# Patient Record
Sex: MALE | Race: WHITE
[De-identification: names, ages, dates, MRNs, and addresses within clinical notes are randomized per-mention and may not be internally consistent; named-entity substitution may affect disease eponyms.]

---

## 2017-12-13 ENCOUNTER — RX RENEWAL (OUTPATIENT)
Age: 57
End: 2017-12-13

## 2017-12-13 PROBLEM — Z00.00 ENCOUNTER FOR PREVENTIVE HEALTH EXAMINATION: Status: ACTIVE | Noted: 2017-12-13

## 2017-12-14 ENCOUNTER — RECORD ABSTRACTING (OUTPATIENT)
Age: 57
End: 2017-12-14

## 2017-12-14 DIAGNOSIS — Z83.3 FAMILY HISTORY OF DIABETES MELLITUS: ICD-10-CM

## 2017-12-14 DIAGNOSIS — Z87.442 PERSONAL HISTORY OF URINARY CALCULI: ICD-10-CM

## 2017-12-14 DIAGNOSIS — Z80.2 FAMILY HISTORY OF MALIGNANT NEOPLASM OF OTHER RESPIRATORY AND INTRATHORACIC ORGANS: ICD-10-CM

## 2017-12-14 DIAGNOSIS — Z82.49 FAMILY HISTORY OF ISCHEMIC HEART DISEASE AND OTHER DISEASES OF THE CIRCULATORY SYSTEM: ICD-10-CM

## 2017-12-14 DIAGNOSIS — Z81.8 FAMILY HISTORY OF OTHER MENTAL AND BEHAVIORAL DISORDERS: ICD-10-CM

## 2017-12-14 DIAGNOSIS — Z80.42 FAMILY HISTORY OF MALIGNANT NEOPLASM OF PROSTATE: ICD-10-CM

## 2017-12-14 DIAGNOSIS — Z83.49 FAMILY HISTORY OF OTHER ENDOCRINE, NUTRITIONAL AND METABOLIC DISEASES: ICD-10-CM

## 2017-12-18 ENCOUNTER — RX RENEWAL (OUTPATIENT)
Age: 57
End: 2017-12-18

## 2018-01-19 ENCOUNTER — APPOINTMENT (OUTPATIENT)
Dept: FAMILY MEDICINE | Facility: CLINIC | Age: 58
End: 2018-01-19
Payer: COMMERCIAL

## 2018-01-19 VITALS
WEIGHT: 190 LBS | HEIGHT: 67 IN | DIASTOLIC BLOOD PRESSURE: 94 MMHG | BODY MASS INDEX: 29.82 KG/M2 | SYSTOLIC BLOOD PRESSURE: 150 MMHG

## 2018-01-19 PROCEDURE — 99213 OFFICE O/P EST LOW 20 MIN: CPT

## 2018-01-19 RX ORDER — TRIAMTERENE AND HYDROCHLOROTHIAZIDE 37.5; 25 MG/1; MG/1
37.5-25 CAPSULE ORAL
Qty: 90 | Refills: 0 | Status: DISCONTINUED | COMMUNITY
Start: 2017-12-13 | End: 2018-01-19

## 2018-02-28 ENCOUNTER — APPOINTMENT (OUTPATIENT)
Dept: FAMILY MEDICINE | Facility: CLINIC | Age: 58
End: 2018-02-28

## 2018-03-19 ENCOUNTER — RX RENEWAL (OUTPATIENT)
Age: 58
End: 2018-03-19

## 2018-03-28 ENCOUNTER — APPOINTMENT (OUTPATIENT)
Dept: FAMILY MEDICINE | Facility: CLINIC | Age: 58
End: 2018-03-28

## 2018-05-29 ENCOUNTER — APPOINTMENT (OUTPATIENT)
Dept: FAMILY MEDICINE | Facility: CLINIC | Age: 58
End: 2018-05-29
Payer: COMMERCIAL

## 2018-05-29 VITALS
SYSTOLIC BLOOD PRESSURE: 120 MMHG | BODY MASS INDEX: 29.82 KG/M2 | DIASTOLIC BLOOD PRESSURE: 76 MMHG | HEIGHT: 67 IN | WEIGHT: 190 LBS

## 2018-05-29 PROCEDURE — 99213 OFFICE O/P EST LOW 20 MIN: CPT

## 2018-05-29 NOTE — HISTORY OF PRESENT ILLNESS
[Hypertension] : Hypertension [Checks BP Sporadically] : The patient checks ~his/her~ blood pressure sporadically [FreeTextEntry6] : BP check

## 2018-05-29 NOTE — HEALTH RISK ASSESSMENT
[No falls in past year] : Patient reported no falls in the past year [0] : 2) Feeling down, depressed, or hopeless: Not at all (0) [[id number]] : 0

## 2018-06-25 ENCOUNTER — APPOINTMENT (OUTPATIENT)
Dept: FAMILY MEDICINE | Facility: CLINIC | Age: 58
End: 2018-06-25

## 2018-09-13 ENCOUNTER — RX RENEWAL (OUTPATIENT)
Age: 58
End: 2018-09-13

## 2018-09-14 ENCOUNTER — RX RENEWAL (OUTPATIENT)
Age: 58
End: 2018-09-14

## 2018-10-05 ENCOUNTER — APPOINTMENT (OUTPATIENT)
Dept: FAMILY MEDICINE | Facility: CLINIC | Age: 58
End: 2018-10-05
Payer: COMMERCIAL

## 2018-10-05 VITALS
SYSTOLIC BLOOD PRESSURE: 130 MMHG | HEIGHT: 67 IN | BODY MASS INDEX: 29.82 KG/M2 | DIASTOLIC BLOOD PRESSURE: 90 MMHG | WEIGHT: 190 LBS

## 2018-10-05 PROCEDURE — 90686 IIV4 VACC NO PRSV 0.5 ML IM: CPT

## 2018-10-05 PROCEDURE — G0008: CPT

## 2018-10-05 PROCEDURE — 99213 OFFICE O/P EST LOW 20 MIN: CPT | Mod: 25

## 2018-10-05 NOTE — HISTORY OF PRESENT ILLNESS
[Hypertension] : Hypertension [Checks BP Regularly] : The patient checks ~his/her~ blood pressure regularly [<140/90] : Target blood pressure is <140/90 [FreeTextEntry6] : BP check and flu shot.

## 2018-12-10 ENCOUNTER — RX RENEWAL (OUTPATIENT)
Age: 58
End: 2018-12-10

## 2019-01-06 ENCOUNTER — RX RENEWAL (OUTPATIENT)
Age: 59
End: 2019-01-06

## 2019-02-08 ENCOUNTER — APPOINTMENT (OUTPATIENT)
Dept: FAMILY MEDICINE | Facility: CLINIC | Age: 59
End: 2019-02-08
Payer: COMMERCIAL

## 2019-02-08 VITALS — HEIGHT: 67 IN | WEIGHT: 190 LBS | BODY MASS INDEX: 29.82 KG/M2

## 2019-02-08 PROCEDURE — 90471 IMMUNIZATION ADMIN: CPT

## 2019-02-08 PROCEDURE — 90715 TDAP VACCINE 7 YRS/> IM: CPT

## 2019-02-08 PROCEDURE — 99213 OFFICE O/P EST LOW 20 MIN: CPT | Mod: 25

## 2019-02-08 NOTE — HISTORY OF PRESENT ILLNESS
[Hypertension] : Hypertension [Does not check BP] : The patient is not checking blood pressure [<140/90] : Target blood pressure is <140/90 [Target goal met] : BP target goal met [FreeTextEntry6] : BP check.

## 2019-05-13 ENCOUNTER — RX RENEWAL (OUTPATIENT)
Age: 59
End: 2019-05-13

## 2019-05-30 ENCOUNTER — APPOINTMENT (OUTPATIENT)
Dept: FAMILY MEDICINE | Facility: CLINIC | Age: 59
End: 2019-05-30
Payer: COMMERCIAL

## 2019-05-30 VITALS
BODY MASS INDEX: 29.35 KG/M2 | WEIGHT: 187 LBS | DIASTOLIC BLOOD PRESSURE: 84 MMHG | SYSTOLIC BLOOD PRESSURE: 128 MMHG | HEIGHT: 67 IN

## 2019-05-30 PROCEDURE — 99212 OFFICE O/P EST SF 10 MIN: CPT

## 2019-09-09 ENCOUNTER — RX RENEWAL (OUTPATIENT)
Age: 59
End: 2019-09-09

## 2019-09-21 ENCOUNTER — RX RENEWAL (OUTPATIENT)
Age: 59
End: 2019-09-21

## 2019-09-26 ENCOUNTER — APPOINTMENT (OUTPATIENT)
Dept: FAMILY MEDICINE | Facility: CLINIC | Age: 59
End: 2019-09-26
Payer: COMMERCIAL

## 2019-09-26 VITALS
BODY MASS INDEX: 29.35 KG/M2 | HEIGHT: 67 IN | SYSTOLIC BLOOD PRESSURE: 124 MMHG | DIASTOLIC BLOOD PRESSURE: 84 MMHG | WEIGHT: 187 LBS

## 2019-09-26 PROCEDURE — 99213 OFFICE O/P EST LOW 20 MIN: CPT

## 2019-09-26 NOTE — HISTORY OF PRESENT ILLNESS
[Hypertension] : Hypertension [Does not check BP] : The patient is not checking blood pressure [<130/90] : Target blood pressure is  <130/90 [Target goal met] : BP target goal met [FreeTextEntry6] : BP check.\par

## 2019-09-26 NOTE — HEALTH RISK ASSESSMENT
[No] : In the past 12 months have you used drugs other than those required for medical reasons? No [No falls in past year] : Patient reported no falls in the past year [0] : 2) Feeling down, depressed, or hopeless: Not at all (0) [] : No [Audit-CScore] : 0 [[id number]] : 0

## 2019-10-22 ENCOUNTER — APPOINTMENT (OUTPATIENT)
Dept: FAMILY MEDICINE | Facility: CLINIC | Age: 59
End: 2019-10-22
Payer: COMMERCIAL

## 2019-10-22 VITALS
DIASTOLIC BLOOD PRESSURE: 84 MMHG | WEIGHT: 187 LBS | SYSTOLIC BLOOD PRESSURE: 130 MMHG | BODY MASS INDEX: 29.35 KG/M2 | HEIGHT: 67 IN

## 2019-10-22 PROCEDURE — 99213 OFFICE O/P EST LOW 20 MIN: CPT | Mod: 25

## 2019-10-22 PROCEDURE — 90686 IIV4 VACC NO PRSV 0.5 ML IM: CPT

## 2019-10-22 PROCEDURE — G0008: CPT

## 2020-01-20 ENCOUNTER — RX RENEWAL (OUTPATIENT)
Age: 60
End: 2020-01-20

## 2020-01-24 ENCOUNTER — APPOINTMENT (OUTPATIENT)
Dept: FAMILY MEDICINE | Facility: CLINIC | Age: 60
End: 2020-01-24
Payer: COMMERCIAL

## 2020-01-24 VITALS
WEIGHT: 189 LBS | BODY MASS INDEX: 29.66 KG/M2 | SYSTOLIC BLOOD PRESSURE: 134 MMHG | DIASTOLIC BLOOD PRESSURE: 90 MMHG | OXYGEN SATURATION: 97 % | HEART RATE: 87 BPM | HEIGHT: 67 IN | TEMPERATURE: 98 F

## 2020-01-24 PROCEDURE — 99213 OFFICE O/P EST LOW 20 MIN: CPT

## 2020-01-24 NOTE — HEALTH RISK ASSESSMENT
[No] : In the past 12 months have you used drugs other than those required for medical reasons? No [No falls in past year] : Patient reported no falls in the past year [0] : 2) Feeling down, depressed, or hopeless: Not at all (0) [] : No [EOH6Lvfxw] : 0

## 2020-01-24 NOTE — HISTORY OF PRESENT ILLNESS
[Hypertension] : Hypertension [Checks BP Sporadically] : The patient checks ~his/her~ blood pressure sporadically [<140/90] : Target blood pressure is <140/90 [Near target goal] : BP near target goal

## 2020-05-04 ENCOUNTER — RX RENEWAL (OUTPATIENT)
Age: 60
End: 2020-05-04

## 2020-05-07 RX ORDER — METOPROLOL TARTRATE 100 MG/1
100 TABLET, FILM COATED ORAL
Qty: 180 | Refills: 0 | Status: DISCONTINUED | COMMUNITY
Start: 2020-05-04 | End: 2020-05-07

## 2020-05-29 ENCOUNTER — APPOINTMENT (OUTPATIENT)
Dept: FAMILY MEDICINE | Facility: CLINIC | Age: 60
End: 2020-05-29
Payer: COMMERCIAL

## 2020-05-29 PROCEDURE — 99213 OFFICE O/P EST LOW 20 MIN: CPT | Mod: 25

## 2020-05-29 PROCEDURE — 36415 COLL VENOUS BLD VENIPUNCTURE: CPT

## 2020-05-29 NOTE — HISTORY OF PRESENT ILLNESS
[Hypertension] : Hypertension [Does not check BP] : The patient is not checking blood pressure [<140/90] : Target blood pressure is <140/90 [Target goal met] : BP target goal met

## 2020-05-29 NOTE — HEALTH RISK ASSESSMENT
[] : No [No] : No [Never (0 pts)] : Never (0 points) [No falls in past year] : Patient reported no falls in the past year [0] : 2) Feeling down, depressed, or hopeless: Not at all (0) [IRM4Ebhqc] : 0

## 2020-05-30 LAB
SARS-COV-2 IGG SERPL IA-ACNC: 0.1 INDEX
SARS-COV-2 IGG SERPL QL IA: NEGATIVE

## 2020-07-31 ENCOUNTER — RX RENEWAL (OUTPATIENT)
Age: 60
End: 2020-07-31

## 2020-09-17 ENCOUNTER — APPOINTMENT (OUTPATIENT)
Dept: FAMILY MEDICINE | Facility: CLINIC | Age: 60
End: 2020-09-17
Payer: COMMERCIAL

## 2020-09-17 VITALS — DIASTOLIC BLOOD PRESSURE: 88 MMHG | SYSTOLIC BLOOD PRESSURE: 136 MMHG

## 2020-09-17 VITALS — DIASTOLIC BLOOD PRESSURE: 92 MMHG | SYSTOLIC BLOOD PRESSURE: 142 MMHG

## 2020-09-17 PROCEDURE — 90686 IIV4 VACC NO PRSV 0.5 ML IM: CPT

## 2020-09-17 PROCEDURE — 99213 OFFICE O/P EST LOW 20 MIN: CPT | Mod: 25

## 2020-09-17 PROCEDURE — G0008: CPT

## 2020-09-17 NOTE — HEALTH RISK ASSESSMENT
[] : No [No] : In the past 12 months have you used drugs other than those required for medical reasons? No [No falls in past year] : Patient reported no falls in the past year [0] : 2) Feeling down, depressed, or hopeless: Not at all (0) [JCZ2Rdehf] : 0

## 2020-09-17 NOTE — HISTORY OF PRESENT ILLNESS
[Hypertension] : Hypertension [Patient was last seen on ___] : Patient was last seen on [unfilled] [Checks BP Sporadically] : The patient checks ~his/her~ blood pressure sporadically [Near target goal] : BP near target goal [FreeTextEntry6] : No new or worsening symptoms since last visit. [<140/90] : Target blood pressure is <140/90 [de-identified] : 134/82

## 2020-09-17 NOTE — PHYSICAL EXAM
[No Abdominal Bruit] : a ~M bruit was not heard ~T in the abdomen [No Edema] : there was no peripheral edema [No Palpable Aorta] : no palpable aorta [Normal] : soft, non-tender, non-distended, no masses palpated, no HSM and normal bowel sounds

## 2020-11-20 ENCOUNTER — APPOINTMENT (OUTPATIENT)
Dept: FAMILY MEDICINE | Facility: CLINIC | Age: 60
End: 2020-11-20
Payer: COMMERCIAL

## 2020-11-20 VITALS
OXYGEN SATURATION: 98 % | DIASTOLIC BLOOD PRESSURE: 90 MMHG | TEMPERATURE: 94.9 F | SYSTOLIC BLOOD PRESSURE: 137 MMHG | HEART RATE: 78 BPM | BODY MASS INDEX: 29.66 KG/M2 | WEIGHT: 189 LBS | HEIGHT: 67 IN

## 2020-11-20 DIAGNOSIS — Z23 ENCOUNTER FOR IMMUNIZATION: ICD-10-CM

## 2020-11-20 DIAGNOSIS — H60.501 UNSPECIFIED ACUTE NONINFECTIVE OTITIS EXTERNA, RIGHT EAR: ICD-10-CM

## 2020-11-20 DIAGNOSIS — Z11.59 ENCOUNTER FOR SCREENING FOR OTHER VIRAL DISEASES: ICD-10-CM

## 2020-11-20 PROCEDURE — 99213 OFFICE O/P EST LOW 20 MIN: CPT

## 2020-11-20 NOTE — PLAN
[FreeTextEntry1] : \par Counseling provided on symptoms to monitor for. Call/return if symptoms worsen. \par Use prescribed ear drops for 7 days.

## 2020-11-20 NOTE — HISTORY OF PRESENT ILLNESS
[FreeTextEntry8] : Kala Garcia is a 60 year old male who presents with right ear pain for about 2 days. States it has improved significantly. He felt as if he had water in his ear. Then he heard a popping sensation and has been better. Denies fever/chills.

## 2020-12-11 ENCOUNTER — OUTPATIENT (OUTPATIENT)
Dept: OUTPATIENT SERVICES | Facility: HOSPITAL | Age: 60
LOS: 1 days | End: 2020-12-11
Payer: COMMERCIAL

## 2020-12-11 DIAGNOSIS — Z11.59 ENCOUNTER FOR SCREENING FOR OTHER VIRAL DISEASES: ICD-10-CM

## 2020-12-11 PROCEDURE — U0003: CPT

## 2020-12-12 ENCOUNTER — TRANSCRIPTION ENCOUNTER (OUTPATIENT)
Age: 60
End: 2020-12-12

## 2020-12-12 DIAGNOSIS — Z11.59 ENCOUNTER FOR SCREENING FOR OTHER VIRAL DISEASES: ICD-10-CM

## 2020-12-13 LAB — SARS-COV-2 RNA SPEC QL NAA+PROBE: SIGNIFICANT CHANGE UP

## 2020-12-18 ENCOUNTER — RX RENEWAL (OUTPATIENT)
Age: 60
End: 2020-12-18

## 2021-03-16 ENCOUNTER — NON-APPOINTMENT (OUTPATIENT)
Age: 61
End: 2021-03-16

## 2021-06-20 ENCOUNTER — RX RENEWAL (OUTPATIENT)
Age: 61
End: 2021-06-20

## 2021-06-23 ENCOUNTER — RX RENEWAL (OUTPATIENT)
Age: 61
End: 2021-06-23

## 2021-07-07 ENCOUNTER — APPOINTMENT (OUTPATIENT)
Dept: FAMILY MEDICINE | Facility: CLINIC | Age: 61
End: 2021-07-07
Payer: COMMERCIAL

## 2021-07-07 VITALS
SYSTOLIC BLOOD PRESSURE: 119 MMHG | TEMPERATURE: 98 F | HEART RATE: 87 BPM | WEIGHT: 189 LBS | BODY MASS INDEX: 29.66 KG/M2 | OXYGEN SATURATION: 97 % | HEIGHT: 67 IN | DIASTOLIC BLOOD PRESSURE: 79 MMHG

## 2021-07-07 PROCEDURE — 99213 OFFICE O/P EST LOW 20 MIN: CPT

## 2021-07-07 NOTE — COUNSELING
[Fall prevention counseling provided] : Fall prevention counseling provided [Behavioral health counseling provided] : Behavioral health counseling provided [Potential consequences of obesity discussed] : Potential consequences of obesity discussed [Good understanding] : Patient has a good understanding of lifestyle changes and steps needed to achieve self management goal

## 2021-07-07 NOTE — HEALTH RISK ASSESSMENT
[0-4] : 0-4 [1 or 2 (0 pts)] : 1 or 2 (0 points) [Never (0 pts)] : Never (0 points) [No] : In the past 12 months have you used drugs other than those required for medical reasons? No [No falls in past year] : Patient reported no falls in the past year [0] : 2) Feeling down, depressed, or hopeless: Not at all (0) [] : No [Audit-CScore] : 0 [IKH3Mbsoh] : 0

## 2021-10-07 ENCOUNTER — APPOINTMENT (OUTPATIENT)
Dept: FAMILY MEDICINE | Facility: CLINIC | Age: 61
End: 2021-10-07
Payer: COMMERCIAL

## 2021-10-07 VITALS
WEIGHT: 189 LBS | BODY MASS INDEX: 29.66 KG/M2 | OXYGEN SATURATION: 96 % | DIASTOLIC BLOOD PRESSURE: 89 MMHG | SYSTOLIC BLOOD PRESSURE: 148 MMHG | HEART RATE: 97 BPM | TEMPERATURE: 98.4 F | HEIGHT: 67 IN

## 2021-10-07 DIAGNOSIS — J04.0 ACUTE LARYNGITIS: ICD-10-CM

## 2021-10-07 PROCEDURE — 99213 OFFICE O/P EST LOW 20 MIN: CPT

## 2021-10-07 RX ORDER — BENZONATATE 100 MG/1
100 CAPSULE ORAL EVERY 8 HOURS
Qty: 40 | Refills: 0 | Status: COMPLETED | COMMUNITY
Start: 2021-10-07 | End: 2021-10-14

## 2021-10-15 NOTE — PHYSICAL EXAM
[Normal] : affect was normal and insight and judgment were intact [de-identified] : errythematous oropharynx

## 2021-10-15 NOTE — PLAN
[FreeTextEntry1] : Symptomatic management for now. May try lozenges, warm teas, salt/water gargles.\par Return sooner if needed.

## 2021-10-15 NOTE — HISTORY OF PRESENT ILLNESS
[FreeTextEntry8] : Patient presenting for symptoms of sore throat.\par Denies fevers/chills, shortness of breath. Denies taking any OTC pain med. Has taken cough drops.

## 2021-11-16 ENCOUNTER — APPOINTMENT (OUTPATIENT)
Dept: FAMILY MEDICINE | Facility: CLINIC | Age: 61
End: 2021-11-16
Payer: COMMERCIAL

## 2021-11-16 VITALS
OXYGEN SATURATION: 97 % | DIASTOLIC BLOOD PRESSURE: 80 MMHG | WEIGHT: 189 LBS | SYSTOLIC BLOOD PRESSURE: 144 MMHG | HEIGHT: 67 IN | BODY MASS INDEX: 29.66 KG/M2 | HEART RATE: 95 BPM | TEMPERATURE: 97.9 F

## 2021-11-16 PROCEDURE — 99213 OFFICE O/P EST LOW 20 MIN: CPT | Mod: 25

## 2021-11-16 PROCEDURE — 90686 IIV4 VACC NO PRSV 0.5 ML IM: CPT

## 2021-11-16 PROCEDURE — G0008: CPT

## 2021-11-16 NOTE — HISTORY OF PRESENT ILLNESS
[Hypertension] : Hypertension [FreeTextEntry6] : Influenza vaccine\par Still with some laryngitis but much better.  No fever or chills. No cough. ONly tickle to back of throat.

## 2022-01-21 ENCOUNTER — RX RENEWAL (OUTPATIENT)
Age: 62
End: 2022-01-21

## 2022-02-17 ENCOUNTER — APPOINTMENT (OUTPATIENT)
Dept: FAMILY MEDICINE | Facility: CLINIC | Age: 62
End: 2022-02-17

## 2022-03-05 ENCOUNTER — RX RENEWAL (OUTPATIENT)
Age: 62
End: 2022-03-05

## 2022-03-06 ENCOUNTER — NON-APPOINTMENT (OUTPATIENT)
Age: 62
End: 2022-03-06

## 2022-04-11 PROBLEM — Z11.59 SCREENING FOR VIRAL DISEASE: Status: RESOLVED | Noted: 2020-05-29 | Resolved: 2020-11-20

## 2022-06-12 ENCOUNTER — RX RENEWAL (OUTPATIENT)
Age: 62
End: 2022-06-12

## 2022-06-24 ENCOUNTER — APPOINTMENT (OUTPATIENT)
Dept: FAMILY MEDICINE | Facility: CLINIC | Age: 62
End: 2022-06-24
Payer: COMMERCIAL

## 2022-06-24 ENCOUNTER — RX RENEWAL (OUTPATIENT)
Age: 62
End: 2022-06-24

## 2022-06-24 VITALS — DIASTOLIC BLOOD PRESSURE: 32 MMHG | SYSTOLIC BLOOD PRESSURE: 118 MMHG

## 2022-06-24 PROCEDURE — 99213 OFFICE O/P EST LOW 20 MIN: CPT

## 2022-06-24 NOTE — HISTORY OF PRESENT ILLNESS
[Hypertension] : Hypertension [Checks BP Sporadically] : The patient checks ~his/her~ blood pressure sporadically [<140/90] : Target blood pressure is <140/90 [Target goal met] : BP target goal met

## 2022-09-24 ENCOUNTER — RX RENEWAL (OUTPATIENT)
Age: 62
End: 2022-09-24

## 2022-11-22 ENCOUNTER — APPOINTMENT (OUTPATIENT)
Dept: FAMILY MEDICINE | Facility: CLINIC | Age: 62
End: 2022-11-22

## 2022-11-22 VITALS
WEIGHT: 189 LBS | BODY MASS INDEX: 29.66 KG/M2 | HEIGHT: 67 IN | TEMPERATURE: 98.1 F | SYSTOLIC BLOOD PRESSURE: 118 MMHG | OXYGEN SATURATION: 97 % | HEART RATE: 81 BPM | DIASTOLIC BLOOD PRESSURE: 70 MMHG

## 2022-11-22 PROCEDURE — 99214 OFFICE O/P EST MOD 30 MIN: CPT

## 2022-11-22 NOTE — PHYSICAL EXAM
[Normal] : no joint swelling and grossly normal strength and tone [de-identified] : ulceration to right nasal septum

## 2022-11-22 NOTE — HEALTH RISK ASSESSMENT
[Never] : Never [0-4] : 0-4 [1 or 2 (0 pts)] : 1 or 2 (0 points) [Never (0 pts)] : Never (0 points) [No] : In the past 12 months have you used drugs other than those required for medical reasons? No [No falls in past year] : Patient reported no falls in the past year [0] : 2) Feeling down, depressed, or hopeless: Not at all (0) [PHQ-2 Negative - No further assessment needed] : PHQ-2 Negative - No further assessment needed [Audit-CScore] : 0 [[id number]] : 0

## 2022-12-05 ENCOUNTER — RX RENEWAL (OUTPATIENT)
Age: 62
End: 2022-12-05

## 2022-12-25 ENCOUNTER — RX RENEWAL (OUTPATIENT)
Age: 62
End: 2022-12-25

## 2023-01-24 ENCOUNTER — APPOINTMENT (OUTPATIENT)
Dept: OTOLARYNGOLOGY | Facility: CLINIC | Age: 63
End: 2023-01-24
Payer: COMMERCIAL

## 2023-01-24 VITALS — BODY MASS INDEX: 26.68 KG/M2 | TEMPERATURE: 97.3 F | HEIGHT: 67 IN | WEIGHT: 170 LBS

## 2023-01-24 DIAGNOSIS — J34.0 ABSCESS, FURUNCLE AND CARBUNCLE OF NOSE: ICD-10-CM

## 2023-01-24 PROCEDURE — 31231 NASAL ENDOSCOPY DX: CPT

## 2023-01-24 PROCEDURE — 99204 OFFICE O/P NEW MOD 45 MIN: CPT | Mod: 25

## 2023-01-24 NOTE — HISTORY OF PRESENT ILLNESS
[de-identified] : co scabbing inside nose\par question of perf symptoms started 12 y ago\par no excessive drip\par no obstruction\par co lump sensation throat past 16 mo\par can regurgitate food, pressure in thoat\par hx gerd in past but not now

## 2023-01-24 NOTE — ASSESSMENT
[FreeTextEntry1] : rt anterior septal ulceration, no septal perf\par rec bacitracin ointment as directed tid\par exam larynx hypopharynx unremarkable\par ?reflux\par ro Zenkers-ba swallow
Statement Selected

## 2023-01-24 NOTE — CONSULT LETTER
[Consult Letter:] : I had the pleasure of evaluating your patient, [unfilled]. [Please see my note below.] : Please see my note below. [Consult Closing:] : Thank you very much for allowing me to participate in the care of this patient.  If you have any questions, please do not hesitate to contact me. [Sincerely,] : Sincerely, [FreeTextEntry1] : Dear Dr. ANTONELLA LACEY,\par \par Thank you for your kind referral. Please refer to my enclosed office notes for TJ HAYWOOD . If there are any questions free to contact me.\par  [FreeTextEntry3] : Bunny Balderas MD, FACS\par

## 2023-01-24 NOTE — PROCEDURE
[FreeTextEntry6] : Indication:  Unable to adequately examine nasal passages and sinus drainage with anterior rhinoscopy.\par The patient has chronic rt nasal crusting\par \par Scope # 44\par Mild septal deviation is present on direct visualization on either side.\par superficial ulceration along rt anterior septum no exposed cartilage\par Both inferior nasal turbinates are moderate in size with normal  appearing mucosa. \par The sinus endoscope was introduced into the right nares\par exam right middle meatus reveals no mucopus, polyps or inflammation.  The middle turbinate is unremarkable.\par The scope was advanced and the sphenoethmoid region was inspected.\par The superior meatus and nasal vault are unremarkable.  The nasopharynx is unremarkable without inflammation or mass\par The sinus endoscope was introduced into the left nares\par exam of the left middle meatus reveals no mucopus, polyps or inflammation and the left middle turbinate is unremarkable.\par The scope was advanced and the sphenoethmoid region was inspected.\par The left superior meatus and nasal vault are unremarkable.\par The fiberoptic scope was introduced to the posterior pharynx and exam of the endolarynx is wnl w good vocal cord mobility.\par No lesion noted in hypopharynx.  There is no erythema or edema of the posterior larynx to suggest reflux.\par

## 2023-01-24 NOTE — REASON FOR VISIT
[Initial Consultation] : an initial consultation for [FreeTextEntry2] : septal  perforation     esophagus

## 2023-01-30 ENCOUNTER — NON-APPOINTMENT (OUTPATIENT)
Age: 63
End: 2023-01-30

## 2023-01-31 ENCOUNTER — NON-APPOINTMENT (OUTPATIENT)
Age: 63
End: 2023-01-31

## 2023-02-06 ENCOUNTER — RESULT REVIEW (OUTPATIENT)
Age: 63
End: 2023-02-06

## 2023-02-06 ENCOUNTER — OUTPATIENT (OUTPATIENT)
Dept: OUTPATIENT SERVICES | Facility: HOSPITAL | Age: 63
LOS: 1 days | End: 2023-02-06
Payer: COMMERCIAL

## 2023-02-06 DIAGNOSIS — R13.12 DYSPHAGIA, OROPHARYNGEAL PHASE: ICD-10-CM

## 2023-02-06 DIAGNOSIS — J31.0 CHRONIC RHINITIS: ICD-10-CM

## 2023-02-06 PROCEDURE — 74220 X-RAY XM ESOPHAGUS 1CNTRST: CPT

## 2023-02-06 PROCEDURE — 74220 X-RAY XM ESOPHAGUS 1CNTRST: CPT | Mod: 26

## 2023-02-07 DIAGNOSIS — R13.12 DYSPHAGIA, OROPHARYNGEAL PHASE: ICD-10-CM

## 2023-02-07 DIAGNOSIS — J31.0 CHRONIC RHINITIS: ICD-10-CM

## 2023-02-08 ENCOUNTER — NON-APPOINTMENT (OUTPATIENT)
Age: 63
End: 2023-02-08

## 2023-02-09 ENCOUNTER — APPOINTMENT (OUTPATIENT)
Dept: GASTROENTEROLOGY | Facility: CLINIC | Age: 63
End: 2023-02-09
Payer: COMMERCIAL

## 2023-02-09 VITALS
HEART RATE: 87 BPM | OXYGEN SATURATION: 100 % | BODY MASS INDEX: 26.68 KG/M2 | WEIGHT: 170 LBS | DIASTOLIC BLOOD PRESSURE: 87 MMHG | SYSTOLIC BLOOD PRESSURE: 138 MMHG | HEIGHT: 67 IN | RESPIRATION RATE: 87 BRPM

## 2023-02-09 PROCEDURE — 99204 OFFICE O/P NEW MOD 45 MIN: CPT

## 2023-02-09 NOTE — HISTORY OF PRESENT ILLNESS
[FreeTextEntry1] : Dave Garcia is a 62 year old male no PMH presents today as initial consult for dysphagia. Pt notes has been going on intermittently since 2021, however notes over the last month has become severe to the point where he can no longer eat solid foods. pt was undergoing workup with Dr. Balderas, noted esophageal obstructions on XR esophagram. Pt admits that over the last 2 months has lost about 30 pounds, has had to regurgitate food because he can't get it to go down. Notes he had reflux most of his adult life until about 2 years ago when it suddenly improved. Has never had previous EGD. Denies any bowel complaints.

## 2023-02-09 NOTE — REVIEW OF SYSTEMS
[As Noted in HPI] : as noted in HPI [Abdominal Pain] : no abdominal pain [Vomiting] : vomiting [Constipation] : no constipation [Diarrhea] : no diarrhea [Heartburn] : heartburn [Melena (black stool)] : no melena [Bleeding] : no bleeding [Fecal Incontinence (soiling)] : no fecal incontinence [Bloating (gassiness)] : no bloating [Negative] : Heme/Lymph

## 2023-02-09 NOTE — PHYSICAL EXAM
[Alert] : alert [Underweight (BMI <= 18.5)] : underweight (BMI <= 18.5) [Sclera] : the sclera and conjunctiva were normal [Bowel Sounds] : normal bowel sounds [Abdomen Tenderness] : non-tender [Abdomen Soft] : soft [Abnormal Walk] : normal gait [Normal Color / Pigmentation] : normal skin color and pigmentation [Oriented To Time, Place, And Person] : oriented to person, place, and time

## 2023-02-09 NOTE — ASSESSMENT
[FreeTextEntry1] : Plan:\par Discussed at length findings of XR esophagram indicating obstruction, highly suspicious for malignancy. recommend EGD ASAP with possible stent placement to relieve symptoms. Risks versus benefits as well as instructions reviewed, pt agrees to planned procedure. Discussed at length importance of liquid diet until then to prevent acute obstruction. Pt and wife at bedside express understanding, all questions answered. Discussed with Dr. Briceño. I have spent 45 minutes on this encounter.

## 2023-02-14 ENCOUNTER — OUTPATIENT (OUTPATIENT)
Dept: OUTPATIENT SERVICES | Facility: HOSPITAL | Age: 63
LOS: 1 days | End: 2023-02-14
Payer: COMMERCIAL

## 2023-02-14 VITALS
HEART RATE: 83 BPM | RESPIRATION RATE: 16 BRPM | SYSTOLIC BLOOD PRESSURE: 131 MMHG | HEIGHT: 67 IN | TEMPERATURE: 98 F | DIASTOLIC BLOOD PRESSURE: 68 MMHG | OXYGEN SATURATION: 100 % | WEIGHT: 154.1 LBS

## 2023-02-14 DIAGNOSIS — Z90.49 ACQUIRED ABSENCE OF OTHER SPECIFIED PARTS OF DIGESTIVE TRACT: Chronic | ICD-10-CM

## 2023-02-14 DIAGNOSIS — K22.89 OTHER SPECIFIED DISEASE OF ESOPHAGUS: ICD-10-CM

## 2023-02-14 DIAGNOSIS — Z98.890 OTHER SPECIFIED POSTPROCEDURAL STATES: Chronic | ICD-10-CM

## 2023-02-14 LAB
APTT BLD: 30.7 SEC — SIGNIFICANT CHANGE UP (ref 27.5–35.5)
BASOPHILS # BLD AUTO: 0.04 K/UL — SIGNIFICANT CHANGE UP (ref 0–0.2)
BASOPHILS NFR BLD AUTO: 0.7 % — SIGNIFICANT CHANGE UP (ref 0–2)
EOSINOPHIL # BLD AUTO: 0.07 K/UL — SIGNIFICANT CHANGE UP (ref 0–0.5)
EOSINOPHIL NFR BLD AUTO: 1.2 % — SIGNIFICANT CHANGE UP (ref 0–6)
HCT VFR BLD CALC: 35.7 % — LOW (ref 39–50)
HGB BLD-MCNC: 11.3 G/DL — LOW (ref 13–17)
IMM GRANULOCYTES NFR BLD AUTO: 0.3 % — SIGNIFICANT CHANGE UP (ref 0–0.9)
INR BLD: 1.1 RATIO — SIGNIFICANT CHANGE UP (ref 0.88–1.16)
LYMPHOCYTES # BLD AUTO: 1.51 K/UL — SIGNIFICANT CHANGE UP (ref 1–3.3)
LYMPHOCYTES # BLD AUTO: 24.8 % — SIGNIFICANT CHANGE UP (ref 13–44)
MCHC RBC-ENTMCNC: 26.5 PG — LOW (ref 27–34)
MCHC RBC-ENTMCNC: 31.7 GM/DL — LOW (ref 32–36)
MCV RBC AUTO: 83.6 FL — SIGNIFICANT CHANGE UP (ref 80–100)
MONOCYTES # BLD AUTO: 0.47 K/UL — SIGNIFICANT CHANGE UP (ref 0–0.9)
MONOCYTES NFR BLD AUTO: 7.7 % — SIGNIFICANT CHANGE UP (ref 2–14)
NEUTROPHILS # BLD AUTO: 3.97 K/UL — SIGNIFICANT CHANGE UP (ref 1.8–7.4)
NEUTROPHILS NFR BLD AUTO: 65.3 % — SIGNIFICANT CHANGE UP (ref 43–77)
PLATELET # BLD AUTO: 170 K/UL — SIGNIFICANT CHANGE UP (ref 150–400)
PROTHROM AB SERPL-ACNC: 12.8 SEC — SIGNIFICANT CHANGE UP (ref 10.5–13.4)
RBC # BLD: 4.27 M/UL — SIGNIFICANT CHANGE UP (ref 4.2–5.8)
RBC # FLD: 13.2 % — SIGNIFICANT CHANGE UP (ref 10.3–14.5)
SARS-COV-2 RNA SPEC QL NAA+PROBE: SIGNIFICANT CHANGE UP
WBC # BLD: 6.08 K/UL — SIGNIFICANT CHANGE UP (ref 3.8–10.5)
WBC # FLD AUTO: 6.08 K/UL — SIGNIFICANT CHANGE UP (ref 3.8–10.5)

## 2023-02-14 PROCEDURE — U0003: CPT

## 2023-02-14 PROCEDURE — 93010 ELECTROCARDIOGRAM REPORT: CPT

## 2023-02-14 PROCEDURE — 99214 OFFICE O/P EST MOD 30 MIN: CPT | Mod: 25

## 2023-02-14 PROCEDURE — 85730 THROMBOPLASTIN TIME PARTIAL: CPT

## 2023-02-14 PROCEDURE — 85610 PROTHROMBIN TIME: CPT

## 2023-02-14 PROCEDURE — U0005: CPT

## 2023-02-14 PROCEDURE — 85025 COMPLETE CBC W/AUTO DIFF WBC: CPT

## 2023-02-14 PROCEDURE — 36415 COLL VENOUS BLD VENIPUNCTURE: CPT

## 2023-02-14 PROCEDURE — 93005 ELECTROCARDIOGRAM TRACING: CPT

## 2023-02-14 NOTE — H&P PST ADULT - PROBLEM SELECTOR PLAN 1
1. Expect a call from Endoscopy the day before your procedure between 11am and 3pm.  2. Follow the GI doctor's instructions for preparation  and day before procedure activities.  3. Follow the GI doctor's  instructions for medications.   4. Covid swab done today in PST

## 2023-02-14 NOTE — H&P PST ADULT - ASSESSMENT
61 y/o male with hx of HTn presents to [facility] for scheduled endoscopy with stent placement on 2/15/23.

## 2023-02-14 NOTE — H&P PST ADULT - HISTORY OF PRESENT ILLNESS
61 y/o male with hx of HTn presents to [facility] for scheduled endoscopy with stent placement on 2/15/23. Patient with dysphagia that has worsen over the last 8 months seen by ENT and GI diagnostic testing done and mass noted.

## 2023-02-15 ENCOUNTER — RESULT REVIEW (OUTPATIENT)
Age: 63
End: 2023-02-15

## 2023-02-15 ENCOUNTER — OUTPATIENT (OUTPATIENT)
Dept: INPATIENT UNIT | Facility: HOSPITAL | Age: 63
LOS: 1 days | Discharge: HOME | End: 2023-02-15
Payer: COMMERCIAL

## 2023-02-15 ENCOUNTER — APPOINTMENT (OUTPATIENT)
Dept: GASTROENTEROLOGY | Facility: HOSPITAL | Age: 63
End: 2023-02-15

## 2023-02-15 ENCOUNTER — TRANSCRIPTION ENCOUNTER (OUTPATIENT)
Age: 63
End: 2023-02-15

## 2023-02-15 VITALS
SYSTOLIC BLOOD PRESSURE: 130 MMHG | TEMPERATURE: 98 F | RESPIRATION RATE: 16 BRPM | OXYGEN SATURATION: 99 % | HEART RATE: 74 BPM | DIASTOLIC BLOOD PRESSURE: 78 MMHG

## 2023-02-15 VITALS
TEMPERATURE: 98 F | RESPIRATION RATE: 16 BRPM | WEIGHT: 154.1 LBS | HEART RATE: 77 BPM | SYSTOLIC BLOOD PRESSURE: 123 MMHG | DIASTOLIC BLOOD PRESSURE: 66 MMHG | HEIGHT: 67 IN | OXYGEN SATURATION: 100 %

## 2023-02-15 DIAGNOSIS — K22.89 OTHER SPECIFIED DISEASE OF ESOPHAGUS: ICD-10-CM

## 2023-02-15 DIAGNOSIS — Z98.890 OTHER SPECIFIED POSTPROCEDURAL STATES: Chronic | ICD-10-CM

## 2023-02-15 DIAGNOSIS — Z90.49 ACQUIRED ABSENCE OF OTHER SPECIFIED PARTS OF DIGESTIVE TRACT: Chronic | ICD-10-CM

## 2023-02-15 LAB
ANION GAP SERPL CALC-SCNC: 6 MMOL/L — SIGNIFICANT CHANGE UP (ref 5–17)
BUN SERPL-MCNC: 14 MG/DL — SIGNIFICANT CHANGE UP (ref 7–23)
CALCIUM SERPL-MCNC: 9 MG/DL — SIGNIFICANT CHANGE UP (ref 8.5–10.1)
CHLORIDE SERPL-SCNC: 103 MMOL/L — SIGNIFICANT CHANGE UP (ref 96–108)
CO2 SERPL-SCNC: 29 MMOL/L — SIGNIFICANT CHANGE UP (ref 22–31)
CREAT SERPL-MCNC: 0.84 MG/DL — SIGNIFICANT CHANGE UP (ref 0.5–1.3)
EGFR: 98 ML/MIN/1.73M2 — SIGNIFICANT CHANGE UP
GLUCOSE SERPL-MCNC: 168 MG/DL — HIGH (ref 70–99)
POTASSIUM SERPL-MCNC: 3.5 MMOL/L — SIGNIFICANT CHANGE UP (ref 3.5–5.3)
POTASSIUM SERPL-SCNC: 3.5 MMOL/L — SIGNIFICANT CHANGE UP (ref 3.5–5.3)
SODIUM SERPL-SCNC: 138 MMOL/L — SIGNIFICANT CHANGE UP (ref 135–145)

## 2023-02-15 PROCEDURE — 88342 IMHCHEM/IMCYTCHM 1ST ANTB: CPT | Mod: 26,59

## 2023-02-15 PROCEDURE — C1769: CPT

## 2023-02-15 PROCEDURE — 88341 IMHCHEM/IMCYTCHM EA ADD ANTB: CPT

## 2023-02-15 PROCEDURE — 76000 FLUOROSCOPY <1 HR PHYS/QHP: CPT

## 2023-02-15 PROCEDURE — 43239 EGD BIOPSY SINGLE/MULTIPLE: CPT | Mod: 59

## 2023-02-15 PROCEDURE — 43259 EGD US EXAM DUODENUM/JEJUNUM: CPT

## 2023-02-15 PROCEDURE — 88360 TUMOR IMMUNOHISTOCHEM/MANUAL: CPT

## 2023-02-15 PROCEDURE — 88313 SPECIAL STAINS GROUP 2: CPT | Mod: 26

## 2023-02-15 PROCEDURE — 36415 COLL VENOUS BLD VENIPUNCTURE: CPT

## 2023-02-15 PROCEDURE — 88313 SPECIAL STAINS GROUP 2: CPT

## 2023-02-15 PROCEDURE — 88360 TUMOR IMMUNOHISTOCHEM/MANUAL: CPT | Mod: 26

## 2023-02-15 PROCEDURE — 43266 EGD ENDOSCOPIC STENT PLACE: CPT

## 2023-02-15 PROCEDURE — 88305 TISSUE EXAM BY PATHOLOGIST: CPT | Mod: 26

## 2023-02-15 PROCEDURE — 88305 TISSUE EXAM BY PATHOLOGIST: CPT

## 2023-02-15 PROCEDURE — C1874: CPT

## 2023-02-15 PROCEDURE — 80048 BASIC METABOLIC PNL TOTAL CA: CPT

## 2023-02-15 PROCEDURE — 88342 IMHCHEM/IMCYTCHM 1ST ANTB: CPT

## 2023-02-15 PROCEDURE — 88341 IMHCHEM/IMCYTCHM EA ADD ANTB: CPT | Mod: 26,59

## 2023-02-15 RX ORDER — SODIUM CHLORIDE 9 MG/ML
1000 INJECTION, SOLUTION INTRAVENOUS
Refills: 0 | Status: DISCONTINUED | OUTPATIENT
Start: 2023-02-15 | End: 2023-02-15

## 2023-02-15 RX ORDER — ONDANSETRON 8 MG/1
4 TABLET, FILM COATED ORAL ONCE
Refills: 0 | Status: DISCONTINUED | OUTPATIENT
Start: 2023-02-15 | End: 2023-02-15

## 2023-02-15 RX ORDER — HYDROMORPHONE HYDROCHLORIDE 2 MG/ML
0.25 INJECTION INTRAMUSCULAR; INTRAVENOUS; SUBCUTANEOUS
Refills: 0 | Status: DISCONTINUED | OUTPATIENT
Start: 2023-02-15 | End: 2023-02-15

## 2023-02-15 RX ORDER — ACETAMINOPHEN 500 MG
1000 TABLET ORAL ONCE
Refills: 0 | Status: DISCONTINUED | OUTPATIENT
Start: 2023-02-15 | End: 2023-02-15

## 2023-02-15 RX ORDER — FENTANYL CITRATE 50 UG/ML
25 INJECTION INTRAVENOUS
Refills: 0 | Status: DISCONTINUED | OUTPATIENT
Start: 2023-02-15 | End: 2023-02-15

## 2023-02-15 NOTE — ASU PATIENT PROFILE, ADULT - FALL HARM RISK - UNIVERSAL INTERVENTIONS
Bed in lowest position, wheels locked, appropriate side rails in place/Call bell, personal items and telephone in reach/Instruct patient to call for assistance before getting out of bed or chair/Non-slip footwear when patient is out of bed/[place] to call system/Physically safe environment - no spills, clutter or unnecessary equipment/Purposeful Proactive Rounding/Room/bathroom lighting operational, light cord in reach

## 2023-02-17 ENCOUNTER — APPOINTMENT (OUTPATIENT)
Dept: FAMILY MEDICINE | Facility: CLINIC | Age: 63
End: 2023-02-17

## 2023-02-21 LAB — SURGICAL PATHOLOGY STUDY: SIGNIFICANT CHANGE UP

## 2023-02-22 DIAGNOSIS — I10 ESSENTIAL (PRIMARY) HYPERTENSION: ICD-10-CM

## 2023-02-22 DIAGNOSIS — R13.10 DYSPHAGIA, UNSPECIFIED: ICD-10-CM

## 2023-02-22 DIAGNOSIS — Z90.49 ACQUIRED ABSENCE OF OTHER SPECIFIED PARTS OF DIGESTIVE TRACT: ICD-10-CM

## 2023-02-22 DIAGNOSIS — C15.9 MALIGNANT NEOPLASM OF ESOPHAGUS, UNSPECIFIED: ICD-10-CM

## 2023-02-22 PROBLEM — K22.89 OTHER SPECIFIED DISEASE OF ESOPHAGUS: Chronic | Status: ACTIVE | Noted: 2023-02-14

## 2023-02-24 ENCOUNTER — OUTPATIENT (OUTPATIENT)
Dept: OUTPATIENT SERVICES | Facility: HOSPITAL | Age: 63
LOS: 1 days | Discharge: HOME | End: 2023-02-24

## 2023-02-24 DIAGNOSIS — C15.9 MALIGNANT NEOPLASM OF ESOPHAGUS, UNSPECIFIED: ICD-10-CM

## 2023-02-24 DIAGNOSIS — Z90.49 ACQUIRED ABSENCE OF OTHER SPECIFIED PARTS OF DIGESTIVE TRACT: Chronic | ICD-10-CM

## 2023-02-24 DIAGNOSIS — Z98.890 OTHER SPECIFIED POSTPROCEDURAL STATES: Chronic | ICD-10-CM

## 2023-02-27 ENCOUNTER — APPOINTMENT (OUTPATIENT)
Dept: CT IMAGING | Facility: CLINIC | Age: 63
End: 2023-02-27

## 2023-02-27 ENCOUNTER — NON-APPOINTMENT (OUTPATIENT)
Age: 63
End: 2023-02-27

## 2023-02-27 ENCOUNTER — OUTPATIENT (OUTPATIENT)
Dept: OUTPATIENT SERVICES | Facility: HOSPITAL | Age: 63
LOS: 1 days | End: 2023-02-27
Payer: COMMERCIAL

## 2023-02-27 DIAGNOSIS — Z98.890 OTHER SPECIFIED POSTPROCEDURAL STATES: Chronic | ICD-10-CM

## 2023-02-27 DIAGNOSIS — R13.12 DYSPHAGIA, OROPHARYNGEAL PHASE: ICD-10-CM

## 2023-02-27 DIAGNOSIS — Z90.49 ACQUIRED ABSENCE OF OTHER SPECIFIED PARTS OF DIGESTIVE TRACT: Chronic | ICD-10-CM

## 2023-02-27 PROCEDURE — 71260 CT THORAX DX C+: CPT

## 2023-02-27 PROCEDURE — 74177 CT ABD & PELVIS W/CONTRAST: CPT

## 2023-02-28 ENCOUNTER — NON-APPOINTMENT (OUTPATIENT)
Age: 63
End: 2023-02-28

## 2023-03-02 ENCOUNTER — RESULT REVIEW (OUTPATIENT)
Age: 63
End: 2023-03-02

## 2023-03-02 ENCOUNTER — NON-APPOINTMENT (OUTPATIENT)
Age: 63
End: 2023-03-02

## 2023-03-02 ENCOUNTER — APPOINTMENT (OUTPATIENT)
Dept: HEMATOLOGY ONCOLOGY | Facility: CLINIC | Age: 63
End: 2023-03-02
Payer: COMMERCIAL

## 2023-03-02 ENCOUNTER — APPOINTMENT (OUTPATIENT)
Dept: SURGICAL ONCOLOGY | Facility: CLINIC | Age: 63
End: 2023-03-02
Payer: COMMERCIAL

## 2023-03-02 VITALS
TEMPERATURE: 98 F | RESPIRATION RATE: 18 BRPM | OXYGEN SATURATION: 98 % | BODY MASS INDEX: 24.37 KG/M2 | DIASTOLIC BLOOD PRESSURE: 84 MMHG | HEIGHT: 67 IN | WEIGHT: 155.25 LBS | SYSTOLIC BLOOD PRESSURE: 130 MMHG | HEART RATE: 82 BPM

## 2023-03-02 VITALS
HEART RATE: 87 BPM | BODY MASS INDEX: 24.17 KG/M2 | HEIGHT: 67 IN | WEIGHT: 154 LBS | RESPIRATION RATE: 18 BRPM | OXYGEN SATURATION: 100 % | SYSTOLIC BLOOD PRESSURE: 122 MMHG | DIASTOLIC BLOOD PRESSURE: 74 MMHG

## 2023-03-02 LAB
ALBUMIN SERPL ELPH-MCNC: 4 G/DL — SIGNIFICANT CHANGE UP (ref 3.3–5)
ALP SERPL-CCNC: 138 U/L — HIGH (ref 40–120)
ALT FLD-CCNC: 15 U/L — SIGNIFICANT CHANGE UP (ref 10–45)
ANION GAP SERPL CALC-SCNC: 11 MMOL/L — SIGNIFICANT CHANGE UP (ref 5–17)
AST SERPL-CCNC: 13 U/L — SIGNIFICANT CHANGE UP (ref 10–40)
BASOPHILS # BLD AUTO: 0.03 K/UL — SIGNIFICANT CHANGE UP (ref 0–0.2)
BASOPHILS NFR BLD AUTO: 0.4 % — SIGNIFICANT CHANGE UP (ref 0–2)
BILIRUB SERPL-MCNC: 0.2 MG/DL — SIGNIFICANT CHANGE UP (ref 0.2–1.2)
BUN SERPL-MCNC: 10 MG/DL — SIGNIFICANT CHANGE UP (ref 7–23)
CALCIUM SERPL-MCNC: 9.6 MG/DL — SIGNIFICANT CHANGE UP (ref 8.4–10.5)
CHLORIDE SERPL-SCNC: 97 MMOL/L — SIGNIFICANT CHANGE UP (ref 96–108)
CO2 SERPL-SCNC: 30 MMOL/L — SIGNIFICANT CHANGE UP (ref 22–31)
CREAT SERPL-MCNC: 0.69 MG/DL — SIGNIFICANT CHANGE UP (ref 0.5–1.3)
EGFR: 104 ML/MIN/1.73M2 — SIGNIFICANT CHANGE UP
EOSINOPHIL # BLD AUTO: 0.13 K/UL — SIGNIFICANT CHANGE UP (ref 0–0.5)
EOSINOPHIL NFR BLD AUTO: 1.9 % — SIGNIFICANT CHANGE UP (ref 0–6)
GLUCOSE SERPL-MCNC: 326 MG/DL — HIGH (ref 70–99)
HAV IGM SER-ACNC: SIGNIFICANT CHANGE UP
HBV CORE IGM SER-ACNC: SIGNIFICANT CHANGE UP
HBV SURFACE AG SER-ACNC: SIGNIFICANT CHANGE UP
HCT VFR BLD CALC: 33.9 % — LOW (ref 39–50)
HCV AB S/CO SERPL IA: 0.17 S/CO — SIGNIFICANT CHANGE UP (ref 0–0.99)
HCV AB SERPL-IMP: SIGNIFICANT CHANGE UP
HGB BLD-MCNC: 10.9 G/DL — LOW (ref 13–17)
IMM GRANULOCYTES NFR BLD AUTO: 0.4 % — SIGNIFICANT CHANGE UP (ref 0–0.9)
INR BLD: 1.05 RATIO — SIGNIFICANT CHANGE UP (ref 0.88–1.16)
LYMPHOCYTES # BLD AUTO: 1.28 K/UL — SIGNIFICANT CHANGE UP (ref 1–3.3)
LYMPHOCYTES # BLD AUTO: 18.8 % — SIGNIFICANT CHANGE UP (ref 13–44)
MAGNESIUM SERPL-MCNC: 1.6 MG/DL — SIGNIFICANT CHANGE UP (ref 1.6–2.6)
MCHC RBC-ENTMCNC: 26.1 PG — LOW (ref 27–34)
MCHC RBC-ENTMCNC: 32.2 GM/DL — SIGNIFICANT CHANGE UP (ref 32–36)
MCV RBC AUTO: 81.3 FL — SIGNIFICANT CHANGE UP (ref 80–100)
MONOCYTES # BLD AUTO: 0.5 K/UL — SIGNIFICANT CHANGE UP (ref 0–0.9)
MONOCYTES NFR BLD AUTO: 7.3 % — SIGNIFICANT CHANGE UP (ref 2–14)
NEUTROPHILS # BLD AUTO: 4.85 K/UL — SIGNIFICANT CHANGE UP (ref 1.8–7.4)
NEUTROPHILS NFR BLD AUTO: 71.2 % — SIGNIFICANT CHANGE UP (ref 43–77)
NRBC # BLD: 0 /100 WBCS — SIGNIFICANT CHANGE UP (ref 0–0)
PLATELET # BLD AUTO: 215 K/UL — SIGNIFICANT CHANGE UP (ref 150–400)
POTASSIUM SERPL-MCNC: 4.1 MMOL/L — SIGNIFICANT CHANGE UP (ref 3.5–5.3)
POTASSIUM SERPL-SCNC: 4.1 MMOL/L — SIGNIFICANT CHANGE UP (ref 3.5–5.3)
PROT SERPL-MCNC: 7 G/DL — SIGNIFICANT CHANGE UP (ref 6–8.3)
PROTHROM AB SERPL-ACNC: 12.2 SEC — SIGNIFICANT CHANGE UP (ref 10.5–13.4)
RBC # BLD: 4.17 M/UL — LOW (ref 4.2–5.8)
RBC # FLD: 13.1 % — SIGNIFICANT CHANGE UP (ref 10.3–14.5)
SODIUM SERPL-SCNC: 138 MMOL/L — SIGNIFICANT CHANGE UP (ref 135–145)
T3FREE SERPL-MCNC: 2.98 PG/ML — SIGNIFICANT CHANGE UP (ref 2–4.4)
T4 FREE SERPL-MCNC: 1.3 NG/DL — SIGNIFICANT CHANGE UP (ref 0.9–1.8)
TSH SERPL-MCNC: 0.84 UIU/ML — SIGNIFICANT CHANGE UP (ref 0.27–4.2)
WBC # BLD: 6.82 K/UL — SIGNIFICANT CHANGE UP (ref 3.8–10.5)
WBC # FLD AUTO: 6.82 K/UL — SIGNIFICANT CHANGE UP (ref 3.8–10.5)

## 2023-03-02 PROCEDURE — 99204 OFFICE O/P NEW MOD 45 MIN: CPT

## 2023-03-02 PROCEDURE — 99205 OFFICE O/P NEW HI 60 MIN: CPT

## 2023-03-02 RX ORDER — BETAMETHASONE DIPROPIONATE 0.5 MG/G
0.05 OINTMENT, AUGMENTED TOPICAL DAILY
Qty: 1 | Refills: 0 | Status: DISCONTINUED | COMMUNITY
Start: 2019-02-08 | End: 2023-03-02

## 2023-03-02 RX ORDER — ONDANSETRON 4 MG/1
4 TABLET ORAL
Qty: 60 | Refills: 0 | Status: DISCONTINUED | COMMUNITY
Start: 2023-02-15 | End: 2023-03-02

## 2023-03-02 RX ORDER — COLISTIN SULFATE, NEOMYCIN SULFATE, THONZONIUM BROMIDE AND HYDROCORTISONE ACETATE 3; 3.3; .5; 1 MG/ML; MG/ML; MG/ML; MG/ML
3.3-3-10-0.5 SUSPENSION AURICULAR (OTIC) 4 TIMES DAILY
Qty: 1 | Refills: 0 | Status: DISCONTINUED | COMMUNITY
Start: 2020-11-20 | End: 2023-03-02

## 2023-03-02 NOTE — PHYSICAL EXAM
[Restricted in physically strenuous activity but ambulatory and able to carry out work of a light or sedentary nature] : Status 1- Restricted in physically strenuous activity but ambulatory and able to carry out work of a light or sedentary nature, e.g., light house work, office work [Normal] : affect appropriate [de-identified] : wt loss 30 lbs over 1 year

## 2023-03-02 NOTE — REVIEW OF SYSTEMS
[Recent Change In Weight] : ~T recent weight change [Negative] : Allergic/Immunologic [Chest Pain] : no chest pain [Shortness Of Breath] : no shortness of breath [Abdominal Pain] : no abdominal pain [FreeTextEntry2] : wt loss 30 lbs over 1 year

## 2023-03-02 NOTE — RESULTS/DATA
[FreeTextEntry1] : 2/27/23 CT C/A/P: Nonspecific bilateral noncalcified pulmonary nodules measuring up to 4 mm Lower third esophageal masslike wall thickening extends to the GE junction and gastric cardia compatible with tumor. Masslike wall thickening extends upstream of the proximal end of the indwelling esophageal Wallstent. Lower esophageal, gastrohepatic and periceliac lymphadenopathy, measures 4.5 x 3.2 cm . Minimal nonspecific nodularity involving the omentum suspicious however inconclusive for carcinomatosis. Probable left hepatic lobe cysts however consider liver MRI if additional imaging is warranted. 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 Alcian blue stain is noncontributory.\par No loss of nuclear expression of MMR proteins (MLH1, MSH2, MSH6, and PMS2). HER-2: Negative (0/1+ membrane staining).\par \par 2/15/23 Endoscopy: At least a T3N1Mx lesion on this exam. Biopsied. Almost completely obstructing esophageal/ GE junction tumor, Siewert Type II.  Stented with an 18 x 103 mm Walflex stent. \par \par 2/6/23 Xray Esophagram: 5 cm distal esophageal stricture suspect for neoplasm.

## 2023-03-02 NOTE — CONSULT LETTER
[Dear  ___] : Dear  [unfilled], [Consult Letter:] : I had the pleasure of evaluating your patient, [unfilled]. [Please see my note below.] : Please see my note below. [Consult Closing:] : Thank you very much for allowing me to participate in the care of this patient.  If you have any questions, please do not hesitate to contact me. [Sincerely,] : Sincerely, [DrYogi  ___] : Dr. CASTRO [DrYogi ___] : Dr. CASTRO [FreeTextEntry3] : Dr. Yue Valero

## 2023-03-02 NOTE — HISTORY OF PRESENT ILLNESS
[T: ___] : T[unfilled] [N: ___] : N[unfilled] [M: ___] : M[unfilled] [AJCC Stage: ____] : AJCC Stage: [unfilled] [de-identified] : The patient was diagnosed with esophageal adenocarcinoma in February 2023 at the age of 62. He presented to his PCP with complaints of dysphagia, intermittently since 2021, however, since January 2023 it has become severe to the point where he can no longer eat solid foods. On 2/6/23, he had an xray esophagram which showed a 5 cm distal esophageal stricture suspect for neoplasm. On 2/15/23, he underwent an endoscopy which showed at least a T3N1Mx lesion.  Almost completely obstructing esophageal/ GE junction tumor, Siewert Type II.  Stented with an 18 x 103 mm Walflex stent. 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  No loss of nuclear expression of MMR proteins (MLH1, MSH2, MSH6, and PMS2). HER-2: Negative (0/1+ membrane staining). A CT C/A/P on 2/27/23 showed nonspecific bilateral noncalcified pulmonary nodules measuring up to 4 mm Lower third esophageal masslike wall thickening extends to the GE junction and gastric cardia compatible with tumor. Masslike wall thickening extends upstream of the proximal end of the indwelling esophageal Wallstent. Lower esophageal, gastrohepatic and periceliac lymphadenopathy, measures 4.5 x 3.2 cm . Minimal nonspecific nodularity involving the omentum suspicious however inconclusive for carcinomatosis. Probable left hepatic lobe cysts however consider liver MRI if additional imaging is warranted.  [de-identified] : Medical Hx: HTN; GERD; kidney stones \par Surgical Hx: cholecystectomy; lithotripsy Left kidney  \par Family Hx: father prostrate ca; paternal uncle lymphoma \par Social Hx: never smoker; ETOH never; , live together; retired [place] ; family close by [de-identified] : Today he has a noted wt loss of 30 lbs over 1 year. SInce stenting he is reporting wt gain. Denies pain. See full review of systems below.

## 2023-03-03 ENCOUNTER — APPOINTMENT (OUTPATIENT)
Dept: RADIATION ONCOLOGY | Facility: CLINIC | Age: 63
End: 2023-03-03
Payer: COMMERCIAL

## 2023-03-03 VITALS
HEART RATE: 85 BPM | OXYGEN SATURATION: 98 % | BODY MASS INDEX: 24.33 KG/M2 | RESPIRATION RATE: 18 BRPM | SYSTOLIC BLOOD PRESSURE: 102 MMHG | WEIGHT: 155 LBS | DIASTOLIC BLOOD PRESSURE: 68 MMHG | HEIGHT: 67 IN

## 2023-03-03 PROCEDURE — 99204 OFFICE O/P NEW MOD 45 MIN: CPT | Mod: 25

## 2023-03-03 NOTE — HISTORY OF PRESENT ILLNESS
[FreeTextEntry1] : The patient is a pleasant 62 year old male diagnosed with esophageal carcinoma referred by Dr. Aviles.\par \par He reports a long standing history of GERD and new intermittent dysphagia increasing in severity for past 4 months. It has become more severe and he was unable to eat solid foods. He has had a 30 lb unintentional weight loss. Denies pain or changes in bowel habits. He was referred to ENT followed by GI (Dr. Jorge Alberto Briceño) and has continued on a liquid diet to prevent obstruction.\par \par Xray Esophagram on 2/6/23 revealed a 5 cm distal esophageal stricture suspect for neoplasm. \par Upper EUS on 2/15/23 with Dr. Velazco showed at least a T3N1Mx tumor, almost completely obstructing esophageal/GE junction. \par A walflex stent was placed. Pathology revealed invasive adenocarcinoma, poorly differentiated with areas of signet ring cell morphology. \par Since the placement of the stent patient is able to eat regular diet and started to gain weight.\par \par CT chest 2/27/23 showed nonspecific bilateral noncalcified pulmonary nodules measuring up to 4 mm\par Lower third esophageal masslike wall thickening extends to the GE junction and gastric cardia compatible with tumor. Masslike wall thickening extends upstream of the proximal end of the indwelling esophageal Wallstent\par Lower esophageal, gastrohepatic and periceliac lymphadenopathy up to 4.5 cm. \par Minimal nonspecific nodularity involving the omentum suspicious however inconclusive for carcinomatosis\par Probable left hepatic lobe cysts however consider liver MRI if additional imaging is warranted.\par \par He has been able to eat a regular diet since the stent placement but is avoiding meat. \par He is a retired [place]  and was present at 9/11 site for 42 days and is enrolled in the [employer].He met his wife at the [address] [place] [address] where she was a volunteer giving out sandwiches. 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 He has a history of psoriasis which resolved in 2001 after his cholecystectomy.\par \par He is scheduled for a PET/CT on 2/7/23 and MRI liver on 2/8/23 to complete staging. \par \par He presents with his wife to discuss the role of radiation therapy in his care.\par

## 2023-03-03 NOTE — PHYSICAL EXAM
[Supraclavicular Lymph Nodes Enlarged Bilaterally] : supraclavicular [Axillary Lymph Nodes Enlarged Bilaterally] : axillary [Normal] : oriented to person, place and time, the affect was normal, the mood was normal and not anxious

## 2023-03-03 NOTE — VITALS
[Maximal Pain Intensity: 0/10] : 0/10 [NoTreatment Scheduled] : no treatment scheduled [Date: ____________] : Patient's last distress assessment performed on [unfilled]. [1 - Distress Level] : Distress Level: 1 [80: Normal activity with effort; some signs or symptoms of disease.] : 80: Normal activity with effort; some signs or symptoms of disease.

## 2023-03-05 ENCOUNTER — RESULT CHARGE (OUTPATIENT)
Age: 63
End: 2023-03-05

## 2023-03-06 ENCOUNTER — APPOINTMENT (OUTPATIENT)
Dept: FAMILY MEDICINE | Facility: CLINIC | Age: 63
End: 2023-03-06
Payer: COMMERCIAL

## 2023-03-06 VITALS
WEIGHT: 155 LBS | OXYGEN SATURATION: 97 % | HEART RATE: 97 BPM | HEIGHT: 67 IN | DIASTOLIC BLOOD PRESSURE: 60 MMHG | TEMPERATURE: 98.6 F | BODY MASS INDEX: 24.33 KG/M2 | SYSTOLIC BLOOD PRESSURE: 122 MMHG

## 2023-03-06 PROCEDURE — 99214 OFFICE O/P EST MOD 30 MIN: CPT | Mod: 25

## 2023-03-06 PROCEDURE — 81003 URINALYSIS AUTO W/O SCOPE: CPT | Mod: QW

## 2023-03-06 NOTE — HISTORY OF PRESENT ILLNESS
[de-identified] : Ms. Dave Garcia is a 62  year old male who presents for an initial consultation for esophageal cancer. He is referred by Dr. Mason Velazco.\par \par He c/o persistent dysphagia since 2021 but notes since last month it has become more severe and unable to eat solid foods. He has had a 30 lb unintentional weight loss. Denies pain or changes in bowel habits. He had met with Dr. Jorge Alberto Briceño ([place]) and has continued on a liquid diet to prevent obstruction\par \par Xray Esophagram on 2/6/23 reveals a 5 cm distal esophageal stricture suspect for neoplasm. He was sent to Dr. Velazco for an upper EUS on 2/15/23. There is at least a T3N1Mx lesion on this exam, almost completely obstructing esophageal/GE junction tumor. A walflex stent was placed. Pathology revealed invasive adenocarcinoma, poorly differentiated with areas of signet ring cell morphology. \par CT chest 2/27/23 showed Nonspecific bilateral noncalcified pulmonary nodules measuring up to 4 mm\par Lower third esophageal masslike wall thickening extends to the GE junction and gastric cardia compatible with tumor. Masslike wall thickening extends upstream of the proximal end of the indwelling esophageal Wallstent\par Lower esophageal, gastrohepatic and periceliac lymphadenopathy\par Minimal nonspecific nodularity involving the omentum suspicious however inconclusive for carcinomatosis\par Probable left hepatic lobe cysts however consider liver MRI if additional imaging is warranted.\par Patient has been eating regular diet since the stent placement, denies any symptoms. States he feels great, exercises.\par

## 2023-03-06 NOTE — CONSULT LETTER
[Dear  ___] : Dear  [unfilled], [Consult Letter:] : I had the pleasure of evaluating your patient, [unfilled]. [Please see my note below.] : Please see my note below. [Consult Closing:] : Thank you very much for allowing me to participate in the care of this patient.  If you have any questions, please do not hesitate to contact me. [Sincerely,] : Sincerely, [FreeTextEntry2] : Dr. Mason Velazco [FreeTextEntry3] : Natan Aviles MD, MPH, FACS, FSSO\par , [facility]\par [facility] [place]\par Associate Professor of Surgery\par [employer] [facility]  [DrYogi  ___] : Dr. CASTRO

## 2023-03-06 NOTE — ASSESSMENT
[FreeTextEntry1] : IMP:\par Dave is a 63 y/o with worsening dysphagia and presented for an Upper EUS which found a lesion almost completely obstructing esophageal/GE junction tumor. Stent was placed.\par \par PLAN:\par ChemoXRT neoadjuvant + esophagectomy\par Continue to improve PO intake / activity\par Med onc\par Rad onc

## 2023-03-06 NOTE — PLAN
[FreeTextEntry1] : start with 1 metformin daily, then after 5-7 days go up to 2 tabs daily\par F/u in3-4 weeks for recheck\par low carbs\par Await  A1C\par \par

## 2023-03-06 NOTE — PHYSICAL EXAM
[Normal] : supple, no neck mass and thyroid not enlarged [Normal Neck Lymph Nodes] : normal neck lymph nodes  [Normal Supraclavicular Lymph Nodes] : normal supraclavicular lymph nodes [Normal Groin Lymph Nodes] : normal groin lymph nodes [Normal Axillary Lymph Nodes] : normal axillary lymph nodes [Normal] : oriented to person, place and time, with appropriate affect [de-identified] : soft NT ND

## 2023-03-06 NOTE — HISTORY OF PRESENT ILLNESS
[FreeTextEntry8] : Pt is here for high sugar levels. \par [place] [employer] physical said elevated glucose 285\par Repeat BW with Dr Alejandra showed glucose of 325\par Diagnosed with esophageal care.\par \par

## 2023-03-07 ENCOUNTER — APPOINTMENT (OUTPATIENT)
Dept: NUCLEAR MEDICINE | Facility: CLINIC | Age: 63
End: 2023-03-07

## 2023-03-07 ENCOUNTER — OUTPATIENT (OUTPATIENT)
Dept: OUTPATIENT SERVICES | Facility: HOSPITAL | Age: 63
LOS: 1 days | End: 2023-03-07
Payer: COMMERCIAL

## 2023-03-07 DIAGNOSIS — Z98.890 OTHER SPECIFIED POSTPROCEDURAL STATES: Chronic | ICD-10-CM

## 2023-03-07 DIAGNOSIS — C15.9 MALIGNANT NEOPLASM OF ESOPHAGUS, UNSPECIFIED: ICD-10-CM

## 2023-03-07 DIAGNOSIS — Z90.49 ACQUIRED ABSENCE OF OTHER SPECIFIED PARTS OF DIGESTIVE TRACT: Chronic | ICD-10-CM

## 2023-03-08 ENCOUNTER — RESULT REVIEW (OUTPATIENT)
Age: 63
End: 2023-03-08

## 2023-03-08 PROCEDURE — 78815 PET IMAGE W/CT SKULL-THIGH: CPT | Mod: 26,PI

## 2023-03-09 ENCOUNTER — APPOINTMENT (OUTPATIENT)
Dept: RADIOLOGY | Facility: CLINIC | Age: 63
End: 2023-03-09
Payer: COMMERCIAL

## 2023-03-09 ENCOUNTER — OUTPATIENT (OUTPATIENT)
Dept: OUTPATIENT SERVICES | Facility: HOSPITAL | Age: 63
LOS: 1 days | End: 2023-03-09
Payer: COMMERCIAL

## 2023-03-09 ENCOUNTER — APPOINTMENT (OUTPATIENT)
Dept: MRI IMAGING | Facility: CLINIC | Age: 63
End: 2023-03-09

## 2023-03-09 ENCOUNTER — NON-APPOINTMENT (OUTPATIENT)
Age: 63
End: 2023-03-09

## 2023-03-09 DIAGNOSIS — Z90.49 ACQUIRED ABSENCE OF OTHER SPECIFIED PARTS OF DIGESTIVE TRACT: Chronic | ICD-10-CM

## 2023-03-09 DIAGNOSIS — Z98.890 OTHER SPECIFIED POSTPROCEDURAL STATES: Chronic | ICD-10-CM

## 2023-03-09 DIAGNOSIS — C15.9 MALIGNANT NEOPLASM OF ESOPHAGUS, UNSPECIFIED: ICD-10-CM

## 2023-03-09 PROCEDURE — 74018 RADEX ABDOMEN 1 VIEW: CPT | Mod: 26

## 2023-03-09 PROCEDURE — 71045 X-RAY EXAM CHEST 1 VIEW: CPT | Mod: 26

## 2023-03-09 PROCEDURE — 74183 MRI ABD W/O CNTR FLWD CNTR: CPT | Mod: 26

## 2023-03-09 PROCEDURE — 74018 RADEX ABDOMEN 1 VIEW: CPT

## 2023-03-09 PROCEDURE — 71045 X-RAY EXAM CHEST 1 VIEW: CPT

## 2023-03-09 PROCEDURE — 74183 MRI ABD W/O CNTR FLWD CNTR: CPT

## 2023-03-10 LAB — HBA1C MFR BLD HPLC: 8.9

## 2023-03-14 ENCOUNTER — OUTPATIENT (OUTPATIENT)
Dept: OUTPATIENT SERVICES | Facility: HOSPITAL | Age: 63
LOS: 1 days | Discharge: HOME | End: 2023-03-14

## 2023-03-14 DIAGNOSIS — C15.9 MALIGNANT NEOPLASM OF ESOPHAGUS, UNSPECIFIED: ICD-10-CM

## 2023-03-14 DIAGNOSIS — Z98.890 OTHER SPECIFIED POSTPROCEDURAL STATES: Chronic | ICD-10-CM

## 2023-03-16 ENCOUNTER — RESULT REVIEW (OUTPATIENT)
Age: 63
End: 2023-03-16

## 2023-03-16 ENCOUNTER — APPOINTMENT (OUTPATIENT)
Dept: INFUSION THERAPY | Facility: CLINIC | Age: 63
End: 2023-03-16

## 2023-03-16 VITALS
WEIGHT: 154.25 LBS | OXYGEN SATURATION: 97 % | RESPIRATION RATE: 18 BRPM | HEIGHT: 67 IN | SYSTOLIC BLOOD PRESSURE: 119 MMHG | DIASTOLIC BLOOD PRESSURE: 74 MMHG | HEART RATE: 91 BPM | TEMPERATURE: 98.1 F | BODY MASS INDEX: 24.21 KG/M2

## 2023-03-16 LAB
BASOPHILS # BLD AUTO: 0.05 K/UL — SIGNIFICANT CHANGE UP (ref 0–0.2)
BASOPHILS NFR BLD AUTO: 0.7 % — SIGNIFICANT CHANGE UP (ref 0–2)
EOSINOPHIL # BLD AUTO: 0.27 K/UL — SIGNIFICANT CHANGE UP (ref 0–0.5)
EOSINOPHIL NFR BLD AUTO: 4 % — SIGNIFICANT CHANGE UP (ref 0–6)
HCT VFR BLD CALC: 31.9 % — LOW (ref 39–50)
HGB BLD-MCNC: 10 G/DL — LOW (ref 13–17)
IMM GRANULOCYTES NFR BLD AUTO: 0.1 % — SIGNIFICANT CHANGE UP (ref 0–0.9)
LYMPHOCYTES # BLD AUTO: 1.75 K/UL — SIGNIFICANT CHANGE UP (ref 1–3.3)
LYMPHOCYTES # BLD AUTO: 26.2 % — SIGNIFICANT CHANGE UP (ref 13–44)
MCHC RBC-ENTMCNC: 24.3 PG — LOW (ref 27–34)
MCHC RBC-ENTMCNC: 31.3 GM/DL — LOW (ref 32–36)
MCV RBC AUTO: 77.6 FL — LOW (ref 80–100)
MONOCYTES # BLD AUTO: 0.63 K/UL — SIGNIFICANT CHANGE UP (ref 0–0.9)
MONOCYTES NFR BLD AUTO: 9.4 % — SIGNIFICANT CHANGE UP (ref 2–14)
NEUTROPHILS # BLD AUTO: 3.97 K/UL — SIGNIFICANT CHANGE UP (ref 1.8–7.4)
NEUTROPHILS NFR BLD AUTO: 59.6 % — SIGNIFICANT CHANGE UP (ref 43–77)
NRBC # BLD: 0 /100 WBCS — SIGNIFICANT CHANGE UP (ref 0–0)
PLATELET # BLD AUTO: 195 K/UL — SIGNIFICANT CHANGE UP (ref 150–400)
RBC # BLD: 4.11 M/UL — LOW (ref 4.2–5.8)
RBC # FLD: 13.3 % — SIGNIFICANT CHANGE UP (ref 10.3–14.5)
WBC # BLD: 6.68 K/UL — SIGNIFICANT CHANGE UP (ref 3.8–10.5)
WBC # FLD AUTO: 6.68 K/UL — SIGNIFICANT CHANGE UP (ref 3.8–10.5)

## 2023-03-16 RX ORDER — MULTIVIT-MIN/FERROUS GLUCONATE 9 MG/15 ML
1 LIQUID (ML) ORAL
Qty: 0 | Refills: 0 | DISCHARGE

## 2023-03-17 ENCOUNTER — APPOINTMENT (OUTPATIENT)
Dept: HEMATOLOGY ONCOLOGY | Facility: CLINIC | Age: 63
End: 2023-03-17
Payer: COMMERCIAL

## 2023-03-17 DIAGNOSIS — Z51.11 ENCOUNTER FOR ANTINEOPLASTIC CHEMOTHERAPY: ICD-10-CM

## 2023-03-17 DIAGNOSIS — R11.2 NAUSEA WITH VOMITING, UNSPECIFIED: ICD-10-CM

## 2023-03-17 LAB
ALBUMIN SERPL ELPH-MCNC: 4.1 G/DL — SIGNIFICANT CHANGE UP (ref 3.3–5)
ALP SERPL-CCNC: 134 U/L — HIGH (ref 40–120)
ALT FLD-CCNC: 15 U/L — SIGNIFICANT CHANGE UP (ref 10–45)
ANION GAP SERPL CALC-SCNC: 11 MMOL/L — SIGNIFICANT CHANGE UP (ref 5–17)
AST SERPL-CCNC: 13 U/L — SIGNIFICANT CHANGE UP (ref 10–40)
BILIRUB SERPL-MCNC: 0.3 MG/DL — SIGNIFICANT CHANGE UP (ref 0.2–1.2)
BUN SERPL-MCNC: 10 MG/DL — SIGNIFICANT CHANGE UP (ref 7–23)
CALCIUM SERPL-MCNC: 9.2 MG/DL — SIGNIFICANT CHANGE UP (ref 8.4–10.5)
CHLORIDE SERPL-SCNC: 100 MMOL/L — SIGNIFICANT CHANGE UP (ref 96–108)
CO2 SERPL-SCNC: 29 MMOL/L — SIGNIFICANT CHANGE UP (ref 22–31)
CREAT SERPL-MCNC: 0.78 MG/DL — SIGNIFICANT CHANGE UP (ref 0.5–1.3)
EGFR: 100 ML/MIN/1.73M2 — SIGNIFICANT CHANGE UP
GLUCOSE SERPL-MCNC: 214 MG/DL — HIGH (ref 70–99)
MAGNESIUM SERPL-MCNC: 1.8 MG/DL — SIGNIFICANT CHANGE UP (ref 1.6–2.6)
POTASSIUM SERPL-MCNC: 3.6 MMOL/L — SIGNIFICANT CHANGE UP (ref 3.5–5.3)
POTASSIUM SERPL-SCNC: 3.6 MMOL/L — SIGNIFICANT CHANGE UP (ref 3.5–5.3)
PROT SERPL-MCNC: 7.2 G/DL — SIGNIFICANT CHANGE UP (ref 6–8.3)
SODIUM SERPL-SCNC: 140 MMOL/L — SIGNIFICANT CHANGE UP (ref 135–145)

## 2023-03-17 PROCEDURE — 99443: CPT

## 2023-03-17 NOTE — PHYSICAL EXAM
[Restricted in physically strenuous activity but ambulatory and able to carry out work of a light or sedentary nature] : Status 1- Restricted in physically strenuous activity but ambulatory and able to carry out work of a light or sedentary nature, e.g., light house work, office work [Normal] : affect appropriate [de-identified] : wt loss 30 lbs over 1 year

## 2023-03-17 NOTE — RESULTS/DATA
[FreeTextEntry1] : 3/10/23 MRI: No liver metastases. Small scattered hepatic cysts. Metastatic lymphadenopathy upper abdomen with central necrosis.\par \par 3/8/23 PET: Foci of increased activity at the gastroesophageal junction and within the gastric cardia related to known site of malignancy. Hypermetabolic upper mesenteric nodes including portacaval/periceliac sandra conglomerate with necrosis compatible with metastases. Sites of omental nodularity not visualized on low-dose CT and without abnormal FDG activity at the sites. Nonavid subcentimeter lung nodules below PET detection; these will be reassessed on follow-up.\par \par 2/27/23 CT C/A/P: Nonspecific bilateral noncalcified pulmonary nodules measuring up to 4 mm Lower third esophageal masslike wall thickening extends to the GE junction and gastric cardia compatible with tumor. Masslike wall thickening extends upstream of the proximal end of the indwelling esophageal Wallstent. Lower esophageal, gastrohepatic and periceliac lymphadenopathy, measures 4.5 x 3.2 cm . Minimal nonspecific nodularity involving the omentum suspicious however inconclusive for carcinomatosis. Probable left hepatic lobe cysts however consider liver MRI if additional imaging is warranted. 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 Alcian blue stain is noncontributory.\par No loss of nuclear expression of MMR proteins (MLH1, MSH2, MSH6, and PMS2). HER-2: Negative (0/1+ membrane staining).\par \par 2/15/23 Endoscopy: At least a T3N1Mx lesion on this exam. Biopsied. Almost completely obstructing esophageal/ GE junction tumor, Siewert Type II.  Stented with an 18 x 103 mm Walflex stent. \par \par 2/6/23 Xray Esophagram: 5 cm distal esophageal stricture suspect for neoplasm.

## 2023-03-17 NOTE — HISTORY OF PRESENT ILLNESS
[T: ___] : T[unfilled] [N: ___] : N[unfilled] [M: ___] : M[unfilled] [AJCC Stage: ____] : AJCC Stage: [unfilled] [Home] : at home, [unfilled] , at the time of the visit. [Medical Office: ([facility])___] : at the medical office located in  [Verbal consent obtained from patient] : the patient, [unfilled] [de-identified] : The patient was diagnosed with esophageal adenocarcinoma in February 2023 at the age of 62. He presented to his PCP with complaints of dysphagia, intermittently since 2021, however, since January 2023 it has become severe to the point where he can no longer eat solid foods. On 2/6/23, he had an xray esophagram which showed a 5 cm distal esophageal stricture suspect for neoplasm. On 2/15/23, he underwent an endoscopy which showed at least a T3N1Mx lesion.  Almost completely obstructing esophageal/ GE junction tumor, Siewert Type II.  Stented with an 18 x 103 mm Walflex stent. 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  No loss of nuclear expression of MMR proteins (MLH1, MSH2, MSH6, and PMS2). HER-2: Negative (0/1+ membrane staining). A CT C/A/P on 2/27/23 showed nonspecific bilateral noncalcified pulmonary nodules measuring up to 4 mm Lower third esophageal masslike wall thickening extends to the GE junction and gastric cardia compatible with tumor. Masslike wall thickening extends upstream of the proximal end of the indwelling esophageal Wallstent. Lower esophageal, gastrohepatic and periceliac lymphadenopathy, measures 4.5 x 3.2 cm . Minimal nonspecific nodularity involving the omentum suspicious however inconclusive for carcinomatosis. Probable left hepatic lobe cysts however consider liver MRI if additional imaging is warranted.  [de-identified] : Medical Hx: HTN; GERD; kidney stones \par Surgical Hx: cholecystectomy; lithotripsy Left kidney  \par Family Hx: father prostrate ca; paternal uncle lymphoma \par Social Hx: never smoker; ETOH never; , live together; retired [place] ; family close by [de-identified] : He is C1D2 of induction for carbo / taxol. He has a noted wt loss of 30 lbs over 1 year. SInce stenting he is reporting wt gain. Denies pain. See full review of systems below.

## 2023-03-21 PROCEDURE — 77263 THER RADIOLOGY TX PLNG CPLX: CPT

## 2023-03-21 PROCEDURE — 77333 RADIATION TREATMENT AID(S): CPT

## 2023-03-23 ENCOUNTER — RESULT REVIEW (OUTPATIENT)
Age: 63
End: 2023-03-23

## 2023-03-23 ENCOUNTER — APPOINTMENT (OUTPATIENT)
Dept: INFUSION THERAPY | Facility: CLINIC | Age: 63
End: 2023-03-23

## 2023-03-23 VITALS
HEIGHT: 67 IN | TEMPERATURE: 98.1 F | SYSTOLIC BLOOD PRESSURE: 106 MMHG | RESPIRATION RATE: 18 BRPM | WEIGHT: 150 LBS | OXYGEN SATURATION: 97 % | BODY MASS INDEX: 23.54 KG/M2 | DIASTOLIC BLOOD PRESSURE: 61 MMHG | HEART RATE: 82 BPM

## 2023-03-23 LAB
ALBUMIN SERPL ELPH-MCNC: 4.1 G/DL — SIGNIFICANT CHANGE UP (ref 3.3–5)
ALP SERPL-CCNC: 121 U/L — HIGH (ref 40–120)
ALT FLD-CCNC: 11 U/L — SIGNIFICANT CHANGE UP (ref 10–45)
ANION GAP SERPL CALC-SCNC: 14 MMOL/L — SIGNIFICANT CHANGE UP (ref 5–17)
AST SERPL-CCNC: 13 U/L — SIGNIFICANT CHANGE UP (ref 10–40)
BASOPHILS # BLD AUTO: 0.03 K/UL — SIGNIFICANT CHANGE UP (ref 0–0.2)
BASOPHILS NFR BLD AUTO: 0.5 % — SIGNIFICANT CHANGE UP (ref 0–2)
BILIRUB SERPL-MCNC: 0.3 MG/DL — SIGNIFICANT CHANGE UP (ref 0.2–1.2)
BUN SERPL-MCNC: 14 MG/DL — SIGNIFICANT CHANGE UP (ref 7–23)
CALCIUM SERPL-MCNC: 9.8 MG/DL — SIGNIFICANT CHANGE UP (ref 8.4–10.5)
CHLORIDE SERPL-SCNC: 98 MMOL/L — SIGNIFICANT CHANGE UP (ref 96–108)
CO2 SERPL-SCNC: 26 MMOL/L — SIGNIFICANT CHANGE UP (ref 22–31)
CREAT SERPL-MCNC: 0.64 MG/DL — SIGNIFICANT CHANGE UP (ref 0.5–1.3)
EGFR: 106 ML/MIN/1.73M2 — SIGNIFICANT CHANGE UP
EOSINOPHIL # BLD AUTO: 0.15 K/UL — SIGNIFICANT CHANGE UP (ref 0–0.5)
EOSINOPHIL NFR BLD AUTO: 2.3 % — SIGNIFICANT CHANGE UP (ref 0–6)
GLUCOSE SERPL-MCNC: 192 MG/DL — HIGH (ref 70–99)
HCT VFR BLD CALC: 30.9 % — LOW (ref 39–50)
HGB BLD-MCNC: 9.8 G/DL — LOW (ref 13–17)
IMM GRANULOCYTES NFR BLD AUTO: 0.5 % — SIGNIFICANT CHANGE UP (ref 0–0.9)
LYMPHOCYTES # BLD AUTO: 1.67 K/UL — SIGNIFICANT CHANGE UP (ref 1–3.3)
LYMPHOCYTES # BLD AUTO: 25.5 % — SIGNIFICANT CHANGE UP (ref 13–44)
MAGNESIUM SERPL-MCNC: 1.4 MG/DL — LOW (ref 1.6–2.6)
MCHC RBC-ENTMCNC: 24.4 PG — LOW (ref 27–34)
MCHC RBC-ENTMCNC: 31.7 GM/DL — LOW (ref 32–36)
MCV RBC AUTO: 76.9 FL — LOW (ref 80–100)
MONOCYTES # BLD AUTO: 0.44 K/UL — SIGNIFICANT CHANGE UP (ref 0–0.9)
MONOCYTES NFR BLD AUTO: 6.7 % — SIGNIFICANT CHANGE UP (ref 2–14)
NEUTROPHILS # BLD AUTO: 4.24 K/UL — SIGNIFICANT CHANGE UP (ref 1.8–7.4)
NEUTROPHILS NFR BLD AUTO: 64.5 % — SIGNIFICANT CHANGE UP (ref 43–77)
NRBC # BLD: 0 /100 WBCS — SIGNIFICANT CHANGE UP (ref 0–0)
PLATELET # BLD AUTO: 169 K/UL — SIGNIFICANT CHANGE UP (ref 150–400)
POTASSIUM SERPL-MCNC: 3.8 MMOL/L — SIGNIFICANT CHANGE UP (ref 3.5–5.3)
POTASSIUM SERPL-SCNC: 3.8 MMOL/L — SIGNIFICANT CHANGE UP (ref 3.5–5.3)
PROT SERPL-MCNC: 6.6 G/DL — SIGNIFICANT CHANGE UP (ref 6–8.3)
RBC # BLD: 4.02 M/UL — LOW (ref 4.2–5.8)
RBC # FLD: 13.6 % — SIGNIFICANT CHANGE UP (ref 10.3–14.5)
SODIUM SERPL-SCNC: 138 MMOL/L — SIGNIFICANT CHANGE UP (ref 135–145)
WBC # BLD: 6.56 K/UL — SIGNIFICANT CHANGE UP (ref 3.8–10.5)
WBC # FLD AUTO: 6.56 K/UL — SIGNIFICANT CHANGE UP (ref 3.8–10.5)

## 2023-03-24 PROCEDURE — 77301 RADIOTHERAPY DOSE PLAN IMRT: CPT

## 2023-03-24 PROCEDURE — 77300 RADIATION THERAPY DOSE PLAN: CPT

## 2023-03-24 PROCEDURE — 77338 DESIGN MLC DEVICE FOR IMRT: CPT

## 2023-03-28 ENCOUNTER — APPOINTMENT (OUTPATIENT)
Dept: FAMILY MEDICINE | Facility: CLINIC | Age: 63
End: 2023-03-28
Payer: COMMERCIAL

## 2023-03-28 VITALS
SYSTOLIC BLOOD PRESSURE: 100 MMHG | DIASTOLIC BLOOD PRESSURE: 60 MMHG | HEART RATE: 89 BPM | HEIGHT: 67 IN | TEMPERATURE: 98.1 F | BODY MASS INDEX: 23.54 KG/M2 | OXYGEN SATURATION: 98 % | WEIGHT: 150 LBS

## 2023-03-28 DIAGNOSIS — Z87.2 PERSONAL HISTORY OF DISEASES OF THE SKIN AND SUBCUTANEOUS TISSUE: ICD-10-CM

## 2023-03-28 PROCEDURE — 99213 OFFICE O/P EST LOW 20 MIN: CPT

## 2023-03-28 RX ORDER — LISINOPRIL AND HYDROCHLOROTHIAZIDE TABLETS 10; 12.5 MG/1; MG/1
10-12.5 TABLET ORAL
Qty: 90 | Refills: 0 | Status: DISCONTINUED | COMMUNITY
Start: 2022-06-12 | End: 2023-03-28

## 2023-03-28 NOTE — HISTORY OF PRESENT ILLNESS
[FreeTextEntry8] : Pt is here for low BP.\par Mr. Garcia is a 61yo male with PMH significant for HTN, HLD, T2DM, and esophageal cancer. He is currently undergoing treatment for esophageal cancer with weekly chemotherapy and upcoming radiation. Pet scan was clear for metastasis. He presents today for blood pressure check. He feels his blood pressure has been low over the last few months due to 40 lb weight loss 2/2 poor intake.

## 2023-03-28 NOTE — PLAN
[FreeTextEntry1] : Discussed meds at length\par Suggest stopping the HCTZ first. If BP still low, willl d/c additional agents

## 2023-03-30 ENCOUNTER — RESULT REVIEW (OUTPATIENT)
Age: 63
End: 2023-03-30

## 2023-03-30 ENCOUNTER — APPOINTMENT (OUTPATIENT)
Dept: INFUSION THERAPY | Facility: CLINIC | Age: 63
End: 2023-03-30
Payer: COMMERCIAL

## 2023-03-30 ENCOUNTER — APPOINTMENT (OUTPATIENT)
Dept: HEMATOLOGY ONCOLOGY | Facility: CLINIC | Age: 63
End: 2023-03-30
Payer: COMMERCIAL

## 2023-03-30 LAB
ALBUMIN SERPL ELPH-MCNC: 4.3 G/DL — SIGNIFICANT CHANGE UP (ref 3.3–5)
ALP SERPL-CCNC: 120 U/L — SIGNIFICANT CHANGE UP (ref 40–120)
ALT FLD-CCNC: 12 U/L — SIGNIFICANT CHANGE UP (ref 10–45)
ANION GAP SERPL CALC-SCNC: 11 MMOL/L — SIGNIFICANT CHANGE UP (ref 5–17)
AST SERPL-CCNC: 10 U/L — SIGNIFICANT CHANGE UP (ref 10–40)
BASOPHILS # BLD AUTO: 0.04 K/UL — SIGNIFICANT CHANGE UP (ref 0–0.2)
BASOPHILS NFR BLD AUTO: 0.9 % — SIGNIFICANT CHANGE UP (ref 0–2)
BILIRUB SERPL-MCNC: 0.2 MG/DL — SIGNIFICANT CHANGE UP (ref 0.2–1.2)
BUN SERPL-MCNC: 11 MG/DL — SIGNIFICANT CHANGE UP (ref 7–23)
CALCIUM SERPL-MCNC: 9.6 MG/DL — SIGNIFICANT CHANGE UP (ref 8.4–10.5)
CHLORIDE SERPL-SCNC: 97 MMOL/L — SIGNIFICANT CHANGE UP (ref 96–108)
CO2 SERPL-SCNC: 30 MMOL/L — SIGNIFICANT CHANGE UP (ref 22–31)
CREAT SERPL-MCNC: 0.61 MG/DL — SIGNIFICANT CHANGE UP (ref 0.5–1.3)
EGFR: 108 ML/MIN/1.73M2 — SIGNIFICANT CHANGE UP
EOSINOPHIL # BLD AUTO: 0.05 K/UL — SIGNIFICANT CHANGE UP (ref 0–0.5)
EOSINOPHIL NFR BLD AUTO: 1.1 % — SIGNIFICANT CHANGE UP (ref 0–6)
GLUCOSE SERPL-MCNC: 188 MG/DL — HIGH (ref 70–99)
HCT VFR BLD CALC: 31.3 % — LOW (ref 39–50)
HGB BLD-MCNC: 9.9 G/DL — LOW (ref 13–17)
IMM GRANULOCYTES NFR BLD AUTO: 0.2 % — SIGNIFICANT CHANGE UP (ref 0–0.9)
LYMPHOCYTES # BLD AUTO: 1.57 K/UL — SIGNIFICANT CHANGE UP (ref 1–3.3)
LYMPHOCYTES # BLD AUTO: 33.8 % — SIGNIFICANT CHANGE UP (ref 13–44)
MAGNESIUM SERPL-MCNC: 1.7 MG/DL — SIGNIFICANT CHANGE UP (ref 1.6–2.6)
MCHC RBC-ENTMCNC: 24.5 PG — LOW (ref 27–34)
MCHC RBC-ENTMCNC: 31.6 GM/DL — LOW (ref 32–36)
MCV RBC AUTO: 77.5 FL — LOW (ref 80–100)
MONOCYTES # BLD AUTO: 0.33 K/UL — SIGNIFICANT CHANGE UP (ref 0–0.9)
MONOCYTES NFR BLD AUTO: 7.1 % — SIGNIFICANT CHANGE UP (ref 2–14)
NEUTROPHILS # BLD AUTO: 2.65 K/UL — SIGNIFICANT CHANGE UP (ref 1.8–7.4)
NEUTROPHILS NFR BLD AUTO: 56.9 % — SIGNIFICANT CHANGE UP (ref 43–77)
NRBC # BLD: 0 /100 WBCS — SIGNIFICANT CHANGE UP (ref 0–0)
PLATELET # BLD AUTO: 193 K/UL — SIGNIFICANT CHANGE UP (ref 150–400)
POTASSIUM SERPL-MCNC: 3.5 MMOL/L — SIGNIFICANT CHANGE UP (ref 3.5–5.3)
POTASSIUM SERPL-SCNC: 3.5 MMOL/L — SIGNIFICANT CHANGE UP (ref 3.5–5.3)
PROT SERPL-MCNC: 7 G/DL — SIGNIFICANT CHANGE UP (ref 6–8.3)
RBC # BLD: 4.04 M/UL — LOW (ref 4.2–5.8)
RBC # FLD: 14.7 % — HIGH (ref 10.3–14.5)
SODIUM SERPL-SCNC: 137 MMOL/L — SIGNIFICANT CHANGE UP (ref 135–145)
WBC # BLD: 4.65 K/UL — SIGNIFICANT CHANGE UP (ref 3.8–10.5)
WBC # FLD AUTO: 4.65 K/UL — SIGNIFICANT CHANGE UP (ref 3.8–10.5)

## 2023-03-30 PROCEDURE — 99213 OFFICE O/P EST LOW 20 MIN: CPT

## 2023-03-30 RX ORDER — METOPROLOL TARTRATE 50 MG
1 TABLET ORAL
Qty: 0 | Refills: 0 | DISCHARGE

## 2023-03-30 NOTE — HISTORY OF PRESENT ILLNESS
[T: ___] : T[unfilled] [N: ___] : N[unfilled] [M: ___] : M[unfilled] [AJCC Stage: ____] : AJCC Stage: [unfilled] [de-identified] : The patient was diagnosed with esophageal adenocarcinoma in February 2023 at the age of 62. He presented to his PCP with complaints of dysphagia, intermittently since 2021, however, since January 2023 it has become severe to the point where he can no longer eat solid foods. On 2/6/23, he had an xray esophagram which showed a 5 cm distal esophageal stricture suspect for neoplasm. On 2/15/23, he underwent an endoscopy which showed at least a T3N1Mx lesion.  Almost completely obstructing esophageal/ GE junction tumor, Siewert Type II.  Stented with an 18 x 103 mm Walflex stent. 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  No loss of nuclear expression of MMR proteins (MLH1, MSH2, MSH6, and PMS2). HER-2: Negative (0/1+ membrane staining). A CT C/A/P on 2/27/23 showed nonspecific bilateral noncalcified pulmonary nodules measuring up to 4 mm Lower third esophageal masslike wall thickening extends to the GE junction and gastric cardia compatible with tumor. Masslike wall thickening extends upstream of the proximal end of the indwelling esophageal Wallstent. Lower esophageal, gastrohepatic and periceliac lymphadenopathy, measures 4.5 x 3.2 cm . Minimal nonspecific nodularity involving the omentum suspicious however inconclusive for carcinomatosis. Probable left hepatic lobe cysts however consider liver MRI if additional imaging is warranted.  [de-identified] : Medical Hx: HTN; GERD; kidney stones \par Surgical Hx: cholecystectomy; lithotripsy Left kidney  \par Family Hx: father prostrate ca; paternal uncle lymphoma \par Social Hx: never smoker; ETOH never; , live together; retired [place] ; family close by [de-identified] : He is C3D1 of induction for carbo / taxol. She c/o fatigue the first few days after treatment and then resolves. He c/o nausea starts about 24 hours later and is mild, takes compazine and resolves; no vomiting. He denies hair loss; nail / skin changes; neuropathy; pain; mouth sores, performs good mouth care; heartburn, mild due to stent and denies \par C/D. \par

## 2023-03-30 NOTE — PHYSICAL EXAM
[Restricted in physically strenuous activity but ambulatory and able to carry out work of a light or sedentary nature] : Status 1- Restricted in physically strenuous activity but ambulatory and able to carry out work of a light or sedentary nature, e.g., light house work, office work [Normal] : affect appropriate [de-identified] : wt stable today

## 2023-03-30 NOTE — REVIEW OF SYSTEMS
[Negative] : Allergic/Immunologic [Chest Pain] : no chest pain [Shortness Of Breath] : no shortness of breath [Abdominal Pain] : no abdominal pain [FreeTextEntry2] : wt stable today  [FreeTextEntry7] : nausea

## 2023-04-06 ENCOUNTER — RESULT REVIEW (OUTPATIENT)
Age: 63
End: 2023-04-06

## 2023-04-06 ENCOUNTER — APPOINTMENT (OUTPATIENT)
Dept: INFUSION THERAPY | Facility: CLINIC | Age: 63
End: 2023-04-06

## 2023-04-06 VITALS
OXYGEN SATURATION: 99 % | WEIGHT: 149.38 LBS | HEART RATE: 100 BPM | TEMPERATURE: 98.2 F | SYSTOLIC BLOOD PRESSURE: 124 MMHG | HEIGHT: 67 IN | DIASTOLIC BLOOD PRESSURE: 79 MMHG | BODY MASS INDEX: 23.45 KG/M2 | RESPIRATION RATE: 18 BRPM

## 2023-04-06 LAB
BASOPHILS # BLD AUTO: 0.03 K/UL — SIGNIFICANT CHANGE UP (ref 0–0.2)
BASOPHILS NFR BLD AUTO: 0.9 % — SIGNIFICANT CHANGE UP (ref 0–2)
EOSINOPHIL # BLD AUTO: 0.01 K/UL — SIGNIFICANT CHANGE UP (ref 0–0.5)
EOSINOPHIL NFR BLD AUTO: 0.3 % — SIGNIFICANT CHANGE UP (ref 0–6)
HCT VFR BLD CALC: 31.4 % — LOW (ref 39–50)
HGB BLD-MCNC: 9.9 G/DL — LOW (ref 13–17)
IMM GRANULOCYTES NFR BLD AUTO: 0.3 % — SIGNIFICANT CHANGE UP (ref 0–0.9)
LYMPHOCYTES # BLD AUTO: 1.03 K/UL — SIGNIFICANT CHANGE UP (ref 1–3.3)
LYMPHOCYTES # BLD AUTO: 29.3 % — SIGNIFICANT CHANGE UP (ref 13–44)
MCHC RBC-ENTMCNC: 24.6 PG — LOW (ref 27–34)
MCHC RBC-ENTMCNC: 31.5 GM/DL — LOW (ref 32–36)
MCV RBC AUTO: 78.1 FL — LOW (ref 80–100)
MONOCYTES # BLD AUTO: 0.32 K/UL — SIGNIFICANT CHANGE UP (ref 0–0.9)
MONOCYTES NFR BLD AUTO: 9.1 % — SIGNIFICANT CHANGE UP (ref 2–14)
NEUTROPHILS # BLD AUTO: 2.11 K/UL — SIGNIFICANT CHANGE UP (ref 1.8–7.4)
NEUTROPHILS NFR BLD AUTO: 60.1 % — SIGNIFICANT CHANGE UP (ref 43–77)
NRBC # BLD: 0 /100 WBCS — SIGNIFICANT CHANGE UP (ref 0–0)
PLATELET # BLD AUTO: 191 K/UL — SIGNIFICANT CHANGE UP (ref 150–400)
RBC # BLD: 4.02 M/UL — LOW (ref 4.2–5.8)
RBC # FLD: 16.3 % — HIGH (ref 10.3–14.5)
WBC # BLD: 3.51 K/UL — LOW (ref 3.8–10.5)
WBC # FLD AUTO: 3.51 K/UL — LOW (ref 3.8–10.5)

## 2023-04-06 PROCEDURE — 77427 RADIATION TX MANAGEMENT X5: CPT

## 2023-04-06 PROCEDURE — G6002: CPT

## 2023-04-06 PROCEDURE — G6015: CPT

## 2023-04-07 LAB
ALBUMIN SERPL ELPH-MCNC: 4.2 G/DL — SIGNIFICANT CHANGE UP (ref 3.3–5)
ALP SERPL-CCNC: 117 U/L — SIGNIFICANT CHANGE UP (ref 40–120)
ALT FLD-CCNC: 11 U/L — SIGNIFICANT CHANGE UP (ref 10–45)
ANION GAP SERPL CALC-SCNC: 14 MMOL/L — SIGNIFICANT CHANGE UP (ref 5–17)
AST SERPL-CCNC: 12 U/L — SIGNIFICANT CHANGE UP (ref 10–40)
BILIRUB SERPL-MCNC: 0.3 MG/DL — SIGNIFICANT CHANGE UP (ref 0.2–1.2)
BUN SERPL-MCNC: 12 MG/DL — SIGNIFICANT CHANGE UP (ref 7–23)
CALCIUM SERPL-MCNC: 9.1 MG/DL — SIGNIFICANT CHANGE UP (ref 8.4–10.5)
CHLORIDE SERPL-SCNC: 98 MMOL/L — SIGNIFICANT CHANGE UP (ref 96–108)
CO2 SERPL-SCNC: 27 MMOL/L — SIGNIFICANT CHANGE UP (ref 22–31)
CREAT SERPL-MCNC: 0.6 MG/DL — SIGNIFICANT CHANGE UP (ref 0.5–1.3)
EGFR: 108 ML/MIN/1.73M2 — SIGNIFICANT CHANGE UP
GLUCOSE SERPL-MCNC: 255 MG/DL — HIGH (ref 70–99)
MAGNESIUM SERPL-MCNC: 1.5 MG/DL — LOW (ref 1.6–2.6)
POTASSIUM SERPL-MCNC: 3.4 MMOL/L — LOW (ref 3.5–5.3)
POTASSIUM SERPL-SCNC: 3.4 MMOL/L — LOW (ref 3.5–5.3)
PROT SERPL-MCNC: 6.8 G/DL — SIGNIFICANT CHANGE UP (ref 6–8.3)
SODIUM SERPL-SCNC: 138 MMOL/L — SIGNIFICANT CHANGE UP (ref 135–145)

## 2023-04-07 PROCEDURE — G6015: CPT

## 2023-04-07 PROCEDURE — G6002: CPT

## 2023-04-10 PROCEDURE — G6015: CPT

## 2023-04-10 PROCEDURE — G6002: CPT

## 2023-04-11 ENCOUNTER — NON-APPOINTMENT (OUTPATIENT)
Age: 63
End: 2023-04-11

## 2023-04-11 VITALS
RESPIRATION RATE: 20 BRPM | DIASTOLIC BLOOD PRESSURE: 68 MMHG | HEIGHT: 67 IN | HEART RATE: 100 BPM | SYSTOLIC BLOOD PRESSURE: 126 MMHG | OXYGEN SATURATION: 99 % | BODY MASS INDEX: 23.7 KG/M2 | WEIGHT: 151 LBS

## 2023-04-11 PROCEDURE — 77014: CPT

## 2023-04-11 PROCEDURE — G6015: CPT

## 2023-04-11 NOTE — HISTORY OF PRESENT ILLNESS
[FreeTextEntry1] : The patient is a pleasant 62 year old male diagnosed with esophageal carcinoma referred by Dr. Aviles.\par \par He reports a long standing history of GERD and new intermittent dysphagia increasing in severity for past 4 months. It has become more severe and he was unable to eat solid foods. He has had a 30 lb unintentional weight loss. Denies pain or changes in bowel habits. He was referred to ENT followed by GI (Dr. Jorge Alberto Briceño) and has continued on a liquid diet to prevent obstruction.\par \par Xray Esophagram on 2/6/23 revealed a 5 cm distal esophageal stricture suspect for neoplasm. \par Upper EUS on 2/15/23 with Dr. Velazco showed at least a T3N1Mx tumor, almost completely obstructing esophageal/GE junction. \par A walflex stent was placed. Pathology revealed invasive adenocarcinoma, poorly differentiated with areas of signet ring cell morphology. \par Since the placement of the stent patient is able to eat regular diet and started to gain weight.\par \par CT chest 2/27/23 showed nonspecific bilateral noncalcified pulmonary nodules measuring up to 4 mm\par Lower third esophageal masslike wall thickening extends to the GE junction and gastric cardia compatible with tumor. Masslike wall thickening extends upstream of the proximal end of the indwelling esophageal Wallstent\par Lower esophageal, gastrohepatic and periceliac lymphadenopathy up to 4.5 cm. \par Minimal nonspecific nodularity involving the omentum suspicious however inconclusive for carcinomatosis\par Probable left hepatic lobe cysts however consider liver MRI if additional imaging is warranted.\par \par He has been able to eat a regular diet since the stent placement but is avoiding meat. \par He is a retired [place]  and was present at 9/11 site for 42 days and is enrolled in the [employer].He met his wife at the [address] [place] [address] where she was a volunteer giving out sandwiches. 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 He has a history of psoriasis which resolved in 2001 after his cholecystectomy.\par \par He had a  PET/CT on 2/7/23 and MRI liver on 2/8/23 which showed no new findings. He began induction carbo/taxol chemotherapy weekly and radiation began week 4.\par \par Started RTX on 4/6/23.\par Presents for OTV # 4/28. Feels well and using compazine pretx and denies nausea. Good swallow function without limitations. Magnesium a bit low and WBC decreasing. Labs all reviewed with pt.\par

## 2023-04-11 NOTE — DISEASE MANAGEMENT
[Pathological] : TNM Stage: p [III] : III [TTNM] : 3 [NTNM] : 1 [MTNM] : x [de-identified] : 588 [de-identified] : 7612 [de-identified] : esophagus/ LN

## 2023-04-12 PROCEDURE — G6015: CPT

## 2023-04-12 PROCEDURE — G6002: CPT

## 2023-04-13 ENCOUNTER — RESULT REVIEW (OUTPATIENT)
Age: 63
End: 2023-04-13

## 2023-04-13 ENCOUNTER — APPOINTMENT (OUTPATIENT)
Dept: INFUSION THERAPY | Facility: CLINIC | Age: 63
End: 2023-04-13

## 2023-04-13 ENCOUNTER — APPOINTMENT (OUTPATIENT)
Dept: HEMATOLOGY ONCOLOGY | Facility: CLINIC | Age: 63
End: 2023-04-13
Payer: COMMERCIAL

## 2023-04-13 VITALS
BODY MASS INDEX: 23.68 KG/M2 | OXYGEN SATURATION: 99 % | RESPIRATION RATE: 81 BRPM | DIASTOLIC BLOOD PRESSURE: 64 MMHG | SYSTOLIC BLOOD PRESSURE: 115 MMHG | HEART RATE: 77 BPM | TEMPERATURE: 98.7 F | WEIGHT: 151.19 LBS

## 2023-04-13 LAB
ACANTHOCYTES BLD QL SMEAR: SLIGHT — SIGNIFICANT CHANGE UP
ALBUMIN SERPL ELPH-MCNC: 4.1 G/DL — SIGNIFICANT CHANGE UP (ref 3.3–5)
ALP SERPL-CCNC: 109 U/L — SIGNIFICANT CHANGE UP (ref 40–120)
ALT FLD-CCNC: 8 U/L — LOW (ref 10–45)
ANION GAP SERPL CALC-SCNC: 12 MMOL/L — SIGNIFICANT CHANGE UP (ref 5–17)
ANISOCYTOSIS BLD QL: SLIGHT — SIGNIFICANT CHANGE UP
AST SERPL-CCNC: 13 U/L — SIGNIFICANT CHANGE UP (ref 10–40)
BASOPHILS # BLD AUTO: 0.04 K/UL — SIGNIFICANT CHANGE UP (ref 0–0.2)
BASOPHILS NFR BLD AUTO: 1.2 % — SIGNIFICANT CHANGE UP (ref 0–2)
BILIRUB SERPL-MCNC: 0.4 MG/DL — SIGNIFICANT CHANGE UP (ref 0.2–1.2)
BUN SERPL-MCNC: 7 MG/DL — SIGNIFICANT CHANGE UP (ref 7–23)
CALCIUM SERPL-MCNC: 9.4 MG/DL — SIGNIFICANT CHANGE UP (ref 8.4–10.5)
CHLORIDE SERPL-SCNC: 102 MMOL/L — SIGNIFICANT CHANGE UP (ref 96–108)
CO2 SERPL-SCNC: 25 MMOL/L — SIGNIFICANT CHANGE UP (ref 22–31)
CREAT SERPL-MCNC: 0.55 MG/DL — SIGNIFICANT CHANGE UP (ref 0.5–1.3)
DACRYOCYTES BLD QL SMEAR: SLIGHT — SIGNIFICANT CHANGE UP
EGFR: 111 ML/MIN/1.73M2 — SIGNIFICANT CHANGE UP
ELLIPTOCYTES BLD QL SMEAR: SLIGHT — SIGNIFICANT CHANGE UP
EOSINOPHIL # BLD AUTO: 0.03 K/UL — SIGNIFICANT CHANGE UP (ref 0–0.5)
EOSINOPHIL NFR BLD AUTO: 0.9 % — SIGNIFICANT CHANGE UP (ref 0–6)
GLUCOSE SERPL-MCNC: 155 MG/DL — HIGH (ref 70–99)
HCT VFR BLD CALC: 29.1 % — LOW (ref 39–50)
HGB BLD-MCNC: 9.4 G/DL — LOW (ref 13–17)
HYPOCHROMIA BLD QL: SLIGHT — SIGNIFICANT CHANGE UP
IMM GRANULOCYTES NFR BLD AUTO: 1.2 % — HIGH (ref 0–0.9)
LG PLATELETS BLD QL AUTO: SLIGHT — SIGNIFICANT CHANGE UP
LYMPHOCYTES # BLD AUTO: 0.55 K/UL — LOW (ref 1–3.3)
LYMPHOCYTES # BLD AUTO: 16.7 % — SIGNIFICANT CHANGE UP (ref 13–44)
MACROCYTES BLD QL: SLIGHT — SIGNIFICANT CHANGE UP
MAGNESIUM SERPL-MCNC: 1.7 MG/DL — SIGNIFICANT CHANGE UP (ref 1.6–2.6)
MCHC RBC-ENTMCNC: 25.4 PG — LOW (ref 27–34)
MCHC RBC-ENTMCNC: 32.3 GM/DL — SIGNIFICANT CHANGE UP (ref 32–36)
MCV RBC AUTO: 78.6 FL — LOW (ref 80–100)
MICROCYTES BLD QL: SLIGHT — SIGNIFICANT CHANGE UP
MONOCYTES # BLD AUTO: 0.28 K/UL — SIGNIFICANT CHANGE UP (ref 0–0.9)
MONOCYTES NFR BLD AUTO: 8.5 % — SIGNIFICANT CHANGE UP (ref 2–14)
NEUTROPHILS # BLD AUTO: 2.36 K/UL — SIGNIFICANT CHANGE UP (ref 1.8–7.4)
NEUTROPHILS NFR BLD AUTO: 71.5 % — SIGNIFICANT CHANGE UP (ref 43–77)
NRBC # BLD: 0 /100 WBCS — SIGNIFICANT CHANGE UP (ref 0–0)
OVALOCYTES BLD QL SMEAR: SLIGHT — SIGNIFICANT CHANGE UP
PLAT MORPH BLD: NORMAL — SIGNIFICANT CHANGE UP
PLATELET # BLD AUTO: 103 K/UL — LOW (ref 150–400)
POIKILOCYTOSIS BLD QL AUTO: SLIGHT — SIGNIFICANT CHANGE UP
POLYCHROMASIA BLD QL SMEAR: SLIGHT — SIGNIFICANT CHANGE UP
POTASSIUM SERPL-MCNC: 3.8 MMOL/L — SIGNIFICANT CHANGE UP (ref 3.5–5.3)
POTASSIUM SERPL-SCNC: 3.8 MMOL/L — SIGNIFICANT CHANGE UP (ref 3.5–5.3)
PROT SERPL-MCNC: 6.6 G/DL — SIGNIFICANT CHANGE UP (ref 6–8.3)
RBC # BLD: 3.7 M/UL — LOW (ref 4.2–5.8)
RBC # FLD: 17 % — HIGH (ref 10.3–14.5)
RBC BLD AUTO: ABNORMAL
SCHISTOCYTES BLD QL AUTO: SLIGHT — SIGNIFICANT CHANGE UP
SODIUM SERPL-SCNC: 139 MMOL/L — SIGNIFICANT CHANGE UP (ref 135–145)
WBC # BLD: 3.3 K/UL — LOW (ref 3.8–10.5)
WBC # FLD AUTO: 3.3 K/UL — LOW (ref 3.8–10.5)

## 2023-04-13 PROCEDURE — G6002: CPT

## 2023-04-13 PROCEDURE — G6015: CPT

## 2023-04-13 PROCEDURE — 99213 OFFICE O/P EST LOW 20 MIN: CPT

## 2023-04-13 PROCEDURE — 77427 RADIATION TX MANAGEMENT X5: CPT

## 2023-04-14 PROCEDURE — G6002: CPT

## 2023-04-14 PROCEDURE — G6015: CPT

## 2023-04-14 NOTE — REVIEW OF SYSTEMS
[Negative] : Allergic/Immunologic [Fatigue] : fatigue [Chest Pain] : no chest pain [Shortness Of Breath] : no shortness of breath [Abdominal Pain] : no abdominal pain [Constipation] : no constipation [Diarrhea] : no diarrhea [FreeTextEntry2] : wt stable today  [FreeTextEntry7] : nausea

## 2023-04-14 NOTE — HISTORY OF PRESENT ILLNESS
[T: ___] : T[unfilled] [N: ___] : N[unfilled] [M: ___] : M[unfilled] [AJCC Stage: ____] : AJCC Stage: [unfilled] [de-identified] : The patient was diagnosed with esophageal adenocarcinoma in February 2023 at the age of 62. He presented to his PCP with complaints of dysphagia, intermittently since 2021, however, since January 2023 it has become severe to the point where he can no longer eat solid foods. On 2/6/23, he had an xray esophagram which showed a 5 cm distal esophageal stricture suspect for neoplasm. On 2/15/23, he underwent an endoscopy which showed at least a T3N1Mx lesion.  Almost completely obstructing esophageal/ GE junction tumor, Siewert Type II.  Stented with an 18 x 103 mm Walflex stent. 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  No loss of nuclear expression of MMR proteins (MLH1, MSH2, MSH6, and PMS2). HER-2: Negative (0/1+ membrane staining). A CT C/A/P on 2/27/23 showed nonspecific bilateral noncalcified pulmonary nodules measuring up to 4 mm Lower third esophageal masslike wall thickening extends to the GE junction and gastric cardia compatible with tumor. Masslike wall thickening extends upstream of the proximal end of the indwelling esophageal Wallstent. Lower esophageal, gastrohepatic and periceliac lymphadenopathy, measures 4.5 x 3.2 cm . Minimal nonspecific nodularity involving the omentum suspicious however inconclusive for carcinomatosis. Probable left hepatic lobe cysts however consider liver MRI if additional imaging is warranted.  [de-identified] : Medical Hx: HTN; GERD; kidney stones \par Surgical Hx: cholecystectomy; lithotripsy Left kidney  \par Family Hx: father prostrate ca; paternal uncle lymphoma \par Social Hx: never smoker; ETOH never; , live together; retired [place] ; family close by [de-identified] : He is C3 of induction for carbo / taxol completed 3/30/23. Today he is C2 of 6 carbo / Taxol with RT today. He had a filling fixed yesterday for a tooth ache, feels much better now. He c/o fatigue the first few days after treatment and then resolves. He c/o nausea starts about 24 hours after treatment and is mild, takes compazine and resolves; no vomiting. Wt is stable. He denies hair loss; nail / skin changes; neuropathy; pain; mouth sores, performs good mouth care; heartburn, mild due to stent and denies C/D. He feels well overall. \par

## 2023-04-14 NOTE — PHYSICAL EXAM
[Restricted in physically strenuous activity but ambulatory and able to carry out work of a light or sedentary nature] : Status 1- Restricted in physically strenuous activity but ambulatory and able to carry out work of a light or sedentary nature, e.g., light house work, office work [Normal] : affect appropriate [de-identified] : wt stable today [de-identified] : right forearm IV accessed, dressing C/D/I

## 2023-04-17 PROCEDURE — G6015: CPT

## 2023-04-17 PROCEDURE — G6002: CPT

## 2023-04-18 ENCOUNTER — NON-APPOINTMENT (OUTPATIENT)
Age: 63
End: 2023-04-18

## 2023-04-18 VITALS
BODY MASS INDEX: 22.91 KG/M2 | HEART RATE: 76 BPM | DIASTOLIC BLOOD PRESSURE: 76 MMHG | RESPIRATION RATE: 16 BRPM | SYSTOLIC BLOOD PRESSURE: 120 MMHG | WEIGHT: 146 LBS | HEIGHT: 67 IN

## 2023-04-18 PROCEDURE — 77014: CPT

## 2023-04-18 PROCEDURE — G6015: CPT

## 2023-04-18 NOTE — HISTORY OF PRESENT ILLNESS
[FreeTextEntry1] : The patient is a pleasant 63 year old male diagnosed with esophageal carcinoma referred by Dr. Aviles.\par \par He reports a long standing history of GERD and new intermittent dysphagia increasing in severity for past 4 months. It has become more severe and he was unable to eat solid foods. He has had a 30 lb unintentional weight loss. Denies pain or changes in bowel habits. He was referred to ENT followed by GI (Dr. Jorge Alberto Briceño) and has continued on a liquid diet to prevent obstruction.\par \par Xray Esophagram on 2/6/23 revealed a 5 cm distal esophageal stricture suspect for neoplasm. \par Upper EUS on 2/15/23 with Dr. Velazco showed at least a T3N1Mx tumor, almost completely obstructing esophageal/GE junction. \par A walflex stent was placed. Pathology revealed invasive adenocarcinoma, poorly differentiated with areas of signet ring cell morphology. \par Since the placement of the stent patient is able to eat regular diet and started to gain weight.\par \par CT chest 2/27/23 showed nonspecific bilateral noncalcified pulmonary nodules measuring up to 4 mm\par Lower third esophageal masslike wall thickening extends to the GE junction and gastric cardia compatible with tumor. Masslike wall thickening extends upstream of the proximal end of the indwelling esophageal Wallstent\par Lower esophageal, gastrohepatic and periceliac lymphadenopathy up to 4.5 cm. \par Minimal nonspecific nodularity involving the omentum suspicious however inconclusive for carcinomatosis\par Probable left hepatic lobe cysts however consider liver MRI if additional imaging is warranted.\par \par He has been able to eat a regular diet since the stent placement but is avoiding meat. \par He is a retired [place]  and was present at 9/11 site for 42 days and is enrolled in the [employer].He met his wife at the [address] [place] [address] where she was a volunteer giving out sandwiches. 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 He has a history of psoriasis which resolved in 2001 after his cholecystectomy.\par \par He had a  PET/CT on 2/7/23 and MRI liver on 2/8/23 which showed no new findings. He began induction carbo/taxol chemotherapy weekly and radiation began week 4.\par \par Started RTX on 4/6/23.\par Presents for OTV # 9/28. Feels well and using compazine pretx and denies nausea. Good swallow function without limitations. Magnesium a bit low and WBC decreasing. Labs all reviewed with pt.Allergies bothering him.\par

## 2023-04-18 NOTE — DISEASE MANAGEMENT
[Pathological] : TNM Stage: p [III] : III [TTNM] : 3 [NTNM] : 1 [MTNM] : x [de-identified] : 2440 [de-identified] : 6920 [de-identified] : esophagus/ LN

## 2023-04-19 ENCOUNTER — APPOINTMENT (OUTPATIENT)
Dept: FAMILY MEDICINE | Facility: CLINIC | Age: 63
End: 2023-04-19
Payer: COMMERCIAL

## 2023-04-19 VITALS
SYSTOLIC BLOOD PRESSURE: 130 MMHG | DIASTOLIC BLOOD PRESSURE: 64 MMHG | HEART RATE: 115 BPM | BODY MASS INDEX: 23.54 KG/M2 | HEIGHT: 67 IN | TEMPERATURE: 98.3 F | OXYGEN SATURATION: 97 % | WEIGHT: 150 LBS

## 2023-04-19 PROCEDURE — 77336 RADIATION PHYSICS CONSULT: CPT

## 2023-04-19 PROCEDURE — G6015: CPT

## 2023-04-19 PROCEDURE — G6002: CPT

## 2023-04-19 PROCEDURE — 99214 OFFICE O/P EST MOD 30 MIN: CPT

## 2023-04-19 RX ORDER — BLOOD-GLUCOSE METER
W/DEVICE EACH MISCELLANEOUS
Qty: 1 | Refills: 0 | Status: DISCONTINUED | COMMUNITY
Start: 2023-03-06 | End: 2023-04-19

## 2023-04-19 RX ORDER — OMEPRAZOLE 40 MG/1
40 CAPSULE, DELAYED RELEASE ORAL
Qty: 180 | Refills: 2 | Status: DISCONTINUED | COMMUNITY
Start: 2023-02-15 | End: 2023-04-19

## 2023-04-20 ENCOUNTER — RESULT REVIEW (OUTPATIENT)
Age: 63
End: 2023-04-20

## 2023-04-20 ENCOUNTER — APPOINTMENT (OUTPATIENT)
Dept: INFUSION THERAPY | Facility: CLINIC | Age: 63
End: 2023-04-20

## 2023-04-20 VITALS
TEMPERATURE: 98.4 F | RESPIRATION RATE: 18 BRPM | HEART RATE: 88 BPM | WEIGHT: 146.38 LBS | BODY MASS INDEX: 22.93 KG/M2 | OXYGEN SATURATION: 100 % | SYSTOLIC BLOOD PRESSURE: 116 MMHG | DIASTOLIC BLOOD PRESSURE: 75 MMHG

## 2023-04-20 LAB
ALBUMIN SERPL ELPH-MCNC: 4 G/DL — SIGNIFICANT CHANGE UP (ref 3.3–5)
ALP SERPL-CCNC: 112 U/L — SIGNIFICANT CHANGE UP (ref 40–120)
ALT FLD-CCNC: 8 U/L — LOW (ref 10–45)
ANION GAP SERPL CALC-SCNC: 9 MMOL/L — SIGNIFICANT CHANGE UP (ref 5–17)
ANISOCYTOSIS BLD QL: SIGNIFICANT CHANGE UP
AST SERPL-CCNC: 9 U/L — LOW (ref 10–40)
BASOPHILS # BLD AUTO: 0.03 K/UL — SIGNIFICANT CHANGE UP (ref 0–0.2)
BASOPHILS NFR BLD AUTO: 1 % — SIGNIFICANT CHANGE UP (ref 0–2)
BILIRUB SERPL-MCNC: 0.3 MG/DL — SIGNIFICANT CHANGE UP (ref 0.2–1.2)
BUN SERPL-MCNC: 15 MG/DL — SIGNIFICANT CHANGE UP (ref 7–23)
CALCIUM SERPL-MCNC: 9.2 MG/DL — SIGNIFICANT CHANGE UP (ref 8.4–10.5)
CHLORIDE SERPL-SCNC: 101 MMOL/L — SIGNIFICANT CHANGE UP (ref 96–108)
CO2 SERPL-SCNC: 27 MMOL/L — SIGNIFICANT CHANGE UP (ref 22–31)
CREAT SERPL-MCNC: 0.58 MG/DL — SIGNIFICANT CHANGE UP (ref 0.5–1.3)
DACRYOCYTES BLD QL SMEAR: SLIGHT — SIGNIFICANT CHANGE UP
EGFR: 109 ML/MIN/1.73M2 — SIGNIFICANT CHANGE UP
ELLIPTOCYTES BLD QL SMEAR: SLIGHT — SIGNIFICANT CHANGE UP
EOSINOPHIL # BLD AUTO: 0.08 K/UL — SIGNIFICANT CHANGE UP (ref 0–0.5)
EOSINOPHIL NFR BLD AUTO: 3 % — SIGNIFICANT CHANGE UP (ref 0–6)
GLUCOSE SERPL-MCNC: 187 MG/DL — HIGH (ref 70–99)
HCT VFR BLD CALC: 29.1 % — LOW (ref 39–50)
HGB BLD-MCNC: 9.5 G/DL — LOW (ref 13–17)
HYPOCHROMIA BLD QL: SLIGHT — SIGNIFICANT CHANGE UP
LYMPHOCYTES # BLD AUTO: 1.02 K/UL — SIGNIFICANT CHANGE UP (ref 1–3.3)
LYMPHOCYTES # BLD AUTO: 40 % — SIGNIFICANT CHANGE UP (ref 13–44)
MACROCYTES BLD QL: SLIGHT — SIGNIFICANT CHANGE UP
MAGNESIUM SERPL-MCNC: 1.8 MG/DL — SIGNIFICANT CHANGE UP (ref 1.6–2.6)
MCHC RBC-ENTMCNC: 25.5 PG — LOW (ref 27–34)
MCHC RBC-ENTMCNC: 32.6 GM/DL — SIGNIFICANT CHANGE UP (ref 32–36)
MCV RBC AUTO: 78.2 FL — LOW (ref 80–100)
MICROCYTES BLD QL: SLIGHT — SIGNIFICANT CHANGE UP
MONOCYTES # BLD AUTO: 0.26 K/UL — SIGNIFICANT CHANGE UP (ref 0–0.9)
MONOCYTES NFR BLD AUTO: 10 % — SIGNIFICANT CHANGE UP (ref 2–14)
NEUTROPHILS # BLD AUTO: 1.18 K/UL — LOW (ref 1.8–7.4)
NEUTROPHILS NFR BLD AUTO: 46 % — SIGNIFICANT CHANGE UP (ref 43–77)
NRBC # BLD: 0 /100 — SIGNIFICANT CHANGE UP (ref 0–0)
NRBC # BLD: SIGNIFICANT CHANGE UP /100 WBCS (ref 0–0)
PLAT MORPH BLD: NORMAL — SIGNIFICANT CHANGE UP
PLATELET # BLD AUTO: 89 K/UL — LOW (ref 150–400)
POLYCHROMASIA BLD QL SMEAR: SLIGHT — SIGNIFICANT CHANGE UP
POTASSIUM SERPL-MCNC: 4.3 MMOL/L — SIGNIFICANT CHANGE UP (ref 3.5–5.3)
POTASSIUM SERPL-SCNC: 4.3 MMOL/L — SIGNIFICANT CHANGE UP (ref 3.5–5.3)
PROT SERPL-MCNC: 6.6 G/DL — SIGNIFICANT CHANGE UP (ref 6–8.3)
RBC # BLD: 3.72 M/UL — LOW (ref 4.2–5.8)
RBC # FLD: 18.8 % — HIGH (ref 10.3–14.5)
RBC BLD AUTO: ABNORMAL
SCHISTOCYTES BLD QL AUTO: SLIGHT — SIGNIFICANT CHANGE UP
SODIUM SERPL-SCNC: 138 MMOL/L — SIGNIFICANT CHANGE UP (ref 135–145)
WBC # BLD: 2.56 K/UL — LOW (ref 3.8–10.5)
WBC # FLD AUTO: 2.56 K/UL — LOW (ref 3.8–10.5)

## 2023-04-20 PROCEDURE — G6015: CPT

## 2023-04-20 PROCEDURE — G6002: CPT

## 2023-04-20 PROCEDURE — 77427 RADIATION TX MANAGEMENT X5: CPT

## 2023-04-21 PROCEDURE — G6002: CPT

## 2023-04-21 PROCEDURE — G6015: CPT

## 2023-04-24 PROCEDURE — G6002: CPT

## 2023-04-24 PROCEDURE — G6015: CPT

## 2023-04-25 ENCOUNTER — NON-APPOINTMENT (OUTPATIENT)
Age: 63
End: 2023-04-25

## 2023-04-25 VITALS
HEART RATE: 100 BPM | HEIGHT: 67 IN | OXYGEN SATURATION: 98 % | BODY MASS INDEX: 23.23 KG/M2 | RESPIRATION RATE: 18 BRPM | WEIGHT: 148 LBS

## 2023-04-25 PROCEDURE — G6015: CPT

## 2023-04-25 PROCEDURE — 77014: CPT

## 2023-04-25 NOTE — HISTORY OF PRESENT ILLNESS
[FreeTextEntry1] : The patient is a pleasant 63 year old male diagnosed with esophageal carcinoma referred by Dr. Aviles.\par \par He reports a long standing history of GERD and new intermittent dysphagia increasing in severity for past 4 months. It has become more severe and he was unable to eat solid foods. He has had a 30 lb unintentional weight loss. Denies pain or changes in bowel habits. He was referred to ENT followed by GI (Dr. Jorge Alberto Briceño) and has continued on a liquid diet to prevent obstruction.\par \par Xray Esophagram on 2/6/23 revealed a 5 cm distal esophageal stricture suspect for neoplasm. \par Upper EUS on 2/15/23 with Dr. Velazco showed at least a T3N1Mx tumor, almost completely obstructing esophageal/GE junction. \par A walflex stent was placed. Pathology revealed invasive adenocarcinoma, poorly differentiated with areas of signet ring cell morphology. \par Since the placement of the stent patient is able to eat regular diet and started to gain weight.\par \par CT chest 2/27/23 showed nonspecific bilateral noncalcified pulmonary nodules measuring up to 4 mm\par Lower third esophageal masslike wall thickening extends to the GE junction and gastric cardia compatible with tumor. Masslike wall thickening extends upstream of the proximal end of the indwelling esophageal Wallstent\par Lower esophageal, gastrohepatic and periceliac lymphadenopathy up to 4.5 cm. \par Minimal nonspecific nodularity involving the omentum suspicious however inconclusive for carcinomatosis\par Probable left hepatic lobe cysts however consider liver MRI if additional imaging is warranted.\par \par He has been able to eat a regular diet since the stent placement but is avoiding meat. \par He is a retired [place]  and was present at 9/11 site for 42 days and is enrolled in the [employer].He met his wife at the [address] [place] [address] where she was a volunteer giving out sandwiches. 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 He has a history of psoriasis which resolved in 2001 after his cholecystectomy.\par \par He had a  PET/CT on 2/7/23 and MRI liver on 2/8/23 which showed no new findings. He began induction carbo/taxol chemotherapy weekly and radiation began week 4.\par \par Started RTX on 4/6/23.\par Presents for OTV # 14/28. Feeling nauseous all the time - using compazine without relief. Good swallow function without limitations. Thick mucus in oral cavity. Magnesium a bit low and WBC decreasing. CHemotherapy held last two weeks due to cytopenias. Rash on b/ forearms likely due to allergic reaction to plants. \par Advised to switch to ondanestron and sent in script for tabs (ODT $900). Coristone on forearms. Constipation- add scoop of benefiber or miralax daily.\par

## 2023-04-25 NOTE — PHYSICAL EXAM
[Normal] : normal skin color and pigmentation and no rash [Skin Color & Pigmentation] : normal skin color and pigmentation [de-identified] : thick mucus in oral cavity. NO thrush

## 2023-04-25 NOTE — DISEASE MANAGEMENT
[Pathological] : TNM Stage: p [III] : III [TTNM] : 3 [NTNM] : 1 [MTNM] : x [de-identified] : 9517 [de-identified] : 8450 [de-identified] : esophagus/ LN

## 2023-04-26 ENCOUNTER — RESULT REVIEW (OUTPATIENT)
Age: 63
End: 2023-04-26

## 2023-04-26 ENCOUNTER — APPOINTMENT (OUTPATIENT)
Dept: HEMATOLOGY ONCOLOGY | Facility: CLINIC | Age: 63
End: 2023-04-26

## 2023-04-26 LAB
ANISOCYTOSIS BLD QL: SLIGHT — SIGNIFICANT CHANGE UP
BASOPHILS # BLD AUTO: 0.02 K/UL — SIGNIFICANT CHANGE UP (ref 0–0.2)
BASOPHILS NFR BLD AUTO: 0.9 % — SIGNIFICANT CHANGE UP (ref 0–2)
DACRYOCYTES BLD QL SMEAR: SLIGHT — SIGNIFICANT CHANGE UP
ELLIPTOCYTES BLD QL SMEAR: SLIGHT — SIGNIFICANT CHANGE UP
EOSINOPHIL # BLD AUTO: 0.07 K/UL — SIGNIFICANT CHANGE UP (ref 0–0.5)
EOSINOPHIL NFR BLD AUTO: 3.1 % — SIGNIFICANT CHANGE UP (ref 0–6)
HCT VFR BLD CALC: 31 % — LOW (ref 39–50)
HGB BLD-MCNC: 10.2 G/DL — LOW (ref 13–17)
HYPOCHROMIA BLD QL: SLIGHT — SIGNIFICANT CHANGE UP
IMM GRANULOCYTES NFR BLD AUTO: 0 % — SIGNIFICANT CHANGE UP (ref 0–0.9)
LG PLATELETS BLD QL AUTO: SLIGHT — SIGNIFICANT CHANGE UP
LYMPHOCYTES # BLD AUTO: 0.29 K/UL — LOW (ref 1–3.3)
LYMPHOCYTES # BLD AUTO: 12.8 % — LOW (ref 13–44)
MACROCYTES BLD QL: SLIGHT — SIGNIFICANT CHANGE UP
MCHC RBC-ENTMCNC: 26.4 PG — LOW (ref 27–34)
MCHC RBC-ENTMCNC: 32.9 GM/DL — SIGNIFICANT CHANGE UP (ref 32–36)
MCV RBC AUTO: 80.3 FL — SIGNIFICANT CHANGE UP (ref 80–100)
MICROCYTES BLD QL: SLIGHT — SIGNIFICANT CHANGE UP
MONOCYTES # BLD AUTO: 0.34 K/UL — SIGNIFICANT CHANGE UP (ref 0–0.9)
MONOCYTES NFR BLD AUTO: 15 % — HIGH (ref 2–14)
NEUTROPHILS # BLD AUTO: 1.54 K/UL — LOW (ref 1.8–7.4)
NEUTROPHILS NFR BLD AUTO: 68.2 % — SIGNIFICANT CHANGE UP (ref 43–77)
NRBC # BLD: 0 /100 WBCS — SIGNIFICANT CHANGE UP (ref 0–0)
OVALOCYTES BLD QL SMEAR: SLIGHT — SIGNIFICANT CHANGE UP
PLAT MORPH BLD: NORMAL — SIGNIFICANT CHANGE UP
PLATELET # BLD AUTO: 191 K/UL — SIGNIFICANT CHANGE UP (ref 150–400)
POLYCHROMASIA BLD QL SMEAR: SLIGHT — SIGNIFICANT CHANGE UP
RBC # BLD: 3.86 M/UL — LOW (ref 4.2–5.8)
RBC # FLD: 21 % — HIGH (ref 10.3–14.5)
RBC BLD AUTO: ABNORMAL
WBC # BLD: 2.26 K/UL — LOW (ref 3.8–10.5)
WBC # FLD AUTO: 2.26 K/UL — LOW (ref 3.8–10.5)

## 2023-04-26 PROCEDURE — 77336 RADIATION PHYSICS CONSULT: CPT

## 2023-04-26 PROCEDURE — G6002: CPT

## 2023-04-26 PROCEDURE — G6015: CPT

## 2023-04-27 ENCOUNTER — APPOINTMENT (OUTPATIENT)
Dept: INFUSION THERAPY | Facility: CLINIC | Age: 63
End: 2023-04-27

## 2023-04-27 ENCOUNTER — RESULT REVIEW (OUTPATIENT)
Age: 63
End: 2023-04-27

## 2023-04-27 LAB
ALBUMIN SERPL ELPH-MCNC: 3.9 G/DL — SIGNIFICANT CHANGE UP (ref 3.3–5)
ALP SERPL-CCNC: 119 U/L — SIGNIFICANT CHANGE UP (ref 40–120)
ALT FLD-CCNC: 7 U/L — LOW (ref 10–45)
ANION GAP SERPL CALC-SCNC: 11 MMOL/L — SIGNIFICANT CHANGE UP (ref 5–17)
AST SERPL-CCNC: 22 U/L — SIGNIFICANT CHANGE UP (ref 10–40)
BILIRUB SERPL-MCNC: 0.5 MG/DL — SIGNIFICANT CHANGE UP (ref 0.2–1.2)
BUN SERPL-MCNC: 13 MG/DL — SIGNIFICANT CHANGE UP (ref 7–23)
CALCIUM SERPL-MCNC: 9.4 MG/DL — SIGNIFICANT CHANGE UP (ref 8.4–10.5)
CHLORIDE SERPL-SCNC: 100 MMOL/L — SIGNIFICANT CHANGE UP (ref 96–108)
CO2 SERPL-SCNC: 27 MMOL/L — SIGNIFICANT CHANGE UP (ref 22–31)
CREAT SERPL-MCNC: 0.66 MG/DL — SIGNIFICANT CHANGE UP (ref 0.5–1.3)
EGFR: 105 ML/MIN/1.73M2 — SIGNIFICANT CHANGE UP
GLUCOSE SERPL-MCNC: 166 MG/DL — HIGH (ref 70–99)
MAGNESIUM SERPL-MCNC: 1.8 MG/DL — SIGNIFICANT CHANGE UP (ref 1.6–2.6)
POTASSIUM SERPL-MCNC: 5.2 MMOL/L — SIGNIFICANT CHANGE UP (ref 3.5–5.3)
POTASSIUM SERPL-SCNC: 5.2 MMOL/L — SIGNIFICANT CHANGE UP (ref 3.5–5.3)
PROT SERPL-MCNC: 6.7 G/DL — SIGNIFICANT CHANGE UP (ref 6–8.3)
SODIUM SERPL-SCNC: 137 MMOL/L — SIGNIFICANT CHANGE UP (ref 135–145)

## 2023-04-27 PROCEDURE — G6015: CPT

## 2023-04-27 PROCEDURE — G6002: CPT

## 2023-04-28 PROCEDURE — G6015: CPT

## 2023-04-28 PROCEDURE — G6002: CPT

## 2023-05-01 ENCOUNTER — NON-APPOINTMENT (OUTPATIENT)
Age: 63
End: 2023-05-01

## 2023-05-01 PROCEDURE — 77427 RADIATION TX MANAGEMENT X5: CPT

## 2023-05-01 PROCEDURE — G6015: CPT

## 2023-05-01 PROCEDURE — G6002: CPT

## 2023-05-02 ENCOUNTER — NON-APPOINTMENT (OUTPATIENT)
Age: 63
End: 2023-05-02

## 2023-05-02 PROCEDURE — 77014: CPT

## 2023-05-02 PROCEDURE — G6015: CPT

## 2023-05-02 NOTE — DISEASE MANAGEMENT
[TTNM] : 3 [NTNM] : 1 [MTNM] : x [de-identified] : 5017 [de-identified] : 6017 [de-identified] : esophagus/ LN

## 2023-05-02 NOTE — HISTORY OF PRESENT ILLNESS
[FreeTextEntry1] : The patient is a pleasant 63 year old male diagnosed with esophageal carcinoma referred by Dr. Aviles.\par \par He reports a long standing history of GERD and new intermittent dysphagia increasing in severity for past 4 months. It has become more severe and he was unable to eat solid foods. He has had a 30 lb unintentional weight loss. Denies pain or changes in bowel habits. He was referred to ENT followed by GI (Dr. Jorge Alberto Briceño) and has continued on a liquid diet to prevent obstruction.\par \par Xray Esophagram on 2/6/23 revealed a 5 cm distal esophageal stricture suspect for neoplasm. \par Upper EUS on 2/15/23 with Dr. Velazco showed at least a T3N1Mx tumor, almost completely obstructing esophageal/GE junction. \par A walflex stent was placed. Pathology revealed invasive adenocarcinoma, poorly differentiated with areas of signet ring cell morphology. \par Since the placement of the stent patient is able to eat regular diet and started to gain weight.\par \par CT chest 2/27/23 showed nonspecific bilateral noncalcified pulmonary nodules measuring up to 4 mm\par Lower third esophageal masslike wall thickening extends to the GE junction and gastric cardia compatible with tumor. Masslike wall thickening extends upstream of the proximal end of the indwelling esophageal Wallstent\par Lower esophageal, gastrohepatic and periceliac lymphadenopathy up to 4.5 cm. \par Minimal nonspecific nodularity involving the omentum suspicious however inconclusive for carcinomatosis\par Probable left hepatic lobe cysts however consider liver MRI if additional imaging is warranted.\par \par He has been able to eat a regular diet since the stent placement but is avoiding meat. \par He is a retired [place]  and was present at 9/11 site for 42 days and is enrolled in the [employer].He met his wife at the [address] [place] [address] where she was a volunteer giving out sandwiches. 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 He has a history of psoriasis which resolved in 2001 after his cholecystectomy.\par \par He had a  PET/CT on 2/7/23 and MRI liver on 2/8/23 which showed no new findings. He began induction carbo/taxol chemotherapy weekly and radiation began week 4.\par \par Started RTX on 4/6/23.\par Presents for OTV # 19/28. Feeling nauseous all the time - using compazine without relief. Good swallow function without limitations. Thick mucus in oral cavity. Magnesium a bit low and WBC decreasing. CHemotherapy held last two weeks due to cytopenias. Rash on b/ forearms likely due to allergic reaction to plants. \par Advised to switch to ondanestron and sent in script for tabs (ODT $900). Coristone on forearms. Constipation- add scoop of benefiber or miralax daily. daily nausea despite zofran and compazine. Better when eating frequent small meals Reports pain in chest from stent.\par

## 2023-05-03 ENCOUNTER — NON-APPOINTMENT (OUTPATIENT)
Age: 63
End: 2023-05-03

## 2023-05-03 PROCEDURE — G6015: CPT

## 2023-05-03 PROCEDURE — 77336 RADIATION PHYSICS CONSULT: CPT

## 2023-05-03 PROCEDURE — G6002: CPT

## 2023-05-04 ENCOUNTER — RESULT REVIEW (OUTPATIENT)
Age: 63
End: 2023-05-04

## 2023-05-04 ENCOUNTER — APPOINTMENT (OUTPATIENT)
Dept: HEMATOLOGY ONCOLOGY | Facility: CLINIC | Age: 63
End: 2023-05-04
Payer: COMMERCIAL

## 2023-05-04 ENCOUNTER — APPOINTMENT (OUTPATIENT)
Dept: INFUSION THERAPY | Facility: CLINIC | Age: 63
End: 2023-05-04
Payer: COMMERCIAL

## 2023-05-04 ENCOUNTER — NON-APPOINTMENT (OUTPATIENT)
Age: 63
End: 2023-05-04

## 2023-05-04 LAB
ALBUMIN SERPL ELPH-MCNC: 3.9 G/DL — SIGNIFICANT CHANGE UP (ref 3.3–5)
ALP SERPL-CCNC: 111 U/L — SIGNIFICANT CHANGE UP (ref 40–120)
ALT FLD-CCNC: 8 U/L — LOW (ref 10–45)
ANION GAP SERPL CALC-SCNC: 15 MMOL/L — SIGNIFICANT CHANGE UP (ref 5–17)
ANISOCYTOSIS BLD QL: SIGNIFICANT CHANGE UP
AST SERPL-CCNC: 14 U/L — SIGNIFICANT CHANGE UP (ref 10–40)
BASOPHILS # BLD AUTO: 0.03 K/UL — SIGNIFICANT CHANGE UP (ref 0–0.2)
BASOPHILS NFR BLD AUTO: 1 % — SIGNIFICANT CHANGE UP (ref 0–2)
BILIRUB SERPL-MCNC: 0.4 MG/DL — SIGNIFICANT CHANGE UP (ref 0.2–1.2)
BUN SERPL-MCNC: 16 MG/DL — SIGNIFICANT CHANGE UP (ref 7–23)
CALCIUM SERPL-MCNC: 9.5 MG/DL — SIGNIFICANT CHANGE UP (ref 8.4–10.5)
CHLORIDE SERPL-SCNC: 94 MMOL/L — LOW (ref 96–108)
CO2 SERPL-SCNC: 28 MMOL/L — SIGNIFICANT CHANGE UP (ref 22–31)
CREAT SERPL-MCNC: 0.59 MG/DL — SIGNIFICANT CHANGE UP (ref 0.5–1.3)
EGFR: 108 ML/MIN/1.73M2 — SIGNIFICANT CHANGE UP
EOSINOPHIL # BLD AUTO: 0 K/UL — SIGNIFICANT CHANGE UP (ref 0–0.5)
EOSINOPHIL NFR BLD AUTO: 0 % — SIGNIFICANT CHANGE UP (ref 0–6)
GLUCOSE SERPL-MCNC: 177 MG/DL — HIGH (ref 70–99)
HCT VFR BLD CALC: 33.9 % — LOW (ref 39–50)
HGB BLD-MCNC: 11.2 G/DL — LOW (ref 13–17)
HYPOCHROMIA BLD QL: SLIGHT — SIGNIFICANT CHANGE UP
LG PLATELETS BLD QL AUTO: SLIGHT — SIGNIFICANT CHANGE UP
LYMPHOCYTES # BLD AUTO: 0.19 K/UL — LOW (ref 1–3.3)
LYMPHOCYTES # BLD AUTO: 7 % — LOW (ref 13–44)
MACROCYTES BLD QL: SIGNIFICANT CHANGE UP
MAGNESIUM SERPL-MCNC: 1.5 MG/DL — LOW (ref 1.6–2.6)
MCHC RBC-ENTMCNC: 26.5 PG — LOW (ref 27–34)
MCHC RBC-ENTMCNC: 33 GM/DL — SIGNIFICANT CHANGE UP (ref 32–36)
MCV RBC AUTO: 80.1 FL — SIGNIFICANT CHANGE UP (ref 80–100)
MICROCYTES BLD QL: SLIGHT — SIGNIFICANT CHANGE UP
MONOCYTES # BLD AUTO: 0.48 K/UL — SIGNIFICANT CHANGE UP (ref 0–0.9)
MONOCYTES NFR BLD AUTO: 18 % — HIGH (ref 2–14)
NEUTROPHILS # BLD AUTO: 1.97 K/UL — SIGNIFICANT CHANGE UP (ref 1.8–7.4)
NEUTROPHILS NFR BLD AUTO: 73 % — SIGNIFICANT CHANGE UP (ref 43–77)
NEUTS BAND # BLD: 1 % — SIGNIFICANT CHANGE UP (ref 0–8)
NRBC # BLD: 0 /100 — SIGNIFICANT CHANGE UP (ref 0–0)
NRBC # BLD: SIGNIFICANT CHANGE UP /100 WBCS (ref 0–0)
PLAT MORPH BLD: ABNORMAL
PLATELET # BLD AUTO: 295 K/UL — SIGNIFICANT CHANGE UP (ref 150–400)
POLYCHROMASIA BLD QL SMEAR: SLIGHT — SIGNIFICANT CHANGE UP
POTASSIUM SERPL-MCNC: 4.2 MMOL/L — SIGNIFICANT CHANGE UP (ref 3.5–5.3)
POTASSIUM SERPL-SCNC: 4.2 MMOL/L — SIGNIFICANT CHANGE UP (ref 3.5–5.3)
PROT SERPL-MCNC: 7 G/DL — SIGNIFICANT CHANGE UP (ref 6–8.3)
RBC # BLD: 4.23 M/UL — SIGNIFICANT CHANGE UP (ref 4.2–5.8)
RBC # FLD: 22.8 % — HIGH (ref 10.3–14.5)
RBC BLD AUTO: ABNORMAL
SODIUM SERPL-SCNC: 137 MMOL/L — SIGNIFICANT CHANGE UP (ref 135–145)
WBC # BLD: 2.66 K/UL — LOW (ref 3.8–10.5)
WBC # FLD AUTO: 2.66 K/UL — LOW (ref 3.8–10.5)

## 2023-05-04 PROCEDURE — 99215 OFFICE O/P EST HI 40 MIN: CPT

## 2023-05-04 PROCEDURE — G6002: CPT

## 2023-05-04 PROCEDURE — G6015: CPT

## 2023-05-04 PROCEDURE — 77427 RADIATION TX MANAGEMENT X5: CPT

## 2023-05-04 NOTE — PHYSICAL EXAM
[Restricted in physically strenuous activity but ambulatory and able to carry out work of a light or sedentary nature] : Status 1- Restricted in physically strenuous activity but ambulatory and able to carry out work of a light or sedentary nature, e.g., light house work, office work [Normal] : affect appropriate [de-identified] : wt stable today [de-identified] : right forearm IV accessed, dressing C/D/I

## 2023-05-04 NOTE — HISTORY OF PRESENT ILLNESS
[T: ___] : T[unfilled] [N: ___] : N[unfilled] [M: ___] : M[unfilled] [AJCC Stage: ____] : AJCC Stage: [unfilled] [de-identified] : The patient was diagnosed with esophageal adenocarcinoma in February 2023 at the age of 62. He presented to his PCP with complaints of dysphagia, intermittently since 2021, however, since January 2023 it has become severe to the point where he can no longer eat solid foods. On 2/6/23, he had an xray esophagram which showed a 5 cm distal esophageal stricture suspect for neoplasm. On 2/15/23, he underwent an endoscopy which showed at least a T3N1Mx lesion.  Almost completely obstructing esophageal/ GE junction tumor, Siewert Type II.  Stented with an 18 x 103 mm Walflex stent. 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  No loss of nuclear expression of MMR proteins (MLH1, MSH2, MSH6, and PMS2). HER-2: Negative (0/1+ membrane staining). A CT C/A/P on 2/27/23 showed nonspecific bilateral noncalcified pulmonary nodules measuring up to 4 mm Lower third esophageal masslike wall thickening extends to the GE junction and gastric cardia compatible with tumor. Masslike wall thickening extends upstream of the proximal end of the indwelling esophageal Wallstent. Lower esophageal, gastrohepatic and periceliac lymphadenopathy, measures 4.5 x 3.2 cm . Minimal nonspecific nodularity involving the omentum suspicious however inconclusive for carcinomatosis. Probable left hepatic lobe cysts however consider liver MRI if additional imaging is warranted.  [de-identified] : Medical Hx: HTN; GERD; kidney stones \par Surgical Hx: cholecystectomy; lithotripsy Left kidney  \par Family Hx: father prostrate ca; paternal uncle lymphoma \par Social Hx: never smoker; ETOH never; , live together; retired [place] ; family close by [de-identified] : He completed 3 cycles of induction for carbo / taxol on 3/30/23. Today he is C5 of 6 carbo / Taxol with RT today. He had a filling fixed yesterday for a tooth ache, feels much better now. He c/o fatigue the first few days after treatment and then resolves. He c/o nausea starts about 24 hours after treatment and is mild, takes compazine and resolves; no vomiting. Wt is stable. He denies hair loss; nail / skin changes; neuropathy; pain; mouth sores, performs good mouth care; heartburn, mild due to stent and denies C/D. He feels well overall. \par

## 2023-05-04 NOTE — REVIEW OF SYSTEMS
[Fatigue] : fatigue [Negative] : Allergic/Immunologic [Chest Pain] : no chest pain [Shortness Of Breath] : no shortness of breath [Abdominal Pain] : no abdominal pain [Constipation] : no constipation [Diarrhea] : no diarrhea [FreeTextEntry2] : wt stable today  [FreeTextEntry7] : nausea

## 2023-05-05 ENCOUNTER — APPOINTMENT (OUTPATIENT)
Dept: INFUSION THERAPY | Facility: CLINIC | Age: 63
End: 2023-05-05

## 2023-05-05 VITALS
DIASTOLIC BLOOD PRESSURE: 70 MMHG | OXYGEN SATURATION: 97 % | SYSTOLIC BLOOD PRESSURE: 106 MMHG | HEART RATE: 131 BPM | TEMPERATURE: 98.5 F | RESPIRATION RATE: 18 BRPM

## 2023-05-05 PROCEDURE — G6015: CPT

## 2023-05-05 PROCEDURE — G6002: CPT

## 2023-05-08 DIAGNOSIS — Z51.89 ENCOUNTER FOR OTHER SPECIFIED AFTERCARE: ICD-10-CM

## 2023-05-08 PROCEDURE — G6015: CPT

## 2023-05-08 PROCEDURE — G6002: CPT

## 2023-05-09 ENCOUNTER — NON-APPOINTMENT (OUTPATIENT)
Age: 63
End: 2023-05-09

## 2023-05-09 VITALS — WEIGHT: 134 LBS | BODY MASS INDEX: 20.99 KG/M2

## 2023-05-09 PROCEDURE — 77014: CPT

## 2023-05-09 PROCEDURE — G6015: CPT

## 2023-05-09 NOTE — DISEASE MANAGEMENT
[Pathological] : TNM Stage: p [III] : III [TTNM] : 3 [NTNM] : 1 [MTNM] : x [de-identified] : 9925 [de-identified] : 7639 [de-identified] : esophagus/ LN

## 2023-05-09 NOTE — HISTORY OF PRESENT ILLNESS
[FreeTextEntry1] : The patient is a pleasant 63 year old male diagnosed with esophageal carcinoma referred by Dr. Aviles.\par \par He reports a long standing history of GERD and new intermittent dysphagia increasing in severity for past 4 months. It has become more severe and he was unable to eat solid foods. He has had a 30 lb unintentional weight loss. Denies pain or changes in bowel habits. He was referred to ENT followed by GI (Dr. Jorge Alberto Briceño) and has continued on a liquid diet to prevent obstruction.\par \par Xray Esophagram on 2/6/23 revealed a 5 cm distal esophageal stricture suspect for neoplasm. \par Upper EUS on 2/15/23 with Dr. Velazco showed at least a T3N1Mx tumor, almost completely obstructing esophageal/GE junction. \par A walflex stent was placed. Pathology revealed invasive adenocarcinoma, poorly differentiated with areas of signet ring cell morphology. \par Since the placement of the stent patient is able to eat regular diet and started to gain weight.\par \par CT chest 2/27/23 showed nonspecific bilateral noncalcified pulmonary nodules measuring up to 4 mm\par Lower third esophageal masslike wall thickening extends to the GE junction and gastric cardia compatible with tumor. Masslike wall thickening extends upstream of the proximal end of the indwelling esophageal Wallstent\par Lower esophageal, gastrohepatic and periceliac lymphadenopathy up to 4.5 cm. \par Minimal nonspecific nodularity involving the omentum suspicious however inconclusive for carcinomatosis\par Probable left hepatic lobe cysts however consider liver MRI if additional imaging is warranted.\par \par He has been able to eat a regular diet since the stent placement but is avoiding meat. \par He is a retired [place]  and was present at 9/11 site for 42 days and is enrolled in the [employer].He met his wife at the [address] [place] [address] where she was a volunteer giving out sandwiches. 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 He has a history of psoriasis which resolved in 2001 after his cholecystectomy.\par \par He had a  PET/CT on 2/7/23 and MRI liver on 2/8/23 which showed no new findings. He began induction carbo/taxol chemotherapy weekly and radiation began week 4.\par \par Started RTX on 4/6/23.\par Presents for OTV # 24/28. Feeling nauseous all the time - using compazine without relief. Good swallow function without limitations. Thick mucus in oral cavity. Magnesium a bit low and WBC decreasing. CHemotherapy held last two weeks due to cytopenias. Rash on b/ forearms likely due to allergic reaction to plants. \par Advised to switch to ondanestron and sent in script for tabs (ODT $900). Coristone on forearms. Constipation- add scoop of benefiber or miralax daily. daily nausea despite zofran and compazine. Better when eating frequent small meals Reports pain in chest from stent.Continuing to lose wegiht. I had recommended medical marijuana and he had declined but agreed today to certification.\par

## 2023-05-10 PROCEDURE — 77336 RADIATION PHYSICS CONSULT: CPT

## 2023-05-10 PROCEDURE — G6002: CPT

## 2023-05-10 PROCEDURE — G6015: CPT

## 2023-05-10 RX ORDER — LISINOPRIL 10 MG/1
10 TABLET ORAL
Qty: 30 | Refills: 1 | Status: DISCONTINUED | COMMUNITY
Start: 2023-03-28 | End: 2023-05-10

## 2023-05-11 ENCOUNTER — RESULT REVIEW (OUTPATIENT)
Age: 63
End: 2023-05-11

## 2023-05-11 ENCOUNTER — APPOINTMENT (OUTPATIENT)
Dept: INFUSION THERAPY | Facility: CLINIC | Age: 63
End: 2023-05-11

## 2023-05-11 LAB
ALBUMIN SERPL ELPH-MCNC: 3.9 G/DL — SIGNIFICANT CHANGE UP (ref 3.3–5)
ALP SERPL-CCNC: 104 U/L — SIGNIFICANT CHANGE UP (ref 40–120)
ALT FLD-CCNC: 7 U/L — LOW (ref 10–45)
ANION GAP SERPL CALC-SCNC: 15 MMOL/L — SIGNIFICANT CHANGE UP (ref 5–17)
AST SERPL-CCNC: 11 U/L — SIGNIFICANT CHANGE UP (ref 10–40)
BASOPHILS # BLD AUTO: 0.03 K/UL — SIGNIFICANT CHANGE UP (ref 0–0.2)
BASOPHILS NFR BLD AUTO: 0.8 % — SIGNIFICANT CHANGE UP (ref 0–2)
BILIRUB SERPL-MCNC: 0.4 MG/DL — SIGNIFICANT CHANGE UP (ref 0.2–1.2)
BUN SERPL-MCNC: 13 MG/DL — SIGNIFICANT CHANGE UP (ref 7–23)
CALCIUM SERPL-MCNC: 9.2 MG/DL — SIGNIFICANT CHANGE UP (ref 8.4–10.5)
CHLORIDE SERPL-SCNC: 94 MMOL/L — LOW (ref 96–108)
CO2 SERPL-SCNC: 28 MMOL/L — SIGNIFICANT CHANGE UP (ref 22–31)
CREAT SERPL-MCNC: 0.63 MG/DL — SIGNIFICANT CHANGE UP (ref 0.5–1.3)
EGFR: 106 ML/MIN/1.73M2 — SIGNIFICANT CHANGE UP
EOSINOPHIL # BLD AUTO: 0 K/UL — SIGNIFICANT CHANGE UP (ref 0–0.5)
EOSINOPHIL NFR BLD AUTO: 0 % — SIGNIFICANT CHANGE UP (ref 0–6)
GLUCOSE SERPL-MCNC: 224 MG/DL — HIGH (ref 70–99)
HCT VFR BLD CALC: 35.3 % — LOW (ref 39–50)
HGB BLD-MCNC: 11.7 G/DL — LOW (ref 13–17)
IMM GRANULOCYTES NFR BLD AUTO: 1.1 % — HIGH (ref 0–0.9)
LYMPHOCYTES # BLD AUTO: 0.21 K/UL — LOW (ref 1–3.3)
LYMPHOCYTES # BLD AUTO: 5.6 % — LOW (ref 13–44)
MAGNESIUM SERPL-MCNC: 1.3 MG/DL — LOW (ref 1.6–2.6)
MCHC RBC-ENTMCNC: 27.4 PG — SIGNIFICANT CHANGE UP (ref 27–34)
MCHC RBC-ENTMCNC: 33.1 GM/DL — SIGNIFICANT CHANGE UP (ref 32–36)
MCV RBC AUTO: 82.7 FL — SIGNIFICANT CHANGE UP (ref 80–100)
MONOCYTES # BLD AUTO: 0.7 K/UL — SIGNIFICANT CHANGE UP (ref 0–0.9)
MONOCYTES NFR BLD AUTO: 18.8 % — HIGH (ref 2–14)
NEUTROPHILS # BLD AUTO: 2.75 K/UL — SIGNIFICANT CHANGE UP (ref 1.8–7.4)
NEUTROPHILS NFR BLD AUTO: 73.7 % — SIGNIFICANT CHANGE UP (ref 43–77)
NRBC # BLD: 0 /100 WBCS — SIGNIFICANT CHANGE UP (ref 0–0)
PLATELET # BLD AUTO: 218 K/UL — SIGNIFICANT CHANGE UP (ref 150–400)
POTASSIUM SERPL-MCNC: 3.9 MMOL/L — SIGNIFICANT CHANGE UP (ref 3.5–5.3)
POTASSIUM SERPL-SCNC: 3.9 MMOL/L — SIGNIFICANT CHANGE UP (ref 3.5–5.3)
PROT SERPL-MCNC: 6.8 G/DL — SIGNIFICANT CHANGE UP (ref 6–8.3)
RBC # BLD: 4.27 M/UL — SIGNIFICANT CHANGE UP (ref 4.2–5.8)
RBC # FLD: 23.7 % — HIGH (ref 10.3–14.5)
SODIUM SERPL-SCNC: 137 MMOL/L — SIGNIFICANT CHANGE UP (ref 135–145)
WBC # BLD: 3.73 K/UL — LOW (ref 3.8–10.5)
WBC # FLD AUTO: 3.73 K/UL — LOW (ref 3.8–10.5)

## 2023-05-11 PROCEDURE — G6002: CPT

## 2023-05-11 PROCEDURE — G6015: CPT

## 2023-05-12 ENCOUNTER — NON-APPOINTMENT (OUTPATIENT)
Age: 63
End: 2023-05-12

## 2023-05-12 ENCOUNTER — OUTPATIENT (OUTPATIENT)
Dept: OUTPATIENT SERVICES | Facility: HOSPITAL | Age: 63
LOS: 1 days | Discharge: HOME | End: 2023-05-12

## 2023-05-12 DIAGNOSIS — Z98.890 OTHER SPECIFIED POSTPROCEDURAL STATES: Chronic | ICD-10-CM

## 2023-05-12 DIAGNOSIS — Z90.49 ACQUIRED ABSENCE OF OTHER SPECIFIED PARTS OF DIGESTIVE TRACT: Chronic | ICD-10-CM

## 2023-05-12 DIAGNOSIS — C15.9 MALIGNANT NEOPLASM OF ESOPHAGUS, UNSPECIFIED: ICD-10-CM

## 2023-05-12 PROCEDURE — G6002: CPT

## 2023-05-12 PROCEDURE — G6015: CPT

## 2023-05-15 PROCEDURE — G6002: CPT

## 2023-05-15 PROCEDURE — G6015: CPT

## 2023-05-15 PROCEDURE — 77336 RADIATION PHYSICS CONSULT: CPT

## 2023-05-16 ENCOUNTER — APPOINTMENT (OUTPATIENT)
Dept: INFUSION THERAPY | Facility: CLINIC | Age: 63
End: 2023-05-16

## 2023-05-16 ENCOUNTER — NON-APPOINTMENT (OUTPATIENT)
Age: 63
End: 2023-05-16

## 2023-05-16 VITALS
TEMPERATURE: 98.5 F | DIASTOLIC BLOOD PRESSURE: 59 MMHG | HEART RATE: 117 BPM | RESPIRATION RATE: 18 BRPM | OXYGEN SATURATION: 97 % | SYSTOLIC BLOOD PRESSURE: 79 MMHG

## 2023-05-16 DIAGNOSIS — E86.0 DEHYDRATION: ICD-10-CM

## 2023-05-17 ENCOUNTER — RESULT REVIEW (OUTPATIENT)
Age: 63
End: 2023-05-17

## 2023-05-17 ENCOUNTER — APPOINTMENT (OUTPATIENT)
Dept: INFUSION THERAPY | Facility: CLINIC | Age: 63
End: 2023-05-17

## 2023-05-17 VITALS
TEMPERATURE: 98.4 F | RESPIRATION RATE: 18 BRPM | OXYGEN SATURATION: 98 % | SYSTOLIC BLOOD PRESSURE: 77 MMHG | HEART RATE: 132 BPM | DIASTOLIC BLOOD PRESSURE: 58 MMHG

## 2023-05-17 LAB
BASOPHILS # BLD AUTO: 0.04 K/UL — SIGNIFICANT CHANGE UP (ref 0–0.2)
BASOPHILS NFR BLD AUTO: 0.9 % — SIGNIFICANT CHANGE UP (ref 0–2)
EOSINOPHIL # BLD AUTO: 0.03 K/UL — SIGNIFICANT CHANGE UP (ref 0–0.5)
EOSINOPHIL NFR BLD AUTO: 0.7 % — SIGNIFICANT CHANGE UP (ref 0–6)
HCT VFR BLD CALC: 32.4 % — LOW (ref 39–50)
HGB BLD-MCNC: 10.8 G/DL — LOW (ref 13–17)
IMM GRANULOCYTES NFR BLD AUTO: 0.9 % — SIGNIFICANT CHANGE UP (ref 0–0.9)
LYMPHOCYTES # BLD AUTO: 0.16 K/UL — LOW (ref 1–3.3)
LYMPHOCYTES # BLD AUTO: 3.5 % — LOW (ref 13–44)
MCHC RBC-ENTMCNC: 27.8 PG — SIGNIFICANT CHANGE UP (ref 27–34)
MCHC RBC-ENTMCNC: 33.3 GM/DL — SIGNIFICANT CHANGE UP (ref 32–36)
MCV RBC AUTO: 83.5 FL — SIGNIFICANT CHANGE UP (ref 80–100)
MONOCYTES # BLD AUTO: 0.86 K/UL — SIGNIFICANT CHANGE UP (ref 0–0.9)
MONOCYTES NFR BLD AUTO: 19 % — HIGH (ref 2–14)
NEUTROPHILS # BLD AUTO: 3.39 K/UL — SIGNIFICANT CHANGE UP (ref 1.8–7.4)
NEUTROPHILS NFR BLD AUTO: 75 % — SIGNIFICANT CHANGE UP (ref 43–77)
NRBC # BLD: 0 /100 WBCS — SIGNIFICANT CHANGE UP (ref 0–0)
PLATELET # BLD AUTO: 157 K/UL — SIGNIFICANT CHANGE UP (ref 150–400)
RBC # BLD: 3.88 M/UL — LOW (ref 4.2–5.8)
RBC # FLD: 24.1 % — HIGH (ref 10.3–14.5)
WBC # BLD: 4.52 K/UL — SIGNIFICANT CHANGE UP (ref 3.8–10.5)
WBC # FLD AUTO: 4.52 K/UL — SIGNIFICANT CHANGE UP (ref 3.8–10.5)

## 2023-05-18 ENCOUNTER — APPOINTMENT (OUTPATIENT)
Dept: INFUSION THERAPY | Facility: CLINIC | Age: 63
End: 2023-05-18

## 2023-05-18 VITALS — BODY MASS INDEX: 20.45 KG/M2 | WEIGHT: 130.56 LBS

## 2023-05-18 LAB
ALBUMIN SERPL ELPH-MCNC: 3.5 G/DL — SIGNIFICANT CHANGE UP (ref 3.3–5)
ALP SERPL-CCNC: 95 U/L — SIGNIFICANT CHANGE UP (ref 40–120)
ALT FLD-CCNC: 10 U/L — SIGNIFICANT CHANGE UP (ref 10–45)
ANION GAP SERPL CALC-SCNC: 17 MMOL/L — SIGNIFICANT CHANGE UP (ref 5–17)
AST SERPL-CCNC: 14 U/L — SIGNIFICANT CHANGE UP (ref 10–40)
BILIRUB SERPL-MCNC: 0.4 MG/DL — SIGNIFICANT CHANGE UP (ref 0.2–1.2)
BUN SERPL-MCNC: 13 MG/DL — SIGNIFICANT CHANGE UP (ref 7–23)
CALCIUM SERPL-MCNC: 9 MG/DL — SIGNIFICANT CHANGE UP (ref 8.4–10.5)
CHLORIDE SERPL-SCNC: 93 MMOL/L — LOW (ref 96–108)
CO2 SERPL-SCNC: 27 MMOL/L — SIGNIFICANT CHANGE UP (ref 22–31)
CREAT SERPL-MCNC: 0.53 MG/DL — SIGNIFICANT CHANGE UP (ref 0.5–1.3)
EGFR: 112 ML/MIN/1.73M2 — SIGNIFICANT CHANGE UP
GLUCOSE SERPL-MCNC: 202 MG/DL — HIGH (ref 70–99)
MAGNESIUM SERPL-MCNC: 1 MG/DL — CRITICAL LOW (ref 1.6–2.6)
POTASSIUM SERPL-MCNC: 3.2 MMOL/L — LOW (ref 3.5–5.3)
POTASSIUM SERPL-SCNC: 3.2 MMOL/L — LOW (ref 3.5–5.3)
PROT SERPL-MCNC: 6.6 G/DL — SIGNIFICANT CHANGE UP (ref 6–8.3)
SODIUM SERPL-SCNC: 138 MMOL/L — SIGNIFICANT CHANGE UP (ref 135–145)

## 2023-05-24 ENCOUNTER — RX RENEWAL (OUTPATIENT)
Age: 63
End: 2023-05-24

## 2023-05-26 ENCOUNTER — RESULT REVIEW (OUTPATIENT)
Age: 63
End: 2023-05-26

## 2023-05-26 ENCOUNTER — APPOINTMENT (OUTPATIENT)
Dept: HEMATOLOGY ONCOLOGY | Facility: CLINIC | Age: 63
End: 2023-05-26
Payer: COMMERCIAL

## 2023-05-26 LAB
ALBUMIN SERPL ELPH-MCNC: 3.5 G/DL — SIGNIFICANT CHANGE UP (ref 3.3–5)
ALP SERPL-CCNC: 94 U/L — SIGNIFICANT CHANGE UP (ref 40–120)
ALT FLD-CCNC: 6 U/L — LOW (ref 10–45)
ANION GAP SERPL CALC-SCNC: 15 MMOL/L — SIGNIFICANT CHANGE UP (ref 5–17)
ANISOCYTOSIS BLD QL: SLIGHT — SIGNIFICANT CHANGE UP
AST SERPL-CCNC: 11 U/L — SIGNIFICANT CHANGE UP (ref 10–40)
BASOPHILS # BLD AUTO: 0.03 K/UL — SIGNIFICANT CHANGE UP (ref 0–0.2)
BASOPHILS NFR BLD AUTO: 0.6 % — SIGNIFICANT CHANGE UP (ref 0–2)
BILIRUB SERPL-MCNC: 0.4 MG/DL — SIGNIFICANT CHANGE UP (ref 0.2–1.2)
BUN SERPL-MCNC: 14 MG/DL — SIGNIFICANT CHANGE UP (ref 7–23)
CALCIUM SERPL-MCNC: 9.2 MG/DL — SIGNIFICANT CHANGE UP (ref 8.4–10.5)
CHLORIDE SERPL-SCNC: 96 MMOL/L — SIGNIFICANT CHANGE UP (ref 96–108)
CO2 SERPL-SCNC: 30 MMOL/L — SIGNIFICANT CHANGE UP (ref 22–31)
CREAT SERPL-MCNC: 0.7 MG/DL — SIGNIFICANT CHANGE UP (ref 0.5–1.3)
EGFR: 103 ML/MIN/1.73M2 — SIGNIFICANT CHANGE UP
ELLIPTOCYTES BLD QL SMEAR: SLIGHT — SIGNIFICANT CHANGE UP
EOSINOPHIL # BLD AUTO: 0.02 K/UL — SIGNIFICANT CHANGE UP (ref 0–0.5)
EOSINOPHIL NFR BLD AUTO: 0.4 % — SIGNIFICANT CHANGE UP (ref 0–6)
GIANT PLATELETS BLD QL SMEAR: PRESENT — SIGNIFICANT CHANGE UP
GLUCOSE SERPL-MCNC: 224 MG/DL — HIGH (ref 70–99)
HCT VFR BLD CALC: 33.5 % — LOW (ref 39–50)
HGB BLD-MCNC: 11 G/DL — LOW (ref 13–17)
IMM GRANULOCYTES NFR BLD AUTO: 0.2 % — SIGNIFICANT CHANGE UP (ref 0–0.9)
LG PLATELETS BLD QL AUTO: SLIGHT — SIGNIFICANT CHANGE UP
LYMPHOCYTES # BLD AUTO: 0.52 K/UL — LOW (ref 1–3.3)
LYMPHOCYTES # BLD AUTO: 10.6 % — LOW (ref 13–44)
MACROCYTES BLD QL: SLIGHT — SIGNIFICANT CHANGE UP
MAGNESIUM SERPL-MCNC: 1.1 MG/DL — LOW (ref 1.6–2.6)
MCHC RBC-ENTMCNC: 28.1 PG — SIGNIFICANT CHANGE UP (ref 27–34)
MCHC RBC-ENTMCNC: 32.8 GM/DL — SIGNIFICANT CHANGE UP (ref 32–36)
MCV RBC AUTO: 85.7 FL — SIGNIFICANT CHANGE UP (ref 80–100)
MICROCYTES BLD QL: SLIGHT — SIGNIFICANT CHANGE UP
MONOCYTES # BLD AUTO: 0.57 K/UL — SIGNIFICANT CHANGE UP (ref 0–0.9)
MONOCYTES NFR BLD AUTO: 11.7 % — SIGNIFICANT CHANGE UP (ref 2–14)
NEUTROPHILS # BLD AUTO: 3.74 K/UL — SIGNIFICANT CHANGE UP (ref 1.8–7.4)
NEUTROPHILS NFR BLD AUTO: 76.5 % — SIGNIFICANT CHANGE UP (ref 43–77)
NRBC # BLD: 0 /100 WBCS — SIGNIFICANT CHANGE UP (ref 0–0)
OVALOCYTES BLD QL SMEAR: SLIGHT — SIGNIFICANT CHANGE UP
PLAT MORPH BLD: NORMAL — SIGNIFICANT CHANGE UP
PLATELET # BLD AUTO: 117 K/UL — LOW (ref 150–400)
POIKILOCYTOSIS BLD QL AUTO: SLIGHT — SIGNIFICANT CHANGE UP
POLYCHROMASIA BLD QL SMEAR: SLIGHT — SIGNIFICANT CHANGE UP
POTASSIUM SERPL-MCNC: 3.4 MMOL/L — LOW (ref 3.5–5.3)
POTASSIUM SERPL-SCNC: 3.4 MMOL/L — LOW (ref 3.5–5.3)
PROT SERPL-MCNC: 6.4 G/DL — SIGNIFICANT CHANGE UP (ref 6–8.3)
RBC # BLD: 3.91 M/UL — LOW (ref 4.2–5.8)
RBC # FLD: 23.9 % — HIGH (ref 10.3–14.5)
RBC BLD AUTO: SIGNIFICANT CHANGE UP
SODIUM SERPL-SCNC: 141 MMOL/L — SIGNIFICANT CHANGE UP (ref 135–145)
WBC # BLD: 4.89 K/UL — SIGNIFICANT CHANGE UP (ref 3.8–10.5)
WBC # FLD AUTO: 4.89 K/UL — SIGNIFICANT CHANGE UP (ref 3.8–10.5)

## 2023-05-26 PROCEDURE — 99215 OFFICE O/P EST HI 40 MIN: CPT

## 2023-05-26 NOTE — HISTORY OF PRESENT ILLNESS
[T: ___] : T[unfilled] [N: ___] : N[unfilled] [M: ___] : M[unfilled] [AJCC Stage: ____] : AJCC Stage: [unfilled] [de-identified] : The patient was diagnosed with esophageal adenocarcinoma in February 2023 at the age of 62. He presented to his PCP with complaints of dysphagia, intermittently since 2021, however, since January 2023 it has become severe to the point where he can no longer eat solid foods. On 2/6/23, he had an xray esophagram which showed a 5 cm distal esophageal stricture suspect for neoplasm. On 2/15/23, he underwent an endoscopy which showed at least a T3N1Mx lesion.  Almost completely obstructing esophageal/ GE junction tumor, Siewert Type II.  Stented with an 18 x 103 mm Walflex stent. 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  No loss of nuclear expression of MMR proteins (MLH1, MSH2, MSH6, and PMS2). HER-2: Negative (0/1+ membrane staining). A CT C/A/P on 2/27/23 showed nonspecific bilateral noncalcified pulmonary nodules measuring up to 4 mm Lower third esophageal masslike wall thickening extends to the GE junction and gastric cardia compatible with tumor. Masslike wall thickening extends upstream of the proximal end of the indwelling esophageal Wallstent. Lower esophageal, gastrohepatic and periceliac lymphadenopathy, measures 4.5 x 3.2 cm . Minimal nonspecific nodularity involving the omentum suspicious however inconclusive for carcinomatosis. Probable left hepatic lobe cysts however consider liver MRI if additional imaging is warranted.  [de-identified] : Medical Hx: HTN; GERD; kidney stones \par Surgical Hx: cholecystectomy; lithotripsy Left kidney  \par Family Hx: father prostrate ca; paternal uncle lymphoma \par Social Hx: never smoker; ETOH never; , live together; retired [place] ; family close by [de-identified] : He completed 3 cycles of induction for carbo / taxol on 3/30/23. Today he is C5 of 6 carbo / Taxol with RT today. He had a filling fixed yesterday for a tooth ache, feels much better now. He c/o fatigue the first few days after treatment and then resolves. He c/o nausea starts about 24 hours after treatment and is mild, takes compazine and resolves; no vomiting. Wt is stable. He denies hair loss; nail / skin changes; neuropathy; pain; mouth sores, performs good mouth care; heartburn, mild due to stent and denies C/D. He feels well overall. \par

## 2023-05-26 NOTE — DISCUSSION/SUMMARY
[FreeTextEntry1] : pt presented for hydration today\par reported fall from knees and scraped nose last Saturday, now with well healed scabs \par his BP was low today at 89/62 and HR of 112\par he has lost an additional 3 lbs since last visit\par chemo completed 5/11/23 and RT completed 5/15/23\par reviewed above with Dr Valero \par will give zyprea here (pt reported increased appetite) and hydration\par pt to return tomorrow for additional hydration as well as Friday \par rx sent to local pharmacy for zyprexa for 10 days for appetite \par will monitor this week

## 2023-05-26 NOTE — REVIEW OF SYSTEMS
[Fatigue] : fatigue [Negative] : Allergic/Immunologic [Chest Pain] : no chest pain [Shortness Of Breath] : no shortness of breath [Abdominal Pain] : no abdominal pain [Constipation] : no constipation [FreeTextEntry2] : wt stable today  [FreeTextEntry7] : nausea

## 2023-05-26 NOTE — PHYSICAL EXAM
[Restricted in physically strenuous activity but ambulatory and able to carry out work of a light or sedentary nature] : Status 1- Restricted in physically strenuous activity but ambulatory and able to carry out work of a light or sedentary nature, e.g., light house work, office work [Normal] : affect appropriate [de-identified] : wt stable today [de-identified] : right forearm IV accessed, dressing C/D/I

## 2023-05-30 ENCOUNTER — APPOINTMENT (OUTPATIENT)
Dept: CARDIOLOGY | Facility: CLINIC | Age: 63
End: 2023-05-30
Payer: COMMERCIAL

## 2023-05-30 ENCOUNTER — NON-APPOINTMENT (OUTPATIENT)
Age: 63
End: 2023-05-30

## 2023-05-30 VITALS
DIASTOLIC BLOOD PRESSURE: 70 MMHG | SYSTOLIC BLOOD PRESSURE: 94 MMHG | WEIGHT: 123 LBS | HEART RATE: 143 BPM | BODY MASS INDEX: 19.3 KG/M2 | HEIGHT: 67 IN | OXYGEN SATURATION: 100 %

## 2023-05-30 VITALS — DIASTOLIC BLOOD PRESSURE: 68 MMHG | SYSTOLIC BLOOD PRESSURE: 96 MMHG

## 2023-05-30 PROCEDURE — 99205 OFFICE O/P NEW HI 60 MIN: CPT

## 2023-05-30 PROCEDURE — 93000 ELECTROCARDIOGRAM COMPLETE: CPT

## 2023-05-30 RX ORDER — ONDANSETRON 8 MG/1
8 TABLET, ORALLY DISINTEGRATING ORAL
Qty: 90 | Refills: 3 | Status: DISCONTINUED | COMMUNITY
Start: 2023-03-02 | End: 2023-05-30

## 2023-05-30 RX ORDER — METOPROLOL SUCCINATE 200 MG/1
200 TABLET, EXTENDED RELEASE ORAL
Qty: 90 | Refills: 0 | Status: DISCONTINUED | COMMUNITY
Start: 2018-03-19 | End: 2023-05-30

## 2023-05-30 RX ORDER — PROCHLORPERAZINE MALEATE 10 MG/1
10 TABLET ORAL EVERY 6 HOURS
Qty: 120 | Refills: 2 | Status: DISCONTINUED | COMMUNITY
Start: 2023-03-02 | End: 2023-05-30

## 2023-05-30 RX ORDER — LANCETS
EACH MISCELLANEOUS
Qty: 1 | Refills: 1 | Status: DISCONTINUED | COMMUNITY
Start: 2023-03-06 | End: 2023-05-30

## 2023-05-30 RX ORDER — ONDANSETRON 8 MG/1
8 TABLET ORAL
Qty: 60 | Refills: 2 | Status: DISCONTINUED | COMMUNITY
Start: 2023-04-25 | End: 2023-05-30

## 2023-05-30 RX ORDER — METFORMIN ER 500 MG 500 MG/1
500 TABLET ORAL
Qty: 180 | Refills: 0 | Status: DISCONTINUED | COMMUNITY
Start: 2023-03-06 | End: 2023-05-30

## 2023-05-30 RX ORDER — LORAZEPAM 0.5 MG/1
0.5 TABLET ORAL DAILY
Qty: 30 | Refills: 0 | Status: DISCONTINUED | COMMUNITY
Start: 2023-05-09 | End: 2023-05-30

## 2023-05-30 RX ORDER — BLOOD SUGAR DIAGNOSTIC
STRIP MISCELLANEOUS
Qty: 1 | Refills: 0 | Status: DISCONTINUED | COMMUNITY
Start: 2023-03-06 | End: 2023-05-30

## 2023-05-30 RX ORDER — LISINOPRIL AND HYDROCHLOROTHIAZIDE TABLETS 10; 12.5 MG/1; MG/1
10-12.5 TABLET ORAL
Qty: 90 | Refills: 0 | Status: DISCONTINUED | COMMUNITY
Start: 2018-01-19 | End: 2023-05-30

## 2023-05-31 ENCOUNTER — NON-APPOINTMENT (OUTPATIENT)
Age: 63
End: 2023-05-31

## 2023-06-01 ENCOUNTER — APPOINTMENT (OUTPATIENT)
Dept: SURGICAL ONCOLOGY | Facility: CLINIC | Age: 63
End: 2023-06-01

## 2023-06-02 ENCOUNTER — OUTPATIENT (OUTPATIENT)
Dept: OUTPATIENT SERVICES | Facility: HOSPITAL | Age: 63
LOS: 1 days | End: 2023-06-02
Payer: COMMERCIAL

## 2023-06-02 VITALS
HEIGHT: 66 IN | DIASTOLIC BLOOD PRESSURE: 70 MMHG | HEART RATE: 95 BPM | SYSTOLIC BLOOD PRESSURE: 102 MMHG | OXYGEN SATURATION: 100 % | RESPIRATION RATE: 16 BRPM | TEMPERATURE: 98 F | WEIGHT: 119.93 LBS

## 2023-06-02 DIAGNOSIS — C15.9 MALIGNANT NEOPLASM OF ESOPHAGUS, UNSPECIFIED: ICD-10-CM

## 2023-06-02 DIAGNOSIS — Z90.49 ACQUIRED ABSENCE OF OTHER SPECIFIED PARTS OF DIGESTIVE TRACT: Chronic | ICD-10-CM

## 2023-06-02 DIAGNOSIS — Z98.890 OTHER SPECIFIED POSTPROCEDURAL STATES: Chronic | ICD-10-CM

## 2023-06-02 LAB
ADD ON TEST-SPECIMEN IN LAB: SIGNIFICANT CHANGE UP
ANION GAP SERPL CALC-SCNC: 8 MMOL/L — SIGNIFICANT CHANGE UP (ref 5–17)
APTT BLD: 27.2 SEC — LOW (ref 27.5–35.5)
BASOPHILS # BLD AUTO: 0.02 K/UL — SIGNIFICANT CHANGE UP (ref 0–0.2)
BASOPHILS NFR BLD AUTO: 0.5 % — SIGNIFICANT CHANGE UP (ref 0–2)
BUN SERPL-MCNC: 13 MG/DL — SIGNIFICANT CHANGE UP (ref 7–23)
CALCIUM SERPL-MCNC: 8.7 MG/DL — SIGNIFICANT CHANGE UP (ref 8.5–10.1)
CHLORIDE SERPL-SCNC: 97 MMOL/L — SIGNIFICANT CHANGE UP (ref 96–108)
CO2 SERPL-SCNC: 31 MMOL/L — SIGNIFICANT CHANGE UP (ref 22–31)
CREAT SERPL-MCNC: 0.65 MG/DL — SIGNIFICANT CHANGE UP (ref 0.5–1.3)
EGFR: 105 ML/MIN/1.73M2 — SIGNIFICANT CHANGE UP
EOSINOPHIL # BLD AUTO: 0.03 K/UL — SIGNIFICANT CHANGE UP (ref 0–0.5)
EOSINOPHIL NFR BLD AUTO: 0.8 % — SIGNIFICANT CHANGE UP (ref 0–6)
GLUCOSE SERPL-MCNC: 169 MG/DL — HIGH (ref 70–99)
HCT VFR BLD CALC: 33.8 % — LOW (ref 39–50)
HGB BLD-MCNC: 11 G/DL — LOW (ref 13–17)
IMM GRANULOCYTES NFR BLD AUTO: 0.3 % — SIGNIFICANT CHANGE UP (ref 0–0.9)
INR BLD: 1.24 RATIO — HIGH (ref 0.88–1.16)
LYMPHOCYTES # BLD AUTO: 0.27 K/UL — LOW (ref 1–3.3)
LYMPHOCYTES # BLD AUTO: 7.3 % — LOW (ref 13–44)
MCHC RBC-ENTMCNC: 28.4 PG — SIGNIFICANT CHANGE UP (ref 27–34)
MCHC RBC-ENTMCNC: 32.5 GM/DL — SIGNIFICANT CHANGE UP (ref 32–36)
MCV RBC AUTO: 87.3 FL — SIGNIFICANT CHANGE UP (ref 80–100)
MONOCYTES # BLD AUTO: 0.33 K/UL — SIGNIFICANT CHANGE UP (ref 0–0.9)
MONOCYTES NFR BLD AUTO: 8.9 % — SIGNIFICANT CHANGE UP (ref 2–14)
NEUTROPHILS # BLD AUTO: 3.03 K/UL — SIGNIFICANT CHANGE UP (ref 1.8–7.4)
NEUTROPHILS NFR BLD AUTO: 82.2 % — HIGH (ref 43–77)
PLATELET # BLD AUTO: 204 K/UL — SIGNIFICANT CHANGE UP (ref 150–400)
POTASSIUM SERPL-MCNC: 3.3 MMOL/L — LOW (ref 3.5–5.3)
POTASSIUM SERPL-SCNC: 3.3 MMOL/L — LOW (ref 3.5–5.3)
PROTHROM AB SERPL-ACNC: 14.4 SEC — HIGH (ref 10.5–13.4)
RBC # BLD: 3.87 M/UL — LOW (ref 4.2–5.8)
RBC # FLD: 22.5 % — HIGH (ref 10.3–14.5)
SARS-COV-2 RNA SPEC QL NAA+PROBE: SIGNIFICANT CHANGE UP
SODIUM SERPL-SCNC: 136 MMOL/L — SIGNIFICANT CHANGE UP (ref 135–145)
WBC # BLD: 3.69 K/UL — LOW (ref 3.8–10.5)
WBC # FLD AUTO: 3.69 K/UL — LOW (ref 3.8–10.5)

## 2023-06-02 PROCEDURE — 85025 COMPLETE CBC W/AUTO DIFF WBC: CPT

## 2023-06-02 PROCEDURE — 85730 THROMBOPLASTIN TIME PARTIAL: CPT

## 2023-06-02 PROCEDURE — 36415 COLL VENOUS BLD VENIPUNCTURE: CPT

## 2023-06-02 PROCEDURE — 80048 BASIC METABOLIC PNL TOTAL CA: CPT

## 2023-06-02 PROCEDURE — 83036 HEMOGLOBIN GLYCOSYLATED A1C: CPT

## 2023-06-02 PROCEDURE — 99214 OFFICE O/P EST MOD 30 MIN: CPT | Mod: 25

## 2023-06-02 PROCEDURE — 87635 SARS-COV-2 COVID-19 AMP PRB: CPT

## 2023-06-02 PROCEDURE — 85610 PROTHROMBIN TIME: CPT

## 2023-06-02 RX ORDER — LISINOPRIL 2.5 MG/1
0 TABLET ORAL
Qty: 0 | Refills: 0 | DISCHARGE

## 2023-06-02 NOTE — H&P PST ADULT - ASSESSMENT
61 y/o male with hx of DM type 2, esophogeal cancer s/p chemo completed 5/11/23 and radiation completed 5/15/23 presents to PST for scheduled endoscopy for stent removal on 6/5/23.

## 2023-06-02 NOTE — H&P PST ADULT - AS BP NONINV METHOD
05/27/23 1035   Pain Assessment   Pain Assessment Tool 0-10   Pain Score No Pain   Restrictions/Precautions   Precautions Aphasia; Aspiration;Bed/chair alarms;Cognitive; Fall Risk;Supervision on toilet/commode  (R side inattetion)   Cognition   Overall Cognitive Status Impaired   Subjective   Subjective pt with no complaints   Roll Left and Right   Type of Assistance Needed Supervision   Comment with bed rails   Roll Left and Right CARE Score 4   Sit to Stand   Type of Assistance Needed Physical assistance   Physical Assistance Level 25% or less   Comment trunk support   Sit to Stand CARE Score 3   Bed-Chair Transfer   Type of Assistance Needed Physical assistance;Verbal cues   Physical Assistance Level 26%-50%   Comment use of B UE on chair/bed with tx   Chair/Bed-to-Chair Transfer CARE Score 3   Transfer Bed/Chair/Wheelchair   Findings min/modA to the R, Jacky to the L   Walk 10 Feet   Type of Assistance Needed Physical assistance   Physical Assistance Level Total assistance   Comment maxA x2 HHA with CF   Walk 10 Feet CARE Score 1   Walk 50 Feet with Two Turns   Type of Assistance Needed Physical assistance   Physical Assistance Level Total assistance   Comment maxA x2 HHA with CF   Walk 50 Feet with Two Turns CARE Score 1   Walk 150 Feet   Type of Assistance Needed Physical assistance   Physical Assistance Level Total assistance   Comment maxA x2 HHA with CF   Walk 150 Feet CARE Score 1   Walking 10 Feet on Uneven Surfaces   Reason if not Attempted Safety concerns   Walking 10 Feet on Uneven Surfaces CARE Score 88   Ambulation   Distance Walked (feet) 250 ft   Assist Device Hand Hold  (maxA x2)   Gait Pattern Ataxic; Inconsistant Deepti; Slow Deepti;Decreased foot clearance;R foot drag;Narrow NEREIDA; Decreased L stance; Improper weight shift   Limitations Noted In Balance; Coordination; Heel Strike;Midline Orientation;Neglect; Sequencing;Sensation;Speed;Strength   Provided Assistance with: Balance   Therapeutic Interventions   Neuromuscular Re-Education repeated STS edge of mat for midline and balance, x10   Other repeated SPT from w/c to mat table in gym x4 to R, x4 to L   Equipment Use   NuStep L1 10mins   Assessment   Treatment Assessment pt cont to work on functional txs to decrease burden of care and get to Ax1  pt does better to L, and still requires cueing to the R, for tactile and visual cues  pt with more LOB and unsteady gait this session, due to fatigue  instructed pt to look at RLE when walking, and increase speed  Ax2 needed with HHA  pt still below baseline and will benefit from cont skilled therapy to work on gait training, neuro re-ed and balance to maximize functional ind nad decrease burden of care  Problem List Decreased strength;Decreased range of motion;Decreased endurance; Impaired balance;Decreased mobility; Decreased coordination;Decreased cognition; Impaired judgement;Decreased safety awareness; Impaired vision; Impaired sensation;Pain   Barriers to Discharge Inaccessible home environment;Decreased caregiver support   PT Barriers   Functional Limitation Car transfers;Stair negotiation;Standing;Transfers; Walking; Wheelchair management   Plan   Progress Progressing toward goals   Recommendation   PT Discharge Recommendation Home with outpatient rehabilitation   Equipment Recommended Wheelchair   PT Therapy Minutes   PT Time In 1035   PT Time Out 1135   PT Total Time (minutes) 60   PT Mode of treatment - Individual (minutes) 60   PT Mode of treatment - Concurrent (minutes) 0   PT Mode of treatment - Group (minutes) 0   PT Mode of treatment - Co-treat (minutes) 0   PT Mode of Treatment - Total time(minutes) 60 minutes   PT Cumulative Minutes 1063   Therapy Time missed   Time missed?  No electronic

## 2023-06-02 NOTE — H&P PST ADULT - PROBLEM SELECTOR PLAN 1
1. Expect a call from Endoscopy the day before your procedure between 11am and 3pm.  2. Follow the GI doctor's instructions for preparation  and day before procedure activities.  3. Follow the GI doctor's  instructions for medications.   4. Covid swab, cbc, bmp, ptt/inr, a1c, done today in PST

## 2023-06-02 NOTE — H&P PST ADULT - NSICDXPASTMEDICALHX_GEN_ALL_CORE_FT
PAST MEDICAL HISTORY:  Esophageal cancer     Esophageal mass     HTN (hypertension)     Type 2 diabetes mellitus

## 2023-06-03 DIAGNOSIS — C15.9 MALIGNANT NEOPLASM OF ESOPHAGUS, UNSPECIFIED: ICD-10-CM

## 2023-06-03 LAB — A1C WITH ESTIMATED AVERAGE GLUCOSE RESULT: SIGNIFICANT CHANGE UP (ref 4–5.6)

## 2023-06-05 ENCOUNTER — OUTPATIENT (OUTPATIENT)
Dept: INPATIENT UNIT | Facility: HOSPITAL | Age: 63
LOS: 1 days | Discharge: HOME | End: 2023-06-05
Payer: COMMERCIAL

## 2023-06-05 ENCOUNTER — APPOINTMENT (OUTPATIENT)
Dept: GASTROENTEROLOGY | Facility: HOSPITAL | Age: 63
End: 2023-06-05
Payer: COMMERCIAL

## 2023-06-05 ENCOUNTER — TRANSCRIPTION ENCOUNTER (OUTPATIENT)
Age: 63
End: 2023-06-05

## 2023-06-05 ENCOUNTER — RX RENEWAL (OUTPATIENT)
Age: 63
End: 2023-06-05

## 2023-06-05 VITALS
RESPIRATION RATE: 16 BRPM | DIASTOLIC BLOOD PRESSURE: 76 MMHG | WEIGHT: 132.06 LBS | HEART RATE: 100 BPM | HEIGHT: 67 IN | SYSTOLIC BLOOD PRESSURE: 120 MMHG | TEMPERATURE: 98 F | OXYGEN SATURATION: 100 %

## 2023-06-05 VITALS — TEMPERATURE: 99 F

## 2023-06-05 DIAGNOSIS — C15.9 MALIGNANT NEOPLASM OF ESOPHAGUS, UNSPECIFIED: ICD-10-CM

## 2023-06-05 DIAGNOSIS — Z98.890 OTHER SPECIFIED POSTPROCEDURAL STATES: Chronic | ICD-10-CM

## 2023-06-05 DIAGNOSIS — Z90.49 ACQUIRED ABSENCE OF OTHER SPECIFIED PARTS OF DIGESTIVE TRACT: Chronic | ICD-10-CM

## 2023-06-05 PROCEDURE — 43247 EGD REMOVE FOREIGN BODY: CPT | Mod: GC

## 2023-06-05 RX ORDER — ONDANSETRON 8 MG/1
4 TABLET, FILM COATED ORAL ONCE
Refills: 0 | Status: DISCONTINUED | OUTPATIENT
Start: 2023-06-05 | End: 2023-06-05

## 2023-06-05 RX ORDER — OXYCODONE HYDROCHLORIDE 5 MG/1
5 TABLET ORAL ONCE
Refills: 0 | Status: DISCONTINUED | OUTPATIENT
Start: 2023-06-05 | End: 2023-06-05

## 2023-06-05 RX ORDER — SODIUM CHLORIDE 9 MG/ML
500 INJECTION, SOLUTION INTRAVENOUS ONCE
Refills: 0 | Status: COMPLETED | OUTPATIENT
Start: 2023-06-05 | End: 2023-06-05

## 2023-06-05 RX ORDER — SODIUM CHLORIDE 9 MG/ML
1000 INJECTION, SOLUTION INTRAVENOUS
Refills: 0 | Status: DISCONTINUED | OUTPATIENT
Start: 2023-06-05 | End: 2023-06-05

## 2023-06-05 RX ORDER — SODIUM CHLORIDE 9 MG/ML
500 INJECTION, SOLUTION INTRAVENOUS ONCE
Refills: 0 | Status: DISCONTINUED | OUTPATIENT
Start: 2023-06-05 | End: 2023-06-05

## 2023-06-05 RX ADMIN — SODIUM CHLORIDE 1500 MILLILITER(S): 9 INJECTION, SOLUTION INTRAVENOUS at 13:51

## 2023-06-05 RX ADMIN — SODIUM CHLORIDE 1500 MILLILITER(S): 9 INJECTION, SOLUTION INTRAVENOUS at 12:56

## 2023-06-05 NOTE — ASU DISCHARGE PLAN (ADULT/PEDIATRIC) - NS MD DC FALL RISK RISK
For information on Fall & Injury Prevention, visit: [email or web address] OR  [email or web address] OR  https://www.cdc.gov/steadi/patient.html

## 2023-06-07 DIAGNOSIS — Z90.49 ACQUIRED ABSENCE OF OTHER SPECIFIED PARTS OF DIGESTIVE TRACT: ICD-10-CM

## 2023-06-07 DIAGNOSIS — I10 ESSENTIAL (PRIMARY) HYPERTENSION: ICD-10-CM

## 2023-06-07 DIAGNOSIS — Z79.84 LONG TERM (CURRENT) USE OF ORAL HYPOGLYCEMIC DRUGS: ICD-10-CM

## 2023-06-07 DIAGNOSIS — Z46.59 ENCOUNTER FOR FITTING AND ADJUSTMENT OF OTHER GASTROINTESTINAL APPLIANCE AND DEVICE: ICD-10-CM

## 2023-06-07 DIAGNOSIS — Z85.01 PERSONAL HISTORY OF MALIGNANT NEOPLASM OF ESOPHAGUS: ICD-10-CM

## 2023-06-07 DIAGNOSIS — Z92.21 PERSONAL HISTORY OF ANTINEOPLASTIC CHEMOTHERAPY: ICD-10-CM

## 2023-06-07 DIAGNOSIS — E11.9 TYPE 2 DIABETES MELLITUS WITHOUT COMPLICATIONS: ICD-10-CM

## 2023-06-07 DIAGNOSIS — Z92.3 PERSONAL HISTORY OF IRRADIATION: ICD-10-CM

## 2023-06-07 DIAGNOSIS — D64.9 ANEMIA, UNSPECIFIED: ICD-10-CM

## 2023-06-12 ENCOUNTER — OUTPATIENT (OUTPATIENT)
Dept: OUTPATIENT SERVICES | Facility: HOSPITAL | Age: 63
LOS: 1 days | End: 2023-06-12
Payer: COMMERCIAL

## 2023-06-12 ENCOUNTER — APPOINTMENT (OUTPATIENT)
Dept: NUCLEAR MEDICINE | Facility: CLINIC | Age: 63
End: 2023-06-12
Payer: COMMERCIAL

## 2023-06-12 DIAGNOSIS — Z98.890 OTHER SPECIFIED POSTPROCEDURAL STATES: Chronic | ICD-10-CM

## 2023-06-12 DIAGNOSIS — C15.9 MALIGNANT NEOPLASM OF ESOPHAGUS, UNSPECIFIED: ICD-10-CM

## 2023-06-12 DIAGNOSIS — Z90.49 ACQUIRED ABSENCE OF OTHER SPECIFIED PARTS OF DIGESTIVE TRACT: Chronic | ICD-10-CM

## 2023-06-12 PROBLEM — E11.9 TYPE 2 DIABETES MELLITUS WITHOUT COMPLICATIONS: Chronic | Status: ACTIVE | Noted: 2023-06-02

## 2023-06-12 PROCEDURE — A9552: CPT

## 2023-06-12 PROCEDURE — 78815 PET IMAGE W/CT SKULL-THIGH: CPT | Mod: 26,PS

## 2023-06-12 PROCEDURE — 78815 PET IMAGE W/CT SKULL-THIGH: CPT

## 2023-06-12 NOTE — ASSESSMENT
[FreeTextEntry1] : A/P:\par \par *tachycarda, hypotension\par -2/2 orthostasis.\par \par advised  at least 2-3 L hydration w/ meals.

## 2023-06-12 NOTE — HISTORY OF PRESENT ILLNESS
[FreeTextEntry1] : "NPA hypotension & tachycardia"\par \par 64 y/o\par *esophageal cancer\par *no prior cardiac hiistory\par \par h/o fast HR at least in '08 started gross started on meprotolol\par 100s -105 then started on metoprolol HR 70s\par \par Later on added lisinopril & triamt-HCTZ for BP\par \par This feb diagnosed w/ esophageal cancer\par 8 rounds chemo 28 rounds of radiation.\par surgery w/ [facility]. [facility] to remove what remains\par \par Appetite has decreased sustanially aslwell fluid\par 16 oz of fliuid\par \par ECG NSR no ischemic changes\par

## 2023-06-12 NOTE — PHYSICAL EXAM
[Well Developed] : well developed [Well Nourished] : well nourished [No Acute Distress] : no acute distress [Normal Conjunctiva] : normal conjunctiva [Normal Venous Pressure] : normal venous pressure [No Carotid Bruit] : no carotid bruit [No Murmur] : no murmur [Normal S1, S2] : normal S1, S2 [No Rub] : no rub [No Gallop] : no gallop [Good Air Entry] : good air entry [Clear Lung Fields] : clear lung fields [No Respiratory Distress] : no respiratory distress  [Soft] : abdomen soft [No Masses/organomegaly] : no masses/organomegaly [Non Tender] : non-tender [Normal Bowel Sounds] : normal bowel sounds [Normal Gait] : normal gait [No Edema] : no edema [No Cyanosis] : no cyanosis [No Clubbing] : no clubbing [No Varicosities] : no varicosities [No Rash] : no rash [Moves all extremities] : moves all extremities [No Skin Lesions] : no skin lesions [No Focal Deficits] : no focal deficits [Normal Speech] : normal speech [Normal memory] : normal memory [Alert and Oriented] : alert and oriented

## 2023-06-15 ENCOUNTER — APPOINTMENT (OUTPATIENT)
Dept: SURGICAL ONCOLOGY | Facility: CLINIC | Age: 63
End: 2023-06-15
Payer: COMMERCIAL

## 2023-06-15 VITALS
DIASTOLIC BLOOD PRESSURE: 68 MMHG | SYSTOLIC BLOOD PRESSURE: 102 MMHG | OXYGEN SATURATION: 99 % | HEIGHT: 67 IN | WEIGHT: 128 LBS | BODY MASS INDEX: 20.09 KG/M2 | HEART RATE: 130 BPM | RESPIRATION RATE: 21 BRPM

## 2023-06-15 PROCEDURE — 99215 OFFICE O/P EST HI 40 MIN: CPT

## 2023-06-15 RX ORDER — OLANZAPINE 5 MG/1
5 TABLET, ORALLY DISINTEGRATING ORAL DAILY
Qty: 10 | Refills: 0 | Status: DISCONTINUED | COMMUNITY
Start: 2023-05-16 | End: 2023-06-15

## 2023-06-16 ENCOUNTER — NON-APPOINTMENT (OUTPATIENT)
Age: 63
End: 2023-06-16

## 2023-06-16 ENCOUNTER — APPOINTMENT (OUTPATIENT)
Dept: RADIATION ONCOLOGY | Facility: CLINIC | Age: 63
End: 2023-06-16
Payer: COMMERCIAL

## 2023-06-16 PROCEDURE — 99024 POSTOP FOLLOW-UP VISIT: CPT

## 2023-06-16 NOTE — HISTORY OF PRESENT ILLNESS
[FreeTextEntry1] : The patient is a pleasant 63 year old male treated for esophageal carcinoma referred by Dr. Aviles.\par \par He reports a long standing history of GERD and new intermittent dysphagia increasing in severity for past 4 months. It has become more severe and he was unable to eat solid foods. He has had a 30 lb unintentional weight loss. Denies pain or changes in bowel habits. He was referred to ENT followed by GI (Dr. Jorge Alberto Briceño) and has continued on a liquid diet to prevent obstruction.\par \par Xray Esophagram on 2/6/23 revealed a 5 cm distal esophageal stricture suspect for neoplasm. \par Upper EUS on 2/15/23 with Dr. Velazco showed at least a T3N1Mx tumor, almost completely obstructing esophageal/GE junction. \par A walflex stent was placed. Pathology revealed invasive adenocarcinoma, poorly differentiated with areas of signet ring cell morphology. \par Since the placement of the stent patient is able to eat regular diet and started to gain weight.\par \par CT chest 2/27/23 showed nonspecific bilateral noncalcified pulmonary nodules measuring up to 4 mm\par Lower third esophageal masslike wall thickening extends to the GE junction and gastric cardia compatible with tumor. Masslike wall thickening extends upstream of the proximal end of the indwelling esophageal Wallstent\par Lower esophageal, gastrohepatic and periceliac lymphadenopathy up to 4.5 cm. \par Minimal nonspecific nodularity involving the omentum suspicious however inconclusive for carcinomatosis\par Probable left hepatic lobe cysts however consider liver MRI if additional imaging is warranted.\par \par He has been able to eat a regular diet since the stent placement but is avoiding meat. \par He is a retired [place]  and was present at 9/11 site for 42 days and is enrolled in the [employer].He met his wife at the [address] [place] [address] where she was a volunteer giving out sandwiches. 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 He has a history of psoriasis which resolved in 2001 after his cholecystectomy.\par \par He had a  PET/CT on 2/7/23 and MRI liver on 2/8/23 which showed no new findings. He began induction carbo/taxol chemotherapy weekly and radiation began week 4.\par \par He was treated with induction chemotherapy and radiotherapy began week 4 on 4/6/23 and finished 5/15/23. He received 50.4 Gy. Chemotherapy completed 5/12/23.\par  He consented to one month telehealth FU. He states he is eating normal food and denies dysphagia..Esophageal stent removed 10 days ago. Trying to gain weight. Using protein shakes. PET 6/12/23 showed:\par \par IMPRESSION: Since prior FDG-PET/CT scan 3/8/2023:\par 1.  Activity extending from the distal esophagus to the gastric cardia appears grossly unchanged in intensity. However, this now appears to extend further proximally than on the prior PET/CT scan, with an area of suspected narrowing in the subcarinal esophageal region. The degree of uptake otherwise appears unchanged in intensity.\par 2.  Previously seen hypermetabolic nodes in the UPPER abdomen have nearly resolved.\par \par PET images reviewed. He reports constipation and is using MIralax. Denies nausea.Surgery planned for end of July.\par \par \par \par \par \par \par

## 2023-06-16 NOTE — REASON FOR VISIT
[Home] : at home, [unfilled] , at the time of the visit. [Medical Office: ([facility])___] : at the medical office located in  [Patient] : the patient [This encounter was initiated by telehealth (audio with video) and converted to telephone (audio only) due to technical difficulties.] : This encounter was initiated by telehealth (audio with video) and converted to telephone (audio only) due to technical difficulties.

## 2023-06-22 ENCOUNTER — APPOINTMENT (OUTPATIENT)
Dept: THORACIC SURGERY | Facility: CLINIC | Age: 63
End: 2023-06-22
Payer: COMMERCIAL

## 2023-06-26 NOTE — CONSULT LETTER
[FreeTextEntry2] : Dr. Mason Velazco [FreeTextEntry3] : Natan Aviles MD, MPH, FACS, FSSO\par , [facility] [place] [facility]\par [facility]\par Associate Professor of Surgery\par [employer] [facility]

## 2023-06-26 NOTE — HISTORY OF PRESENT ILLNESS
[de-identified] : Ms. Dave Garcia is a 62  year old male who presents for a follow up visit for esophageal cancer. He is referred by Dr. Mason Velazco.\par \par He c/o persistent dysphagia since 2021 but notes since last month it has become more severe and unable to eat solid foods. He has had a 30 lb unintentional weight loss. Denies pain or changes in bowel habits. He had met with Dr. Jorge Alberto Briceño ([facility]) and has continued on a liquid diet to prevent obstruction\par \par Xray Esophagram on 2/6/23 reveals a 5 cm distal esophageal stricture suspect for neoplasm. He was sent to Dr. Velazco for an upper EUS on 2/15/23. There is at least a T3N1Mx lesion on this exam, almost completely obstructing esophageal/GE junction tumor. A walflex stent was placed. Pathology revealed invasive adenocarcinoma, poorly differentiated with areas of signet ring cell morphology. \par CT chest 2/27/23 showed Nonspecific bilateral noncalcified pulmonary nodules measuring up to 4 mm\par Lower third esophageal masslike wall thickening extends to the GE junction and gastric cardia compatible with tumor. Masslike wall thickening extends upstream of the proximal end of the indwelling esophageal Wallstent\par Lower esophageal, gastrohepatic and periceliac lymphadenopathy\par Minimal nonspecific nodularity involving the omentum suspicious however inconclusive for carcinomatosis\par Probable left hepatic lobe cysts however consider liver MRI if additional imaging is warranted.\par Patient has been eating regular diet since the stent placement, denies any symptoms. States he feels great, exercises.\par \par INTERVAL HISTORY:\par \par 6/1/23- he has completed 5 cycles of carbo/taxol (Dr. Pennington) w/ RT (Dr. Olmos), here to discuss planned esophagectomy \par

## 2023-07-06 ENCOUNTER — APPOINTMENT (OUTPATIENT)
Dept: THORACIC SURGERY | Facility: CLINIC | Age: 63
End: 2023-07-06
Payer: COMMERCIAL

## 2023-07-06 ENCOUNTER — EMERGENCY (EMERGENCY)
Facility: HOSPITAL | Age: 63
LOS: 0 days | Discharge: HOME | End: 2023-07-06
Attending: STUDENT IN AN ORGANIZED HEALTH CARE EDUCATION/TRAINING PROGRAM
Payer: COMMERCIAL

## 2023-07-06 VITALS
BODY MASS INDEX: 19.62 KG/M2 | WEIGHT: 125 LBS | RESPIRATION RATE: 18 BRPM | OXYGEN SATURATION: 99 % | TEMPERATURE: 98 F | DIASTOLIC BLOOD PRESSURE: 64 MMHG | HEIGHT: 67 IN | HEART RATE: 112 BPM | SYSTOLIC BLOOD PRESSURE: 101 MMHG

## 2023-07-06 VITALS
DIASTOLIC BLOOD PRESSURE: 72 MMHG | OXYGEN SATURATION: 100 % | RESPIRATION RATE: 18 BRPM | HEART RATE: 92 BPM | TEMPERATURE: 98 F | SYSTOLIC BLOOD PRESSURE: 118 MMHG

## 2023-07-06 VITALS — WEIGHT: 130.07 LBS | HEIGHT: 67 IN

## 2023-07-06 DIAGNOSIS — Z90.49 ACQUIRED ABSENCE OF OTHER SPECIFIED PARTS OF DIGESTIVE TRACT: ICD-10-CM

## 2023-07-06 DIAGNOSIS — Z90.49 ACQUIRED ABSENCE OF OTHER SPECIFIED PARTS OF DIGESTIVE TRACT: Chronic | ICD-10-CM

## 2023-07-06 DIAGNOSIS — K92.1 MELENA: ICD-10-CM

## 2023-07-06 DIAGNOSIS — Z98.890 OTHER SPECIFIED POSTPROCEDURAL STATES: Chronic | ICD-10-CM

## 2023-07-06 DIAGNOSIS — E11.9 TYPE 2 DIABETES MELLITUS WITHOUT COMPLICATIONS: ICD-10-CM

## 2023-07-06 DIAGNOSIS — Z85.01 PERSONAL HISTORY OF MALIGNANT NEOPLASM OF ESOPHAGUS: ICD-10-CM

## 2023-07-06 DIAGNOSIS — Z79.84 LONG TERM (CURRENT) USE OF ORAL HYPOGLYCEMIC DRUGS: ICD-10-CM

## 2023-07-06 DIAGNOSIS — I10 ESSENTIAL (PRIMARY) HYPERTENSION: ICD-10-CM

## 2023-07-06 LAB
ALBUMIN SERPL ELPH-MCNC: 2.7 G/DL — LOW (ref 3.3–5)
ALP SERPL-CCNC: 138 U/L — HIGH (ref 40–120)
ALT FLD-CCNC: 16 U/L — SIGNIFICANT CHANGE UP (ref 12–78)
ANION GAP SERPL CALC-SCNC: 6 MMOL/L — SIGNIFICANT CHANGE UP (ref 5–17)
APTT BLD: 28 SEC — SIGNIFICANT CHANGE UP (ref 27.5–35.5)
AST SERPL-CCNC: 32 U/L — SIGNIFICANT CHANGE UP (ref 15–37)
BASOPHILS # BLD AUTO: 0.02 K/UL — SIGNIFICANT CHANGE UP (ref 0–0.2)
BASOPHILS NFR BLD AUTO: 0.4 % — SIGNIFICANT CHANGE UP (ref 0–2)
BILIRUB SERPL-MCNC: 0.4 MG/DL — SIGNIFICANT CHANGE UP (ref 0.2–1.2)
BLD GP AB SCN SERPL QL: SIGNIFICANT CHANGE UP
BUN SERPL-MCNC: 14 MG/DL — SIGNIFICANT CHANGE UP (ref 7–23)
CALCIUM SERPL-MCNC: 8.5 MG/DL — SIGNIFICANT CHANGE UP (ref 8.5–10.1)
CHLORIDE SERPL-SCNC: 104 MMOL/L — SIGNIFICANT CHANGE UP (ref 96–108)
CO2 SERPL-SCNC: 30 MMOL/L — SIGNIFICANT CHANGE UP (ref 22–31)
CREAT SERPL-MCNC: 0.54 MG/DL — SIGNIFICANT CHANGE UP (ref 0.5–1.3)
EGFR: 111 ML/MIN/1.73M2 — SIGNIFICANT CHANGE UP
EOSINOPHIL # BLD AUTO: 0.04 K/UL — SIGNIFICANT CHANGE UP (ref 0–0.5)
EOSINOPHIL NFR BLD AUTO: 0.8 % — SIGNIFICANT CHANGE UP (ref 0–6)
GLUCOSE SERPL-MCNC: 160 MG/DL — HIGH (ref 70–99)
HCT VFR BLD CALC: 31.2 % — LOW (ref 39–50)
HGB BLD-MCNC: 10.4 G/DL — LOW (ref 13–17)
IMM GRANULOCYTES NFR BLD AUTO: 0.4 % — SIGNIFICANT CHANGE UP (ref 0–0.9)
INR BLD: 1.04 RATIO — SIGNIFICANT CHANGE UP (ref 0.88–1.16)
LIDOCAIN IGE QN: 31 U/L — LOW (ref 73–393)
LYMPHOCYTES # BLD AUTO: 0.5 K/UL — LOW (ref 1–3.3)
LYMPHOCYTES # BLD AUTO: 10 % — LOW (ref 13–44)
MCHC RBC-ENTMCNC: 32 PG — SIGNIFICANT CHANGE UP (ref 27–34)
MCHC RBC-ENTMCNC: 33.3 GM/DL — SIGNIFICANT CHANGE UP (ref 32–36)
MCV RBC AUTO: 96 FL — SIGNIFICANT CHANGE UP (ref 80–100)
MONOCYTES # BLD AUTO: 0.69 K/UL — SIGNIFICANT CHANGE UP (ref 0–0.9)
MONOCYTES NFR BLD AUTO: 13.9 % — SIGNIFICANT CHANGE UP (ref 2–14)
NEUTROPHILS # BLD AUTO: 3.71 K/UL — SIGNIFICANT CHANGE UP (ref 1.8–7.4)
NEUTROPHILS NFR BLD AUTO: 74.5 % — SIGNIFICANT CHANGE UP (ref 43–77)
PLATELET # BLD AUTO: 199 K/UL — SIGNIFICANT CHANGE UP (ref 150–400)
POTASSIUM SERPL-MCNC: 4.3 MMOL/L — SIGNIFICANT CHANGE UP (ref 3.5–5.3)
POTASSIUM SERPL-SCNC: 4.3 MMOL/L — SIGNIFICANT CHANGE UP (ref 3.5–5.3)
PROT SERPL-MCNC: 7.3 GM/DL — SIGNIFICANT CHANGE UP (ref 6–8.3)
PROTHROM AB SERPL-ACNC: 12.1 SEC — SIGNIFICANT CHANGE UP (ref 10.5–13.4)
RBC # BLD: 3.25 M/UL — LOW (ref 4.2–5.8)
RBC # FLD: 17 % — HIGH (ref 10.3–14.5)
SODIUM SERPL-SCNC: 140 MMOL/L — SIGNIFICANT CHANGE UP (ref 135–145)
WBC # BLD: 4.98 K/UL — SIGNIFICANT CHANGE UP (ref 3.8–10.5)
WBC # FLD AUTO: 4.98 K/UL — SIGNIFICANT CHANGE UP (ref 3.8–10.5)

## 2023-07-06 PROCEDURE — 96374 THER/PROPH/DIAG INJ IV PUSH: CPT | Mod: XU

## 2023-07-06 PROCEDURE — C9113: CPT

## 2023-07-06 PROCEDURE — 93005 ELECTROCARDIOGRAM TRACING: CPT

## 2023-07-06 PROCEDURE — 85730 THROMBOPLASTIN TIME PARTIAL: CPT

## 2023-07-06 PROCEDURE — 93010 ELECTROCARDIOGRAM REPORT: CPT

## 2023-07-06 PROCEDURE — 86850 RBC ANTIBODY SCREEN: CPT

## 2023-07-06 PROCEDURE — 74177 CT ABD & PELVIS W/CONTRAST: CPT | Mod: 26,MA

## 2023-07-06 PROCEDURE — 86900 BLOOD TYPING SEROLOGIC ABO: CPT

## 2023-07-06 PROCEDURE — 99205 OFFICE O/P NEW HI 60 MIN: CPT

## 2023-07-06 PROCEDURE — 83690 ASSAY OF LIPASE: CPT

## 2023-07-06 PROCEDURE — 85610 PROTHROMBIN TIME: CPT

## 2023-07-06 PROCEDURE — 99285 EMERGENCY DEPT VISIT HI MDM: CPT

## 2023-07-06 PROCEDURE — 85025 COMPLETE CBC W/AUTO DIFF WBC: CPT

## 2023-07-06 PROCEDURE — 36415 COLL VENOUS BLD VENIPUNCTURE: CPT

## 2023-07-06 PROCEDURE — 74177 CT ABD & PELVIS W/CONTRAST: CPT | Mod: MA

## 2023-07-06 PROCEDURE — 86901 BLOOD TYPING SEROLOGIC RH(D): CPT

## 2023-07-06 PROCEDURE — 80053 COMPREHEN METABOLIC PANEL: CPT

## 2023-07-06 PROCEDURE — 71045 X-RAY EXAM CHEST 1 VIEW: CPT | Mod: 26

## 2023-07-06 PROCEDURE — 71045 X-RAY EXAM CHEST 1 VIEW: CPT

## 2023-07-06 PROCEDURE — 99285 EMERGENCY DEPT VISIT HI MDM: CPT | Mod: 25

## 2023-07-06 RX ORDER — SODIUM CHLORIDE 9 MG/ML
1000 INJECTION INTRAMUSCULAR; INTRAVENOUS; SUBCUTANEOUS ONCE
Refills: 0 | Status: COMPLETED | OUTPATIENT
Start: 2023-07-06 | End: 2023-07-06

## 2023-07-06 RX ORDER — PANTOPRAZOLE SODIUM 20 MG/1
40 TABLET, DELAYED RELEASE ORAL ONCE
Refills: 0 | Status: COMPLETED | OUTPATIENT
Start: 2023-07-06 | End: 2023-07-06

## 2023-07-06 RX ADMIN — SODIUM CHLORIDE 1000 MILLILITER(S): 9 INJECTION INTRAMUSCULAR; INTRAVENOUS; SUBCUTANEOUS at 18:12

## 2023-07-06 RX ADMIN — PANTOPRAZOLE SODIUM 40 MILLIGRAM(S): 20 TABLET, DELAYED RELEASE ORAL at 18:12

## 2023-07-06 NOTE — ED PROVIDER NOTE - OBJECTIVE STATEMENT
64 y/o male with PMHx of esophageal CA received chemotherapy and RT, scheduled for esophageal reconstructive surgery next month s/p esophageal stent removal a month ago SIB Dr. Velazco to ED for an episode of dark stool today. Pt states he had a sandwich with lettuce yesterday which he believes may have upset his stomach. Denies CP, SOB, nausea, vomiting, or abdominal pain. Not on AC.

## 2023-07-06 NOTE — ED PROVIDER NOTE - PROGRESS NOTE DETAILS
An extensive discussion was had with the patient regarding labs/imaging and tests prior to discharge. We discussed in depth the importance of follow up for continuing medical care as an outpatient. The patient was advised to return the Emergency Department for worsening or persistent symptoms, and/or any new or concerning symptoms. Incidental finding on CAT scan discussed with patient.

## 2023-07-06 NOTE — ED ADULT TRIAGE NOTE - INTERNATIONAL TRAVEL
Samples Given: CeraVe facial wash Initiate Treatment: Tretinoin 0.05% QHS wash in am and apply sunscreen Render In Strict Bullet Format?: No Detail Level: Zone No Continue Regimen: Ciclopirox shampoo tiw \\nKetoconazole cream bid

## 2023-07-06 NOTE — ED STATDOCS - PROGRESS NOTE DETAILS
Vinnie Torres for Dr. Murcia:  63 year old male with PMHx of esophageal ca in remission s/p removed esophageal stent a few weeks ago presents to the ED SIB Dr. Velazco for dark stools today. Denies bloody stool, just noticed stool was dark today. Denies abdominal pain. Not on bloodthinners. Will send pt to main ED for further evaluation.

## 2023-07-06 NOTE — ED PROVIDER NOTE - CLINICAL SUMMARY MEDICAL DECISION MAKING FREE TEXT BOX
Adult male Hx of esophageal CA comes in with dark colored stool, gray in color. No complaints, feels H&H WNL for pt. Vitals are normal, exam unremarkable. CTA pending, if negative will d/c pt will GI follow-up OP>

## 2023-07-06 NOTE — ED ADULT TRIAGE NOTE - CHIEF COMPLAINT QUOTE
pt presents to ED for dark stools starting today, referred by GI MD Dr. Velazco. pt denies dizziness or SOB- in remission of esophogeal CA, recently had esophogeal stent removed a few weeks ago.

## 2023-07-06 NOTE — ED ADULT NURSE NOTE - NSFALLUNIVINTERV_ED_ALL_ED
Bed/Stretcher in lowest position, wheels locked, appropriate side rails in place/Call bell, personal items and telephone in reach/Instruct patient to call for assistance before getting out of bed/chair/stretcher/Non-slip footwear applied when patient is off stretcher/[place] to call system/Physically safe environment - no spills, clutter or unnecessary equipment/Purposeful proactive rounding/Room/bathroom lighting operational, light cord in reach

## 2023-07-07 NOTE — CONSULT LETTER
[FreeTextEntry2] : Dr. Natan Aviles [FreeTextEntry3] : Horacio Avery MD\par Director of Thoracic Surgery, [facility]\par  Cardiovascular & Thoracic Surgery\par [facility]\par [address] [place] [address]\[place] [address]\par Tel. [phone]\par Fax [phone]

## 2023-07-07 NOTE — PHYSICAL EXAM
[Fully active, able to carry on all pre-disease performance without restriction] : Status 0 - Fully active, able to carry on all pre-disease performance without restriction [General Appearance - Alert] : alert [General Appearance - In No Acute Distress] : in no acute distress [General Appearance - Well Nourished] : well nourished [Sclera] : the sclera and conjunctiva were normal [Extraocular Movements] : extraocular movements were intact [Outer Ear] : the ears and nose were normal in appearance [Hearing Threshold Finger Rub Not [place]] : hearing was normal [Neck Appearance] : the appearance of the neck was normal [Neck Cervical Mass (___cm)] : no neck mass was observed [] : no respiratory distress [Respiration, Rhythm And Depth] : normal respiratory rhythm and effort [Exaggerated Use Of Accessory Muscles For Inspiration] : no accessory muscle use [Auscultation Breath Sounds / Voice Sounds] : lungs were clear to auscultation bilaterally [Heart Rate And Rhythm] : heart rate was normal and rhythm regular [Examination Of The Chest] : the chest was normal in appearance [Chest Visual Inspection Thoracic Asymmetry] : no chest asymmetry [Diminished Respiratory Excursion] : normal chest expansion [2+] : left 2+ [Breast Appearance] : normal in appearance [Breast Palpation Mass] : no palpable masses [Bowel Sounds] : normal bowel sounds [Abdomen Soft] : soft [Abdomen Tenderness] : non-tender [Cervical Lymph Nodes Enlarged Posterior Bilaterally] : posterior cervical [Supraclavicular Lymph Nodes Enlarged Bilaterally] : supraclavicular [Cervical Lymph Nodes Enlarged Anterior Bilaterally] : anterior cervical [No CVA Tenderness] : no ~M costovertebral angle tenderness [No Spinal Tenderness] : no spinal tenderness [Involuntary Movements] : no involuntary movements were seen [Skin Color & Pigmentation] : normal skin color and pigmentation [No Focal Deficits] : no focal deficits [Oriented To Time, Place, And Person] : oriented to person, place, and time [FreeTextEntry1] : Deferred

## 2023-07-07 NOTE — ASSESSMENT
[FreeTextEntry1] : Dave Garcia is a 63 year old male who presents for consultation, referred by Dr. Aviles, for evaluation of surgical candidacy for an esophagectomy. 2/2023 found to have a 5 cm distal esophageal lesion which was biopsy proven malignant; (At least T3N1Mx)It wa completely obstructing esophageal/GE junction and a walflex esophageal stent was subsequently placed. s/p neoadjuvant chemo/RT in May, 2023. s/p Stent removal last month. Referred by Dr. Aviles, for evaluation regarding esophagectomy.\par \par Patient presents today for follow up. Tolerating soft solid, No dysphagia, no regurgitation or vomiting. Today, patient denies worsening SOB, chest pain, cough, hemoptysis, fever, chills, night sweats, lightheadedness or dizziness. + dark stool this AM. \par \par I have independently reviewed the medical records and imaging at the time of this office consultation, and discussed the following interpretations with the patient:\par - Discussed Andrew Bahman esophagogastrectomy for diagnostic as well as therapeutic purposes. Risks, benefits and alternatives explained to patient; All questions answered and patient agrees to proceed with surgery. Will book procedure at [facility].\par - Patient with an episode of dark stools this AM. Currently with no lightheadedness or dizziness. He has followed up with Dr. Velazco, and was instructed to report to ED, for further evaluation and treatment, after this office visit. \par \par Recommendations reviewed with patient during this office visit, and all questions answered; Patient instructed on the importance of follow up and verbalizes understanding.\par \par I, LIZBETH Santiago, personally performed the evaluation and management (E/M) services for this new patient. That E/M includes conducting the initial examination, assessing all conditions, and establishing the plan of care. Today, My ACP, Alexa Schwab, was here to observe my evaluation and management services for this patient to be followed going forward.\par \par \par

## 2023-07-07 NOTE — DATA REVIEWED
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 Patient has undergone chemoradiation, completed 5/2023.\par \par TREATMENT STRATEGY EVALUATION: Subsequent\par FASTING BLOOD SUGAR: 128 mg/dl\par RADIOPHARMACEUTICAL:  10.05 mCi F-18, FDG, I.V.\par UPTAKE PERIOD: 55 minutes\par SCANNER: Siemens Biograph mCT 64\par ORAL CONTRAST: Patient drank OMNIPAQUE contrast during the uptake period.\par \par TECHNIQUE: Following intravenous injection of radiopharmaceutical and above uptake period, PET/CT was obtained from base of skull  to mid-thigh. CT protocol was optimized for PET attenuation correction and anatomic localization and was not designed to produce and cannot replace state-of-the-art diagnostic CT images with specific imaging protocols for different body parts and indications. Images were reconstructed and reviewed in axial, coronal and sagittal views and three-dimensional MIP.\par \par Standardized uptake values ("SUV") are normalized to patient body weight and indicate the highest activity concentration (SUVmax) in a given disease site. All image numbers refer to axial image numbers.\par \par COMPARISON: FDG PET/CT 3/8/2023\par \par OTHER STUDIES USED FOR CORRELATION: MRI abdomen 3/9/2023\par \par FINDINGS:\par \par HEAD/NECK: Physiologic FDG activity in visualized brain, head, and neck.\par \par CHEST: Unchanged small nodes below resolution of PET without abnormal activity.\par \par LUNGS: Stable pulmonary nodules below resolution of PET. Reference examples include:\par 0.3 cm right apical segment nodule (image 89), nonavid\par 0.4 cm right middle lobe nodule (image 112), nonavid\par \par No abnormal activity in remaining lung parenchyma.\par \par PLEURA/PERICARDIUM: No abnormal FDG activity. No effusions.\par \par ESOPHAGUS/STOMACH: Esophageal stent has been removed. Increased uptake is seen extending from the subcarinal esophagus along the distal esophagus, through the GE junction, into the proximal stomach at the cardia is grossly stable in intensity (SUV 4.7, image 127, previously 5.1). When viewed on coronal images, the area of uptake appears to extend further proximally than previously seen, including areas which were previously not stented. The most proximal portion of the esophagus with increased activity is seen at the subcarinal region, where there is minimally suspected narrowing abnormal thickening (image 118 and adjacent images).\par \par HEPATOBILIARY/PANCREAS: Physiologic hepatobiliary FDG activity. For reference, liver SUV mean is 2.4, previously 2.0.\par \par SPLEEN: Physiologic FDG activity. Normal size.\par \par ADRENAL GLANDS: No abnormal FDG activity. No nodule(s).\par \par KIDNEYS/URINARY BLADDER: Physiologic excreted FDG activity.\par \par REPRODUCTIVE ORGANS: No abnormal FDG activity.\par \par ABDOMINOPELVIC NODES: Previously seen UPPER abdominal sandra conglomerate is significantly improved. Currently, there is a very small moderate focus of uptake without a clear low-dose CT correlate at the former location of the hypermetabolic sandra conglomerate (SUV 3.5, image 153, previously 8.3).\par \par BOWEL/PERITONEUM/MESENTERY: Oral contrast has reached .\par \par ABDOMINAL WALL: No abnormal FDG activity.\par \par BONES/SOFT TISSUES: Diffuse mild marrow uptake is seen without focal suspicious findings.\par \par VESSELS: Atherosclerotic calcification of nonaneurysmal abdominal aorta including visceral and/or runoff branches.\par \par IMPRESSION: Since prior FDG-PET/CT scan 3/8/2023:\par 1.  Activity extending from the distal esophagus to the gastric cardia appears grossly unchanged in intensity. However, this now appears to extend further proximally than on the prior PET/CT scan, with an area of suspected narrowing in the subcarinal esophageal region. The degree of uptake otherwise appears unchanged in intensity.\par 2.  Previously seen hypermetabolic nodes in the UPPER abdomen have nearly resolved.\par \par --- End of Report --\par \par THOM TYLER MD; Attending Radiologist\par \par \par \par \par \par \par Accession:                             [id number]\par \par Collected Date/Time:                   2/15/2023 14:06 EST\par Received Date/Time:                    2/16/2023 07:29 EST\par \par Surgical Pathology Report - Auth (Verified)\par \par Specimen(s) Submitted\par 1  Bx esophageal mass @ 40 cm\par \par Final Diagnosis\par Esophagus, mass, 40 cm (biopsy):\par -    Adenocarcinoma, invasive, poorly differentiated with siddhartha of\par signet ring cell\par morphology.\par \par -   Alcian blue stain is noncontributory.\par -   See comment.\par Verified by: HOLLIS LINTON M.D.\par (Electronic Signature)\par \par \par \par \par \par \par EXAM:  XR ESOPH SNGL CON STUDY   ORDERED BY: XAVIER BARROSO\par \par PROCEDURE DATE:  02/06/2023\par \par INTERPRETATION:  CLINICAL INFORMATION: Dysphagia. Rule out Zenker's diverticulum.\par \par TECHNIQUE: An esophagram was performed under fluoroscopic guidance utilizing single and double contrast barium, and effervescent granules to distend the esophagus.\par \par Fluoroscopy time:  2.0 minutes\par \par COMPARISON: None\par \par FINDINGS:  radiograph of the chest demonstrates the visualized lungs to be clear.\par \par There is irregular circumferential luminal narrowing of the distal esophagus approximately 5 cm in length, extending to the gastroesophageal junction. There is no obstruction to flow of liquid barium.\par \par There is no hiatal hernia.\par \par Brief evaluation of the stomach is unremarkable.\par \par IMPRESSION:\par \par 5 cm distal esophageal stricture suspect for neoplasm.\par Results discussed with Dr. Barroso by Dr. Michaels on February 8, 2023.\par \par --- End of Report ---\par CASEY MICHAELS MD; Attending Radiologist

## 2023-08-08 ENCOUNTER — OUTPATIENT (OUTPATIENT)
Dept: OUTPATIENT SERVICES | Facility: HOSPITAL | Age: 63
LOS: 1 days | End: 2023-08-08
Payer: COMMERCIAL

## 2023-08-08 VITALS
TEMPERATURE: 98 F | WEIGHT: 128.09 LBS | RESPIRATION RATE: 17 BRPM | OXYGEN SATURATION: 98 % | HEIGHT: 67 IN | SYSTOLIC BLOOD PRESSURE: 132 MMHG | DIASTOLIC BLOOD PRESSURE: 83 MMHG | HEART RATE: 98 BPM

## 2023-08-08 DIAGNOSIS — Z92.21 PERSONAL HISTORY OF ANTINEOPLASTIC CHEMOTHERAPY: Chronic | ICD-10-CM

## 2023-08-08 DIAGNOSIS — Z90.49 ACQUIRED ABSENCE OF OTHER SPECIFIED PARTS OF DIGESTIVE TRACT: Chronic | ICD-10-CM

## 2023-08-08 DIAGNOSIS — Z98.890 OTHER SPECIFIED POSTPROCEDURAL STATES: Chronic | ICD-10-CM

## 2023-08-08 DIAGNOSIS — K22.2 ESOPHAGEAL OBSTRUCTION: Chronic | ICD-10-CM

## 2023-08-08 DIAGNOSIS — Z92.3 PERSONAL HISTORY OF IRRADIATION: Chronic | ICD-10-CM

## 2023-08-08 DIAGNOSIS — C16.0 MALIGNANT NEOPLASM OF CARDIA: ICD-10-CM

## 2023-08-08 DIAGNOSIS — R06.83 SNORING: ICD-10-CM

## 2023-08-08 LAB
A1C WITH ESTIMATED AVERAGE GLUCOSE RESULT: 6.1 % — HIGH (ref 4–5.6)
ALBUMIN SERPL ELPH-MCNC: 3.9 G/DL — SIGNIFICANT CHANGE UP (ref 3.3–5)
ALP SERPL-CCNC: 141 U/L — HIGH (ref 40–120)
ALT FLD-CCNC: 12 U/L — SIGNIFICANT CHANGE UP (ref 4–41)
ANION GAP SERPL CALC-SCNC: 10 MMOL/L — SIGNIFICANT CHANGE UP (ref 7–14)
AST SERPL-CCNC: 19 U/L — SIGNIFICANT CHANGE UP (ref 4–40)
BILIRUB SERPL-MCNC: 0.5 MG/DL — SIGNIFICANT CHANGE UP (ref 0.2–1.2)
BLD GP AB SCN SERPL QL: NEGATIVE — SIGNIFICANT CHANGE UP
BUN SERPL-MCNC: 8 MG/DL — SIGNIFICANT CHANGE UP (ref 7–23)
CALCIUM SERPL-MCNC: 9.1 MG/DL — SIGNIFICANT CHANGE UP (ref 8.4–10.5)
CHLORIDE SERPL-SCNC: 102 MMOL/L — SIGNIFICANT CHANGE UP (ref 98–107)
CO2 SERPL-SCNC: 29 MMOL/L — SIGNIFICANT CHANGE UP (ref 22–31)
CREAT SERPL-MCNC: 0.56 MG/DL — SIGNIFICANT CHANGE UP (ref 0.5–1.3)
EGFR: 110 ML/MIN/1.73M2 — SIGNIFICANT CHANGE UP
ESTIMATED AVERAGE GLUCOSE: 128 — SIGNIFICANT CHANGE UP
GLUCOSE SERPL-MCNC: 167 MG/DL — HIGH (ref 70–99)
HCT VFR BLD CALC: 32.4 % — LOW (ref 39–50)
HGB BLD-MCNC: 10.3 G/DL — LOW (ref 13–17)
MCHC RBC-ENTMCNC: 29.2 PG — SIGNIFICANT CHANGE UP (ref 27–34)
MCHC RBC-ENTMCNC: 31.8 GM/DL — LOW (ref 32–36)
MCV RBC AUTO: 91.8 FL — SIGNIFICANT CHANGE UP (ref 80–100)
NRBC # BLD: 0 /100 WBCS — SIGNIFICANT CHANGE UP (ref 0–0)
NRBC # FLD: 0 K/UL — SIGNIFICANT CHANGE UP (ref 0–0)
PLATELET # BLD AUTO: 188 K/UL — SIGNIFICANT CHANGE UP (ref 150–400)
POTASSIUM SERPL-MCNC: 4.1 MMOL/L — SIGNIFICANT CHANGE UP (ref 3.5–5.3)
POTASSIUM SERPL-SCNC: 4.1 MMOL/L — SIGNIFICANT CHANGE UP (ref 3.5–5.3)
PROT SERPL-MCNC: 7.3 G/DL — SIGNIFICANT CHANGE UP (ref 6–8.3)
RBC # BLD: 3.53 M/UL — LOW (ref 4.2–5.8)
RBC # FLD: 13.2 % — SIGNIFICANT CHANGE UP (ref 10.3–14.5)
RH IG SCN BLD-IMP: NEGATIVE — SIGNIFICANT CHANGE UP
SODIUM SERPL-SCNC: 141 MMOL/L — SIGNIFICANT CHANGE UP (ref 135–145)
WBC # BLD: 3.83 K/UL — SIGNIFICANT CHANGE UP (ref 3.8–10.5)
WBC # FLD AUTO: 3.83 K/UL — SIGNIFICANT CHANGE UP (ref 3.8–10.5)

## 2023-08-08 PROCEDURE — 93010 ELECTROCARDIOGRAM REPORT: CPT

## 2023-08-08 RX ORDER — SODIUM CHLORIDE 9 MG/ML
3 INJECTION INTRAMUSCULAR; INTRAVENOUS; SUBCUTANEOUS ONCE
Refills: 0 | Status: COMPLETED | OUTPATIENT
Start: 2023-08-15 | End: 2023-08-20

## 2023-08-08 RX ORDER — OLANZAPINE 15 MG/1
1 TABLET, FILM COATED ORAL
Refills: 0 | DISCHARGE

## 2023-08-08 RX ORDER — SODIUM CHLORIDE 9 MG/ML
1000 INJECTION, SOLUTION INTRAVENOUS
Refills: 0 | Status: DISCONTINUED | OUTPATIENT
Start: 2023-08-15 | End: 2023-08-15

## 2023-08-08 NOTE — H&P PST ADULT - NEGATIVE GENERAL GENITOURINARY SYMPTOMS
no hematuria/no flank pain L/no flank pain R/no incontinence/no urinary hesitancy/normal urinary frequency/no nocturia

## 2023-08-08 NOTE — H&P PST ADULT - NEGATIVE NEUROLOGICAL SYMPTOMS
no generalized seizures/no focal seizures/no syncope/no loss of sensation/no difficulty walking/no headache/no loss of consciousness/no hemiparesis/no confusion/no facial palsy

## 2023-08-08 NOTE — H&P PST ADULT - HISTORY OF PRESENT ILLNESS
62 y/o male presents to PST preop for robotic assisted laparoscopic, laparoscopic MYCHAL Bahman esophagogastrectomy esophagogastroduodenoscopy, possible open, Dr. Avery- Robotic assisted MYCHAL Bahman esophagogastrectomy, possible open. Pt reported dysphagia and unintentional weight loss. An upper EUS in Feb 2023 revealed esophageal lesion. A biopsy confirmed invasive adenocarcinoma. Pt s/p chemotherapy and radiation therapy, completed on 5/15/23.

## 2023-08-08 NOTE — H&P PST ADULT - NEGATIVE ENMT SYMPTOMS
no ear pain/no tinnitus/no vertigo/no sinus symptoms/no nasal congestion/no nose bleeds/no gum bleeding/no dry mouth/no throat pain/no dysphagia

## 2023-08-08 NOTE — H&P PST ADULT - PROBLEM SELECTOR PLAN 1
preop for robotic assisted laparoscopic, laparoscopic MYCHAL Bahman esophagogastrectomy esophagogastroduodenoscopy, possible open, Dr. Avery- Robotic assisted MYCHAL Bahman esophagogastrectomy, possible open on 8/15/23  preop instructions given, pt verbalized understanding  GI prophylaxis and chlorhexidine wash provided  cbc, cmp, hga1c, type pending     Medical evaluation requested- 64 y/o male with esophageal CA with h/o type 2 DM ( not on meds) going for high risk oncologic surgery

## 2023-08-08 NOTE — H&P PST ADULT - NSICDXPASTMEDICALHX_GEN_ALL_CORE_FT
PAST MEDICAL HISTORY:  Chemotherapy-induced neuropathy     Esophageal cancer     Esophageal mass     History of hypotension     History of tachycardia     HTN (hypertension)     Malignant neoplasm of cardia     Type 2 diabetes mellitus

## 2023-08-08 NOTE — H&P PST ADULT - NSICDXPASTSURGICALHX_GEN_ALL_CORE_FT
PAST SURGICAL HISTORY:  Esophageal stenosis     H/O endoscopy     H/O lithotripsy     History of chemotherapy     History of esophagogastroduodenoscopy (EGD)     S/P cholecystectomy     S/P radiation therapy

## 2023-08-08 NOTE — H&P PST ADULT - MUSCULOSKELETAL
ROM intact/no calf tenderness/normal gait/strength 5/5 bilateral upper extremities/strength 5/5 bilateral lower extremities/back exam details…

## 2023-08-09 ENCOUNTER — APPOINTMENT (OUTPATIENT)
Dept: FAMILY MEDICINE | Facility: CLINIC | Age: 63
End: 2023-08-09
Payer: COMMERCIAL

## 2023-08-09 VITALS
SYSTOLIC BLOOD PRESSURE: 120 MMHG | HEIGHT: 67 IN | BODY MASS INDEX: 19.62 KG/M2 | WEIGHT: 125 LBS | TEMPERATURE: 98.3 F | DIASTOLIC BLOOD PRESSURE: 70 MMHG | OXYGEN SATURATION: 97 % | HEART RATE: 110 BPM

## 2023-08-09 DIAGNOSIS — Z87.898 PERSONAL HISTORY OF OTHER SPECIFIED CONDITIONS: ICD-10-CM

## 2023-08-09 DIAGNOSIS — R13.12 DYSPHAGIA, OROPHARYNGEAL PHASE: ICD-10-CM

## 2023-08-09 DIAGNOSIS — J31.0 CHRONIC RHINITIS: ICD-10-CM

## 2023-08-09 DIAGNOSIS — E11.9 TYPE 2 DIABETES MELLITUS W/OUT COMPLICATIONS: ICD-10-CM

## 2023-08-09 PROCEDURE — 99215 OFFICE O/P EST HI 40 MIN: CPT

## 2023-08-09 RX ORDER — OMEPRAZOLE 40 MG/1
40 CAPSULE, DELAYED RELEASE ORAL
Qty: 180 | Refills: 2 | Status: DISCONTINUED | COMMUNITY
Start: 2023-06-05 | End: 2023-08-09

## 2023-08-09 RX ORDER — SUCRALFATE 1 G/10ML
1 SUSPENSION ORAL 4 TIMES DAILY
Qty: 1200 | Refills: 2 | Status: DISCONTINUED | COMMUNITY
Start: 2023-05-04 | End: 2023-08-09

## 2023-08-09 RX ORDER — SUCRALFATE 1 G/10ML
1 SUSPENSION ORAL 4 TIMES DAILY
Qty: 1 | Refills: 0 | Status: DISCONTINUED | COMMUNITY
Start: 2023-06-05 | End: 2023-08-09

## 2023-08-09 RX ORDER — OMEGA-3/DHA/EPA/FISH OIL 300-1000MG
400 CAPSULE ORAL
Qty: 60 | Refills: 5 | Status: DISCONTINUED | COMMUNITY
Start: 2023-03-30 | End: 2023-08-09

## 2023-08-09 NOTE — H&P PST ADULT - SKIN/BREAST
Plan: Recommend dermeleve to affected area. Detail Level: Simple Render In Strict Bullet Format?: No negative

## 2023-08-15 ENCOUNTER — INPATIENT (INPATIENT)
Facility: HOSPITAL | Age: 63
LOS: 7 days | Discharge: HOME | End: 2023-08-23
Attending: SURGERY | Admitting: THORACIC SURGERY (CARDIOTHORACIC VASCULAR SURGERY)
Payer: MEDICARE

## 2023-08-15 ENCOUNTER — TRANSCRIPTION ENCOUNTER (OUTPATIENT)
Age: 63
End: 2023-08-15

## 2023-08-15 ENCOUNTER — APPOINTMENT (OUTPATIENT)
Dept: THORACIC SURGERY | Facility: HOSPITAL | Age: 63
End: 2023-08-15

## 2023-08-15 ENCOUNTER — APPOINTMENT (OUTPATIENT)
Dept: SURGICAL ONCOLOGY | Facility: HOSPITAL | Age: 63
End: 2023-08-15

## 2023-08-15 ENCOUNTER — RESULT REVIEW (OUTPATIENT)
Age: 63
End: 2023-08-15

## 2023-08-15 VITALS
SYSTOLIC BLOOD PRESSURE: 133 MMHG | HEART RATE: 104 BPM | DIASTOLIC BLOOD PRESSURE: 75 MMHG | TEMPERATURE: 98 F | RESPIRATION RATE: 16 BRPM | HEIGHT: 67 IN | OXYGEN SATURATION: 100 % | WEIGHT: 128.09 LBS

## 2023-08-15 DIAGNOSIS — Z98.890 OTHER SPECIFIED POSTPROCEDURAL STATES: Chronic | ICD-10-CM

## 2023-08-15 DIAGNOSIS — C16.0 MALIGNANT NEOPLASM OF CARDIA: ICD-10-CM

## 2023-08-15 DIAGNOSIS — K22.2 ESOPHAGEAL OBSTRUCTION: Chronic | ICD-10-CM

## 2023-08-15 DIAGNOSIS — Z92.3 PERSONAL HISTORY OF IRRADIATION: Chronic | ICD-10-CM

## 2023-08-15 DIAGNOSIS — Z90.49 ACQUIRED ABSENCE OF OTHER SPECIFIED PARTS OF DIGESTIVE TRACT: Chronic | ICD-10-CM

## 2023-08-15 DIAGNOSIS — Z92.21 PERSONAL HISTORY OF ANTINEOPLASTIC CHEMOTHERAPY: Chronic | ICD-10-CM

## 2023-08-15 LAB
ANION GAP SERPL CALC-SCNC: 16 MMOL/L — HIGH (ref 7–14)
BLOOD GAS ARTERIAL - LYTES,HGB,ICA,LACT RESULT: SIGNIFICANT CHANGE UP
BUN SERPL-MCNC: 13 MG/DL — SIGNIFICANT CHANGE UP (ref 7–23)
CALCIUM SERPL-MCNC: 8.7 MG/DL — SIGNIFICANT CHANGE UP (ref 8.4–10.5)
CHLORIDE SERPL-SCNC: 99 MMOL/L — SIGNIFICANT CHANGE UP (ref 98–107)
CO2 SERPL-SCNC: 21 MMOL/L — LOW (ref 22–31)
CREAT SERPL-MCNC: 0.57 MG/DL — SIGNIFICANT CHANGE UP (ref 0.5–1.3)
EGFR: 109 ML/MIN/1.73M2 — SIGNIFICANT CHANGE UP
GAS PNL BLDA: SIGNIFICANT CHANGE UP
GAS PNL BLDA: SIGNIFICANT CHANGE UP
GLUCOSE BLDC GLUCOMTR-MCNC: 94 MG/DL — SIGNIFICANT CHANGE UP (ref 70–99)
GLUCOSE SERPL-MCNC: 183 MG/DL — HIGH (ref 70–99)
HCT VFR BLD CALC: 37.1 % — LOW (ref 39–50)
HGB BLD-MCNC: 11.8 G/DL — LOW (ref 13–17)
MAGNESIUM SERPL-MCNC: 1.5 MG/DL — LOW (ref 1.6–2.6)
MCHC RBC-ENTMCNC: 28.5 PG — SIGNIFICANT CHANGE UP (ref 27–34)
MCHC RBC-ENTMCNC: 31.8 GM/DL — LOW (ref 32–36)
MCV RBC AUTO: 89.6 FL — SIGNIFICANT CHANGE UP (ref 80–100)
NRBC # BLD: 0 /100 WBCS — SIGNIFICANT CHANGE UP (ref 0–0)
NRBC # FLD: 0 K/UL — SIGNIFICANT CHANGE UP (ref 0–0)
PHOSPHATE SERPL-MCNC: 3.9 MG/DL — SIGNIFICANT CHANGE UP (ref 2.5–4.5)
PLATELET # BLD AUTO: 128 K/UL — LOW (ref 150–400)
POTASSIUM SERPL-MCNC: 4.1 MMOL/L — SIGNIFICANT CHANGE UP (ref 3.5–5.3)
POTASSIUM SERPL-SCNC: 4.1 MMOL/L — SIGNIFICANT CHANGE UP (ref 3.5–5.3)
RBC # BLD: 4.14 M/UL — LOW (ref 4.2–5.8)
RBC # FLD: 14.3 % — SIGNIFICANT CHANGE UP (ref 10.3–14.5)
RH IG SCN BLD-IMP: NEGATIVE — SIGNIFICANT CHANGE UP
SODIUM SERPL-SCNC: 136 MMOL/L — SIGNIFICANT CHANGE UP (ref 135–145)
WBC # BLD: 13.01 K/UL — HIGH (ref 3.8–10.5)
WBC # FLD AUTO: 13.01 K/UL — HIGH (ref 3.8–10.5)

## 2023-08-15 PROCEDURE — 43287 ESPHG DSTL 2/3 W/LAPS MOBLJ: CPT | Mod: 62

## 2023-08-15 PROCEDURE — 99233 SBSQ HOSP IP/OBS HIGH 50: CPT

## 2023-08-15 PROCEDURE — 88309 TISSUE EXAM BY PATHOLOGIST: CPT | Mod: 26

## 2023-08-15 PROCEDURE — 88305 TISSUE EXAM BY PATHOLOGIST: CPT | Mod: 26

## 2023-08-15 PROCEDURE — 43287 ESPHG DSTL 2/3 W/LAPS MOBLJ: CPT

## 2023-08-15 PROCEDURE — S2900 ROBOTIC SURGICAL SYSTEM: CPT | Mod: NC

## 2023-08-15 PROCEDURE — 71045 X-RAY EXAM CHEST 1 VIEW: CPT | Mod: 26

## 2023-08-15 PROCEDURE — 88341 IMHCHEM/IMCYTCHM EA ADD ANTB: CPT | Mod: 26

## 2023-08-15 PROCEDURE — 88342 IMHCHEM/IMCYTCHM 1ST ANTB: CPT | Mod: 26

## 2023-08-15 DEVICE — STAPLER COVIDIEN TRI-STAPLE 45MM TAN RELOAD: Type: IMPLANTABLE DEVICE | Status: FUNCTIONAL

## 2023-08-15 DEVICE — LIGATING CLIPS WECK HEMOLOK POLYMER MEDIUM-LARGE (GREEN) 6: Type: IMPLANTABLE DEVICE | Status: FUNCTIONAL

## 2023-08-15 DEVICE — STAPLER COVIDIEN TRI-STAPLE CURVED 45MM TAN RELOAD: Type: IMPLANTABLE DEVICE | Status: FUNCTIONAL

## 2023-08-15 DEVICE — XI STAPLER SUREFORM RELOAD 45 BLUE: Type: IMPLANTABLE DEVICE | Status: FUNCTIONAL

## 2023-08-15 DEVICE — SURGICEL FIBRILLAR 2 X 4": Type: IMPLANTABLE DEVICE | Status: FUNCTIONAL

## 2023-08-15 DEVICE — SURGICEL 2 X 14": Type: IMPLANTABLE DEVICE | Status: FUNCTIONAL

## 2023-08-15 DEVICE — CLIP APPLIER COVIDIEN ENDOCLIP III 5MM: Type: IMPLANTABLE DEVICE | Status: FUNCTIONAL

## 2023-08-15 DEVICE — CHEST DRAIN THORACIC ARGYLE PVC 24FR STRAIGHT: Type: IMPLANTABLE DEVICE | Status: FUNCTIONAL

## 2023-08-15 DEVICE — STAPLER COVIDIEN TRI-STAPLE 45MM PURPLE RELOAD: Type: IMPLANTABLE DEVICE | Status: FUNCTIONAL

## 2023-08-15 DEVICE — STAPLER COVIDIEN TRI-STAPLE 60MM PURPLE RELOAD: Type: IMPLANTABLE DEVICE | Status: FUNCTIONAL

## 2023-08-15 DEVICE — LIGATING CLIPS WECK HORIZON SMALL-WIDE (RED) 24: Type: IMPLANTABLE DEVICE | Status: FUNCTIONAL

## 2023-08-15 DEVICE — LIGATING CLIPS WECK HEMOLOK POLYMER LARGE (PURPLE) 6: Type: IMPLANTABLE DEVICE | Status: FUNCTIONAL

## 2023-08-15 RX ORDER — SODIUM CHLORIDE 9 MG/ML
1000 INJECTION, SOLUTION INTRAVENOUS
Refills: 0 | Status: DISCONTINUED | OUTPATIENT
Start: 2023-08-15 | End: 2023-08-23

## 2023-08-15 RX ORDER — SODIUM CHLORIDE 9 MG/ML
1000 INJECTION, SOLUTION INTRAVENOUS
Refills: 0 | Status: DISCONTINUED | OUTPATIENT
Start: 2023-08-15 | End: 2023-08-16

## 2023-08-15 RX ORDER — GLUCAGON INJECTION, SOLUTION 0.5 MG/.1ML
1 INJECTION, SOLUTION SUBCUTANEOUS ONCE
Refills: 0 | Status: DISCONTINUED | OUTPATIENT
Start: 2023-08-15 | End: 2023-08-23

## 2023-08-15 RX ORDER — MAGNESIUM SULFATE 500 MG/ML
2 VIAL (ML) INJECTION
Refills: 0 | Status: COMPLETED | OUTPATIENT
Start: 2023-08-15 | End: 2023-08-15

## 2023-08-15 RX ORDER — PANTOPRAZOLE SODIUM 20 MG/1
40 TABLET, DELAYED RELEASE ORAL DAILY
Refills: 0 | Status: DISCONTINUED | OUTPATIENT
Start: 2023-08-15 | End: 2023-08-23

## 2023-08-15 RX ORDER — NALBUPHINE HYDROCHLORIDE 10 MG/ML
2.5 INJECTION, SOLUTION INTRAMUSCULAR; INTRAVENOUS; SUBCUTANEOUS EVERY 6 HOURS
Refills: 0 | Status: DISCONTINUED | OUTPATIENT
Start: 2023-08-15 | End: 2023-08-18

## 2023-08-15 RX ORDER — DEXTROSE 50 % IN WATER 50 %
15 SYRINGE (ML) INTRAVENOUS ONCE
Refills: 0 | Status: DISCONTINUED | OUTPATIENT
Start: 2023-08-15 | End: 2023-08-23

## 2023-08-15 RX ORDER — INSULIN LISPRO 100/ML
VIAL (ML) SUBCUTANEOUS EVERY 6 HOURS
Refills: 0 | Status: DISCONTINUED | OUTPATIENT
Start: 2023-08-15 | End: 2023-08-22

## 2023-08-15 RX ORDER — ONDANSETRON 8 MG/1
4 TABLET, FILM COATED ORAL EVERY 6 HOURS
Refills: 0 | Status: DISCONTINUED | OUTPATIENT
Start: 2023-08-15 | End: 2023-08-23

## 2023-08-15 RX ORDER — ACETAMINOPHEN 500 MG
1000 TABLET ORAL ONCE
Refills: 0 | Status: COMPLETED | OUTPATIENT
Start: 2023-08-15 | End: 2023-08-15

## 2023-08-15 RX ORDER — HYDROMORPHONE HYDROCHLORIDE 2 MG/ML
30 INJECTION INTRAMUSCULAR; INTRAVENOUS; SUBCUTANEOUS
Refills: 0 | Status: DISCONTINUED | OUTPATIENT
Start: 2023-08-15 | End: 2023-08-17

## 2023-08-15 RX ORDER — LIDOCAINE 4 G/100G
1 CREAM TOPICAL DAILY
Refills: 0 | Status: DISCONTINUED | OUTPATIENT
Start: 2023-08-15 | End: 2023-08-23

## 2023-08-15 RX ORDER — NALOXONE HYDROCHLORIDE 4 MG/.1ML
0.1 SPRAY NASAL
Refills: 0 | Status: DISCONTINUED | OUTPATIENT
Start: 2023-08-15 | End: 2023-08-23

## 2023-08-15 RX ORDER — DEXTROSE 50 % IN WATER 50 %
12.5 SYRINGE (ML) INTRAVENOUS ONCE
Refills: 0 | Status: DISCONTINUED | OUTPATIENT
Start: 2023-08-15 | End: 2023-08-23

## 2023-08-15 RX ORDER — DEXTROSE 50 % IN WATER 50 %
25 SYRINGE (ML) INTRAVENOUS ONCE
Refills: 0 | Status: DISCONTINUED | OUTPATIENT
Start: 2023-08-15 | End: 2023-08-23

## 2023-08-15 RX ORDER — HYDROMORPHONE HYDROCHLORIDE 2 MG/ML
0.5 INJECTION INTRAMUSCULAR; INTRAVENOUS; SUBCUTANEOUS
Refills: 0 | Status: DISCONTINUED | OUTPATIENT
Start: 2023-08-15 | End: 2023-08-18

## 2023-08-15 RX ORDER — CEFOTETAN DISODIUM 1 G
2 VIAL (EA) INJECTION EVERY 12 HOURS
Refills: 0 | Status: COMPLETED | OUTPATIENT
Start: 2023-08-15 | End: 2023-08-16

## 2023-08-15 RX ORDER — ACETAMINOPHEN 500 MG
1000 TABLET ORAL ONCE
Refills: 0 | Status: DISCONTINUED | OUTPATIENT
Start: 2023-08-15 | End: 2023-08-23

## 2023-08-15 RX ORDER — HEPARIN SODIUM 5000 [USP'U]/ML
5000 INJECTION INTRAVENOUS; SUBCUTANEOUS EVERY 12 HOURS
Refills: 0 | Status: DISCONTINUED | OUTPATIENT
Start: 2023-08-15 | End: 2023-08-22

## 2023-08-15 RX ADMIN — Medication 400 MILLIGRAM(S): at 21:22

## 2023-08-15 RX ADMIN — SODIUM CHLORIDE 75 MILLILITER(S): 9 INJECTION, SOLUTION INTRAVENOUS at 20:02

## 2023-08-15 RX ADMIN — Medication 25 GRAM(S): at 23:11

## 2023-08-15 RX ADMIN — Medication 25 GRAM(S): at 21:36

## 2023-08-15 RX ADMIN — Medication 1000 MILLIGRAM(S): at 21:42

## 2023-08-15 RX ADMIN — ONDANSETRON 4 MILLIGRAM(S): 8 TABLET, FILM COATED ORAL at 20:38

## 2023-08-15 RX ADMIN — HYDROMORPHONE HYDROCHLORIDE 30 MILLILITER(S): 2 INJECTION INTRAMUSCULAR; INTRAVENOUS; SUBCUTANEOUS at 20:02

## 2023-08-15 NOTE — BRIEF OPERATIVE NOTE - NSICDXBRIEFPROCEDURE_GEN_ALL_CORE_FT
PROCEDURES:  Thoracoscopic robotic assisted procedure 15-Aug-2023 19:48:57 FB, EGD, Robotic RVATS, [place] Bahman Esophagogastrectomy Desire Langford

## 2023-08-15 NOTE — PATIENT PROFILE ADULT - FALL HARM RISK - HARM RISK INTERVENTIONS
Assistance with ambulation/Assistance OOB with selected safe patient handling equipment/Communicate Risk of Fall with Harm to all staff/Monitor gait and stability/Reinforce activity limits and safety measures with patient and family/Review medications for side effects contributing to fall risk/Sit up slowly, dangle for a short time, stand at bedside before walking/Tailored Fall Risk Interventions/Toileting schedule using arm’s reach rule for commode and bathroom/Use of alarms - bed, chair and/or voice tab/Visual Cue: Yellow wristband and red socks/Bed in lowest position, wheels locked, appropriate side rails in place/Call bell, personal items and telephone in reach/Instruct patient to call for assistance before getting out of bed or chair/Non-slip footwear when patient is out of bed/[place] to call system/Physically safe environment - no spills, clutter or unnecessary equipment/Purposeful Proactive Rounding/Room/bathroom lighting operational, light cord in reach

## 2023-08-15 NOTE — BRIEF OPERATIVE NOTE - COMMENTS
i first assisted through out the thoracic portion of the case, including port placement, bedside assist while the surgeon at the console, and wound closure i first assisted through out the thoracic portion of the case, including port placement, bedside assist while the surgeon at the console, and wound closure, Desire Langford

## 2023-08-15 NOTE — ASU PREOP CHECKLIST - NS PREOP CHK MONITOR ANESTHESIA CONSENT
done Topical Clindamycin Pregnancy And Lactation Text: This medication is Pregnancy Category B and is considered safe during pregnancy. It is unknown if it is excreted in breast milk.

## 2023-08-15 NOTE — PATIENT PROFILE ADULT - FUNCTIONAL ASSESSMENT - BASIC MOBILITY 6.
2-calculated by average/Not able to assess (calculate score using [facility] averaging method)

## 2023-08-16 LAB
ANION GAP SERPL CALC-SCNC: 13 MMOL/L — SIGNIFICANT CHANGE UP (ref 7–14)
BASOPHILS # BLD AUTO: 0.01 K/UL — SIGNIFICANT CHANGE UP (ref 0–0.2)
BASOPHILS NFR BLD AUTO: 0.1 % — SIGNIFICANT CHANGE UP (ref 0–2)
BUN SERPL-MCNC: 15 MG/DL — SIGNIFICANT CHANGE UP (ref 7–23)
CALCIUM SERPL-MCNC: 8.6 MG/DL — SIGNIFICANT CHANGE UP (ref 8.4–10.5)
CHLORIDE SERPL-SCNC: 99 MMOL/L — SIGNIFICANT CHANGE UP (ref 98–107)
CO2 SERPL-SCNC: 24 MMOL/L — SIGNIFICANT CHANGE UP (ref 22–31)
CREAT SERPL-MCNC: 0.59 MG/DL — SIGNIFICANT CHANGE UP (ref 0.5–1.3)
EGFR: 108 ML/MIN/1.73M2 — SIGNIFICANT CHANGE UP
EOSINOPHIL # BLD AUTO: 0 K/UL — SIGNIFICANT CHANGE UP (ref 0–0.5)
EOSINOPHIL NFR BLD AUTO: 0 % — SIGNIFICANT CHANGE UP (ref 0–6)
GLUCOSE BLDC GLUCOMTR-MCNC: 115 MG/DL — HIGH (ref 70–99)
GLUCOSE BLDC GLUCOMTR-MCNC: 144 MG/DL — HIGH (ref 70–99)
GLUCOSE BLDC GLUCOMTR-MCNC: 167 MG/DL — HIGH (ref 70–99)
GLUCOSE BLDC GLUCOMTR-MCNC: 202 MG/DL — HIGH (ref 70–99)
GLUCOSE BLDC GLUCOMTR-MCNC: 239 MG/DL — HIGH (ref 70–99)
GLUCOSE SERPL-MCNC: 225 MG/DL — HIGH (ref 70–99)
HCT VFR BLD CALC: 35.5 % — LOW (ref 39–50)
HGB BLD-MCNC: 11.9 G/DL — LOW (ref 13–17)
IANC: 10.48 K/UL — HIGH (ref 1.8–7.4)
IMM GRANULOCYTES NFR BLD AUTO: 0.3 % — SIGNIFICANT CHANGE UP (ref 0–0.9)
LYMPHOCYTES # BLD AUTO: 0.57 K/UL — LOW (ref 1–3.3)
LYMPHOCYTES # BLD AUTO: 4.8 % — LOW (ref 13–44)
MAGNESIUM SERPL-MCNC: 2.3 MG/DL — SIGNIFICANT CHANGE UP (ref 1.6–2.6)
MCHC RBC-ENTMCNC: 29.2 PG — SIGNIFICANT CHANGE UP (ref 27–34)
MCHC RBC-ENTMCNC: 33.5 GM/DL — SIGNIFICANT CHANGE UP (ref 32–36)
MCV RBC AUTO: 87 FL — SIGNIFICANT CHANGE UP (ref 80–100)
MONOCYTES # BLD AUTO: 0.82 K/UL — SIGNIFICANT CHANGE UP (ref 0–0.9)
MONOCYTES NFR BLD AUTO: 6.9 % — SIGNIFICANT CHANGE UP (ref 2–14)
NEUTROPHILS # BLD AUTO: 10.48 K/UL — HIGH (ref 1.8–7.4)
NEUTROPHILS NFR BLD AUTO: 87.9 % — HIGH (ref 43–77)
NRBC # BLD: 0 /100 WBCS — SIGNIFICANT CHANGE UP (ref 0–0)
NRBC # FLD: 0 K/UL — SIGNIFICANT CHANGE UP (ref 0–0)
PHOSPHATE SERPL-MCNC: 2.8 MG/DL — SIGNIFICANT CHANGE UP (ref 2.5–4.5)
PLATELET # BLD AUTO: 133 K/UL — LOW (ref 150–400)
POTASSIUM SERPL-MCNC: 3.8 MMOL/L — SIGNIFICANT CHANGE UP (ref 3.5–5.3)
POTASSIUM SERPL-SCNC: 3.8 MMOL/L — SIGNIFICANT CHANGE UP (ref 3.5–5.3)
RBC # BLD: 4.08 M/UL — LOW (ref 4.2–5.8)
RBC # FLD: 14.9 % — HIGH (ref 10.3–14.5)
SODIUM SERPL-SCNC: 136 MMOL/L — SIGNIFICANT CHANGE UP (ref 135–145)
WBC # BLD: 11.92 K/UL — HIGH (ref 3.8–10.5)
WBC # FLD AUTO: 11.92 K/UL — HIGH (ref 3.8–10.5)

## 2023-08-16 PROCEDURE — 71045 X-RAY EXAM CHEST 1 VIEW: CPT | Mod: 26

## 2023-08-16 PROCEDURE — 99233 SBSQ HOSP IP/OBS HIGH 50: CPT

## 2023-08-16 RX ORDER — ACETAMINOPHEN 500 MG
1000 TABLET ORAL ONCE
Refills: 0 | Status: COMPLETED | OUTPATIENT
Start: 2023-08-16 | End: 2023-08-16

## 2023-08-16 RX ORDER — SODIUM CHLORIDE 9 MG/ML
1000 INJECTION, SOLUTION INTRAVENOUS
Refills: 0 | Status: DISCONTINUED | OUTPATIENT
Start: 2023-08-16 | End: 2023-08-18

## 2023-08-16 RX ORDER — POTASSIUM CHLORIDE 20 MEQ
10 PACKET (EA) ORAL
Refills: 0 | Status: COMPLETED | OUTPATIENT
Start: 2023-08-16 | End: 2023-08-16

## 2023-08-16 RX ADMIN — SODIUM CHLORIDE 75 MILLILITER(S): 9 INJECTION, SOLUTION INTRAVENOUS at 07:58

## 2023-08-16 RX ADMIN — PANTOPRAZOLE SODIUM 40 MILLIGRAM(S): 20 TABLET, DELAYED RELEASE ORAL at 13:09

## 2023-08-16 RX ADMIN — HEPARIN SODIUM 5000 UNIT(S): 5000 INJECTION INTRAVENOUS; SUBCUTANEOUS at 17:35

## 2023-08-16 RX ADMIN — HYDROMORPHONE HYDROCHLORIDE 30 MILLILITER(S): 2 INJECTION INTRAMUSCULAR; INTRAVENOUS; SUBCUTANEOUS at 19:50

## 2023-08-16 RX ADMIN — Medication 100 GRAM(S): at 06:00

## 2023-08-16 RX ADMIN — Medication 100 MILLIEQUIVALENT(S): at 06:03

## 2023-08-16 RX ADMIN — Medication 100 GRAM(S): at 17:35

## 2023-08-16 RX ADMIN — Medication 1000 MILLIGRAM(S): at 16:45

## 2023-08-16 RX ADMIN — Medication 400 MILLIGRAM(S): at 16:30

## 2023-08-16 RX ADMIN — Medication 1: at 13:10

## 2023-08-16 RX ADMIN — HYDROMORPHONE HYDROCHLORIDE 0.5 MILLIGRAM(S): 2 INJECTION INTRAMUSCULAR; INTRAVENOUS; SUBCUTANEOUS at 10:30

## 2023-08-16 RX ADMIN — HEPARIN SODIUM 5000 UNIT(S): 5000 INJECTION INTRAVENOUS; SUBCUTANEOUS at 06:03

## 2023-08-16 RX ADMIN — Medication 1000 MILLIGRAM(S): at 23:45

## 2023-08-16 RX ADMIN — Medication 2: at 00:10

## 2023-08-16 RX ADMIN — SODIUM CHLORIDE 75 MILLILITER(S): 9 INJECTION, SOLUTION INTRAVENOUS at 19:50

## 2023-08-16 RX ADMIN — SODIUM CHLORIDE 75 MILLILITER(S): 9 INJECTION, SOLUTION INTRAVENOUS at 00:34

## 2023-08-16 RX ADMIN — Medication 2: at 06:04

## 2023-08-16 RX ADMIN — Medication 400 MILLIGRAM(S): at 23:28

## 2023-08-16 RX ADMIN — Medication 100 MILLIEQUIVALENT(S): at 07:57

## 2023-08-16 NOTE — DIETITIAN INITIAL EVALUATION ADULT - ADD RECOMMEND
1. Monitor weights, labs, BM's, skin integrity and enteral tolerance. 2. Follow pt as per protocol.

## 2023-08-16 NOTE — PHYSICAL THERAPY INITIAL EVALUATION ADULT - PATIENT/FAMILY AGREES WITH PLAN
Initial CPAP setup. Patient tolerating well. Vitals for patient are HR-79, SpO2- 97 and RR- 19.   yes

## 2023-08-16 NOTE — PHYSICAL THERAPY INITIAL EVALUATION ADULT - GAIT DISTANCE, PT EVAL
Pt desaturated to 81% on room air, required ~30 seconds of pursed lip breathing exercise and standing rest to recover to 94%, Covering RN, Kiki, made aware/150 feet

## 2023-08-16 NOTE — DIETITIAN INITIAL EVALUATION ADULT - PERTINENT MEDS FT
MEDICATIONS  (STANDING):  acetaminophen   IVPB .. 1000 milliGRAM(s) IV Intermittent once  cefoTEtan  IVPB 2 Gram(s) IV Intermittent every 12 hours  dextrose 5%. 1000 milliLiter(s) (50 mL/Hr) IV Continuous <Continuous>  dextrose 5%. 1000 milliLiter(s) (100 mL/Hr) IV Continuous <Continuous>  dextrose 50% Injectable 25 Gram(s) IV Push once  dextrose 50% Injectable 12.5 Gram(s) IV Push once  dextrose 50% Injectable 25 Gram(s) IV Push once  glucagon  Injectable 1 milliGRAM(s) IntraMuscular once  heparin   Injectable 5000 Unit(s) SubCutaneous every 12 hours  HYDROmorphone PCA (1 mG/mL) 30 milliLiter(s) PCA Continuous PCA Continuous  insulin lispro (ADMELOG) corrective regimen sliding scale   SubCutaneous every 6 hours  lactated ringers. 1000 milliLiter(s) (75 mL/Hr) IV Continuous <Continuous>  pantoprazole  Injectable 40 milliGRAM(s) IV Push daily  sodium chloride 0.9% lock flush 3 milliLiter(s) IV Push Once    MEDICATIONS  (PRN):  dextrose Oral Gel 15 Gram(s) Oral once PRN Blood Glucose LESS THAN 70 milliGRAM(s)/deciliter  HYDROmorphone PCA (1 mG/mL) Rescue Clinician Bolus 0.5 milliGRAM(s) IV Push every 15 minutes PRN for Pain Scale GREATER THAN 6  lidocaine   4% Patch 1 Patch Transdermal daily PRN pain at the surgical site  nalbuphine Injectable 2.5 milliGRAM(s) IV Push every 6 hours PRN Pruritus  naloxone Injectable 0.1 milliGRAM(s) IV Push every 3 minutes PRN For ANY of the following changes in patient status:  A. RR LESS THAN 10 breaths per minute, B. Oxygen saturation LESS THAN 90%, C. Sedation score of 6  ondansetron Injectable 4 milliGRAM(s) IV Push every 6 hours PRN Nausea

## 2023-08-16 NOTE — PHYSICAL THERAPY INITIAL EVALUATION ADULT - PERTINENT HX OF CURRENT PROBLEM, REHAB EVAL
Pt is a 63 year old male status post Thoracoscopic robotic assisted procedure esophagogastroduodenoscopy, Robotic right Video assisted thoracoscopic surgery, Andrew Bahman Esophagogastrectomy 8/15/23

## 2023-08-16 NOTE — PHYSICAL THERAPY INITIAL EVALUATION ADULT - WEIGHT-BEARING RESTRICTIONS: STAND/SIT, REHAB EVAL
Detail Level: Simple Quality 111:Pneumonia Vaccination Status For Older Adults: Pneumococcal Vaccination not Administered or Previously Received, Reason not Otherwise Specified Quality 110: Preventive Care And Screening: Influenza Immunization: Influenza Immunization not Administered for Documented Reasons. full weight-bearing

## 2023-08-16 NOTE — PHYSICAL THERAPY INITIAL EVALUATION ADULT - GENERAL OBSERVATIONS, REHAB EVAL
Pt received sitting in bedside chair, +nasogastric tube, +monitor lines, +a-line, +chest tube, all lines/tubes intact, NAD. oxygen saturation: 98% on room air, HR: 100 beats per minute

## 2023-08-16 NOTE — DIETITIAN INITIAL EVALUATION ADULT - OTHER INFO
64 y/o male with hx of DM, HTN, esophageal ca and malignant stomach ca now s/p esophagogastrectomy. Pt reported having a lot of difficulty with tolerating PO since being dx with stomach ca in 3/2023. He experienced a lot of gagging and nausea which limited his overall oral intake greatly. He subsequently lost about 25 lbs over the past 5 months PTA. Pt presents at severe risk for malnutrition based on suboptimal oral intake with clinically significant loss of weight PTA. Pt with plan to have enteral feeding initiated. Spoke with [facility]ICU NP and suggested initiating Glucerna 1.5 and titrating up to goal rate of 47 mL/hr x 24 hrs which will provide 1692 kcals, 93 gms protein, 856 mL free H2O in 1128 mL total volume. Enteral recommendation will give pt 29 kcals/kg and 1.6 gms protein/kg of dosing wt. No GI distress noted at present. FS over the past 24 hrs 202 - 239 mg/dl with Admelog ISS coverage. Please start TF when medically feasible. Education pending nutrition plan of care appropriate for d/c. RDN services to remain available as needed.

## 2023-08-16 NOTE — CONSULT NOTE ADULT - SUBJECTIVE AND OBJECTIVE BOX
08-16-23 @ 12:06    Patient is a 63y old  Male who presents with a chief complaint of "I have esophogeal cancer" (08 Aug 2023 10:56)      HPI:  64 y/o male presents to [facility] preop for robotic assisted laparoscopic, laparoscopic KATHLEEN Bahman esophagogastrectomy esophagogastroduodenoscopy, possible open, Dr. Avery- Robotic assisted KATHLEEN Bahman esophagogastrectomy, possible open. Pt reported dysphagia and unintentional weight loss. An upper EUS in Feb 2023 revealed esophageal lesion. A biopsy confirmed invasive adenocarcinoma. Pt s/p chemotherapy and radiation therapy, completed on 5/15/23.  (08 Aug 2023 10:56)    pulm called :  pt has no underlying lung disease:  not a smoker:   has no sob:  extubated after the surgery  able to do incentive spirometry upto 1000cc     ?FOLLOWING PRESENT  [ x] Hx of PE/DVT, [ x] Hx COPD, [ x] Hx of Asthma, [x ] Hx of Hospitalization, x[ ]  Hx of BiPAP/CPAP use, [x ] Hx of COURTNEY    Allergies    No Known Allergies    Intolerances        PAST MEDICAL & SURGICAL HISTORY:  Esophageal mass      HTN (hypertension)      Esophageal cancer      Type 2 diabetes mellitus      Malignant neoplasm of cardia      History of hypotension      History of tachycardia      Chemotherapy-induced neuropathy      S/P cholecystectomy      H/O lithotripsy      H/O endoscopy      History of chemotherapy      S/P radiation therapy      Esophageal stenosis      History of esophagogastroduodenoscopy (EGD)          FAMILY HISTORY:  FH: prostate cancer (Father)        Social History: [ x ] TOBACCO                  [  x] ETOH                                 [ x ] IVDA/DRUGS    REVIEW OF SYSTEMS      General:	x    Skin/Breast:x  	  Ophthalmologic:x  	x  ENMT:	    Respiratory and Thorax: no sob, no cough : no phlegm    	  Cardiovascular:	x    Gastrointestinal:	x    Genitourinary:	x    Musculoskeletal:	x    Neurological:	x    Psychiatric:	x    Hematology/Lymphatics:	x    Endocrine:	x    Allergic/Immunologic:	x    MEDICATIONS  (STANDING):  acetaminophen   IVPB .. 1000 milliGRAM(s) IV Intermittent once  cefoTEtan  IVPB 2 Gram(s) IV Intermittent every 12 hours  dextrose 5%. 1000 milliLiter(s) (50 mL/Hr) IV Continuous <Continuous>  dextrose 5%. 1000 milliLiter(s) (100 mL/Hr) IV Continuous <Continuous>  dextrose 50% Injectable 25 Gram(s) IV Push once  dextrose 50% Injectable 12.5 Gram(s) IV Push once  dextrose 50% Injectable 25 Gram(s) IV Push once  glucagon  Injectable 1 milliGRAM(s) IntraMuscular once  heparin   Injectable 5000 Unit(s) SubCutaneous every 12 hours  HYDROmorphone PCA (1 mG/mL) 30 milliLiter(s) PCA Continuous PCA Continuous  insulin lispro (ADMELOG) corrective regimen sliding scale   SubCutaneous every 6 hours  lactated ringers. 1000 milliLiter(s) (75 mL/Hr) IV Continuous <Continuous>  pantoprazole  Injectable 40 milliGRAM(s) IV Push daily  sodium chloride 0.9% lock flush 3 milliLiter(s) IV Push Once    MEDICATIONS  (PRN):  dextrose Oral Gel 15 Gram(s) Oral once PRN Blood Glucose LESS THAN 70 milliGRAM(s)/deciliter  HYDROmorphone PCA (1 mG/mL) Rescue Clinician Bolus 0.5 milliGRAM(s) IV Push every 15 minutes PRN for Pain Scale GREATER THAN 6  lidocaine   4% Patch 1 Patch Transdermal daily PRN pain at the surgical site  nalbuphine Injectable 2.5 milliGRAM(s) IV Push every 6 hours PRN Pruritus  naloxone Injectable 0.1 milliGRAM(s) IV Push every 3 minutes PRN For ANY of the following changes in patient status:  A. RR LESS THAN 10 breaths per minute, B. Oxygen saturation LESS THAN 90%, C. Sedation score of 6  ondansetron Injectable 4 milliGRAM(s) IV Push every 6 hours PRN Nausea       Vital Signs Last 24 Hrs  T(C): 36.7 (16 Aug 2023 08:00), Max: 36.9 (16 Aug 2023 04:00)  T(F): 98 (16 Aug 2023 08:00), Max: 98.5 (16 Aug 2023 04:00)  HR: 89 (16 Aug 2023 11:00) (81 - 109)  BP: 135/81 (15 Aug 2023 20:15) (130/82 - 135/81)  BP(mean): 97 (15 Aug 2023 20:15) (97 - 97)  RR: 12 (16 Aug 2023 11:00) (12 - 20)  SpO2: 98% (16 Aug 2023 11:00) (98% - 100%)    Parameters below as of 16 Aug 2023 11:00  Patient On (Oxygen Delivery Method): room air    Orthostatic VS          I&O's Summary    15 Aug 2023 07:01  -  16 Aug 2023 07:00  --------------------------------------------------------  IN: 1315 mL / OUT: 1375 mL / NET: -60 mL    16 Aug 2023 07:01  -  16 Aug 2023 12:06  --------------------------------------------------------  IN: 430 mL / OUT: 250 mL / NET: 180 mL        Physical Exam:   GENERAL: NAD, well-groomed, well-developed  HEENT: GAYLE/   Atraumatic, Normocephalic  ENMT: No tonsillar erythema, exudates, or enlargement; Moist mucous membranes, Good dentition, No lesions  NECK: Supple, No JVD, Normal thyroid  CHEST/LUNG: Clear to auscultation bilaterally- no wheezing  CVS: Regular rate and rhythm; No murmurs, rubs, or gallops  GI: : Soft, Nontender, Nondistended; Bowel sounds present  NERVOUS SYSTEM:  Alert & Oriented X3  EXTREMITIES: - edema  LYMPH: No lymphadenopathy noted  SKIN: No rashes or lesions  ENDOCRINOLOGY: No Thyromegaly  PSYCH: Appropriate    Labs:  ABG - ( 15 Aug 2023 17:19 )  pH, Arterial: 7.22  pH, Blood: x     /  pCO2: 57    /  pO2: 168   / HCO3: 23    / Base Excess: -4.9  /  SaO2: 99.8            COVID-19 PCR: NotDetec (02 Jun 2023 09:51)                              11.9   11.92 )-----------( 133      ( 16 Aug 2023 04:29 )             35.5                         11.8   13.01 )-----------( 128      ( 15 Aug 2023 20:13 )             37.1     08-16    136  |  99  |  15  ----------------------------<  225<H>  3.8   |  24  |  0.59  08-15    136  |  99  |  13  ----------------------------<  183<H>  4.1   |  21<L>  |  0.57    Ca    8.6      16 Aug 2023 04:29  Ca    8.7      15 Aug 2023 20:13  Phos  2.8     08-16  Phos  3.9     08-15  Mg     2.30     08-16  Mg     1.50     08-15      CAPILLARY BLOOD GLUCOSE      POCT Blood Glucose.: 202 mg/dL (16 Aug 2023 05:19)  POCT Blood Glucose.: 239 mg/dL (16 Aug 2023 00:02)        Urinalysis Basic - ( 16 Aug 2023 04:29 )    Color: x / Appearance: x / SG: x / pH: x  Gluc: 225 mg/dL / Ketone: x  / Bili: x / Urobili: x   Blood: x / Protein: x / Nitrite: x   Leuk Esterase: x / RBC: x / WBC x   Sq Epi: x / Non Sq Epi: x / Bacteria: x      D DImer      Studies  Chest X-RAY  CT SCAN Chest   CT Abdomen  Venous Dopplers: LE:   Others      rad< from: Xray Chest 1 View AP/PA (08.16.23 @ 09:40) >    INDICATION: Admitting Dxs: C16.0 C16.0 s/p robotic kathleen bahman   esophagectomy for esophageal cancer LXR    COMPARISON: See below    IMPRESSION:    Portable chest August 16 at 9:23 AM: Exam is compared to earlier exam   August 15 at 9:05 PM.  medical apparatus unchanged. No pneumothorax. Some   subcutaneous emphysema still seen in the right neck and right lateral   chest. Pneumomediastinum is still suggested. Some increased density   behind the heart. Continued follow-up suggested.    --- End of Report ---            SABIHA LESTER MD; Attending Radiologist  This document has been electronically signed. Aug 16 2023 12:00PM    < end of copied text >

## 2023-08-16 NOTE — PHYSICAL THERAPY INITIAL EVALUATION ADULT - ADDITIONAL COMMENTS
Pt lives with his wife in a house, +2 stairs to enter; Pt resides on the first floor. prior to admission Pt was independent with all mobility and ambulated without an assistive device.     Pt. left comfortable in bedside chair with all tubes/lines intact, call bell in reach and in NAD.

## 2023-08-16 NOTE — CHART NOTE - NSCHARTNOTEFT_GEN_A_CORE
Post Operative Note  Patient: TJ HAYWOOD 63y (1960) Male   MRN: [id number]  Location: [facility]  Visit: 08-15-23 Inpatient  Date: 08-16-23 @ 00:05    Procedure: S/P Brandon Bahman Esophagogastrectomy    Subjective: Patient seen and examined post operatively. Reports pain as controlled, around 2-3/10 localized at chest tube site. Patient started having new onset nausea w/ dry heaving while provider in room. Denies chills, chest pain, SOB, cough.      Objective:  Vitals: T(F): 96.9 (08-15-23 @ 19:51), Max: 97.9 (08-15-23 @ 08:56)  HR: 92 (08-15-23 @ 23:00)  BP: 135/81 (08-15-23 @ 20:15) (130/82 - 135/81)  RR: 14 (08-15-23 @ 23:00)  SpO2: 100% (08-15-23 @ 23:00)  Vent Settings:     In:   08-15-23 @ 07:01  -  08-16-23 @ 00:05  --------------------------------------------------------  IN: 550 mL      IV Fluids: dextrose 5% + lactated ringers. 1000 milliLiter(s) (75 mL/Hr) IV Continuous <Continuous>  dextrose 5%. 1000 milliLiter(s) (50 mL/Hr) IV Continuous <Continuous>  dextrose 5%. 1000 milliLiter(s) (100 mL/Hr) IV Continuous <Continuous>  sodium chloride 0.9% lock flush 3 milliLiter(s) IV Push Once      Out:   08-15-23 @ 07:01  -  08-16-23 @ 00:05  --------------------------------------------------------  OUT: 685 mL      EBL:     Voided Urine:   08-15-23 @ 07:01 - 08-16-23 @ 00:05  --------------------------------------------------------  OUT: 685 mL      Coronel Catheter: yes no   Drains:   NIMISHA:   08-15-23 @ 07:01  -  08-16-23 @ 00:05  --------------------------------------------------------  OUT: 60 mL     ,   Chest Tube:   08-15-23 @ 07:01  -  08-16-23 @ 00:05  --------------------------------------------------------  OUT: 100 mL       NG Tube:         Physical Examination:  General: NAD, resting comfortably in bed  HEENT: Normocephalic atraumatic  Respiratory: Nonlabored respirations, normal CW expansion  Cardio: S1S2, regular rate and rhythm.  Chest: Chest tube in place w/ sanguinous output ~130mL, dressing w/ strikethrough, NIMISHA x1 in place, holding suction sang output  Abdomen: softly distended, appropriately tender, surgical incisions are c/d/i. NGT  Vascular: extremities are warm and well perfused.     Imaging:  No post-op imaging studies    Assessment:  63yMale patient S/P Brandon Bahman esophagogastrectomy for esophageal adenocarcinoma w/ involvement of the gastric cardia. Patient was feeling well after procedure, but developed dry heaving while provider in room. CTICU provider aware and gave reglan. Overall, patient appears well for this stage in recovery.    Plan:  - IV Abx: Cefotetan  - Pain control PRN  - Diet: NPO  - IVF  - Coronel, DTV  - DVT ppx: SCD's & SQH  - Excellent care per CTICU    Date/Time: 08-16-23 @ 00:05

## 2023-08-16 NOTE — DIETITIAN INITIAL EVALUATION ADULT - PERTINENT LABORATORY DATA
08-16    136  |  99  |  15  ----------------------------<  225<H>  3.8   |  24  |  0.59    Ca    8.6      16 Aug 2023 04:29  Phos  2.8     08-16  Mg     2.30     08-16    POCT Blood Glucose.: 167 mg/dL (08-16-23 @ 13:05)  A1C with Estimated Average Glucose Result: 6.1 % (08-08-23 @ 13:47)  A1C with Estimated Average Glucose Result: See Note (06-02-23 @ 09:51)

## 2023-08-16 NOTE — PHYSICAL THERAPY INITIAL EVALUATION ADULT - DID THE PATIENT HAVE SURGERY?
status post Thoracoscopic robotic assisted procedure esophagogastroduodenoscopy, Robotic right Video assisted thoracoscopic surgery, Andrew Bahman Esophagogastrectomy 8/15/23/yes

## 2023-08-16 NOTE — DIETITIAN INITIAL EVALUATION ADULT - NSFNSGIIOFT_GEN_A_CORE
08-15-23 @ 07:01  -  08-16-23 @ 07:00  --------------------------------------------------------  OUT:    Chest Tube (mL): 220 mL    Nasogastric/Oral tube (mL): 100 mL  Total OUT: 320 mL    Total NET: -320 mL      08-16-23 @ 07:01 - 08-16-23 @ 13:15  --------------------------------------------------------  OUT:    Chest Tube (mL): 30 mL    Nasogastric/Oral tube (mL): 0 mL  Total OUT: 30 mL    Total NET: -30 mL

## 2023-08-16 NOTE — DIETITIAN INITIAL EVALUATION ADULT - SIGNS/SYMPTOMS
malignant stomach ca s/p esophagogastrectomy >7.5% weight loss >3 months with <75% of estimated nutrition needs met >3 months PTA

## 2023-08-16 NOTE — CONSULT NOTE ADULT - ASSESSMENT
62 y/o male presents to [facility] preop for robotic assisted laparoscopic, laparoscopic MYCHAL Bahman esophagogastrectomy esophagogastroduodenoscopy, possible open, Dr. Avery- Robotic assisted MYCHAL Bahman esophagogastrectomy, possible open. Pt reported dysphagia and unintentional weight loss. An upper EUS in Feb 2023 revealed esophageal lesion. A biopsy confirmed invasive adenocarcinoma. Pt s/p chemotherapy and radiation therapy, completed on 5/15/23.  (08 Aug 2023 10:56)  pulm called :  pt has no underlying lung disease:  not a smoker:   has no sob:  extubated after the surgery  able to do incentive spirometry upto 1000cc       Pneumomediastinum/ sq emphysema:  S/P MYCHAL esophagectomy: esophageal cancer  HTN  DM    Pneumomediastinum/ sq emphysema:  -had surgery done  : pneumomediastinum post surgery  : pt is asymptomatic  cont to monitor conservatively  -cont incentive spirometry  -BD prn for excessive cough or wheezing  -keep o2 sao2 above 90%    S/P MYCHAL esophagectomy: esophageal cancer  -per cticu   -on cefotetan  HTN  -blood pressure is controlled  DM  -monitor and control  'DVT prophylaxis    dw team

## 2023-08-17 LAB
ANION GAP SERPL CALC-SCNC: 9 MMOL/L — SIGNIFICANT CHANGE UP (ref 7–14)
BUN SERPL-MCNC: 10 MG/DL — SIGNIFICANT CHANGE UP (ref 7–23)
CALCIUM SERPL-MCNC: 8.5 MG/DL — SIGNIFICANT CHANGE UP (ref 8.4–10.5)
CHLORIDE SERPL-SCNC: 100 MMOL/L — SIGNIFICANT CHANGE UP (ref 98–107)
CO2 SERPL-SCNC: 27 MMOL/L — SIGNIFICANT CHANGE UP (ref 22–31)
CREAT SERPL-MCNC: 0.43 MG/DL — LOW (ref 0.5–1.3)
EGFR: 119 ML/MIN/1.73M2 — SIGNIFICANT CHANGE UP
GLUCOSE BLDC GLUCOMTR-MCNC: 105 MG/DL — HIGH (ref 70–99)
GLUCOSE BLDC GLUCOMTR-MCNC: 108 MG/DL — HIGH (ref 70–99)
GLUCOSE BLDC GLUCOMTR-MCNC: 81 MG/DL — SIGNIFICANT CHANGE UP (ref 70–99)
GLUCOSE BLDC GLUCOMTR-MCNC: 91 MG/DL — SIGNIFICANT CHANGE UP (ref 70–99)
GLUCOSE SERPL-MCNC: 115 MG/DL — HIGH (ref 70–99)
HCT VFR BLD CALC: 34.9 % — LOW (ref 39–50)
HGB BLD-MCNC: 11.3 G/DL — LOW (ref 13–17)
MAGNESIUM SERPL-MCNC: 1.8 MG/DL — SIGNIFICANT CHANGE UP (ref 1.6–2.6)
MCHC RBC-ENTMCNC: 28.3 PG — SIGNIFICANT CHANGE UP (ref 27–34)
MCHC RBC-ENTMCNC: 32.4 GM/DL — SIGNIFICANT CHANGE UP (ref 32–36)
MCV RBC AUTO: 87.3 FL — SIGNIFICANT CHANGE UP (ref 80–100)
NRBC # BLD: 0 /100 WBCS — SIGNIFICANT CHANGE UP (ref 0–0)
NRBC # FLD: 0 K/UL — SIGNIFICANT CHANGE UP (ref 0–0)
PHOSPHATE SERPL-MCNC: 1.6 MG/DL — LOW (ref 2.5–4.5)
PLATELET # BLD AUTO: 118 K/UL — LOW (ref 150–400)
POTASSIUM SERPL-MCNC: 3.5 MMOL/L — SIGNIFICANT CHANGE UP (ref 3.5–5.3)
POTASSIUM SERPL-SCNC: 3.5 MMOL/L — SIGNIFICANT CHANGE UP (ref 3.5–5.3)
RBC # BLD: 4 M/UL — LOW (ref 4.2–5.8)
RBC # FLD: 14.9 % — HIGH (ref 10.3–14.5)
SODIUM SERPL-SCNC: 136 MMOL/L — SIGNIFICANT CHANGE UP (ref 135–145)
WBC # BLD: 9.58 K/UL — SIGNIFICANT CHANGE UP (ref 3.8–10.5)
WBC # FLD AUTO: 9.58 K/UL — SIGNIFICANT CHANGE UP (ref 3.8–10.5)

## 2023-08-17 PROCEDURE — 71045 X-RAY EXAM CHEST 1 VIEW: CPT | Mod: 26

## 2023-08-17 PROCEDURE — 93970 EXTREMITY STUDY: CPT | Mod: 26

## 2023-08-17 PROCEDURE — 99233 SBSQ HOSP IP/OBS HIGH 50: CPT

## 2023-08-17 RX ORDER — ENOXAPARIN SODIUM 100 MG/ML
40 INJECTION SUBCUTANEOUS
Qty: 30 | Refills: 0
Start: 2023-08-17 | End: 2023-09-15

## 2023-08-17 RX ORDER — HYDROMORPHONE HYDROCHLORIDE 2 MG/ML
30 INJECTION INTRAMUSCULAR; INTRAVENOUS; SUBCUTANEOUS
Refills: 0 | Status: DISCONTINUED | OUTPATIENT
Start: 2023-08-17 | End: 2023-08-18

## 2023-08-17 RX ORDER — ACETAMINOPHEN 500 MG
1000 TABLET ORAL ONCE
Refills: 0 | Status: COMPLETED | OUTPATIENT
Start: 2023-08-17 | End: 2023-08-17

## 2023-08-17 RX ORDER — MAGNESIUM SULFATE 500 MG/ML
2 VIAL (ML) INJECTION ONCE
Refills: 0 | Status: COMPLETED | OUTPATIENT
Start: 2023-08-17 | End: 2023-08-17

## 2023-08-17 RX ORDER — POTASSIUM CHLORIDE 20 MEQ
10 PACKET (EA) ORAL
Refills: 0 | Status: COMPLETED | OUTPATIENT
Start: 2023-08-17 | End: 2023-08-17

## 2023-08-17 RX ORDER — POTASSIUM PHOSPHATE, MONOBASIC POTASSIUM PHOSPHATE, DIBASIC 236; 224 MG/ML; MG/ML
30 INJECTION, SOLUTION INTRAVENOUS ONCE
Refills: 0 | Status: COMPLETED | OUTPATIENT
Start: 2023-08-17 | End: 2023-08-17

## 2023-08-17 RX ADMIN — Medication 100 MILLIEQUIVALENT(S): at 07:06

## 2023-08-17 RX ADMIN — Medication 100 MILLIEQUIVALENT(S): at 08:14

## 2023-08-17 RX ADMIN — Medication 100 MILLIEQUIVALENT(S): at 05:13

## 2023-08-17 RX ADMIN — Medication 25 GRAM(S): at 05:13

## 2023-08-17 RX ADMIN — HYDROMORPHONE HYDROCHLORIDE 30 MILLILITER(S): 2 INJECTION INTRAMUSCULAR; INTRAVENOUS; SUBCUTANEOUS at 11:01

## 2023-08-17 RX ADMIN — Medication 1000 MILLIGRAM(S): at 11:30

## 2023-08-17 RX ADMIN — Medication 400 MILLIGRAM(S): at 22:05

## 2023-08-17 RX ADMIN — POTASSIUM PHOSPHATE, MONOBASIC POTASSIUM PHOSPHATE, DIBASIC 83.33 MILLIMOLE(S): 236; 224 INJECTION, SOLUTION INTRAVENOUS at 08:14

## 2023-08-17 RX ADMIN — HYDROMORPHONE HYDROCHLORIDE 30 MILLILITER(S): 2 INJECTION INTRAMUSCULAR; INTRAVENOUS; SUBCUTANEOUS at 19:07

## 2023-08-17 RX ADMIN — PANTOPRAZOLE SODIUM 40 MILLIGRAM(S): 20 TABLET, DELAYED RELEASE ORAL at 12:48

## 2023-08-17 RX ADMIN — HYDROMORPHONE HYDROCHLORIDE 0.5 MILLIGRAM(S): 2 INJECTION INTRAMUSCULAR; INTRAVENOUS; SUBCUTANEOUS at 12:30

## 2023-08-17 RX ADMIN — HEPARIN SODIUM 5000 UNIT(S): 5000 INJECTION INTRAVENOUS; SUBCUTANEOUS at 05:13

## 2023-08-17 RX ADMIN — SODIUM CHLORIDE 75 MILLILITER(S): 9 INJECTION, SOLUTION INTRAVENOUS at 19:07

## 2023-08-17 RX ADMIN — Medication 1000 MILLIGRAM(S): at 22:20

## 2023-08-17 RX ADMIN — HEPARIN SODIUM 5000 UNIT(S): 5000 INJECTION INTRAVENOUS; SUBCUTANEOUS at 17:56

## 2023-08-17 RX ADMIN — Medication 400 MILLIGRAM(S): at 10:59

## 2023-08-18 ENCOUNTER — TRANSCRIPTION ENCOUNTER (OUTPATIENT)
Age: 63
End: 2023-08-18

## 2023-08-18 LAB
ANION GAP SERPL CALC-SCNC: 12 MMOL/L — SIGNIFICANT CHANGE UP (ref 7–14)
BUN SERPL-MCNC: 8 MG/DL — SIGNIFICANT CHANGE UP (ref 7–23)
CALCIUM SERPL-MCNC: 8.2 MG/DL — LOW (ref 8.4–10.5)
CHLORIDE SERPL-SCNC: 99 MMOL/L — SIGNIFICANT CHANGE UP (ref 98–107)
CO2 SERPL-SCNC: 25 MMOL/L — SIGNIFICANT CHANGE UP (ref 22–31)
CREAT SERPL-MCNC: 0.42 MG/DL — LOW (ref 0.5–1.3)
EGFR: 120 ML/MIN/1.73M2 — SIGNIFICANT CHANGE UP
GLUCOSE BLDC GLUCOMTR-MCNC: 115 MG/DL — HIGH (ref 70–99)
GLUCOSE BLDC GLUCOMTR-MCNC: 133 MG/DL — HIGH (ref 70–99)
GLUCOSE BLDC GLUCOMTR-MCNC: 90 MG/DL — SIGNIFICANT CHANGE UP (ref 70–99)
GLUCOSE BLDC GLUCOMTR-MCNC: 95 MG/DL — SIGNIFICANT CHANGE UP (ref 70–99)
GLUCOSE SERPL-MCNC: 104 MG/DL — HIGH (ref 70–99)
HCT VFR BLD CALC: 32.9 % — LOW (ref 39–50)
HGB BLD-MCNC: 10.7 G/DL — LOW (ref 13–17)
MAGNESIUM SERPL-MCNC: 1.6 MG/DL — SIGNIFICANT CHANGE UP (ref 1.6–2.6)
MCHC RBC-ENTMCNC: 28.8 PG — SIGNIFICANT CHANGE UP (ref 27–34)
MCHC RBC-ENTMCNC: 32.5 GM/DL — SIGNIFICANT CHANGE UP (ref 32–36)
MCV RBC AUTO: 88.7 FL — SIGNIFICANT CHANGE UP (ref 80–100)
NRBC # BLD: 0 /100 WBCS — SIGNIFICANT CHANGE UP (ref 0–0)
NRBC # FLD: 0 K/UL — SIGNIFICANT CHANGE UP (ref 0–0)
PHOSPHATE SERPL-MCNC: 1.8 MG/DL — LOW (ref 2.5–4.5)
PLATELET # BLD AUTO: 114 K/UL — LOW (ref 150–400)
POTASSIUM SERPL-MCNC: 3.9 MMOL/L — SIGNIFICANT CHANGE UP (ref 3.5–5.3)
POTASSIUM SERPL-SCNC: 3.9 MMOL/L — SIGNIFICANT CHANGE UP (ref 3.5–5.3)
RBC # BLD: 3.71 M/UL — LOW (ref 4.2–5.8)
RBC # FLD: 14.7 % — HIGH (ref 10.3–14.5)
SODIUM SERPL-SCNC: 136 MMOL/L — SIGNIFICANT CHANGE UP (ref 135–145)
WBC # BLD: 8.55 K/UL — SIGNIFICANT CHANGE UP (ref 3.8–10.5)
WBC # FLD AUTO: 8.55 K/UL — SIGNIFICANT CHANGE UP (ref 3.8–10.5)

## 2023-08-18 PROCEDURE — 99233 SBSQ HOSP IP/OBS HIGH 50: CPT

## 2023-08-18 PROCEDURE — 71045 X-RAY EXAM CHEST 1 VIEW: CPT | Mod: 26

## 2023-08-18 RX ORDER — HYDROMORPHONE HYDROCHLORIDE 2 MG/ML
0.25 INJECTION INTRAMUSCULAR; INTRAVENOUS; SUBCUTANEOUS
Refills: 0 | Status: DISCONTINUED | OUTPATIENT
Start: 2023-08-18 | End: 2023-08-23

## 2023-08-18 RX ORDER — METOPROLOL TARTRATE 50 MG
5 TABLET ORAL ONCE
Refills: 0 | Status: COMPLETED | OUTPATIENT
Start: 2023-08-18 | End: 2023-08-18

## 2023-08-18 RX ORDER — SODIUM CHLORIDE 9 MG/ML
1000 INJECTION, SOLUTION INTRAVENOUS
Refills: 0 | Status: DISCONTINUED | OUTPATIENT
Start: 2023-08-18 | End: 2023-08-21

## 2023-08-18 RX ORDER — ACETAMINOPHEN 500 MG
1000 TABLET ORAL ONCE
Refills: 0 | Status: COMPLETED | OUTPATIENT
Start: 2023-08-18 | End: 2023-08-18

## 2023-08-18 RX ORDER — ALBUMIN HUMAN 25 %
250 VIAL (ML) INTRAVENOUS ONCE
Refills: 0 | Status: COMPLETED | OUTPATIENT
Start: 2023-08-18 | End: 2023-08-18

## 2023-08-18 RX ORDER — POTASSIUM PHOSPHATE, MONOBASIC POTASSIUM PHOSPHATE, DIBASIC 236; 224 MG/ML; MG/ML
30 INJECTION, SOLUTION INTRAVENOUS ONCE
Refills: 0 | Status: COMPLETED | OUTPATIENT
Start: 2023-08-18 | End: 2023-08-18

## 2023-08-18 RX ORDER — MAGNESIUM SULFATE 500 MG/ML
2 VIAL (ML) INJECTION ONCE
Refills: 0 | Status: COMPLETED | OUTPATIENT
Start: 2023-08-18 | End: 2023-08-18

## 2023-08-18 RX ORDER — DILTIAZEM HCL 120 MG
5 CAPSULE, EXT RELEASE 24 HR ORAL
Qty: 125 | Refills: 0 | Status: DISCONTINUED | OUTPATIENT
Start: 2023-08-18 | End: 2023-08-19

## 2023-08-18 RX ORDER — HYDROMORPHONE HYDROCHLORIDE 2 MG/ML
0.5 INJECTION INTRAMUSCULAR; INTRAVENOUS; SUBCUTANEOUS
Refills: 0 | Status: DISCONTINUED | OUTPATIENT
Start: 2023-08-18 | End: 2023-08-23

## 2023-08-18 RX ORDER — POTASSIUM CHLORIDE 20 MEQ
10 PACKET (EA) ORAL ONCE
Refills: 0 | Status: COMPLETED | OUTPATIENT
Start: 2023-08-18 | End: 2023-08-18

## 2023-08-18 RX ORDER — SODIUM CHLORIDE 9 MG/ML
500 INJECTION, SOLUTION INTRAVENOUS ONCE
Refills: 0 | Status: COMPLETED | OUTPATIENT
Start: 2023-08-18 | End: 2023-08-18

## 2023-08-18 RX ORDER — DILTIAZEM HCL 120 MG
15 CAPSULE, EXT RELEASE 24 HR ORAL ONCE
Refills: 0 | Status: COMPLETED | OUTPATIENT
Start: 2023-08-18 | End: 2023-08-18

## 2023-08-18 RX ADMIN — SODIUM CHLORIDE 75 MILLILITER(S): 9 INJECTION, SOLUTION INTRAVENOUS at 06:10

## 2023-08-18 RX ADMIN — Medication 1000 MILLIGRAM(S): at 10:51

## 2023-08-18 RX ADMIN — HEPARIN SODIUM 5000 UNIT(S): 5000 INJECTION INTRAVENOUS; SUBCUTANEOUS at 06:07

## 2023-08-18 RX ADMIN — POTASSIUM PHOSPHATE, MONOBASIC POTASSIUM PHOSPHATE, DIBASIC 83.33 MILLIMOLE(S): 236; 224 INJECTION, SOLUTION INTRAVENOUS at 08:04

## 2023-08-18 RX ADMIN — Medication 25 GRAM(S): at 06:07

## 2023-08-18 RX ADMIN — Medication 5 MILLIGRAM(S): at 13:20

## 2023-08-18 RX ADMIN — Medication 25 GRAM(S): at 15:36

## 2023-08-18 RX ADMIN — SODIUM CHLORIDE 500 MILLILITER(S): 9 INJECTION, SOLUTION INTRAVENOUS at 09:49

## 2023-08-18 RX ADMIN — Medication 125 MILLILITER(S): at 18:08

## 2023-08-18 RX ADMIN — Medication 400 MILLIGRAM(S): at 18:26

## 2023-08-18 RX ADMIN — PANTOPRAZOLE SODIUM 40 MILLIGRAM(S): 20 TABLET, DELAYED RELEASE ORAL at 13:19

## 2023-08-18 RX ADMIN — HYDROMORPHONE HYDROCHLORIDE 30 MILLILITER(S): 2 INJECTION INTRAMUSCULAR; INTRAVENOUS; SUBCUTANEOUS at 07:42

## 2023-08-18 RX ADMIN — Medication 1000 MILLIGRAM(S): at 18:26

## 2023-08-18 RX ADMIN — SODIUM CHLORIDE 75 MILLILITER(S): 9 INJECTION, SOLUTION INTRAVENOUS at 19:20

## 2023-08-18 RX ADMIN — Medication 100 MILLIEQUIVALENT(S): at 06:07

## 2023-08-18 RX ADMIN — Medication 5 MG/HR: at 19:20

## 2023-08-18 RX ADMIN — Medication 400 MILLIGRAM(S): at 10:51

## 2023-08-18 RX ADMIN — Medication 15 MILLIGRAM(S): at 13:21

## 2023-08-18 RX ADMIN — HEPARIN SODIUM 5000 UNIT(S): 5000 INJECTION INTRAVENOUS; SUBCUTANEOUS at 18:08

## 2023-08-18 NOTE — DISCHARGE NOTE PROVIDER - DETAILS OF MALNUTRITION DIAGNOSIS/DIAGNOSES
avoid hypoglycemia       This patient has been assessed with a concern for Malnutrition and was treated during this hospitalization for the following Nutrition diagnosis/diagnoses:     -  08/16/2023: Severe protein-calorie malnutrition

## 2023-08-18 NOTE — DISCHARGE NOTE PROVIDER - NSDCCPCAREPLAN_GEN_ALL_CORE_FT
PRINCIPAL DISCHARGE DIAGNOSIS  Diagnosis: Malignant neoplasm of cardia  Assessment and Plan of Treatment: WOUND CARE:  Please keep incisions clean and dry. Please do not Scrub or rub incisions. Do not use lotion or powder on incisions.   BATHING: You may shower and/or sponge bathe. You may use warm soapy water in the shower and rinse, pat dry.  ACTIVITY: No heavy lifting or straining. Otherwise, you may return to your usual level of physical activity. If you are taking narcotic pain medication DO NOT drive a car, operate machinery or make important decisions.  DIET: Return to your usual diet.  NOTIFY YOUR SURGEON IF YOU HAVE: any bleeding that does not stop, any pus draining from your wound(s), any fever (over 100.4 F) persistent nausea/vomiting, or if your pain is not controlled on your discharge pain medications, unable to urinate.  FOLLOW UP:  1. Please follow up with your primary care physician in one week regarding your hospitalization, bring copies of your discharge paperwork.  2. Please follow up with your surgical oncologist, Dr. Aviles. Please call [phone] to make an appointment.   3. Please follow up with your thoracic surgeon, Dr. Avery. Call [phone] to make an appointment.

## 2023-08-18 NOTE — DISCHARGE NOTE PROVIDER - NSDCMRMEDTOKEN_GEN_ALL_CORE_FT
Lovenox 40 mg/0.4 mL injectable solution: 40 milligram(s) subcutaneously once a day MDD: 0.4 mL   enoxaparin 40 mg/0.4 mL injectable solution: 40 milligram(s) subcutaneously once a day Clean area to be injected with alcohol pad prior to injection MDD: 40mg  metoprolol tartrate 25 mg oral tablet: 0.5 tab(s) orally every 12 hours MDD: 25mg  Protonix 40 mg oral delayed release tablet: 1 tab(s) orally once a day  Tylenol 325 mg oral tablet: 2 tab(s) orally every 6 hours as needed for  moderate pain MDD: 8 tabs   CXR: CXR PA &amp; Lateral.  Please give films to patient and fax results to Dr. Horacio Avery at [phone]  enoxaparin 40 mg/0.4 mL injectable solution: 40 milligram(s) subcutaneously once a day Clean area to be injected with alcohol pad prior to injection MDD: 40mg  metoprolol tartrate 25 mg oral tablet: 0.5 tab(s) orally every 12 hours MDD: 25mg  Protonix 40 mg oral delayed release tablet: 1 tab(s) orally once a day  Tylenol 325 mg oral tablet: 2 tab(s) orally every 6 hours as needed for  moderate pain MDD: 8 tabs

## 2023-08-18 NOTE — DISCHARGE NOTE PROVIDER - NSDCCPTREATMENT_GEN_ALL_CORE_FT
PRINCIPAL PROCEDURE  Procedure: Thoracoscopic robotic assisted procedure  Findings and Treatment: FB, EGD, Robotic RVATS, [place] Bahman Esophagogastrectomy

## 2023-08-18 NOTE — DISCHARGE NOTE PROVIDER - CARE PROVIDER_API CALL
Natan Aviles  Surgery  [address] [place] [id number]  Phone: [phone]  Fax: [phone]  Follow Up Time: 1 week    Horacio Avery  Thoracic Surgery  [address] [place] [address]  Phone: [phone]  Fax: [phone]  Follow Up Time: 1 week

## 2023-08-18 NOTE — DISCHARGE NOTE PROVIDER - NSDCACTIVITY_GEN_ALL_CORE
Showering allowed/No heavy lifting/straining/Follow Instructions Provided by your Surgical Team Do not drive or operate machinery/Showering allowed/Do not make important decisions/Stairs allowed/Walking - Indoors allowed/No heavy lifting/straining/Walking - Outdoors allowed/Follow Instructions Provided by your Surgical Team

## 2023-08-18 NOTE — DISCHARGE NOTE PROVIDER - NSDCFUADDAPPT_GEN_ALL_CORE_FT
Follow-up with Dr. Avery in 1-2 weeks [phone], as well as with Dr. Aviles.  Right chest suture to be removed at follow-up appointment with Dr. Avery.  Have CXR done morning of follow-up appointment with Dr. Avery.    Follow-up with PMD within 2 weeks concerning addition of metoprolol to medication regimen and postoperative atrial fibrillation.

## 2023-08-18 NOTE — DISCHARGE NOTE PROVIDER - NSDCFUADDINST_GEN_ALL_CORE_FT
Continue SOFT diet ONLY until follow-up with Dr. Horacio Avery [phone]  Continue incentive spirometry  Ambulate 4-5x daily    Call Dr. Avery's office if you have any worsening chest pains or increased shortness of breath, fever of 101oF or greater, or any other concerning symptom, or CALL [phone].

## 2023-08-18 NOTE — DISCHARGE NOTE PROVIDER - CARE PROVIDERS DIRECT ADDRESSES
[email or web address]

## 2023-08-18 NOTE — DISCHARGE NOTE PROVIDER - PROVIDER TOKENS
PROVIDER:[TOKEN:[[id number]],FOLLOWUP:[1 week]],PROVIDER:[TOKEN:[[id number]],FOLLOWUP:[1 week]]

## 2023-08-18 NOTE — DISCHARGE NOTE PROVIDER - HOSPITAL COURSE
This patient is a 63M with HTN, T2DM, and esophageal cancer who presented to [facility] on 8/15/23 for scheduled surgery. Patient taken to the OR by Dr. Aviles and Dr. Avery and underwent robotic assisted laparoscopic [place]-Bahman esophagogastrectomy. Patient tolerated operation well and there were no post-operative complications identified. Patient transferred to CTICU post-op, NPO with NGT, chest tube and NIMISHA drain in place.  8/16-8/20 POD1-5 Patient remained NPO with NGT on IVF with plan for swallow study on POD6. Patient had bilateral lower extremity duplex which was negative for DVTs bilaterally.   8/21 POD6 Swallow study performed and showed***    Patient remained hemodynamically stable in the CTICU and was deemed stable for transferto the surgical floor. Diet was restarted and advanced as tolerated. Pain control was transitioned from IV to PO pain meds. At this time, patient is currently ambulating, voiding, tolerating a regular diet. Pain well controlled on PO pain meds. Patient has been deemed stable for discharge home with follow up as an outpatient. This patient is a 63M with HTN, T2DM, and esophageal cancer who [facility] for scheduled surgery. Patient taken to the OR by Dr. Aviles and Dr. Avery and underwent robotic assisted laparoscopic [place]-Bahman esophagogastrectomy. Patient tolerated operation well and there were no post-operative complications identified. Patient transferred to CTICU post-op, NPO with NGT, chest tube and NIMISHA drain in place.  8/16-8/20 POD1-5 Patient remained NPO with NGT on IVF with plan for swallow study on POD6. Patient had bilateral lower extremity duplex which was negative for DVTs bilaterally.   8/18 POD3 Patient developed Atrial Fibrillation with RVR requiring pushes of metoprolol, cardizem and cardizem infusion.   8/19 POD4 Patient weaned off cardizem overnight. Continued on lopressor 2.5mg q6h IVP while NPO with NGT. Chest tube removed.   8/20 POD5 NGT removed. Remained NPO awaiting swallow study.  8/21 POD6 Swallow study performed and showed no evidence of leak. Diet advanced to clear liquids.     Patient remained hemodynamically stable in the CTICU and was deemed stable for transfer to the surgical floor. Diet was restarted and advanced as tolerated. Pain control was transitioned from IV to PO pain meds.     At this time, patient is currently ambulating, voiding, tolerating a regular diet. Pain well controlled on PO pain meds. Patient has been deemed stable for discharge home with follow up as an outpatient. This patient is a 63M with HTN, T2DM, and esophageal cancer who presented to [facility] on 8/15/23 for scheduled surgery. Patient taken to the OR by Dr. Aviles and Dr. Avery and underwent robotic assisted laparoscopic [place]-Bahman esophagogastrectomy. Patient tolerated operation well and there were no post-operative complications identified. Patient transferred to CTICU post-op, NPO with NGT, chest tube and NIMISHA drain in place.  8/16-8/20 POD1-5 Patient remained NPO with NGT on IVF with plan for swallow study on POD6. Patient had bilateral lower extremity duplex which was negative for DVTs bilaterally.   8/18 POD3 Patient developed Atrial Fibrillation with RVR requiring pushes of metoprolol, cardizem and cardizem infusion.   8/19 POD4 Patient weaned off cardizem overnight. Continued on lopressor 2.5mg q6h IVP while NPO with NGT. Chest tube removed.   8/20 POD5 NGT removed. Remained NPO awaiting swallow study.  8/21 POD6 Swallow study performed and showed no evidence of leak. Mild narrowing at the esophagogastric anastomosis, which could be due to postoperative edema. Oral contrast passes freely through this point   without evidence for obstruction. Diet advanced to clear liquids.     Patient remained hemodynamically stable in the CTICU and was deemed stable for transfer to the surgical floor. Diet was restarted and advanced as tolerated. Pain control was transitioned from IV to PO pain meds.     At this time, patient is currently ambulating, voiding, tolerating a regular diet. Pain well controlled on PO pain meds. Patient has been deemed stable for discharge home with follow up as an outpatient. 62 y/o male with PMHx HTN, DM, and esophageal cancer who was admitted through [facility] on 8/15/23 and underwent Robotic-Assisted, Laparoscopic Andrew-Bahman Esophagogastrectomy. He received 2u PRBC's intraoperatively.  Postoperatively, the patient had postop Atrial Fibrillation for which he was started on lopressor, and swallow study on 8/21 showed no evidence of leak.  He has been tolerating a full liquid diet, and his diet was advanced to soft today.  He is ambulating well, hemodynamically stable, and denies any chest pains or SOB.  He was discussed with Dr. Avery this morning and seen at bedside with Dr. Dunn.  Plan is to discharge him home today.

## 2023-08-19 LAB
ANION GAP SERPL CALC-SCNC: 11 MMOL/L — SIGNIFICANT CHANGE UP (ref 7–14)
BUN SERPL-MCNC: 4 MG/DL — LOW (ref 7–23)
CALCIUM SERPL-MCNC: 8.4 MG/DL — SIGNIFICANT CHANGE UP (ref 8.4–10.5)
CHLORIDE SERPL-SCNC: 98 MMOL/L — SIGNIFICANT CHANGE UP (ref 98–107)
CO2 SERPL-SCNC: 26 MMOL/L — SIGNIFICANT CHANGE UP (ref 22–31)
CREAT SERPL-MCNC: 0.36 MG/DL — LOW (ref 0.5–1.3)
EGFR: 126 ML/MIN/1.73M2 — SIGNIFICANT CHANGE UP
GLUCOSE BLDC GLUCOMTR-MCNC: 123 MG/DL — HIGH (ref 70–99)
GLUCOSE BLDC GLUCOMTR-MCNC: 126 MG/DL — HIGH (ref 70–99)
GLUCOSE BLDC GLUCOMTR-MCNC: 134 MG/DL — HIGH (ref 70–99)
GLUCOSE BLDC GLUCOMTR-MCNC: 137 MG/DL — HIGH (ref 70–99)
GLUCOSE SERPL-MCNC: 137 MG/DL — HIGH (ref 70–99)
HCT VFR BLD CALC: 30.6 % — LOW (ref 39–50)
HGB BLD-MCNC: 10.1 G/DL — LOW (ref 13–17)
MAGNESIUM SERPL-MCNC: 1.7 MG/DL — SIGNIFICANT CHANGE UP (ref 1.6–2.6)
MCHC RBC-ENTMCNC: 28.7 PG — SIGNIFICANT CHANGE UP (ref 27–34)
MCHC RBC-ENTMCNC: 33 GM/DL — SIGNIFICANT CHANGE UP (ref 32–36)
MCV RBC AUTO: 86.9 FL — SIGNIFICANT CHANGE UP (ref 80–100)
NRBC # BLD: 0 /100 WBCS — SIGNIFICANT CHANGE UP (ref 0–0)
NRBC # FLD: 0 K/UL — SIGNIFICANT CHANGE UP (ref 0–0)
PLATELET # BLD AUTO: 127 K/UL — LOW (ref 150–400)
POTASSIUM SERPL-MCNC: 3.3 MMOL/L — LOW (ref 3.5–5.3)
POTASSIUM SERPL-SCNC: 3.3 MMOL/L — LOW (ref 3.5–5.3)
RBC # BLD: 3.52 M/UL — LOW (ref 4.2–5.8)
RBC # FLD: 14.4 % — SIGNIFICANT CHANGE UP (ref 10.3–14.5)
SODIUM SERPL-SCNC: 135 MMOL/L — SIGNIFICANT CHANGE UP (ref 135–145)
WBC # BLD: 7.84 K/UL — SIGNIFICANT CHANGE UP (ref 3.8–10.5)
WBC # FLD AUTO: 7.84 K/UL — SIGNIFICANT CHANGE UP (ref 3.8–10.5)

## 2023-08-19 PROCEDURE — 71045 X-RAY EXAM CHEST 1 VIEW: CPT | Mod: 26

## 2023-08-19 PROCEDURE — 71045 X-RAY EXAM CHEST 1 VIEW: CPT | Mod: 26,77

## 2023-08-19 PROCEDURE — 99233 SBSQ HOSP IP/OBS HIGH 50: CPT

## 2023-08-19 RX ORDER — ACETAMINOPHEN 500 MG
1000 TABLET ORAL ONCE
Refills: 0 | Status: COMPLETED | OUTPATIENT
Start: 2023-08-19 | End: 2023-08-19

## 2023-08-19 RX ORDER — POTASSIUM CHLORIDE 20 MEQ
10 PACKET (EA) ORAL
Refills: 0 | Status: COMPLETED | OUTPATIENT
Start: 2023-08-19 | End: 2023-08-19

## 2023-08-19 RX ORDER — MAGNESIUM SULFATE 500 MG/ML
2 VIAL (ML) INJECTION ONCE
Refills: 0 | Status: COMPLETED | OUTPATIENT
Start: 2023-08-19 | End: 2023-08-19

## 2023-08-19 RX ORDER — POTASSIUM CHLORIDE 20 MEQ
10 PACKET (EA) ORAL ONCE
Refills: 0 | Status: COMPLETED | OUTPATIENT
Start: 2023-08-19 | End: 2023-08-19

## 2023-08-19 RX ORDER — METOPROLOL TARTRATE 50 MG
2.5 TABLET ORAL EVERY 6 HOURS
Refills: 0 | Status: DISCONTINUED | OUTPATIENT
Start: 2023-08-19 | End: 2023-08-21

## 2023-08-19 RX ADMIN — Medication 1000 MILLIGRAM(S): at 20:37

## 2023-08-19 RX ADMIN — Medication 25 GRAM(S): at 06:01

## 2023-08-19 RX ADMIN — HYDROMORPHONE HYDROCHLORIDE 0.25 MILLIGRAM(S): 2 INJECTION INTRAMUSCULAR; INTRAVENOUS; SUBCUTANEOUS at 02:47

## 2023-08-19 RX ADMIN — Medication 2.5 MILLIGRAM(S): at 05:09

## 2023-08-19 RX ADMIN — Medication 2.5 MILLIGRAM(S): at 17:57

## 2023-08-19 RX ADMIN — Medication 400 MILLIGRAM(S): at 20:22

## 2023-08-19 RX ADMIN — HYDROMORPHONE HYDROCHLORIDE 0.25 MILLIGRAM(S): 2 INJECTION INTRAMUSCULAR; INTRAVENOUS; SUBCUTANEOUS at 03:02

## 2023-08-19 RX ADMIN — Medication 2.5 MILLIGRAM(S): at 23:12

## 2023-08-19 RX ADMIN — Medication 100 MILLIEQUIVALENT(S): at 06:02

## 2023-08-19 RX ADMIN — SODIUM CHLORIDE 75 MILLILITER(S): 9 INJECTION, SOLUTION INTRAVENOUS at 07:41

## 2023-08-19 RX ADMIN — PANTOPRAZOLE SODIUM 40 MILLIGRAM(S): 20 TABLET, DELAYED RELEASE ORAL at 12:17

## 2023-08-19 RX ADMIN — HYDROMORPHONE HYDROCHLORIDE 0.5 MILLIGRAM(S): 2 INJECTION INTRAMUSCULAR; INTRAVENOUS; SUBCUTANEOUS at 12:50

## 2023-08-19 RX ADMIN — Medication 2.5 MILLIGRAM(S): at 12:16

## 2023-08-19 RX ADMIN — Medication 100 MILLIEQUIVALENT(S): at 12:16

## 2023-08-19 RX ADMIN — SODIUM CHLORIDE 75 MILLILITER(S): 9 INJECTION, SOLUTION INTRAVENOUS at 19:12

## 2023-08-19 RX ADMIN — HEPARIN SODIUM 5000 UNIT(S): 5000 INJECTION INTRAVENOUS; SUBCUTANEOUS at 17:57

## 2023-08-19 RX ADMIN — HEPARIN SODIUM 5000 UNIT(S): 5000 INJECTION INTRAVENOUS; SUBCUTANEOUS at 05:09

## 2023-08-19 RX ADMIN — HYDROMORPHONE HYDROCHLORIDE 0.5 MILLIGRAM(S): 2 INJECTION INTRAMUSCULAR; INTRAVENOUS; SUBCUTANEOUS at 11:52

## 2023-08-19 RX ADMIN — Medication 100 MILLIEQUIVALENT(S): at 13:55

## 2023-08-19 RX ADMIN — Medication 100 MILLIEQUIVALENT(S): at 07:42

## 2023-08-20 LAB
ANION GAP SERPL CALC-SCNC: 10 MMOL/L — SIGNIFICANT CHANGE UP (ref 7–14)
BUN SERPL-MCNC: 3 MG/DL — LOW (ref 7–23)
CALCIUM SERPL-MCNC: 8.9 MG/DL — SIGNIFICANT CHANGE UP (ref 8.4–10.5)
CHLORIDE SERPL-SCNC: 98 MMOL/L — SIGNIFICANT CHANGE UP (ref 98–107)
CO2 SERPL-SCNC: 28 MMOL/L — SIGNIFICANT CHANGE UP (ref 22–31)
CREAT SERPL-MCNC: 0.45 MG/DL — LOW (ref 0.5–1.3)
EGFR: 118 ML/MIN/1.73M2 — SIGNIFICANT CHANGE UP
GLUCOSE BLDC GLUCOMTR-MCNC: 113 MG/DL — HIGH (ref 70–99)
GLUCOSE BLDC GLUCOMTR-MCNC: 117 MG/DL — HIGH (ref 70–99)
GLUCOSE BLDC GLUCOMTR-MCNC: 123 MG/DL — HIGH (ref 70–99)
GLUCOSE BLDC GLUCOMTR-MCNC: 132 MG/DL — HIGH (ref 70–99)
GLUCOSE SERPL-MCNC: 140 MG/DL — HIGH (ref 70–99)
HCT VFR BLD CALC: 31.9 % — LOW (ref 39–50)
HGB BLD-MCNC: 10.6 G/DL — LOW (ref 13–17)
MAGNESIUM SERPL-MCNC: 1.5 MG/DL — LOW (ref 1.6–2.6)
MCHC RBC-ENTMCNC: 28.1 PG — SIGNIFICANT CHANGE UP (ref 27–34)
MCHC RBC-ENTMCNC: 33.2 GM/DL — SIGNIFICANT CHANGE UP (ref 32–36)
MCV RBC AUTO: 84.6 FL — SIGNIFICANT CHANGE UP (ref 80–100)
NRBC # BLD: 0 /100 WBCS — SIGNIFICANT CHANGE UP (ref 0–0)
NRBC # FLD: 0 K/UL — SIGNIFICANT CHANGE UP (ref 0–0)
PHOSPHATE SERPL-MCNC: 2.3 MG/DL — LOW (ref 2.5–4.5)
PLATELET # BLD AUTO: 152 K/UL — SIGNIFICANT CHANGE UP (ref 150–400)
POTASSIUM SERPL-MCNC: 3.4 MMOL/L — LOW (ref 3.5–5.3)
POTASSIUM SERPL-SCNC: 3.4 MMOL/L — LOW (ref 3.5–5.3)
RBC # BLD: 3.77 M/UL — LOW (ref 4.2–5.8)
RBC # FLD: 14.4 % — SIGNIFICANT CHANGE UP (ref 10.3–14.5)
SODIUM SERPL-SCNC: 136 MMOL/L — SIGNIFICANT CHANGE UP (ref 135–145)
WBC # BLD: 6.06 K/UL — SIGNIFICANT CHANGE UP (ref 3.8–10.5)
WBC # FLD AUTO: 6.06 K/UL — SIGNIFICANT CHANGE UP (ref 3.8–10.5)

## 2023-08-20 PROCEDURE — 71045 X-RAY EXAM CHEST 1 VIEW: CPT | Mod: 26

## 2023-08-20 PROCEDURE — 99233 SBSQ HOSP IP/OBS HIGH 50: CPT

## 2023-08-20 RX ORDER — POTASSIUM CHLORIDE 20 MEQ
10 PACKET (EA) ORAL
Refills: 0 | Status: COMPLETED | OUTPATIENT
Start: 2023-08-20 | End: 2023-08-20

## 2023-08-20 RX ORDER — MAGNESIUM SULFATE 500 MG/ML
2 VIAL (ML) INJECTION ONCE
Refills: 0 | Status: COMPLETED | OUTPATIENT
Start: 2023-08-20 | End: 2023-08-20

## 2023-08-20 RX ORDER — POTASSIUM PHOSPHATE, MONOBASIC POTASSIUM PHOSPHATE, DIBASIC 236; 224 MG/ML; MG/ML
30 INJECTION, SOLUTION INTRAVENOUS ONCE
Refills: 0 | Status: COMPLETED | OUTPATIENT
Start: 2023-08-20 | End: 2023-08-20

## 2023-08-20 RX ADMIN — Medication 100 MILLIEQUIVALENT(S): at 08:34

## 2023-08-20 RX ADMIN — Medication 2.5 MILLIGRAM(S): at 11:35

## 2023-08-20 RX ADMIN — HYDROMORPHONE HYDROCHLORIDE 0.25 MILLIGRAM(S): 2 INJECTION INTRAMUSCULAR; INTRAVENOUS; SUBCUTANEOUS at 12:00

## 2023-08-20 RX ADMIN — HYDROMORPHONE HYDROCHLORIDE 0.25 MILLIGRAM(S): 2 INJECTION INTRAMUSCULAR; INTRAVENOUS; SUBCUTANEOUS at 23:45

## 2023-08-20 RX ADMIN — Medication 100 MILLIEQUIVALENT(S): at 07:31

## 2023-08-20 RX ADMIN — HEPARIN SODIUM 5000 UNIT(S): 5000 INJECTION INTRAVENOUS; SUBCUTANEOUS at 05:10

## 2023-08-20 RX ADMIN — SODIUM CHLORIDE 3 MILLILITER(S): 9 INJECTION INTRAMUSCULAR; INTRAVENOUS; SUBCUTANEOUS at 09:52

## 2023-08-20 RX ADMIN — SODIUM CHLORIDE 75 MILLILITER(S): 9 INJECTION, SOLUTION INTRAVENOUS at 19:35

## 2023-08-20 RX ADMIN — HYDROMORPHONE HYDROCHLORIDE 0.25 MILLIGRAM(S): 2 INJECTION INTRAMUSCULAR; INTRAVENOUS; SUBCUTANEOUS at 11:36

## 2023-08-20 RX ADMIN — POTASSIUM PHOSPHATE, MONOBASIC POTASSIUM PHOSPHATE, DIBASIC 83.33 MILLIMOLE(S): 236; 224 INJECTION, SOLUTION INTRAVENOUS at 08:39

## 2023-08-20 RX ADMIN — Medication 25 GRAM(S): at 05:58

## 2023-08-20 RX ADMIN — Medication 100 MILLIEQUIVALENT(S): at 05:58

## 2023-08-20 RX ADMIN — PANTOPRAZOLE SODIUM 40 MILLIGRAM(S): 20 TABLET, DELAYED RELEASE ORAL at 11:35

## 2023-08-20 RX ADMIN — HEPARIN SODIUM 5000 UNIT(S): 5000 INJECTION INTRAVENOUS; SUBCUTANEOUS at 18:19

## 2023-08-20 RX ADMIN — Medication 2.5 MILLIGRAM(S): at 23:24

## 2023-08-20 RX ADMIN — Medication 2.5 MILLIGRAM(S): at 05:10

## 2023-08-20 RX ADMIN — Medication 2.5 MILLIGRAM(S): at 18:19

## 2023-08-20 RX ADMIN — HYDROMORPHONE HYDROCHLORIDE 0.25 MILLIGRAM(S): 2 INJECTION INTRAMUSCULAR; INTRAVENOUS; SUBCUTANEOUS at 23:30

## 2023-08-21 LAB
ANION GAP SERPL CALC-SCNC: 7 MMOL/L — SIGNIFICANT CHANGE UP (ref 7–14)
BUN SERPL-MCNC: 4 MG/DL — LOW (ref 7–23)
CALCIUM SERPL-MCNC: 8.6 MG/DL — SIGNIFICANT CHANGE UP (ref 8.4–10.5)
CHLORIDE SERPL-SCNC: 100 MMOL/L — SIGNIFICANT CHANGE UP (ref 98–107)
CO2 SERPL-SCNC: 29 MMOL/L — SIGNIFICANT CHANGE UP (ref 22–31)
CREAT SERPL-MCNC: 0.43 MG/DL — LOW (ref 0.5–1.3)
EGFR: 119 ML/MIN/1.73M2 — SIGNIFICANT CHANGE UP
GLUCOSE BLDC GLUCOMTR-MCNC: 120 MG/DL — HIGH (ref 70–99)
GLUCOSE BLDC GLUCOMTR-MCNC: 143 MG/DL — HIGH (ref 70–99)
GLUCOSE BLDC GLUCOMTR-MCNC: 176 MG/DL — HIGH (ref 70–99)
GLUCOSE SERPL-MCNC: 137 MG/DL — HIGH (ref 70–99)
HCT VFR BLD CALC: 30.1 % — LOW (ref 39–50)
HGB BLD-MCNC: 10 G/DL — LOW (ref 13–17)
MAGNESIUM SERPL-MCNC: 1.5 MG/DL — LOW (ref 1.6–2.6)
MCHC RBC-ENTMCNC: 28.8 PG — SIGNIFICANT CHANGE UP (ref 27–34)
MCHC RBC-ENTMCNC: 33.2 GM/DL — SIGNIFICANT CHANGE UP (ref 32–36)
MCV RBC AUTO: 86.7 FL — SIGNIFICANT CHANGE UP (ref 80–100)
NRBC # BLD: 0 /100 WBCS — SIGNIFICANT CHANGE UP (ref 0–0)
NRBC # FLD: 0 K/UL — SIGNIFICANT CHANGE UP (ref 0–0)
PHOSPHATE SERPL-MCNC: 3.3 MG/DL — SIGNIFICANT CHANGE UP (ref 2.5–4.5)
PLATELET # BLD AUTO: 141 K/UL — LOW (ref 150–400)
POTASSIUM SERPL-MCNC: 3.3 MMOL/L — LOW (ref 3.5–5.3)
POTASSIUM SERPL-SCNC: 3.3 MMOL/L — LOW (ref 3.5–5.3)
RBC # BLD: 3.47 M/UL — LOW (ref 4.2–5.8)
RBC # FLD: 14.3 % — SIGNIFICANT CHANGE UP (ref 10.3–14.5)
SODIUM SERPL-SCNC: 136 MMOL/L — SIGNIFICANT CHANGE UP (ref 135–145)
WBC # BLD: 5.19 K/UL — SIGNIFICANT CHANGE UP (ref 3.8–10.5)
WBC # FLD AUTO: 5.19 K/UL — SIGNIFICANT CHANGE UP (ref 3.8–10.5)

## 2023-08-21 PROCEDURE — 99233 SBSQ HOSP IP/OBS HIGH 50: CPT

## 2023-08-21 PROCEDURE — 71045 X-RAY EXAM CHEST 1 VIEW: CPT | Mod: 26

## 2023-08-21 PROCEDURE — 74220 X-RAY XM ESOPHAGUS 1CNTRST: CPT | Mod: 26

## 2023-08-21 RX ORDER — MAGNESIUM SULFATE 500 MG/ML
2 VIAL (ML) INJECTION ONCE
Refills: 0 | Status: COMPLETED | OUTPATIENT
Start: 2023-08-21 | End: 2023-08-21

## 2023-08-21 RX ORDER — POTASSIUM CHLORIDE 20 MEQ
10 PACKET (EA) ORAL
Refills: 0 | Status: COMPLETED | OUTPATIENT
Start: 2023-08-21 | End: 2023-08-21

## 2023-08-21 RX ORDER — METOPROLOL TARTRATE 50 MG
12.5 TABLET ORAL
Refills: 0 | Status: DISCONTINUED | OUTPATIENT
Start: 2023-08-21 | End: 2023-08-23

## 2023-08-21 RX ORDER — POTASSIUM CHLORIDE 20 MEQ
40 PACKET (EA) ORAL ONCE
Refills: 0 | Status: DISCONTINUED | OUTPATIENT
Start: 2023-08-21 | End: 2023-08-21

## 2023-08-21 RX ADMIN — Medication 2.5 MILLIGRAM(S): at 17:12

## 2023-08-21 RX ADMIN — Medication 1: at 17:20

## 2023-08-21 RX ADMIN — Medication 100 MILLIEQUIVALENT(S): at 07:35

## 2023-08-21 RX ADMIN — Medication 25 GRAM(S): at 06:18

## 2023-08-21 RX ADMIN — Medication 2.5 MILLIGRAM(S): at 05:30

## 2023-08-21 RX ADMIN — HYDROMORPHONE HYDROCHLORIDE 0.5 MILLIGRAM(S): 2 INJECTION INTRAMUSCULAR; INTRAVENOUS; SUBCUTANEOUS at 22:46

## 2023-08-21 RX ADMIN — Medication 12.5 MILLIGRAM(S): at 23:37

## 2023-08-21 RX ADMIN — SODIUM CHLORIDE 75 MILLILITER(S): 9 INJECTION, SOLUTION INTRAVENOUS at 07:35

## 2023-08-21 RX ADMIN — HEPARIN SODIUM 5000 UNIT(S): 5000 INJECTION INTRAVENOUS; SUBCUTANEOUS at 17:13

## 2023-08-21 RX ADMIN — PANTOPRAZOLE SODIUM 40 MILLIGRAM(S): 20 TABLET, DELAYED RELEASE ORAL at 11:19

## 2023-08-21 RX ADMIN — SODIUM CHLORIDE 75 MILLILITER(S): 9 INJECTION, SOLUTION INTRAVENOUS at 19:50

## 2023-08-21 RX ADMIN — HYDROMORPHONE HYDROCHLORIDE 0.25 MILLIGRAM(S): 2 INJECTION INTRAMUSCULAR; INTRAVENOUS; SUBCUTANEOUS at 13:09

## 2023-08-21 RX ADMIN — HYDROMORPHONE HYDROCHLORIDE 0.25 MILLIGRAM(S): 2 INJECTION INTRAMUSCULAR; INTRAVENOUS; SUBCUTANEOUS at 13:25

## 2023-08-21 RX ADMIN — HYDROMORPHONE HYDROCHLORIDE 0.5 MILLIGRAM(S): 2 INJECTION INTRAMUSCULAR; INTRAVENOUS; SUBCUTANEOUS at 22:31

## 2023-08-21 RX ADMIN — Medication 100 MILLIEQUIVALENT(S): at 09:38

## 2023-08-21 RX ADMIN — HEPARIN SODIUM 5000 UNIT(S): 5000 INJECTION INTRAVENOUS; SUBCUTANEOUS at 05:30

## 2023-08-21 RX ADMIN — Medication 2.5 MILLIGRAM(S): at 11:19

## 2023-08-21 RX ADMIN — Medication 100 MILLIEQUIVALENT(S): at 11:20

## 2023-08-21 NOTE — PROGRESS NOTE ADULT - REASON FOR ADMISSION
MYCHAL Bahman procedure
[place] Bahman esophagectomy
[place] Bahman esophagectomy
[place] Bahman esophagectomy
s/p [place] Bahman Esophagectomy

## 2023-08-22 LAB
ANION GAP SERPL CALC-SCNC: 12 MMOL/L — SIGNIFICANT CHANGE UP (ref 7–14)
BLD GP AB SCN SERPL QL: NEGATIVE — SIGNIFICANT CHANGE UP
BUN SERPL-MCNC: 4 MG/DL — LOW (ref 7–23)
CALCIUM SERPL-MCNC: 8.6 MG/DL — SIGNIFICANT CHANGE UP (ref 8.4–10.5)
CHLORIDE SERPL-SCNC: 97 MMOL/L — LOW (ref 98–107)
CO2 SERPL-SCNC: 28 MMOL/L — SIGNIFICANT CHANGE UP (ref 22–31)
CREAT SERPL-MCNC: 0.45 MG/DL — LOW (ref 0.5–1.3)
EGFR: 118 ML/MIN/1.73M2 — SIGNIFICANT CHANGE UP
GLUCOSE BLDC GLUCOMTR-MCNC: 103 MG/DL — HIGH (ref 70–99)
GLUCOSE BLDC GLUCOMTR-MCNC: 114 MG/DL — HIGH (ref 70–99)
GLUCOSE BLDC GLUCOMTR-MCNC: 115 MG/DL — HIGH (ref 70–99)
GLUCOSE BLDC GLUCOMTR-MCNC: 118 MG/DL — HIGH (ref 70–99)
GLUCOSE SERPL-MCNC: 105 MG/DL — HIGH (ref 70–99)
HCT VFR BLD CALC: 31.6 % — LOW (ref 39–50)
HGB BLD-MCNC: 10.3 G/DL — LOW (ref 13–17)
MAGNESIUM SERPL-MCNC: 1.5 MG/DL — LOW (ref 1.6–2.6)
MCHC RBC-ENTMCNC: 28.5 PG — SIGNIFICANT CHANGE UP (ref 27–34)
MCHC RBC-ENTMCNC: 32.6 GM/DL — SIGNIFICANT CHANGE UP (ref 32–36)
MCV RBC AUTO: 87.3 FL — SIGNIFICANT CHANGE UP (ref 80–100)
NRBC # BLD: 0 /100 WBCS — SIGNIFICANT CHANGE UP (ref 0–0)
NRBC # FLD: 0 K/UL — SIGNIFICANT CHANGE UP (ref 0–0)
PHOSPHATE SERPL-MCNC: 3.1 MG/DL — SIGNIFICANT CHANGE UP (ref 2.5–4.5)
PLATELET # BLD AUTO: 145 K/UL — LOW (ref 150–400)
POTASSIUM SERPL-MCNC: 3.3 MMOL/L — LOW (ref 3.5–5.3)
POTASSIUM SERPL-SCNC: 3.3 MMOL/L — LOW (ref 3.5–5.3)
RBC # BLD: 3.62 M/UL — LOW (ref 4.2–5.8)
RBC # FLD: 14.2 % — SIGNIFICANT CHANGE UP (ref 10.3–14.5)
RH IG SCN BLD-IMP: NEGATIVE — SIGNIFICANT CHANGE UP
SODIUM SERPL-SCNC: 137 MMOL/L — SIGNIFICANT CHANGE UP (ref 135–145)
WBC # BLD: 5.02 K/UL — SIGNIFICANT CHANGE UP (ref 3.8–10.5)
WBC # FLD AUTO: 5.02 K/UL — SIGNIFICANT CHANGE UP (ref 3.8–10.5)

## 2023-08-22 PROCEDURE — 99233 SBSQ HOSP IP/OBS HIGH 50: CPT

## 2023-08-22 PROCEDURE — 71045 X-RAY EXAM CHEST 1 VIEW: CPT | Mod: 26

## 2023-08-22 RX ORDER — MAGNESIUM SULFATE 500 MG/ML
2 VIAL (ML) INJECTION ONCE
Refills: 0 | Status: COMPLETED | OUTPATIENT
Start: 2023-08-22 | End: 2023-08-22

## 2023-08-22 RX ORDER — POTASSIUM CHLORIDE 20 MEQ
40 PACKET (EA) ORAL ONCE
Refills: 0 | Status: COMPLETED | OUTPATIENT
Start: 2023-08-22 | End: 2023-08-22

## 2023-08-22 RX ORDER — POTASSIUM CHLORIDE 20 MEQ
10 PACKET (EA) ORAL
Refills: 0 | Status: COMPLETED | OUTPATIENT
Start: 2023-08-22 | End: 2023-08-22

## 2023-08-22 RX ORDER — ENOXAPARIN SODIUM 100 MG/ML
40 INJECTION SUBCUTANEOUS EVERY 24 HOURS
Refills: 0 | Status: DISCONTINUED | OUTPATIENT
Start: 2023-08-23 | End: 2023-08-23

## 2023-08-22 RX ORDER — HEPARIN SODIUM 5000 [USP'U]/ML
5000 INJECTION INTRAVENOUS; SUBCUTANEOUS ONCE
Refills: 0 | Status: COMPLETED | OUTPATIENT
Start: 2023-08-22 | End: 2023-08-22

## 2023-08-22 RX ORDER — POTASSIUM CHLORIDE 20 MEQ
20 PACKET (EA) ORAL
Refills: 0 | Status: DISCONTINUED | OUTPATIENT
Start: 2023-08-22 | End: 2023-08-22

## 2023-08-22 RX ORDER — INSULIN LISPRO 100/ML
VIAL (ML) SUBCUTANEOUS
Refills: 0 | Status: DISCONTINUED | OUTPATIENT
Start: 2023-08-22 | End: 2023-08-23

## 2023-08-22 RX ADMIN — HYDROMORPHONE HYDROCHLORIDE 0.25 MILLIGRAM(S): 2 INJECTION INTRAMUSCULAR; INTRAVENOUS; SUBCUTANEOUS at 18:14

## 2023-08-22 RX ADMIN — HYDROMORPHONE HYDROCHLORIDE 0.25 MILLIGRAM(S): 2 INJECTION INTRAMUSCULAR; INTRAVENOUS; SUBCUTANEOUS at 11:46

## 2023-08-22 RX ADMIN — HEPARIN SODIUM 5000 UNIT(S): 5000 INJECTION INTRAVENOUS; SUBCUTANEOUS at 06:10

## 2023-08-22 RX ADMIN — HYDROMORPHONE HYDROCHLORIDE 0.25 MILLIGRAM(S): 2 INJECTION INTRAMUSCULAR; INTRAVENOUS; SUBCUTANEOUS at 23:02

## 2023-08-22 RX ADMIN — Medication 100 MILLIEQUIVALENT(S): at 09:51

## 2023-08-22 RX ADMIN — Medication 12.5 MILLIGRAM(S): at 06:10

## 2023-08-22 RX ADMIN — Medication 100 MILLIEQUIVALENT(S): at 06:05

## 2023-08-22 RX ADMIN — Medication 40 MILLIEQUIVALENT(S): at 06:18

## 2023-08-22 RX ADMIN — PANTOPRAZOLE SODIUM 40 MILLIGRAM(S): 20 TABLET, DELAYED RELEASE ORAL at 16:45

## 2023-08-22 RX ADMIN — Medication 12.5 MILLIGRAM(S): at 16:45

## 2023-08-22 RX ADMIN — Medication 100 MILLIEQUIVALENT(S): at 08:28

## 2023-08-22 RX ADMIN — HYDROMORPHONE HYDROCHLORIDE 0.25 MILLIGRAM(S): 2 INJECTION INTRAMUSCULAR; INTRAVENOUS; SUBCUTANEOUS at 10:55

## 2023-08-22 RX ADMIN — HYDROMORPHONE HYDROCHLORIDE 0.25 MILLIGRAM(S): 2 INJECTION INTRAMUSCULAR; INTRAVENOUS; SUBCUTANEOUS at 18:10

## 2023-08-22 RX ADMIN — HEPARIN SODIUM 5000 UNIT(S): 5000 INJECTION INTRAVENOUS; SUBCUTANEOUS at 16:45

## 2023-08-22 RX ADMIN — Medication 25 GRAM(S): at 06:18

## 2023-08-22 RX ADMIN — HYDROMORPHONE HYDROCHLORIDE 0.25 MILLIGRAM(S): 2 INJECTION INTRAMUSCULAR; INTRAVENOUS; SUBCUTANEOUS at 22:47

## 2023-08-23 ENCOUNTER — TRANSCRIPTION ENCOUNTER (OUTPATIENT)
Age: 63
End: 2023-08-23

## 2023-08-23 VITALS
OXYGEN SATURATION: 98 % | TEMPERATURE: 98 F | DIASTOLIC BLOOD PRESSURE: 80 MMHG | RESPIRATION RATE: 25 BRPM | SYSTOLIC BLOOD PRESSURE: 122 MMHG | HEART RATE: 97 BPM

## 2023-08-23 LAB
ANION GAP SERPL CALC-SCNC: 10 MMOL/L — SIGNIFICANT CHANGE UP (ref 7–14)
BUN SERPL-MCNC: 4 MG/DL — LOW (ref 7–23)
CA-I BLD-SCNC: 1.1 MMOL/L — LOW (ref 1.15–1.29)
CALCIUM SERPL-MCNC: 8.6 MG/DL — SIGNIFICANT CHANGE UP (ref 8.4–10.5)
CHLORIDE SERPL-SCNC: 96 MMOL/L — LOW (ref 98–107)
CO2 SERPL-SCNC: 30 MMOL/L — SIGNIFICANT CHANGE UP (ref 22–31)
CREAT SERPL-MCNC: 0.52 MG/DL — SIGNIFICANT CHANGE UP (ref 0.5–1.3)
EGFR: 113 ML/MIN/1.73M2 — SIGNIFICANT CHANGE UP
GLUCOSE BLDC GLUCOMTR-MCNC: 126 MG/DL — HIGH (ref 70–99)
GLUCOSE BLDC GLUCOMTR-MCNC: 87 MG/DL — SIGNIFICANT CHANGE UP (ref 70–99)
GLUCOSE SERPL-MCNC: 100 MG/DL — HIGH (ref 70–99)
HCT VFR BLD CALC: 31 % — LOW (ref 39–50)
HGB BLD-MCNC: 10.4 G/DL — LOW (ref 13–17)
MAGNESIUM SERPL-MCNC: 1.6 MG/DL — SIGNIFICANT CHANGE UP (ref 1.6–2.6)
MCHC RBC-ENTMCNC: 29.3 PG — SIGNIFICANT CHANGE UP (ref 27–34)
MCHC RBC-ENTMCNC: 33.5 GM/DL — SIGNIFICANT CHANGE UP (ref 32–36)
MCV RBC AUTO: 87.3 FL — SIGNIFICANT CHANGE UP (ref 80–100)
NRBC # BLD: 0 /100 WBCS — SIGNIFICANT CHANGE UP (ref 0–0)
NRBC # FLD: 0 K/UL — SIGNIFICANT CHANGE UP (ref 0–0)
PHOSPHATE SERPL-MCNC: 3.4 MG/DL — SIGNIFICANT CHANGE UP (ref 2.5–4.5)
PLATELET # BLD AUTO: 154 K/UL — SIGNIFICANT CHANGE UP (ref 150–400)
POTASSIUM SERPL-MCNC: 3.7 MMOL/L — SIGNIFICANT CHANGE UP (ref 3.5–5.3)
POTASSIUM SERPL-SCNC: 3.7 MMOL/L — SIGNIFICANT CHANGE UP (ref 3.5–5.3)
RBC # BLD: 3.55 M/UL — LOW (ref 4.2–5.8)
RBC # FLD: 14.2 % — SIGNIFICANT CHANGE UP (ref 10.3–14.5)
SODIUM SERPL-SCNC: 136 MMOL/L — SIGNIFICANT CHANGE UP (ref 135–145)
WBC # BLD: 5.44 K/UL — SIGNIFICANT CHANGE UP (ref 3.8–10.5)
WBC # FLD AUTO: 5.44 K/UL — SIGNIFICANT CHANGE UP (ref 3.8–10.5)

## 2023-08-23 PROCEDURE — 71045 X-RAY EXAM CHEST 1 VIEW: CPT | Mod: 26

## 2023-08-23 PROCEDURE — 99233 SBSQ HOSP IP/OBS HIGH 50: CPT

## 2023-08-23 RX ORDER — PANTOPRAZOLE SODIUM 20 MG/1
1 TABLET, DELAYED RELEASE ORAL
Qty: 30 | Refills: 0
Start: 2023-08-23 | End: 2023-09-21

## 2023-08-23 RX ORDER — OXYCODONE HYDROCHLORIDE 5 MG/1
1 TABLET ORAL
Qty: 20 | Refills: 0
Start: 2023-08-23 | End: 2023-08-27

## 2023-08-23 RX ORDER — POTASSIUM CHLORIDE 20 MEQ
10 PACKET (EA) ORAL ONCE
Refills: 0 | Status: COMPLETED | OUTPATIENT
Start: 2023-08-23 | End: 2023-08-23

## 2023-08-23 RX ORDER — MAGNESIUM SULFATE 500 MG/ML
2 VIAL (ML) INJECTION ONCE
Refills: 0 | Status: COMPLETED | OUTPATIENT
Start: 2023-08-23 | End: 2023-08-23

## 2023-08-23 RX ORDER — ACETAMINOPHEN 500 MG
2 TABLET ORAL
Qty: 80 | Refills: 0
Start: 2023-08-23 | End: 2023-09-01

## 2023-08-23 RX ORDER — ENOXAPARIN SODIUM 100 MG/ML
40 INJECTION SUBCUTANEOUS
Qty: 22 | Refills: 0
Start: 2023-08-23 | End: 2023-09-13

## 2023-08-23 RX ORDER — METOPROLOL TARTRATE 50 MG
0.5 TABLET ORAL
Qty: 30 | Refills: 0
Start: 2023-08-23 | End: 2023-09-21

## 2023-08-23 RX ORDER — SENNA PLUS 8.6 MG/1
2 TABLET ORAL
Qty: 28 | Refills: 0
Start: 2023-08-23 | End: 2023-09-05

## 2023-08-23 RX ORDER — PANTOPRAZOLE SODIUM 20 MG/1
40 TABLET, DELAYED RELEASE ORAL
Refills: 0 | Status: DISCONTINUED | OUTPATIENT
Start: 2023-08-24 | End: 2023-08-23

## 2023-08-23 RX ADMIN — Medication 100 MILLIEQUIVALENT(S): at 09:49

## 2023-08-23 RX ADMIN — ENOXAPARIN SODIUM 40 MILLIGRAM(S): 100 INJECTION SUBCUTANEOUS at 09:48

## 2023-08-23 RX ADMIN — Medication 25 GRAM(S): at 09:49

## 2023-08-23 RX ADMIN — HYDROMORPHONE HYDROCHLORIDE 0.25 MILLIGRAM(S): 2 INJECTION INTRAMUSCULAR; INTRAVENOUS; SUBCUTANEOUS at 12:30

## 2023-08-23 RX ADMIN — HYDROMORPHONE HYDROCHLORIDE 0.25 MILLIGRAM(S): 2 INJECTION INTRAMUSCULAR; INTRAVENOUS; SUBCUTANEOUS at 04:15

## 2023-08-23 RX ADMIN — HYDROMORPHONE HYDROCHLORIDE 0.25 MILLIGRAM(S): 2 INJECTION INTRAMUSCULAR; INTRAVENOUS; SUBCUTANEOUS at 04:00

## 2023-08-23 RX ADMIN — HYDROMORPHONE HYDROCHLORIDE 0.25 MILLIGRAM(S): 2 INJECTION INTRAMUSCULAR; INTRAVENOUS; SUBCUTANEOUS at 12:25

## 2023-08-23 RX ADMIN — Medication 12.5 MILLIGRAM(S): at 05:35

## 2023-08-23 NOTE — DISCHARGE NOTE NURSING/CASE MANAGEMENT/SOCIAL WORK - NSDCPEFALRISK_GEN_ALL_CORE
For information on Fall & Injury Prevention, visit: [email or web address] OR  [email or web address] OR  https://www.cdc.gov/steadi/patient.html

## 2023-08-23 NOTE — PROGRESS NOTE ADULT - SUBJECTIVE AND OBJECTIVE BOX
Anesthesia Pain Management Service    SUBJECTIVE: Patient with inadequate pain relief with the current demand dose of 0.2mg.    Pain Scale Score	At rest: ___ 	With Activity: ___ 	[X ] Refer to charted pain scores    THERAPY:    [ ] IV PCA Morphine		[ ] 5 mg/mL	[ ] 1 mg/mL  [X ] IV PCA Hydromorphone	[ ] 5 mg/mL	[X ] 1 mg/mL  [ ] IV PCA Fentanyl		[ ] 50 micrograms/mL    Demand dose __0.2_ lockout __6_ (minutes) Continuous Rate _0__ Total: __2.5_  mg used (in past 24 hours)      MEDICATIONS  (STANDING):  acetaminophen   IVPB .. 1000 milliGRAM(s) IV Intermittent once  dextrose 5%. 1000 milliLiter(s) (50 mL/Hr) IV Continuous <Continuous>  dextrose 5%. 1000 milliLiter(s) (100 mL/Hr) IV Continuous <Continuous>  dextrose 50% Injectable 25 Gram(s) IV Push once  dextrose 50% Injectable 12.5 Gram(s) IV Push once  dextrose 50% Injectable 25 Gram(s) IV Push once  glucagon  Injectable 1 milliGRAM(s) IntraMuscular once  heparin   Injectable 5000 Unit(s) SubCutaneous every 12 hours  HYDROmorphone PCA (1 mG/mL) 30 milliLiter(s) PCA Continuous PCA Continuous  insulin lispro (ADMELOG) corrective regimen sliding scale   SubCutaneous every 6 hours  lactated ringers. 1000 milliLiter(s) (75 mL/Hr) IV Continuous <Continuous>  pantoprazole  Injectable 40 milliGRAM(s) IV Push daily  sodium chloride 0.9% lock flush 3 milliLiter(s) IV Push Once    MEDICATIONS  (PRN):  dextrose Oral Gel 15 Gram(s) Oral once PRN Blood Glucose LESS THAN 70 milliGRAM(s)/deciliter  HYDROmorphone PCA (1 mG/mL) Rescue Clinician Bolus 0.5 milliGRAM(s) IV Push every 15 minutes PRN for Pain Scale GREATER THAN 6  lidocaine   4% Patch 1 Patch Transdermal daily PRN pain at the surgical site  nalbuphine Injectable 2.5 milliGRAM(s) IV Push every 6 hours PRN Pruritus  naloxone Injectable 0.1 milliGRAM(s) IV Push every 3 minutes PRN For ANY of the following changes in patient status:  A. RR LESS THAN 10 breaths per minute, B. Oxygen saturation LESS THAN 90%, C. Sedation score of 6  ondansetron Injectable 4 milliGRAM(s) IV Push every 6 hours PRN Nausea      OBJECTIVE:    Sedation Score:	[ X] Alert	[ ] Drowsy 	[ ] Arousable	[ ] Asleep	[ ] Unresponsive    Side Effects:	[X ] None	[ ] Nausea	[ ] Vomiting	[ ] Pruritus  		[ ] Other:    Vital Signs Last 24 Hrs  T(C): 36.9 (17 Aug 2023 12:00), Max: 37.1 (17 Aug 2023 04:00)  T(F): 98.4 (17 Aug 2023 12:00), Max: 98.7 (17 Aug 2023 04:00)  HR: 90 (17 Aug 2023 13:00) (76 - 108)  BP: 89/64 (17 Aug 2023 13:00) (89/64 - 146/79)  BP(mean): 73 (17 Aug 2023 13:00) (66 - 93)  RR: 14 (17 Aug 2023 13:00) (12 - 23)  SpO2: 98% (17 Aug 2023 13:00) (96% - 100%)    Parameters below as of 17 Aug 2023 13:00  Patient On (Oxygen Delivery Method): room air        ASSESSMENT/ PLAN    Therapy to  be:	[ X] Continue   [ ] Discontinued   [ ] Change to prn Analgesics    Documentation and Verification of current medications:   [X] Done	[ ] Not done, not elligible    Comments: Continue PCA. Demand dose increased to 0.3mg. Recommend non-opioid adjuvant analgesics to be used when possible and when allowed by primary surgical team.    Progress Note written now but Patient was seen earlier.
Anesthesia Pain Management Service    SUBJECTIVE: Pt doing well with IV PCA without problems reported.    Therapy:	  [ X] IV PCA	   [ ] Epidural           [ ] s/p Spinal Opoid              [ ] Postpartum infusion	  [ ] Patient controlled regional anesthesia (PCRA)    [ ] prn Analgesics    Allergies    No Known Allergies    Intolerances      MEDICATIONS  (STANDING):  acetaminophen   IVPB .. 1000 milliGRAM(s) IV Intermittent once  cefoTEtan  IVPB 2 Gram(s) IV Intermittent every 12 hours  dextrose 5%. 1000 milliLiter(s) (50 mL/Hr) IV Continuous <Continuous>  dextrose 5%. 1000 milliLiter(s) (100 mL/Hr) IV Continuous <Continuous>  dextrose 50% Injectable 25 Gram(s) IV Push once  dextrose 50% Injectable 12.5 Gram(s) IV Push once  dextrose 50% Injectable 25 Gram(s) IV Push once  glucagon  Injectable 1 milliGRAM(s) IntraMuscular once  heparin   Injectable 5000 Unit(s) SubCutaneous every 12 hours  HYDROmorphone PCA (1 mG/mL) 30 milliLiter(s) PCA Continuous PCA Continuous  insulin lispro (ADMELOG) corrective regimen sliding scale   SubCutaneous every 6 hours  lactated ringers. 1000 milliLiter(s) (75 mL/Hr) IV Continuous <Continuous>  pantoprazole  Injectable 40 milliGRAM(s) IV Push daily  sodium chloride 0.9% lock flush 3 milliLiter(s) IV Push Once    MEDICATIONS  (PRN):  dextrose Oral Gel 15 Gram(s) Oral once PRN Blood Glucose LESS THAN 70 milliGRAM(s)/deciliter  HYDROmorphone PCA (1 mG/mL) Rescue Clinician Bolus 0.5 milliGRAM(s) IV Push every 15 minutes PRN for Pain Scale GREATER THAN 6  lidocaine   4% Patch 1 Patch Transdermal daily PRN pain at the surgical site  nalbuphine Injectable 2.5 milliGRAM(s) IV Push every 6 hours PRN Pruritus  naloxone Injectable 0.1 milliGRAM(s) IV Push every 3 minutes PRN For ANY of the following changes in patient status:  A. RR LESS THAN 10 breaths per minute, B. Oxygen saturation LESS THAN 90%, C. Sedation score of 6  ondansetron Injectable 4 milliGRAM(s) IV Push every 6 hours PRN Nausea      OBJECTIVE:   [X] No new signs     [ ] Other:    Side Effects:  [X ] None			[ ] Other:    Assessment of Catheter Site:		[ ] Intact		[ ] Other:    ASSESSMENT/PLAN  [ X] Continue current therapy    [ ] Therapy changed to:    [ ] IV PCA       [ ] Epidural     [ ] prn Analgesics     Comments:
CHIEF COMPLAINT: FOLLOW UP IN ICU FOR POSTOPERATIVE CARE OF PATIENT WHO IS S/P Andrew Bahman esophagogastrectomy      PROCEDURES:     FB, EGD, Robotic RVATS, Andrew Bahman Esophagogastrectomy 8/15/2023      ISSUES:                  Esophageal cancer               Postop pain               Chest tube in place   HTN   DM-2   History of tachycardia   Chemotherapy-induced neuropathy   S/P cholecystectomy   H/O lithotripsy  History of chemotherapy  S/P radiation therapy      INTERVAL EVENTS:     Afebrile, stable oxygenation on room air, stable hemodyamics  Chest tube removed today, lexsu to bulb suction.  NPO on IVF  NGT to remain in place, 250ml bilious output in past 24h      HISTORY:   Patient reports moderate pain at chest and abdominal wall incision sites which is worse with coughing and deep breathing without associated fever or dyspnea. Pain is improved with use of pain meds.       PHYSICAL EXAM:   Gen: Comfortable, No acute distress  Eyes: Sclera white, Conjunctiva normal, Eyelids normal, Pupils symmetrical   ENT: Mucous membranes moist,  NGT in place  Neck: Trachea midline,  ,  ,  ,  ,  ,    CV: Rate regular, Rhythm regular,  ,  ,    Resp: Breath sounds clear, No accessory muscles use, 2 R chest tubes in place,  ,    Abd: Soft, Non-distended, Non-tender, Bowel sounds normal,  ,  ,    Skin: Warm, No peripheral edema of lower extremities,  ,    : No torres  Neuro: Moving all 4 extremities,    Psych: A&Ox3      ASSESSMENT AND PLAN:     NEURO:  Post-operative Pain - Stable. Pain control with PCA and Tylenol IV PRN.              RESPIRATORY:  Hypoxia - Wean nasal cannula for goal O2sat above 92. Obtain CXR. Incentive spirometry. Chest PT and frequent suctioning. Continue bronchodilators. OOB to chair & ambulate w/ assistance. Continuous pulse oximetry for support & to prevent decompensation.       Chest tube removed today    Right chest lexus to bulb suction - monitor output, serous and 20ml in past 24h          CARDIOVASCULAR:  Hemodynamically stable - Not on pressors. Continue hemodynamic monitoring.  Telemetry (medical test) - Reviewed by me today independently. Normal sinus rhythm.   HTN - not on any meds. Normtensive here in postop setting, continue to monitor          RENAL:  Stable - Monitor IOs and electrolytes. Keep K above 4.0 and Mg above 2.0. Maintenance IVF while NPO              GASTROINTESTINAL:  GI prophylaxis   Zofran and Reglan IV PRN for nausea  NPO  Barium swallow on 8/21  NGT to low continuous suction, monitor output            HEMATOLOGIC:  No signs of active bleeding. Monitor Hgb in CBC in AM  DVT prophylaxis with heparin subQ and SCDs.             INFECTIOUS DISEASE:  All surgical sites appear clean. No signs of active infection. Will monitor for fever and leukocytosis.             ENDOCRINE:  Stable – Monitor glucose fingersticks for goal 120-180.          ONCOLOGY:  Esophageal cancer - Received preop chemo/RT. Improved. S/P resection. Follow up final pathology.      Pertinent clinical, laboratory, radiographic, hemodynamic, echocardiographic, respiratory data, microbiologic data and chart were reviewed by myself and analyzed frequently throughout the course of the day and night by myself.    Plan discussed at length with the CTICU staff and Attending CT Surgeon -   Dr Horacio Avery    Patient's status was discussed with patient at bedside.    I have spent 50 minutes of time with this patient monitoring hemodynamic status, respiratory status, and coordinating care in the ICU.    _________________________  VITAL SIGNS:  Vital Signs Last 24 Hrs  T(C): 36.6 (19 Aug 2023 16:00), Max: 36.8 (18 Aug 2023 20:00)  T(F): 97.9 (19 Aug 2023 16:00), Max: 98.3 (18 Aug 2023 20:00)  HR: 99 (19 Aug 2023 15:00) (67 - 119)  BP: 131/91 (19 Aug 2023 15:00) (88/53 - 148/73)  BP(mean): 105 (19 Aug 2023 15:00) (66 - 105)  RR: 18 (19 Aug 2023 15:00) (11 - 24)  SpO2: 97% (19 Aug 2023 15:00) (95% - 100%)    Parameters below as of 19 Aug 2023 08:00  Patient On (Oxygen Delivery Method): room air      I/Os:   I&O's Detail    18 Aug 2023 07:01  -  19 Aug 2023 07:00  --------------------------------------------------------  IN:    dextrose 5% + lactated ringers: 1800 mL    Diltiazem: 125 mL    Enteral Tube Flush: 180 mL    IV PiggyBack: 150 mL    Lactated Ringers Bolus: 500 mL  Total IN: 2755 mL    OUT:    Bulb (mL): 10 mL    Chest Tube (mL): 40 mL    Nasogastric/Oral tube (mL): 500 mL    Voided (mL): 2700 mL  Total OUT: 3250 mL    Total NET: -495 mL      19 Aug 2023 07:01  -  19 Aug 2023 16:04  --------------------------------------------------------  IN:    dextrose 5% + lactated ringers: 675 mL    Enteral Tube Flush: 90 mL    IV PiggyBack: 300 mL  Total IN: 1065 mL    OUT:    Chest Tube (mL): 10 mL    Voided (mL): 900 mL  Total OUT: 910 mL    Total NET: 155 mL              MEDICATIONS:  MEDICATIONS  (STANDING):  acetaminophen   IVPB .. 1000 milliGRAM(s) IV Intermittent once  dextrose 5% + lactated ringers. 1000 milliLiter(s) (75 mL/Hr) IV Continuous <Continuous>  dextrose 5%. 1000 milliLiter(s) (100 mL/Hr) IV Continuous <Continuous>  dextrose 5%. 1000 milliLiter(s) (50 mL/Hr) IV Continuous <Continuous>  dextrose 50% Injectable 12.5 Gram(s) IV Push once  dextrose 50% Injectable 25 Gram(s) IV Push once  dextrose 50% Injectable 25 Gram(s) IV Push once  glucagon  Injectable 1 milliGRAM(s) IntraMuscular once  heparin   Injectable 5000 Unit(s) SubCutaneous every 12 hours  insulin lispro (ADMELOG) corrective regimen sliding scale   SubCutaneous every 6 hours  metoprolol tartrate Injectable 2.5 milliGRAM(s) IV Push every 6 hours  pantoprazole  Injectable 40 milliGRAM(s) IV Push daily  sodium chloride 0.9% lock flush 3 milliLiter(s) IV Push Once    MEDICATIONS  (PRN):  dextrose Oral Gel 15 Gram(s) Oral once PRN Blood Glucose LESS THAN 70 milliGRAM(s)/deciliter  HYDROmorphone  Injectable 0.25 milliGRAM(s) IV Push every 3 hours PRN Moderate Pain (4 - 6)  HYDROmorphone  Injectable 0.5 milliGRAM(s) IV Push every 3 hours PRN Severe Pain (7 - 10)  lidocaine   4% Patch 1 Patch Transdermal daily PRN pain at the surgical site  naloxone Injectable 0.1 milliGRAM(s) IV Push every 3 minutes PRN For ANY of the following changes in patient status:  A. RR LESS THAN 10 breaths per minute, B. Oxygen saturation LESS THAN 90%, C. Sedation score of 6  ondansetron Injectable 4 milliGRAM(s) IV Push every 6 hours PRN Nausea      LABS:  Laboratory data was independently reviewed by me today.                           10.1   7.84  )-----------( 127      ( 19 Aug 2023 02:36 )             30.6     08-19    135  |  98  |  4<L>  ----------------------------<  137<H>  3.3<L>   |  26  |  0.36<L>    Ca    8.4      19 Aug 2023 02:36  Phos  1.8     08-18  Mg     1.70     08-19            Urinalysis Basic - ( 19 Aug 2023 02:36 )    Color: x / Appearance: x / SG: x / pH: x  Gluc: 137 mg/dL / Ketone: x  / Bili: x / Urobili: x   Blood: x / Protein: x / Nitrite: x   Leuk Esterase: x / RBC: x / WBC x   Sq Epi: x / Non Sq Epi: x / Bacteria: x        RADIOLOGY:   Radiology images were independently reviewed by me today. Reports were reviewed by me today.    Xray Chest 1 View- PORTABLE-Routine:   ACC: [id number] EXAM:  XR CHEST PORTABLE ROUTINE 1V   ORDERED BY: ANTONELLA GODFREY     PROCEDURE DATE:  08/19/2023          INTERPRETATION:  CLINICAL INDICATION: postoperative follow-up    EXAM:  Single frontal chest from 8/19/2023 at 0536. Compared toprior study from   8/18/2023.    IMPRESSION:  Enteric tube again seen with tip below diaphragm in upper medial left   epigastric region with side port at GE junction level. Right pleural   chest tube again seen. Smaller less conspicuous peripheral right apical   pneumothorax. Redemonstrated right pneumoperitoneum.    Clear remaining visualized lungs.    Stable cardiac and mediastinal silhouettes.    Safety pin overlies peripheral lower left hemithorax.    --- End of Report ---            PETRA ROSA MD; Attending Radiologist  This document has been electronically signed. Aug 19 2023 12:21PM (08-19-23 @ 05:46)  Xray Chest 1 View- PORTABLE-Routine:   ACC: [id number] EXAM:  XR CHEST PORTABLE ROUTINE 1V   ORDERED BY: CLEMENTINE JUAREZ     PROCEDURE DATE:  08/18/2023          INTERPRETATION:  CLINICAL INFORMATION: Postop esophagectomy    TIME OF EXAMINATION: August 18, 2023 at 5:26 AM    EXAM: Portablechest    FINDINGS:  The enteric and chest tubes again seen. Subcutaneous emphysema   and postop pneumoperitoneum in addition to a tiny right pneumothorax is   seen.  Lungs are clear.        COMPARISON: August 17        IMPRESSION: Follow-up post esophagectomy with clear lungs and extra   alveolar air including pneumoperitoneum and tiny right pneumothorax.    --- End of Report ---            CUATE MAN MD; Attending Radiologist  This document has been electronically signed. Aug 18 2023 12:24PM (08-18-23 @ 05:46)  Xray Chest 1 View- PORTABLE-Routine:   ACC: [id number] EXAM:  XR CHEST PORTABLE ROUTINE 1V   ORDERED BY: ARMAAN OBRIEN     PROCEDURE DATE:  08/17/2023          INTERPRETATION:  CLINICAL INFORMATION: Follow-up    TIME OF EXAMINATION: August 17 at 5:32 AM    EXAM: Portable chest    FINDINGS:  Enteric and chest tubes unchanged. Lungs remain free of focal   consolidations. The heart is not enlarged. No pneumothorax.    Decreasing subcutaneous emphysema.        COMPARISON: August 16        IMPRESSION: Follow-up with clear lungs post esophagectomy.    --- End of Report ---            CUATE MAN MD; Attending Radiologist  This document has been electronically signed. Aug 17 2023  8:06PM (08-17-23 @ 06:01)  
CHIEF COMPLAINT: FOLLOW UP IN ICU FOR POSTOPERATIVE CARE OF PATIENT WHO IS S/P Andrew Bahman esophagogastrectomy      PROCEDURES:     FB, EGD, Robotic RVATS, Andrew Bahman Esophagogastrectomy 8/15/2023      ISSUES:                  Esophageal cancer               Postop pain               Chest tube in place   HTN   DM-2   History of tachycardia   Chemotherapy-induced neuropathy   S/P cholecystectomy   H/O lithotripsy  History of chemotherapy  S/P radiation therapy      INTERVAL EVENTS:     Afebrile, stable oxygenation on room air, stable hemodyamics  Passed barium swallow, starting on clear liquids.  Gabe to be left in place till tolerating clears.      HISTORY:   Patient reports moderate pain at chest and abdominal wall incision sites which is worse with coughing and deep breathing without associated fever or dyspnea. Pain is improved with use of pain meds.       PHYSICAL EXAM:   Gen: Comfortable, No acute distress  Eyes: Sclera white, Conjunctiva normal, Eyelids normal, Pupils symmetrical   ENT: Mucous membranes moist,  NGT in place  Neck: Trachea midline,  ,  ,  ,  ,  ,    CV: Rate regular, Rhythm regular,  ,  ,    Resp: Breath sounds clear, No accessory muscles use, 1 right chest tubes in place,  ,    Abd: Soft, Non-distended, Non-tender, Bowel sounds normal,  ,  ,    Skin: Warm, No peripheral edema of lower extremities,  ,    : No torres  Neuro: Moving all 4 extremities,    Psych: A&Ox3      ASSESSMENT AND PLAN:     NEURO:  Post-operative Pain - Stable. Pain control with PCA and Tylenol IV PRN.              RESPIRATORY:  Stable on room air - goal O2sat above 92. Obtain CXR. Incentive spirometry. Chest PT and frequent suctioning. Continue bronchodilators. OOB to chair & ambulate w/ assistance. Continuous pulse oximetry for support & to prevent decompensation.     Chest tube removed today    Right chest gabe to bulb suction - monitor output, serous.          CARDIOVASCULAR:  Hemodynamically stable - Not on pressors. Continue hemodynamic monitoring.  Telemetry (medical test) - Reviewed by me today independently. Normal sinus rhythm.   HTN - not on any meds. Normotensive here in postop setting, continue to monitor          RENAL:  Stable - Monitor IOs and electrolytes. Keep K above 4.0 and Mg above 2.0. Maintenance IVF while NPO              GASTROINTESTINAL:  GI prophylaxis   Zofran and Reglan IV PRN for nausea  Barium swallow today, passed, no leak. Starting clear liquid diet. Monitor gabe outpout  NGT removed            HEMATOLOGIC:  No signs of active bleeding. Monitor Hgb in CBC in AM  DVT prophylaxis with heparin subQ and SCDs.             INFECTIOUS DISEASE:  All surgical sites appear clean. No signs of active infection. Will monitor for fever and leukocytosis.             ENDOCRINE:  Stable – Monitor glucose fingersticks for goal 120-180.          ONCOLOGY:  Esophageal cancer - Received preop chemo/RT. Improved. S/P resection. Follow up final pathology.      Pertinent clinical, laboratory, radiographic, hemodynamic, echocardiographic, respiratory data, microbiologic data and chart were reviewed by myself and analyzed frequently throughout the course of the day and night by myself.    Plan discussed at length with the CTICU staff and Attending CT Surgeon -   Dr Horacio Avery    Patient's status was discussed with patient at bedside.    I have spent 50 minutes of time with this patient monitoring hemodynamic status, respiratory status, and coordinating care in the ICU.    _________________________  VITAL SIGNS:  Vital Signs Last 24 Hrs  T(C): 36.8 (21 Aug 2023 13:00), Max: 37.3 (20 Aug 2023 20:00)  T(F): 98.2 (21 Aug 2023 13:00), Max: 99.1 (20 Aug 2023 20:00)  HR: 94 (21 Aug 2023 13:00) (70 - 97)  BP: 128/79 (21 Aug 2023 13:00) (105/60 - 134/76)  BP(mean): 92 (21 Aug 2023 13:00) (75 - 94)  RR: 26 (21 Aug 2023 13:00) (10 - 26)  SpO2: 95% (21 Aug 2023 13:00) (94% - 100%)    Parameters below as of 21 Aug 2023 13:00  Patient On (Oxygen Delivery Method): room air      I/Os:   I&O's Detail    20 Aug 2023 07:01  -  21 Aug 2023 07:00  --------------------------------------------------------  IN:    dextrose 5% + lactated ringers: 1800 mL    IV PiggyBack: 750 mL  Total IN: 2550 mL    OUT:    Bulb (mL): 5 mL    Nasogastric/Oral tube (mL): 25 mL    Voided (mL): 2050 mL  Total OUT: 2080 mL    Total NET: 470 mL      21 Aug 2023 07:01  -  21 Aug 2023 13:20  --------------------------------------------------------  IN:    dextrose 5% + lactated ringers: 375 mL    IV PiggyBack: 100 mL  Total IN: 475 mL    OUT:    Bulb (mL): 20 mL    Voided (mL): 200 mL  Total OUT: 220 mL    Total NET: 255 mL              MEDICATIONS:  MEDICATIONS  (STANDING):  acetaminophen   IVPB .. 1000 milliGRAM(s) IV Intermittent once  dextrose 5% + lactated ringers. 1000 milliLiter(s) (75 mL/Hr) IV Continuous <Continuous>  dextrose 5%. 1000 milliLiter(s) (100 mL/Hr) IV Continuous <Continuous>  dextrose 5%. 1000 milliLiter(s) (50 mL/Hr) IV Continuous <Continuous>  dextrose 50% Injectable 12.5 Gram(s) IV Push once  dextrose 50% Injectable 25 Gram(s) IV Push once  dextrose 50% Injectable 25 Gram(s) IV Push once  glucagon  Injectable 1 milliGRAM(s) IntraMuscular once  heparin   Injectable 5000 Unit(s) SubCutaneous every 12 hours  insulin lispro (ADMELOG) corrective regimen sliding scale   SubCutaneous every 6 hours  metoprolol tartrate Injectable 2.5 milliGRAM(s) IV Push every 6 hours  pantoprazole  Injectable 40 milliGRAM(s) IV Push daily    MEDICATIONS  (PRN):  dextrose Oral Gel 15 Gram(s) Oral once PRN Blood Glucose LESS THAN 70 milliGRAM(s)/deciliter  HYDROmorphone  Injectable 0.25 milliGRAM(s) IV Push every 3 hours PRN Moderate Pain (4 - 6)  HYDROmorphone  Injectable 0.5 milliGRAM(s) IV Push every 3 hours PRN Severe Pain (7 - 10)  lidocaine   4% Patch 1 Patch Transdermal daily PRN pain at the surgical site  naloxone Injectable 0.1 milliGRAM(s) IV Push every 3 minutes PRN For ANY of the following changes in patient status:  A. RR LESS THAN 10 breaths per minute, B. Oxygen saturation LESS THAN 90%, C. Sedation score of 6  ondansetron Injectable 4 milliGRAM(s) IV Push every 6 hours PRN Nausea      LABS:  Laboratory data was independently reviewed by me today.                           10.0   5.19  )-----------( 141      ( 21 Aug 2023 03:00 )             30.1     08-21    136  |  100  |  4<L>  ----------------------------<  137<H>  3.3<L>   |  29  |  0.43<L>    Ca    8.6      21 Aug 2023 03:00  Phos  3.3     08-21  Mg     1.50     08-21            Urinalysis Basic - ( 21 Aug 2023 03:00 )    Color: x / Appearance: x / SG: x / pH: x  Gluc: 137 mg/dL / Ketone: x  / Bili: x / Urobili: x   Blood: x / Protein: x / Nitrite: x   Leuk Esterase: x / RBC: x / WBC x   Sq Epi: x / Non Sq Epi: x / Bacteria: x        RADIOLOGY:   Radiology images were independently reviewed by me today. Reports were reviewed by me today.    Xray Chest 1 View- PORTABLE-Routine:   ACC: [id number] EXAM:  XR CHEST PORTABLE ROUTINE 1V   ORDERED BY: BALDEMAR FRANZ     ACC: [id number] EXAM:  XR CHEST PORTABLE ROUTINE 1V   ORDERED BY: BALDEMAR FRANZ     PROCEDURE DATE:  08/20/2023          INTERPRETATION:  Chest one view 8/20/2023 6:17 AM    HISTORY: Postop esophagectomy    COMPARISON STUDY: 8/19/2023    Frontal expiratory view of the chest shows the heart to be normal in   size. Right chest tube, right upper quadrant clips and enteric tube are   again noted. Similar free air is seen under the right hemidiaphragm.    The lungs are clear with small left effusion and there is no evidence of   pneumothorax nor definite right pleural effusion.    Chest one view 8/21/2023 5:16 AM  Compared to the prior study, enteric tube has been removed. There is less   free air under the right hemidiaphragm.    IMPRESSION:  Decreasing free air.        Thank you for the courtesy of this referral.    --- End of Report ---            ANNA GRIFFIN MD; Attending Interventional Radiologist  This document has been electronically signed. Aug 21 2023 12:12PM (08-21-23 @ 05:40)  Xray Chest 1 View- PORTABLE-Routine:   ACC: [id number] EXAM:  XR CHEST PORTABLE ROUTINE 1V   ORDERED BY: BALDEMAR FRANZ     ACC: [id number] EXAM:  XR CHEST PORTABLE ROUTINE 1V   ORDERED BY: BALDEMAR FRANZ     PROCEDURE DATE:  08/20/2023          INTERPRETATION:  Chest one view 8/20/2023 6:17 AM    HISTORY: Postop esophagectomy    COMPARISON STUDY: 8/19/2023    Frontal expiratory view of the chest shows the heart to be normal in   size. Right chest tube, right upper quadrant clips and enteric tube are   again noted. Similar free air is seen under the right hemidiaphragm.    The lungs are clear with small left effusion and there is no evidence of   pneumothorax nor definite right pleural effusion.    Chest one view 8/21/2023 5:16 AM  Compared to the prior study, enteric tube has been removed. There is less   free air under the right hemidiaphragm.    IMPRESSION:  Decreasing free air.        Thank you for the courtesy of this referral.    --- End of Report ---            ANNA GRIFFIN MD; Attending Interventional Radiologist  This document has been electronically signed. Aug 21 2023 12:12PM (08-20-23 @ 06:37)  Xray Chest 1 View- PORTABLE-Urgent:   ******PRELIMINARY REPORT******      ******PRELIMINARY REPORT******         ACC: [id number] EXAM:  XR CHEST PORTABLE URGENT 1V   ORDERED BY: BALDEMAR FRANZ     PROCEDURE DATE:  08/19/2023    ******PRELIMINARY REPORT******      ******PRELIMINARY REPORT******           INTERPRETATION:  Right apical pneumothorax slightly increased.   Redemonstrated right pneumoperitoneum.        ******PRELIMINARY REPORT******      ******PRELIMINARY REPORT******       MELISSA GRAY MD; Resident Radiologist  This document is a PRELIMINARY interpretation and is pending final   attending approval. Aug 19 2023  8:16PM (08-19-23 @ 13:04)  
D TEAM Surgery Progress Note  Patient is a 63y old  Male who presents with a chief complaint of Malignant neoplasm of cardia of stomach (16 Aug 2023 13:15)    INTERVAL EVENTS: Patient is POD#2 s/p Andrew Bahman Esophagectomy. No acute events overnight.    SUBJECTIVE: Patient seen and examined at bedside with surgical team, patient without specific complaints. Abdominal pain and pain at chest tube site is tolerable. Remains NPO. Denies passing flatus nor BM. Has been OOB and ambulating. Denies fever, chills, CP, SOB nausea, vomiting.    OBJECTIVE:    Vital Signs Last 24 Hrs  T(C): 37.1 (17 Aug 2023 04:00), Max: 37.1 (17 Aug 2023 04:00)  T(F): 98.7 (17 Aug 2023 04:00), Max: 98.7 (17 Aug 2023 04:00)  HR: 100 (17 Aug 2023 07:00) (76 - 108)  BP: 123/71 (17 Aug 2023 07:00) (96/56 - 123/71)  BP(mean): 85 (17 Aug 2023 07:00) (70 - 90)  RR: 21 (17 Aug 2023 07:00) (12 - 21)  SpO2: 100% (17 Aug 2023 07:00) (96% - 100%)    Parameters below as of 17 Aug 2023 07:00  Patient On (Oxygen Delivery Method): room air    I&O's Detail    16 Aug 2023 07:01  -  17 Aug 2023 07:00  --------------------------------------------------------  IN:    Enteral Tube Flush: 180 mL    IV PiggyBack: 500 mL    Lactated Ringers: 1800 mL  Total IN: 2480 mL    OUT:    Bulb (mL): 20 mL    Chest Tube (mL): 240 mL    Indwelling Catheter - Urethral (mL): 220 mL    Nasogastric/Oral tube (mL): 250 mL    Voided (mL): 900 mL  Total OUT: 1630 mL    Total NET: 850 mL      MEDICATIONS  (STANDING):  acetaminophen   IVPB .. 1000 milliGRAM(s) IV Intermittent once  dextrose 5%. 1000 milliLiter(s) (50 mL/Hr) IV Continuous <Continuous>  dextrose 5%. 1000 milliLiter(s) (100 mL/Hr) IV Continuous <Continuous>  dextrose 50% Injectable 25 Gram(s) IV Push once  dextrose 50% Injectable 12.5 Gram(s) IV Push once  dextrose 50% Injectable 25 Gram(s) IV Push once  glucagon  Injectable 1 milliGRAM(s) IntraMuscular once  heparin   Injectable 5000 Unit(s) SubCutaneous every 12 hours  HYDROmorphone PCA (1 mG/mL) 30 milliLiter(s) PCA Continuous PCA Continuous  insulin lispro (ADMELOG) corrective regimen sliding scale   SubCutaneous every 6 hours  lactated ringers. 1000 milliLiter(s) (75 mL/Hr) IV Continuous <Continuous>  pantoprazole  Injectable 40 milliGRAM(s) IV Push daily  potassium chloride  10 mEq/100 mL IVPB 10 milliEquivalent(s) IV Intermittent every 1 hour  potassium phosphate IVPB 30 milliMole(s) IV Intermittent once  sodium chloride 0.9% lock flush 3 milliLiter(s) IV Push Once    MEDICATIONS  (PRN):  dextrose Oral Gel 15 Gram(s) Oral once PRN Blood Glucose LESS THAN 70 milliGRAM(s)/deciliter  HYDROmorphone PCA (1 mG/mL) Rescue Clinician Bolus 0.5 milliGRAM(s) IV Push every 15 minutes PRN for Pain Scale GREATER THAN 6  lidocaine   4% Patch 1 Patch Transdermal daily PRN pain at the surgical site  nalbuphine Injectable 2.5 milliGRAM(s) IV Push every 6 hours PRN Pruritus  naloxone Injectable 0.1 milliGRAM(s) IV Push every 3 minutes PRN For ANY of the following changes in patient status:  A. RR LESS THAN 10 breaths per minute, B. Oxygen saturation LESS THAN 90%, C. Sedation score of 6  ondansetron Injectable 4 milliGRAM(s) IV Push every 6 hours PRN Nausea      PHYSICAL EXAM:  Constitutional: A&Ox3, NAD  Respiratory: Unlabored breathing. CT in place draining straw colored fluid.  Abdomen: Soft, nondistended, NTTP. No rebound or guarding. Incisions C/D/I. NIMISHA SS.  Extremities: WWP, AZUL spontaneously    LABS:                        11.3   9.58  )-----------( 118      ( 17 Aug 2023 02:50 )             34.9     08-17    136  |  100  |  10  ----------------------------<  115<H>  3.5   |  27  |  0.43<L>    Ca    8.5      17 Aug 2023 02:50  Phos  1.6     08-17  Mg     1.80     08-17          Urinalysis Basic - ( 17 Aug 2023 02:50 )    Color: x / Appearance: x / SG: x / pH: x  Gluc: 115 mg/dL / Ketone: x  / Bili: x / Urobili: x   Blood: x / Protein: x / Nitrite: x   Leuk Esterase: x / RBC: x / WBC x   Sq Epi: x / Non Sq Epi: x / Bacteria: x          
Date of Service: 08-20-23 @ 10:59    Patient is a 63y old  Male who presents with a chief complaint of s/p [place] Bahman Esophagectomy (20 Aug 2023 09:14)      Any change in ROS: he seems to be doing ok : no sob:  no cough: on room air    MEDICATIONS  (STANDING):  acetaminophen   IVPB .. 1000 milliGRAM(s) IV Intermittent once  dextrose 5% + lactated ringers. 1000 milliLiter(s) (75 mL/Hr) IV Continuous <Continuous>  dextrose 5%. 1000 milliLiter(s) (100 mL/Hr) IV Continuous <Continuous>  dextrose 5%. 1000 milliLiter(s) (50 mL/Hr) IV Continuous <Continuous>  dextrose 50% Injectable 12.5 Gram(s) IV Push once  dextrose 50% Injectable 25 Gram(s) IV Push once  dextrose 50% Injectable 25 Gram(s) IV Push once  glucagon  Injectable 1 milliGRAM(s) IntraMuscular once  heparin   Injectable 5000 Unit(s) SubCutaneous every 12 hours  insulin lispro (ADMELOG) corrective regimen sliding scale   SubCutaneous every 6 hours  metoprolol tartrate Injectable 2.5 milliGRAM(s) IV Push every 6 hours  pantoprazole  Injectable 40 milliGRAM(s) IV Push daily    MEDICATIONS  (PRN):  dextrose Oral Gel 15 Gram(s) Oral once PRN Blood Glucose LESS THAN 70 milliGRAM(s)/deciliter  HYDROmorphone  Injectable 0.25 milliGRAM(s) IV Push every 3 hours PRN Moderate Pain (4 - 6)  HYDROmorphone  Injectable 0.5 milliGRAM(s) IV Push every 3 hours PRN Severe Pain (7 - 10)  lidocaine   4% Patch 1 Patch Transdermal daily PRN pain at the surgical site  naloxone Injectable 0.1 milliGRAM(s) IV Push every 3 minutes PRN For ANY of the following changes in patient status:  A. RR LESS THAN 10 breaths per minute, B. Oxygen saturation LESS THAN 90%, C. Sedation score of 6  ondansetron Injectable 4 milliGRAM(s) IV Push every 6 hours PRN Nausea    Vital Signs Last 24 Hrs  T(C): 37 (20 Aug 2023 08:00), Max: 37.3 (19 Aug 2023 20:00)  T(F): 98.6 (20 Aug 2023 08:00), Max: 99.2 (19 Aug 2023 20:00)  HR: 99 (20 Aug 2023 09:00) (76 - 119)  BP: 128/85 (20 Aug 2023 09:00) (115/70 - 148/73)  BP(mean): 96 (20 Aug 2023 09:00) (82 - 105)  RR: 22 (20 Aug 2023 09:00) (0 - 28)  SpO2: 98% (20 Aug 2023 09:00) (94% - 99%)    Parameters below as of 20 Aug 2023 08:00  Patient On (Oxygen Delivery Method): room air        I&O's Summary    19 Aug 2023 07:01  -  20 Aug 2023 07:00  --------------------------------------------------------  IN: 2530 mL / OUT: 2840 mL / NET: -310 mL          Physical Exam:   GENERAL: NAD, well-groomed, well-developed  HEENT: GAYLE/   Atraumatic, Normocephalic  ENMT: No tonsillar erythema, exudates, or enlargement; Moist mucous membranes, Good dentition, No lesions  NECK: Supple, No JVD, Normal thyroid  CHEST/LUNG: Clear to auscultaion  CVS: Regular rate and rhythm; No murmurs, rubs, or gallops  GI: : Soft, Nontender, Nondistended; Bowel sounds present  NERVOUS SYSTEM:  Alert & Oriented X3  EXTREMITIES:  - edema  LYMPH: No lymphadenopathy noted  SKIN: No rashes or lesions  ENDOCRINOLOGY: No Thyromegaly  PSYCH: Appropriate    Labs:                              10.6   6.06  )-----------( 152      ( 20 Aug 2023 02:45 )             31.9                         10.1   7.84  )-----------( 127      ( 19 Aug 2023 02:36 )             30.6                         10.7   8.55  )-----------( 114      ( 18 Aug 2023 04:40 )             32.9                         11.3   9.58  )-----------( 118      ( 17 Aug 2023 02:50 )             34.9     08-20    136  |  98  |  3<L>  ----------------------------<  140<H>  3.4<L>   |  28  |  0.45<L>  08-19    135  |  98  |  4<L>  ----------------------------<  137<H>  3.3<L>   |  26  |  0.36<L>  08-18    136  |  99  |  8   ----------------------------<  104<H>  3.9   |  25  |  0.42<L>  08-17    136  |  100  |  10  ----------------------------<  115<H>  3.5   |  27  |  0.43<L>    Ca    8.9      20 Aug 2023 02:45  Ca    8.4      19 Aug 2023 02:36  Phos  2.3     08-20  Mg     1.50     08-20  Mg     1.70     08-19      CAPILLARY BLOOD GLUCOSE      POCT Blood Glucose.: 123 mg/dL (20 Aug 2023 05:28)  POCT Blood Glucose.: 126 mg/dL (19 Aug 2023 23:11)  POCT Blood Glucose.: 137 mg/dL (19 Aug 2023 17:54)  POCT Blood Glucose.: 134 mg/dL (19 Aug 2023 12:15)          Urinalysis Basic - ( 20 Aug 2023 02:45 )    Color: x / Appearance: x / SG: x / pH: x  Gluc: 140 mg/dL / Ketone: x  / Bili: x / Urobili: x   Blood: x / Protein: x / Nitrite: x   Leuk Esterase: x / RBC: x / WBC x   Sq Epi: x / Non Sq Epi: x / Bacteria: x      rad< from: Xray Chest 1 View- PORTABLE-Urgent (Xray Chest 1 View- PORTABLE-Urgent .) (08.19.23 @ 13:04) >        ACC: [id number] EXAM:  XR CHEST PORTABLE URGENT 1V   ORDERED BY: BALDEMAR FRANZ     PROCEDURE DATE:  08/19/2023    ******PRELIMINARY REPORT******      ******PRELIMINARY REPORT******           INTERPRETATION:  Right apical pneumothorax slightly increased.   Redemonstrated right pneumoperitoneum.        ******PRELIMINARY REPORT******      ******PRELIMINARY REPORT******       MELISSA GRAY MD; Resident Radiologist  This document is a PRELIMINARY interpretation and is pending final   attending approval. Aug 19 2023  8:16PM    < end of copied text >        RECENT CULTURES:        RESPIRATORY CULTURES:          Studies  Chest X-RAY  CT SCAN Chest   Venous Dopplers: LE:   CT Abdomen  Others              
Date of Service: 08-22-23 @ 11:31    Patient is a 63y old  Male who presents with a chief complaint of Baxter Bahman esophagectomy (21 Aug 2023 13:16)      Any change in ROS: seems pretty good:  no sob: ; no cough : no phlegm      MEDICATIONS  (STANDING):  acetaminophen   IVPB .. 1000 milliGRAM(s) IV Intermittent once  dextrose 5%. 1000 milliLiter(s) (50 mL/Hr) IV Continuous <Continuous>  dextrose 5%. 1000 milliLiter(s) (100 mL/Hr) IV Continuous <Continuous>  dextrose 50% Injectable 25 Gram(s) IV Push once  dextrose 50% Injectable 12.5 Gram(s) IV Push once  dextrose 50% Injectable 25 Gram(s) IV Push once  glucagon  Injectable 1 milliGRAM(s) IntraMuscular once  heparin   Injectable 5000 Unit(s) SubCutaneous once  insulin lispro (ADMELOG) corrective regimen sliding scale   SubCutaneous every 6 hours  metoprolol tartrate 12.5 milliGRAM(s) Oral two times a day  pantoprazole  Injectable 40 milliGRAM(s) IV Push daily    MEDICATIONS  (PRN):  dextrose Oral Gel 15 Gram(s) Oral once PRN Blood Glucose LESS THAN 70 milliGRAM(s)/deciliter  HYDROmorphone  Injectable 0.25 milliGRAM(s) IV Push every 3 hours PRN Moderate Pain (4 - 6)  HYDROmorphone  Injectable 0.5 milliGRAM(s) IV Push every 3 hours PRN Severe Pain (7 - 10)  lidocaine   4% Patch 1 Patch Transdermal daily PRN pain at the surgical site  naloxone Injectable 0.1 milliGRAM(s) IV Push every 3 minutes PRN For ANY of the following changes in patient status:  A. RR LESS THAN 10 breaths per minute, B. Oxygen saturation LESS THAN 90%, C. Sedation score of 6  ondansetron Injectable 4 milliGRAM(s) IV Push every 6 hours PRN Nausea    Vital Signs Last 24 Hrs  T(C): 36.9 (22 Aug 2023 08:00), Max: 36.9 (21 Aug 2023 20:00)  T(F): 98.4 (22 Aug 2023 08:00), Max: 98.5 (21 Aug 2023 20:00)  HR: 83 (22 Aug 2023 09:00) (71 - 95)  BP: 116/83 (22 Aug 2023 09:00) (105/64 - 128/79)  BP(mean): 94 (22 Aug 2023 09:00) (68 - 94)  RR: 10 (22 Aug 2023 09:00) (10 - 28)  SpO2: 98% (22 Aug 2023 09:00) (94% - 100%)    Parameters below as of 22 Aug 2023 10:00  Patient On (Oxygen Delivery Method): room air        I&O's Summary    21 Aug 2023 07:01  -  22 Aug 2023 07:00  --------------------------------------------------------  IN: 1800 mL / OUT: 1900 mL / NET: -100 mL    22 Aug 2023 07:01  -  22 Aug 2023 11:31  --------------------------------------------------------  IN: 440 mL / OUT: 200 mL / NET: 240 mL          Physical Exam:   GENERAL: NAD, well-groomed, well-developed  HEENT: GAYLE/   Atraumatic, Normocephalic  ENMT: No tonsillar erythema, exudates, or enlargement; Moist mucous membranes, Good dentition, No lesions  NECK: Supple, No JVD, Normal thyroid  CHEST/LUNG: Clear to auscultaion, ; No rales, rhonchi, wheezing, or rubs  CVS: Regular rate and rhythm; No murmurs, rubs, or gallops  GI: : Soft, Nontender, Nondistended; Bowel sounds present  NERVOUS SYSTEM:  Alert & Oriented X3  EXTREMITIES:  2+ Peripheral Pulses, No clubbing, cyanosis, or edema  LYMPH: No lymphadenopathy noted  SKIN: No rashes or lesions  ENDOCRINOLOGY: No Thyromegaly  PSYCH: Appropriate    Labs:                              10.3   5.02  )-----------( 145      ( 22 Aug 2023 04:20 )             31.6                         10.0   5.19  )-----------( 141      ( 21 Aug 2023 03:00 )             30.1                         10.6   6.06  )-----------( 152      ( 20 Aug 2023 02:45 )             31.9                         10.1   7.84  )-----------( 127      ( 19 Aug 2023 02:36 )             30.6     08-22    137  |  97<L>  |  4<L>  ----------------------------<  105<H>  3.3<L>   |  28  |  0.45<L>  08-21    136  |  100  |  4<L>  ----------------------------<  137<H>  3.3<L>   |  29  |  0.43<L>  08-20    136  |  98  |  3<L>  ----------------------------<  140<H>  3.4<L>   |  28  |  0.45<L>  08-19    135  |  98  |  4<L>  ----------------------------<  137<H>  3.3<L>   |  26  |  0.36<L>    Ca    8.6      22 Aug 2023 04:20  Ca    8.6      21 Aug 2023 03:00  Phos  3.1     08-22  Phos  3.3     08-21  Mg     1.50     08-22  Mg     1.50     08-21      CAPILLARY BLOOD GLUCOSE      POCT Blood Glucose.: 118 mg/dL (22 Aug 2023 05:58)  POCT Blood Glucose.: 120 mg/dL (21 Aug 2023 23:33)  POCT Blood Glucose.: 176 mg/dL (21 Aug 2023 17:16)      rad< from: Xray Esophagram Single Contrast (08.21.23 @ 12:16) >  FINDINGS:  A  radiograph of the chest demonstrates a drain overlying the   mediastinum and upper abdomen, and a surgical clip overlying the left   upper quadrant. Small amount of free air under the right hemidiaphragm,   similar to the radiograph performed earlier today and likely postsurgical.    The patientswallowed water-soluble contrast without difficulty.   Postoperative changes consistent with Baxter Bahman esophagogastrectomy.   Relative luminal narrowing at what appears to be the level of the   esophagogastric anastomosis, which could be due to postoperative edema,   with contrast passing freely into the stomach and into the small bowel   without delay. No extraluminal oral contrast identified to suggest leak.    IMPRESSION:  *  Status post Baxter Bahman esophagogastrectomy.  *  No evidence for bowel leak.  *  Mild narrowing at the esophagogastric anastomosis, which could be due   to postoperative edema. Oral contrast passes freely through this point   without evidence for obstruction.    --- End of Report ---          LYNN UP MD; Resident Radiologist  This document has been electronically signed.  ALEXX SOTO MD; Attending Radiologist  This document has been electronically signed. Aug 21 2023  2:14PM    < end of copied text >      Urinalysis Basic - ( 22 Aug 2023 04:20 )    Color: x / Appearance: x / SG: x / pH: x  Gluc: 105 mg/dL / Ketone: x  / Bili: x / Urobili: x   Blood: x / Protein: x / Nitrite: x   Leuk Esterase: x / RBC: x / WBC x   Sq Epi: x / Non Sq Epi: x / Bacteria: x            RECENT CULTURES:        RESPIRATORY CULTURES:          Studies  Chest X-RAY  CT SCAN Chest   Venous Dopplers: LE:   CT Abdomen  Others              
Date of Service: 08-23-23 @ 11:29    Patient is a 63y old  Male who presents with a chief complaint of [place] Bahman esophagectomy (21 Aug 2023 13:16)      Any change in ROS: moving arround pretty good:  no sob:  on room air:  for dc today     MEDICATIONS  (STANDING):  acetaminophen   IVPB .. 1000 milliGRAM(s) IV Intermittent once  dextrose 50% Injectable 25 Gram(s) IV Push once  enoxaparin Injectable 40 milliGRAM(s) SubCutaneous every 24 hours  insulin lispro (ADMELOG) corrective regimen sliding scale   SubCutaneous Before meals and at bedtime  metoprolol tartrate 12.5 milliGRAM(s) Oral two times a day    MEDICATIONS  (PRN):  dextrose Oral Gel 15 Gram(s) Oral once PRN Blood Glucose LESS THAN 70 milliGRAM(s)/deciliter  HYDROmorphone  Injectable 0.25 milliGRAM(s) IV Push every 3 hours PRN Moderate Pain (4 - 6)  HYDROmorphone  Injectable 0.5 milliGRAM(s) IV Push every 3 hours PRN Severe Pain (7 - 10)  lidocaine   4% Patch 1 Patch Transdermal daily PRN pain at the surgical site  naloxone Injectable 0.1 milliGRAM(s) IV Push every 3 minutes PRN For ANY of the following changes in patient status:  A. RR LESS THAN 10 breaths per minute, B. Oxygen saturation LESS THAN 90%, C. Sedation score of 6  ondansetron Injectable 4 milliGRAM(s) IV Push every 6 hours PRN Nausea    Vital Signs Last 24 Hrs  T(C): 36.8 (23 Aug 2023 08:00), Max: 37.1 (22 Aug 2023 12:00)  T(F): 98.3 (23 Aug 2023 08:00), Max: 98.7 (22 Aug 2023 12:00)  HR: 97 (23 Aug 2023 08:00) (80 - 122)  BP: 110/98 (23 Aug 2023 08:00) (106/62 - 124/76)  BP(mean): 104 (23 Aug 2023 08:00) (74 - 104)  RR: 21 (23 Aug 2023 08:00) (12 - 23)  SpO2: 98% (23 Aug 2023 08:00) (96% - 100%)    Parameters below as of 23 Aug 2023 08:00  Patient On (Oxygen Delivery Method): room air        I&O's Summary    22 Aug 2023 07:01  -  23 Aug 2023 07:00  --------------------------------------------------------  IN: 920 mL / OUT: 2170 mL / NET: -1250 mL    23 Aug 2023 07:01  -  23 Aug 2023 11:29  --------------------------------------------------------  IN: 390 mL / OUT: 200 mL / NET: 190 mL          Physical Exam:   GENERAL: NAD, well-groomed, well-developed  HEENT: GAYLE/   Atraumatic, Normocephalic  ENMT: No tonsillar erythema, exudates, or enlargement; Moist mucous membranes, Good dentition, No lesions  NECK: Supple, No JVD, Normal thyroid  CHEST/LUNG: Clear to auscultaion,  CVS: Regular rate and rhythm; No murmurs, rubs, or gallops  GI: : Soft, Nontender, Nondistended; Bowel sounds present  NERVOUS SYSTEM:  Alert & Oriented X3  EXTREMITIES:  2+ Peripheral Pulses, No clubbing, cyanosis, or edema  LYMPH: No lymphadenopathy noted  SKIN: No rashes or lesions  ENDOCRINOLOGY: No Thyromegaly  PSYCH: Appropriate    Labs:                              10.4   5.44  )-----------( 154      ( 23 Aug 2023 03:45 )             31.0                         10.3   5.02  )-----------( 145      ( 22 Aug 2023 04:20 )             31.6                         10.0   5.19  )-----------( 141      ( 21 Aug 2023 03:00 )             30.1                         10.6   6.06  )-----------( 152      ( 20 Aug 2023 02:45 )             31.9     08-23    136  |  96<L>  |  4<L>  ----------------------------<  100<H>  3.7   |  30  |  0.52  08-22    137  |  97<L>  |  4<L>  ----------------------------<  105<H>  3.3<L>   |  28  |  0.45<L>  08-21    136  |  100  |  4<L>  ----------------------------<  137<H>  3.3<L>   |  29  |  0.43<L>  08-20    136  |  98  |  3<L>  ----------------------------<  140<H>  3.4<L>   |  28  |  0.45<L>    Ca    8.6      23 Aug 2023 03:45  Ca    8.6      22 Aug 2023 04:20  Phos  3.4     08-23  Phos  3.1     08-22  Mg     1.60     08-23  Mg     1.50     08-22      CAPILLARY BLOOD GLUCOSE      POCT Blood Glucose.: 87 mg/dL (23 Aug 2023 07:49)  POCT Blood Glucose.: 103 mg/dL (22 Aug 2023 21:42)  POCT Blood Glucose.: 115 mg/dL (22 Aug 2023 16:51)  POCT Blood Glucose.: 114 mg/dL (22 Aug 2023 11:57)          Urinalysis Basic - ( 23 Aug 2023 03:45 )    Color: x / Appearance: x / SG: x / pH: x  Gluc: 100 mg/dL / Ketone: x  / Bili: x / Urobili: x   Blood: x / Protein: x / Nitrite: x   Leuk Esterase: x / RBC: x / WBC x   Sq Epi: x / Non Sq Epi: x / Bacteria: x        rad< from: Xray Chest 1 View- PORTABLE-Routine (Xray Chest 1 View- PORTABLE-Routine in AM.) (08.22.23 @ 05:39) >    ACC: [id number] EXAM:  XR CHEST PORTABLE ROUTINE 1V   ORDERED BY: CHRISTIANO CARRERA     ACC: [id number] EXAM:  XR CHEST PORTABLE URGENT 1V   ORDERED BY: BALDEMAR FRANZ     PROCEDURE DATE:  08/19/2023          INTERPRETATION:  EXAMINATION: XR CHEST URGENT, XR CHEST 8/19/2023 12:33 PM    CLINICAL INDICATION: Status post esophagectomy r/o ptx    TECHNIQUE: Single frontal, portable view of the chest was obtained.    COMPARISON: Chest x-ray 8/19/2023.    FINDINGS:  Overlying tubes and leads unchanged from prior study  The heart is normal in size.  Small right apical pneumothorax.  The lungs are otherwise clear.  There is no pleural effusion.  No acute bony abnormality.    Chest one view 8/22/2023 5:15 AM  Compared to the prior study, right apical pneumothorax is smaller. Less   air noted under the right hemidiaphragm.    IMPRESSION:  Resolving right pneumothorax.    --- End of Report ---          TRISHA SYKES MD; Resident Radiologist  This document has been electronically signed.  ANNA GRIFFIN MD; Attending Interventional Radiologist  This document has been electronically signed. Aug 22 2023 12:34PM    < end of copied text >      RECENT CULTURES:        RESPIRATORY CULTURES:          Studies  Chest X-RAY  CT SCAN Chest   Venous Dopplers: LE:   CT Abdomen  Others              
TJ HAYWOOD                     MRN-[id number]    HPI:  64 y/o male presents to [facility] preop for robotic assisted laparoscopic, laparoscopic MYCHAL Bahman esophagogastrectomy esophagogastroduodenoscopy, possible open, Dr. Avery- Robotic assisted MYCHAL Bahman esophagogastrectomy, possible open. Pt reported dysphagia and unintentional weight loss. An upper EUS in Feb 2023 revealed esophageal lesion. A biopsy confirmed invasive adenocarcinoma. Pt s/p chemotherapy and radiation therapy, completed on 5/15/23.  (08 Aug 2023 10:56)    CHIEF COMPLAINT: FOLLOW UP IN ICU FOR POSTOPERATIVE CARE OF PATIENT WHO IS S/P [place] Bahman esophagogastrectomy      PROCEDURES:     FB, EGD, Robotic RVATS, [place] Bahman Esophagogastrectomy 8/15/2023      ISSUES:                  Esophageal cancer               Postop pain               Chest tube in place   HTN   DM-2   History of tachycardia   Chemotherapy-induced neuropathy   S/P cholecystectomy   H/O lithotripsy  History of chemotherapy  S/P radiation therapy    PAST MEDICAL & SURGICAL HISTORY:  Esophageal mass      HTN (hypertension)      Esophageal cancer      Type 2 diabetes mellitus      Malignant neoplasm of cardia      History of hypotension      History of tachycardia      Chemotherapy-induced neuropathy      S/P cholecystectomy      H/O lithotripsy      H/O endoscopy      History of chemotherapy      S/P radiation therapy      Esophageal stenosis      History of esophagogastroduodenoscopy (EGD)                VITAL SIGNS:  Vital Signs Last 24 Hrs  T(C): 37 (20 Aug 2023 08:00), Max: 37.3 (19 Aug 2023 20:00)  T(F): 98.6 (20 Aug 2023 08:00), Max: 99.2 (19 Aug 2023 20:00)  HR: 99 (20 Aug 2023 09:00) (76 - 119)  BP: 128/85 (20 Aug 2023 09:00) (115/70 - 148/73)  BP(mean): 96 (20 Aug 2023 09:00) (82 - 105)  RR: 22 (20 Aug 2023 09:00) (0 - 28)  SpO2: 98% (20 Aug 2023 09:00) (94% - 99%)    Parameters below as of 20 Aug 2023 08:00  Patient On (Oxygen Delivery Method): room air        I/Os:   I&O's Detail    19 Aug 2023 07:01  -  20 Aug 2023 07:00  --------------------------------------------------------  IN:    dextrose 5% + lactated ringers: 1800 mL    Enteral Tube Flush: 180 mL    IV PiggyBack: 550 mL  Total IN: 2530 mL    OUT:    Bulb (mL): 30 mL    Chest Tube (mL): 10 mL    Nasogastric/Oral tube (mL): 250 mL    Voided (mL): 2550 mL  Total OUT: 2840 mL    Total NET: -310 mL          CAPILLARY BLOOD GLUCOSE      POCT Blood Glucose.: 123 mg/dL (20 Aug 2023 05:28)  POCT Blood Glucose.: 126 mg/dL (19 Aug 2023 23:11)  POCT Blood Glucose.: 137 mg/dL (19 Aug 2023 17:54)  POCT Blood Glucose.: 134 mg/dL (19 Aug 2023 12:15)      =======================MEDICATIONS===================  MEDICATIONS  (STANDING):  acetaminophen   IVPB .. 1000 milliGRAM(s) IV Intermittent once  dextrose 5% + lactated ringers. 1000 milliLiter(s) (75 mL/Hr) IV Continuous <Continuous>  dextrose 5%. 1000 milliLiter(s) (100 mL/Hr) IV Continuous <Continuous>  dextrose 5%. 1000 milliLiter(s) (50 mL/Hr) IV Continuous <Continuous>  dextrose 50% Injectable 12.5 Gram(s) IV Push once  dextrose 50% Injectable 25 Gram(s) IV Push once  dextrose 50% Injectable 25 Gram(s) IV Push once  glucagon  Injectable 1 milliGRAM(s) IntraMuscular once  heparin   Injectable 5000 Unit(s) SubCutaneous every 12 hours  insulin lispro (ADMELOG) corrective regimen sliding scale   SubCutaneous every 6 hours  metoprolol tartrate Injectable 2.5 milliGRAM(s) IV Push every 6 hours  pantoprazole  Injectable 40 milliGRAM(s) IV Push daily    MEDICATIONS  (PRN):  dextrose Oral Gel 15 Gram(s) Oral once PRN Blood Glucose LESS THAN 70 milliGRAM(s)/deciliter  HYDROmorphone  Injectable 0.5 milliGRAM(s) IV Push every 3 hours PRN Severe Pain (7 - 10)  HYDROmorphone  Injectable 0.25 milliGRAM(s) IV Push every 3 hours PRN Moderate Pain (4 - 6)  lidocaine   4% Patch 1 Patch Transdermal daily PRN pain at the surgical site  naloxone Injectable 0.1 milliGRAM(s) IV Push every 3 minutes PRN For ANY of the following changes in patient status:  A. RR LESS THAN 10 breaths per minute, B. Oxygen saturation LESS THAN 90%, C. Sedation score of 6  ondansetron Injectable 4 milliGRAM(s) IV Push every 6 hours PRN Nausea    PHYSICAL EXAM============================  General:                         Awake, alert, not in any distress  Neuro:                            Moving all extremities to commands.   Respiratory:	Air entry fair and  bilateral conducted sounds                                           Effort even and unlabored.  CV:		Regular rate and rhythm. Normal S1/S2                                          Distal pulses present.  Abdomen:	                     Soft, non-distended. Bowel sounds present   Skin:		No rash.  Extremities:	Warm, no cyanosis or edema.  Palpable pulses    ============================LABS=========================                        10.6   6.06  )-----------( 152      ( 20 Aug 2023 02:45 )             31.9     08-20    136  |  98  |  3<L>  ----------------------------<  140<H>  3.4<L>   |  28  |  0.45<L>    Ca    8.9      20 Aug 2023 02:45  Phos  2.3     08-20  Mg     1.50     08-20      Urinalysis Basic - ( 20 Aug 2023 02:45 )    Color: x / Appearance: x / SG: x / pH: x  Gluc: 140 mg/dL / Ketone: x  / Bili: x / Urobili: x   Blood: x / Protein: x / Nitrite: x   Leuk Esterase: x / RBC: x / WBC x   Sq Epi: x / Non Sq Epi: x / Bacteria: x        ASSESSMENT AND PLAN:     NEURO:  Post-operative Pain - Stable. Pain control with PCA and Tylenol IV PRN.           RESPIRATORY:  Hypoxia - Wean nasal cannula for goal O2sat above 92. Obtain CXR. Incentive spirometry. Chest PT and frequent suctioning. Continue bronchodilators. OOB to chair & ambulate w/ assistance. Continuous pulse oximetry for support & to prevent decompensation.       Chest tube removed today    Right chest lexus to bulb suction - monitor output, serous and 20ml in past 24h          CARDIOVASCULAR:  Hemodynamically stable - Not on pressors. Continue hemodynamic monitoring.  Telemetry (medical test) - Reviewed by me today independently. Normal sinus rhythm.   HTN - not on any meds. Normtensive here in postop setting, continue to monitor          RENAL:  Stable - Monitor IOs and electrolytes. Keep K above 4.0 and Mg above 2.0. Maintenance IVF while NPO       GASTROINTESTINAL:  GI prophylaxis   Zofran and Reglan IV PRN for nausea  NPO  Barium swallow on 8/21  NGT to low continuous suction, monitor output            HEMATOLOGIC:  No signs of active bleeding. Monitor Hgb in CBC in AM  DVT prophylaxis with heparin subQ and SCDs.             INFECTIOUS DISEASE:  All surgical sites appear clean. No signs of active infection. Will monitor for fever and leukocytosis.             ENDOCRINE:  Stable – Monitor glucose fingersticks for goal 120-180.          ONCOLOGY:  Esophageal cancer - Received preop chemo/RT. Improved. S/P resection. Follow up final pathology.      Pertinent clinical, laboratory, radiographic, hemodynamic, echocardiographic, respiratory data, microbiologic data and chart were reviewed by myself and analyzed frequently throughout the course of the day and night by myself.    Plan discussed at length with the CTICU staff and Attending CT Surgeon -   Dr Horacio vAery    Patient's status was discussed with patient at bedside.    I have spent 40 minutes of time with this patient monitoring hemodynamic status, respiratory status, and coordinating care in the ICU.      Melinda DOYLEP    
TJ HAYWOOD            MRN-[id number]         No Known Allergies               HPI:  64 y/o male presents to [facility] preop for robotic assisted laparoscopic, laparoscopic ANDREW Bahman esophagogastrectomy esophagogastroduodenoscopy, possible open, Dr. Avery- Robotic assisted ANDREW Bahman esophagogastrectomy, possible open. Pt reported dysphagia and unintentional weight loss. An upper EUS in Feb 2023 revealed esophageal lesion. A biopsy confirmed invasive adenocarcinoma. Pt s/p chemotherapy and radiation therapy, completed on 5/15/23.  (08 Aug 2023 10:56)    CHIEF COMPLAINT: Follow up in ICU  for postoperative care of patient who is s/p     Procedure: FB, EGD, Robotic RVATS, Andrew Bahman Esophagogastrectomy 8/15/2023    Issues:               Esophageal cancer               Postop pain               Chest tube in place   HTN   DM-2   History of tachycardia   Chemotherapy-induced neuropathy   S/P cholecystectomy   H/O lithotripsy  History of chemotherapy  S/P radiation therapy      Postop course:     Patient reports moderate pain at chest wall incision sites which is worse with coughing and deep breathing without associated fever or dyspnea. Pain is improved with use of PCA and  IV Tylenol.         PAST MEDICAL & SURGICAL HISTORY:  Esophageal mass      HTN (hypertension)      Esophageal cancer      Type 2 diabetes mellitus      Malignant neoplasm of cardia      History of hypotension      History of tachycardia      Chemotherapy-induced neuropathy      S/P cholecystectomy      H/O lithotripsy      H/O endoscopy      History of chemotherapy      S/P radiation therapy      Esophageal stenosis      History of esophagogastroduodenoscopy (EGD)          ICU Vital Signs Last 24 Hrs  T(C): 36.1 (15 Aug 2023 19:51), Max: 36.6 (15 Aug 2023 08:56)  T(F): 96.9 (15 Aug 2023 19:51), Max: 97.9 (15 Aug 2023 08:56)  HR: 107 (15 Aug 2023 20:01) (99 - 107)  BP: 130/82 (15 Aug 2023 19:56) (130/82 - 133/75)  BP(mean): 97 (15 Aug 2023 19:56) (97 - 97)  ABP: 129/69 (15 Aug 2023 20:01) (127/67 - 129/69)  ABP(mean): 94 (15 Aug 2023 20:01) (93 - 94)  RR: 13 (15 Aug 2023 20:01) (7 - 16)  SpO2: 100% (15 Aug 2023 20:01) (100% - 100%)      I&O's Detail    CAPILLARY BLOOD GLUCOSE      POCT Blood Glucose.: 94 mg/dL (15 Aug 2023 09:13)    Home Medications:    MEDICATIONS  (STANDING):  cefoTEtan  IVPB 2 Gram(s) IV Intermittent every 12 hours  dextrose 5% + lactated ringers. 1000 milliLiter(s) (75 mL/Hr) IV Continuous <Continuous>  dextrose 5%. 1000 milliLiter(s) (50 mL/Hr) IV Continuous <Continuous>  dextrose 5%. 1000 milliLiter(s) (100 mL/Hr) IV Continuous <Continuous>  dextrose 50% Injectable 25 Gram(s) IV Push once  dextrose 50% Injectable 12.5 Gram(s) IV Push once  dextrose 50% Injectable 25 Gram(s) IV Push once  glucagon  Injectable 1 milliGRAM(s) IntraMuscular once  heparin   Injectable 5000 Unit(s) SubCutaneous every 12 hours  HYDROmorphone PCA (1 mG/mL) 30 milliLiter(s) PCA Continuous PCA Continuous  insulin lispro (ADMELOG) corrective regimen sliding scale   SubCutaneous every 6 hours  lactated ringers. 1000 milliLiter(s) (30 mL/Hr) IV Continuous <Continuous>  pantoprazole  Injectable 40 milliGRAM(s) IV Push daily  sodium chloride 0.9% lock flush 3 milliLiter(s) IV Push Once    MEDICATIONS  (PRN):  dextrose Oral Gel 15 Gram(s) Oral once PRN Blood Glucose LESS THAN 70 milliGRAM(s)/deciliter  HYDROmorphone PCA (1 mG/mL) Rescue Clinician Bolus 0.5 milliGRAM(s) IV Push every 15 minutes PRN for Pain Scale GREATER THAN 6  nalbuphine Injectable 2.5 milliGRAM(s) IV Push every 6 hours PRN Pruritus  naloxone Injectable 0.1 milliGRAM(s) IV Push every 3 minutes PRN For ANY of the following changes in patient status:  A. RR LESS THAN 10 breaths per minute, B. Oxygen saturation LESS THAN 90%, C. Sedation score of 6  ondansetron Injectable 4 milliGRAM(s) IV Push every 6 hours PRN Nausea        Physical exam:                             General:               Pt is awake, alert, not in any distress                                                  Neuro:                  Nonfocal                             Cardiovascular:   S1 & S2, regular                           Respiratory:         Air entry is fair and equal on both sides, has bilateral conducted sounds                           GI:                          Soft, nondistended and nontender, Bowel sounds absent                            Ext:                        No cyanosis or edema     Labs:                                                                 CXR:    Plan:  General: 63yMale s/p FB, EGD, Robotic RVATS, [facility] Esophagogastrectomy 8/15/2023,  appears fairly comfortable, complaining of chest pain with deep breathing.                             Neuro:                                         Pain control with PCA / IV Tylenol                            Cardiovascular:                                          Continue hemodynamic monitoring.                                        Continue IVF D5LR 75cc/hr                            Respiratory:                                         Postop hypoxemia requiring O2 via nasal cannula - Wean nasal cannula for goal O2 sat above 92%.                                              Obtain CXR. Incentive spirometry.                                                   Chest PT and frequent suctioning. Continue bronchodilators, pulmozyme and inhaled 3% saline                                                      OOB to chair & ambulate w/ assistance.                                                           Continuous pulse oximetry for support & to prevent decompensation.                                         Monitor chest tube output                                         Chest tube to suction                             GI                                          NPO                                           Continue GI prophylaxis with  Protonix                                          Continue Zofran / Reglan for nausea - PRN                                          NGT to low continuous wall suction                                           Flush both NGT and J-tube with 30cc of sterile water Q 4hrs.  	                                                                 Renal:                                          Continue D5LR 75cc/hr                                          Monitor I/Os and electrolytes                                          Coronel                                                  Hem/ Onc:                                                                                   Monitor chest tube output &  signs of bleeding.                                           Follow CBC in AM                           Infectious disease:                                             Monitor for fever / leukocytosis.                                           All surgical incision / chest tube  sites look clean                            Endocrine                                              DM-2: Continue Accu-Checks with coverage.     Pt is on SQ Heparin and Venodyne boots for DVT prophylaxis.     Pertinent clinical, laboratory, radiographic, hemodynamic, echocardiographic, respiratory data, microbiologic data and chart were reviewed and analyzed frequently throughout the course of the day and night  Patient seen, examined and plan discussed with CT Surgeon Dr. Avery / CTICU team during rounds.    Status discussed with patient  / family at bedside  and  updated plan of care.     I have spent 40 minutes with this patient including 20 minutes of time coordinating care in the ICU.              Juan Antonio Martin MD                                                                    
TJ HAYWOOD  MRN-[id number]    HPI:  62 y/o male presents to [facility] preop for robotic assisted laparoscopic, laparoscopic MYCHAL Bahman esophagogastrectomy esophagogastroduodenoscopy, possible open, Dr. Avery- Robotic assisted MYCHAL Bahman esophagogastrectomy, possible open. Pt reported dysphagia and unintentional weight loss. An upper EUS in Feb 2023 revealed esophageal lesion. A biopsy confirmed invasive adenocarcinoma. Pt s/p chemotherapy and radiation therapy, completed on 5/15/23.  (08 Aug 2023 10:56)      CHIEF COMPLAINT: FOLLOW UP IN ICU FOR POSTOPERATIVE CARE OF PATIENT WHO IS S/P [place] Bahman esophagogastrectomy      PROCEDURES:     FB, EGD, Robotic RVATS, [place] Bahman Esophagogastrectomy 8/15/2023      ISSUES:                  Esophageal cancer               Postop pain               Chest tube in place   HTN   DM-2   History of tachycardia   Chemotherapy-induced neuropathy   S/P cholecystectomy   H/O lithotripsy  History of chemotherapy  S/P radiation therapy    PAST MEDICAL & SURGICAL HISTORY:  Esophageal mass      HTN (hypertension)      Esophageal cancer      Type 2 diabetes mellitus      Malignant neoplasm of cardia      History of hypotension      History of tachycardia      Chemotherapy-induced neuropathy      S/P cholecystectomy      H/O lithotripsy      H/O endoscopy      History of chemotherapy      S/P radiation therapy      Esophageal stenosis      History of esophagogastroduodenoscopy (EGD)          ***VITAL SIGNS:  Vital Signs Last 24 Hrs  T(C): 36.7 (18 Aug 2023 08:00), Max: 37 (17 Aug 2023 20:00)  T(F): 98 (18 Aug 2023 08:00), Max: 98.6 (17 Aug 2023 20:00)  HR: 83 (18 Aug 2023 10:00) (70 - 109)  BP: 118/72 (18 Aug 2023 10:00) (89/64 - 146/79)  BP(mean): 87 (18 Aug 2023 10:00) (66 - 98)  RR: 15 (18 Aug 2023 10:00) (11 - 23)  SpO2: 99% (18 Aug 2023 10:00) (93% - 100%)    Parameters below as of 18 Aug 2023 10:00  Patient On (Oxygen Delivery Method): room air      I/Os:   I&O's Detail    17 Aug 2023 07:01  -  18 Aug 2023 07:00  --------------------------------------------------------  IN:    dextrose 5% + lactated ringers: 375 mL    Enteral Tube Flush: 180 mL    IV PiggyBack: 765 mL    Lactated Ringers: 1425 mL  Total IN: 2745 mL    OUT:    Bulb (mL): 10 mL    Chest Tube (mL): 140 mL    Nasogastric/Oral tube (mL): 450 mL    Voided (mL): 850 mL  Total OUT: 1450 mL    Total NET: 1295 mL      18 Aug 2023 07:01  -  18 Aug 2023 10:56  --------------------------------------------------------  IN:    dextrose 5% + lactated ringers: 225 mL    Enteral Tube Flush: 30 mL    Lactated Ringers Bolus: 500 mL  Total IN: 755 mL    OUT:    Bulb (mL): 0 mL    Chest Tube (mL): 0 mL    Voided (mL): 300 mL  Total OUT: 300 mL    Total NET: 455 mL        CAPILLARY BLOOD GLUCOSE      POCT Blood Glucose.: 95 mg/dL (18 Aug 2023 06:01)  POCT Blood Glucose.: 81 mg/dL (17 Aug 2023 23:08)  POCT Blood Glucose.: 91 mg/dL (17 Aug 2023 17:49)  POCT Blood Glucose.: 105 mg/dL (17 Aug 2023 12:47)      ======================= PHYSICAL EXAM============================  General:                         Awake, alert, not in any distress  Neuro:                            Moving all extremities to commands. No acute change from baseline exam.	  Respiratory:	Lungs clear to auscultation bilaterally. Good aeration.                                           No rales, rhonchi. Effort even and unlabored.  CV:		Regular rate and rhythm. Normal S1/S2. No murmurs                                          Distal pulses present.  Abdomen:	                     Soft, non-distended. Bowel sounds present / absent.   Skin:		No rash.  Extremities:	Warm, no cyanosis or edema.  Palpable pulses    ============================ LABS =========================                        10.7   8.55  )-----------( 114      ( 18 Aug 2023 04:40 )             32.9     08-18    136  |  99  |  8   ----------------------------<  104<H>  3.9   |  25  |  0.42<L>    Ca    8.2<L>      18 Aug 2023 04:40  Phos  1.8     08-18  Mg     1.60     08-18            Urinalysis Basic - ( 18 Aug 2023 04:40 )    Color: x / Appearance: x / SG: x / pH: x  Gluc: 104 mg/dL / Ketone: x  / Bili: x / Urobili: x   Blood: x / Protein: x / Nitrite: x   Leuk Esterase: x / RBC: x / WBC x   Sq Epi: x / Non Sq Epi: x / Bacteria: x          =======================  MEDICATIONS  =================  MEDICATIONS  (STANDING):  acetaminophen   IVPB .. 1000 milliGRAM(s) IV Intermittent once  dextrose 5% + lactated ringers. 1000 milliLiter(s) (75 mL/Hr) IV Continuous <Continuous>  dextrose 5%. 1000 milliLiter(s) (50 mL/Hr) IV Continuous <Continuous>  dextrose 5%. 1000 milliLiter(s) (100 mL/Hr) IV Continuous <Continuous>  dextrose 50% Injectable 25 Gram(s) IV Push once  dextrose 50% Injectable 12.5 Gram(s) IV Push once  dextrose 50% Injectable 25 Gram(s) IV Push once  glucagon  Injectable 1 milliGRAM(s) IntraMuscular once  heparin   Injectable 5000 Unit(s) SubCutaneous every 12 hours  insulin lispro (ADMELOG) corrective regimen sliding scale   SubCutaneous every 6 hours  pantoprazole  Injectable 40 milliGRAM(s) IV Push daily  sodium chloride 0.9% lock flush 3 milliLiter(s) IV Push Once    MEDICATIONS  (PRN):  dextrose Oral Gel 15 Gram(s) Oral once PRN Blood Glucose LESS THAN 70 milliGRAM(s)/deciliter  HYDROmorphone  Injectable 0.25 milliGRAM(s) IV Push every 3 hours PRN Moderate Pain (4 - 6)  HYDROmorphone  Injectable 0.5 milliGRAM(s) IV Push every 3 hours PRN Severe Pain (7 - 10)  lidocaine   4% Patch 1 Patch Transdermal daily PRN pain at the surgical site  naloxone Injectable 0.1 milliGRAM(s) IV Push every 3 minutes PRN For ANY of the following changes in patient status:  A. RR LESS THAN 10 breaths per minute, B. Oxygen saturation LESS THAN 90%, C. Sedation score of 6  ondansetron Injectable 4 milliGRAM(s) IV Push every 6 hours PRN Nausea          ASSESSMENT AND PLAN:     NEURO:  Post-operative Pain - Stable. Pain control with PCA and Tylenol IV PRN.          RESPIRATORY:  Hypoxia - Wean nasal cannula for goal O2sat above 92. Obtain CXR. Incentive spirometry. Chest PT and frequent suctioning. Continue bronchodilators. OOB to chair & ambulate w/ assistance. Continuous pulse oximetry for support & to prevent decompensation.       Chest tube – Pleurevac regulated water seal. Monitor chest tube output.    Right chest lexus to bulb suction - monitor output, serous and 20ml in past 24h          CARDIOVASCULAR:  Hemodynamically stable - Not on pressors. Continue hemodynamic monitoring.  Telemetry (medical test) - Reviewed by me today independently. Normal sinus rhythm.   HTN - not on any meds. Normtensive here in postop setting, continue to monitor          RENAL:  Stable - Monitor IOs and electrolytes. Keep K above 4.0 and Mg above 2.0. Maintenance IVF while NPO       GASTROINTESTINAL:  GI prophylaxis   Zofran and Reglan IV PRN for nausea  NPO, IVF  Barium swallow on 8/21  NGT to low continuous suction, monitor output   [place]arte TF to gaol rate          HEMATOLOGIC:  No signs of active bleeding. Monitor Hgb in CBC in AM  DVT prophylaxis with heparin subQ and SCDs.             INFECTIOUS DISEASE:  All surgical sites appear clean. No signs of active infection. Will monitor for fever and leukocytosis.             ENDOCRINE:  Stable – Monitor glucose fingersticks for goal 120-180.          ONCOLOGY:  Esophageal cancer - Received preop chemo/RT. Improved. S/P resection. Follow up final pathology.      Pertinent clinical, laboratory, radiographic, hemodynamic, echocardiographic, respiratory data, microbiologic data and chart were reviewed by myself and analyzed frequently throughout the course of the day and night by myself.    Plan discussed at length with the CTICU staff and Attending CT Surgeon -   Dr Horacio Avery    Patient's status was discussed with patient at bedside.    I have spent 40 minutes of time with this patient monitoring hemodynamic status, respiratory status, and coordinating care in the ICU.    Annieupnathanael Herr DO, FACEP
Anesthesia Pain Management Service    SUBJECTIVE: Patient is doing well with IV PCA and no significant problems reported.    Pain Scale Score	At rest: _1/10__ 	With Activity: ___ 	[X ] Refer to charted pain scores    THERAPY:    [ ] IV PCA Morphine		[ ] 5 mg/mL	[ ] 1 mg/mL  [X ] IV PCA Hydromorphone	[ ] 5 mg/mL	[X ] 1 mg/mL  [ ] IV PCA Fentanyl		[ ] 50 micrograms/mL    Demand dose __0.2_ lockout __6_ (minutes) Continuous Rate _0__ Total: 0.9___   mg used (in past 24 hrs)      MEDICATIONS  (STANDING):  acetaminophen   IVPB .. 1000 milliGRAM(s) IV Intermittent once  dextrose 5% + lactated ringers. 1000 milliLiter(s) (75 mL/Hr) IV Continuous <Continuous>  dextrose 5%. 1000 milliLiter(s) (50 mL/Hr) IV Continuous <Continuous>  dextrose 5%. 1000 milliLiter(s) (100 mL/Hr) IV Continuous <Continuous>  dextrose 50% Injectable 25 Gram(s) IV Push once  dextrose 50% Injectable 12.5 Gram(s) IV Push once  dextrose 50% Injectable 25 Gram(s) IV Push once  glucagon  Injectable 1 milliGRAM(s) IntraMuscular once  heparin   Injectable 5000 Unit(s) SubCutaneous every 12 hours  insulin lispro (ADMELOG) corrective regimen sliding scale   SubCutaneous every 6 hours  pantoprazole  Injectable 40 milliGRAM(s) IV Push daily  sodium chloride 0.9% lock flush 3 milliLiter(s) IV Push Once    MEDICATIONS  (PRN):  dextrose Oral Gel 15 Gram(s) Oral once PRN Blood Glucose LESS THAN 70 milliGRAM(s)/deciliter  HYDROmorphone  Injectable 0.25 milliGRAM(s) IV Push every 3 hours PRN Moderate Pain (4 - 6)  HYDROmorphone  Injectable 0.5 milliGRAM(s) IV Push every 3 hours PRN Severe Pain (7 - 10)  lidocaine   4% Patch 1 Patch Transdermal daily PRN pain at the surgical site  naloxone Injectable 0.1 milliGRAM(s) IV Push every 3 minutes PRN For ANY of the following changes in patient status:  A. RR LESS THAN 10 breaths per minute, B. Oxygen saturation LESS THAN 90%, C. Sedation score of 6  ondansetron Injectable 4 milliGRAM(s) IV Push every 6 hours PRN Nausea      OBJECTIVE: Patient sitting up in chair. NG tube in place.    Sedation Score:	[ X] Alert	[ ] Drowsy 	[ ] Arousable	[ ] Asleep	[ ] Unresponsive    Side Effects:	[X ] None	[ ] Nausea	[ ] Vomiting	[ ] Pruritus  		[ ] Other:    Vital Signs Last 24 Hrs  T(C): 36.7 (18 Aug 2023 08:00), Max: 37 (17 Aug 2023 20:00)  T(F): 98 (18 Aug 2023 08:00), Max: 98.6 (17 Aug 2023 20:00)  HR: 84 (18 Aug 2023 11:00) (70 - 109)  BP: 124/70 (18 Aug 2023 11:00) (89/64 - 131/75)  BP(mean): 86 (18 Aug 2023 11:00) (66 - 98)  RR: 16 (18 Aug 2023 11:00) (11 - 22)  SpO2: 98% (18 Aug 2023 11:00) (93% - 100%)    Parameters below as of 18 Aug 2023 11:00  Patient On (Oxygen Delivery Method): room air        ASSESSMENT/ PLAN    Therapy to  be:	[ ] Continue   [ X] Discontinued   [X ] Change to prn Analgesics    Documentation and Verification of current medications:   [X] Done	[ ] Not done, not elligible    Comments: IV PCA discontinued. Plan discussed with primary team. PRN Oral/IV opioids and/or Adjuvant non-opioid medication to be ordered at this point.    Progress Note written now but Patient was seen earlier.
Anesthesia Pain Management Service    SUBJECTIVE: Patient is doing well with IV PCA and no significant problems reported.    Pain Scale Score	At rest: _5/10__ 	With Activity: ___ 	[X ] Refer to charted pain scores    THERAPY:    [ ] IV PCA Morphine		[ ] 5 mg/mL	[ ] 1 mg/mL  [X ] IV PCA Hydromorphone	[ ] 5 mg/mL	[X ] 1 mg/mL  [ ] IV PCA Fentanyl		[ ] 50 micrograms/mL    Demand dose __0.2_ lockout __6_ (minutes) Continuous Rate _0__ Total: _2.6__  mg used (in past 24 hours)      MEDICATIONS  (STANDING):  acetaminophen   IVPB .. 1000 milliGRAM(s) IV Intermittent once  cefoTEtan  IVPB 2 Gram(s) IV Intermittent every 12 hours  dextrose 5%. 1000 milliLiter(s) (50 mL/Hr) IV Continuous <Continuous>  dextrose 5%. 1000 milliLiter(s) (100 mL/Hr) IV Continuous <Continuous>  dextrose 50% Injectable 25 Gram(s) IV Push once  dextrose 50% Injectable 12.5 Gram(s) IV Push once  dextrose 50% Injectable 25 Gram(s) IV Push once  glucagon  Injectable 1 milliGRAM(s) IntraMuscular once  heparin   Injectable 5000 Unit(s) SubCutaneous every 12 hours  HYDROmorphone PCA (1 mG/mL) 30 milliLiter(s) PCA Continuous PCA Continuous  insulin lispro (ADMELOG) corrective regimen sliding scale   SubCutaneous every 6 hours  lactated ringers. 1000 milliLiter(s) (75 mL/Hr) IV Continuous <Continuous>  pantoprazole  Injectable 40 milliGRAM(s) IV Push daily  sodium chloride 0.9% lock flush 3 milliLiter(s) IV Push Once    MEDICATIONS  (PRN):  dextrose Oral Gel 15 Gram(s) Oral once PRN Blood Glucose LESS THAN 70 milliGRAM(s)/deciliter  HYDROmorphone PCA (1 mG/mL) Rescue Clinician Bolus 0.5 milliGRAM(s) IV Push every 15 minutes PRN for Pain Scale GREATER THAN 6  lidocaine   4% Patch 1 Patch Transdermal daily PRN pain at the surgical site  nalbuphine Injectable 2.5 milliGRAM(s) IV Push every 6 hours PRN Pruritus  naloxone Injectable 0.1 milliGRAM(s) IV Push every 3 minutes PRN For ANY of the following changes in patient status:  A. RR LESS THAN 10 breaths per minute, B. Oxygen saturation LESS THAN 90%, C. Sedation score of 6  ondansetron Injectable 4 milliGRAM(s) IV Push every 6 hours PRN Nausea      OBJECTIVE: Patient sitting up in chair. NG tube and CTX1 in place.    Sedation Score:	[ X] Alert	[ ] Drowsy 	[ ] Arousable	[ ] Asleep	[ ] Unresponsive    Side Effects:	[X ] None	[ ] Nausea	[ ] Vomiting	[ ] Pruritus  		[ ] Other:    Vital Signs Last 24 Hrs  T(C): 36.7 (16 Aug 2023 08:00), Max: 36.9 (16 Aug 2023 04:00)  T(F): 98 (16 Aug 2023 08:00), Max: 98.5 (16 Aug 2023 04:00)  HR: 89 (16 Aug 2023 11:00) (81 - 109)  BP: 135/81 (15 Aug 2023 20:15) (130/82 - 135/81)  BP(mean): 97 (15 Aug 2023 20:15) (97 - 97)  RR: 12 (16 Aug 2023 11:00) (12 - 20)  SpO2: 98% (16 Aug 2023 11:00) (98% - 100%)    Parameters below as of 16 Aug 2023 11:00  Patient On (Oxygen Delivery Method): room air        ASSESSMENT/ PLAN    Therapy to  be:	[ X] Continue   [ ] Discontinued   [ ] Change to prn Analgesics    Documentation and Verification of current medications:   [X] Done	[ ] Not done, not elligible    Comments: Continue IV PCA. Recommend non-opioid adjuvant analgesics to be used when possible and when allowed by primary surgical team.    Progress Note written now but Patient was seen earlier.
Date of Service: 08-17-23 @ 15:26    Patient is a 63y old  Male who presents with a chief complaint of Larrabee Bahman esophagectomy (17 Aug 2023 12:33)      Any change in ROS: doing pretty good: no sob:  no cough : no phlegm      MEDICATIONS  (STANDING):  acetaminophen   IVPB .. 1000 milliGRAM(s) IV Intermittent once  dextrose 5%. 1000 milliLiter(s) (50 mL/Hr) IV Continuous <Continuous>  dextrose 5%. 1000 milliLiter(s) (100 mL/Hr) IV Continuous <Continuous>  dextrose 50% Injectable 25 Gram(s) IV Push once  dextrose 50% Injectable 12.5 Gram(s) IV Push once  dextrose 50% Injectable 25 Gram(s) IV Push once  glucagon  Injectable 1 milliGRAM(s) IntraMuscular once  heparin   Injectable 5000 Unit(s) SubCutaneous every 12 hours  HYDROmorphone PCA (1 mG/mL) 30 milliLiter(s) PCA Continuous PCA Continuous  insulin lispro (ADMELOG) corrective regimen sliding scale   SubCutaneous every 6 hours  lactated ringers. 1000 milliLiter(s) (75 mL/Hr) IV Continuous <Continuous>  pantoprazole  Injectable 40 milliGRAM(s) IV Push daily  sodium chloride 0.9% lock flush 3 milliLiter(s) IV Push Once    MEDICATIONS  (PRN):  dextrose Oral Gel 15 Gram(s) Oral once PRN Blood Glucose LESS THAN 70 milliGRAM(s)/deciliter  HYDROmorphone PCA (1 mG/mL) Rescue Clinician Bolus 0.5 milliGRAM(s) IV Push every 15 minutes PRN for Pain Scale GREATER THAN 6  lidocaine   4% Patch 1 Patch Transdermal daily PRN pain at the surgical site  nalbuphine Injectable 2.5 milliGRAM(s) IV Push every 6 hours PRN Pruritus  naloxone Injectable 0.1 milliGRAM(s) IV Push every 3 minutes PRN For ANY of the following changes in patient status:  A. RR LESS THAN 10 breaths per minute, B. Oxygen saturation LESS THAN 90%, C. Sedation score of 6  ondansetron Injectable 4 milliGRAM(s) IV Push every 6 hours PRN Nausea    Vital Signs Last 24 Hrs  T(C): 36.9 (17 Aug 2023 12:00), Max: 37.1 (17 Aug 2023 04:00)  T(F): 98.4 (17 Aug 2023 12:00), Max: 98.7 (17 Aug 2023 04:00)  HR: 88 (17 Aug 2023 15:00) (76 - 108)  BP: 112/66 (17 Aug 2023 15:00) (89/64 - 146/79)  BP(mean): 80 (17 Aug 2023 15:00) (66 - 93)  RR: 14 (17 Aug 2023 15:00) (12 - 23)  SpO2: 99% (17 Aug 2023 15:00) (96% - 100%)    Parameters below as of 17 Aug 2023 15:00  Patient On (Oxygen Delivery Method): room air        I&O's Summary    16 Aug 2023 07:01  -  17 Aug 2023 07:00  --------------------------------------------------------  IN: 2480 mL / OUT: 1630 mL / NET: 850 mL    17 Aug 2023 07:01  -  17 Aug 2023 15:26  --------------------------------------------------------  IN: 1175 mL / OUT: 600 mL / NET: 575 mL          Physical Exam:   GENERAL: NAD, well-groomed, well-developed  HEENT: GAYLE/   Atraumatic, Normocephalic  ENMT: No tonsillar erythema, exudates, or enlargement; Moist mucous membranes, Good dentition, No lesions  NECK: Supple, No JVD, Normal thyroid  CHEST/LUNG: Clear to auscultaion,  CVS: Regular rate and rhythm; No murmurs, rubs, or gallops  GI: : Soft, Nontender, Nondistended; Bowel sounds present  NERVOUS SYSTEM:  Alert & Oriented X3  EXTREMITIES:  2+ Peripheral Pulses, No clubbing, cyanosis, or edema  LYMPH: No lymphadenopathy noted  SKIN: No rashes or lesions  ENDOCRINOLOGY: No Thyromegaly  PSYCH: Appropriate    Labs:  ABG - ( 15 Aug 2023 17:19 )  pH, Arterial: 7.22  pH, Blood: x     /  pCO2: 57    /  pO2: 168   / HCO3: 23    / Base Excess: -4.9  /  SaO2: 99.8                                        11.3   9.58  )-----------( 118      ( 17 Aug 2023 02:50 )             34.9                         11.9   11.92 )-----------( 133      ( 16 Aug 2023 04:29 )             35.5                         11.8   13.01 )-----------( 128      ( 15 Aug 2023 20:13 )             37.1     08-17    136  |  100  |  10  ----------------------------<  115<H>  3.5   |  27  |  0.43<L>  08-16    136  |  99  |  15  ----------------------------<  225<H>  3.8   |  24  |  0.59  08-15    136  |  99  |  13  ----------------------------<  183<H>  4.1   |  21<L>  |  0.57    Ca    8.5      17 Aug 2023 02:50  Ca    8.6      16 Aug 2023 04:29  Ca    8.7      15 Aug 2023 20:13  Phos  1.6     08-17  Phos  2.8     08-16  Phos  3.9     08-15  Mg     1.80     08-17  Mg     2.30     08-16  Mg     1.50     08-15      CAPILLARY BLOOD GLUCOSE      POCT Blood Glucose.: 105 mg/dL (17 Aug 2023 12:47)  POCT Blood Glucose.: 108 mg/dL (17 Aug 2023 06:16)  POCT Blood Glucose.: 115 mg/dL (16 Aug 2023 23:30)  POCT Blood Glucose.: 144 mg/dL (16 Aug 2023 17:31)          Urinalysis Basic - ( 17 Aug 2023 02:50 )    Color: x / Appearance: x / SG: x / pH: x  Gluc: 115 mg/dL / Ketone: x  / Bili: x / Urobili: x   Blood: x / Protein: x / Nitrite: x   Leuk Esterase: x / RBC: x / WBC x   Sq Epi: x / Non Sq Epi: x / Bacteria: x      rad< from: US Duplex Venous Lower Ext Complete, Bilateral (08.17.23 @ 10:55) >    RIGHT:  Normal compressibility of the RIGHT common femoral, femoral and popliteal   veins.  Doppler examination shows normal spontaneous and phasic flow.  No RIGHT calf vein thrombosis is detected.    LEFT:  Normal compressibility of the LEFT common femoral, femoral and popliteal   veins.  Doppler examination shows normal spontaneous and phasic flow.  No LEFT calf vein thrombosis is detected.    IMPRESSION:  No evidence of deep venous thrombosis in either lower extremity.            --- End of Report ---            MARGA ONTIVEROS MD; Attending Radiologist  This document has been electronically signed. Aug 17 2023 11:11AM    < end of copied text >  < from: Xray Chest 1 View AP/PA (08.16.23 @ 09:40) >      INTERPRETATION:  EXAM: XR CHEST    INDICATION: Admitting Dxs: C16.0 C16.0 s/p robotic kathleen bahman   esophagectomy for esophageal cancer LXR    COMPARISON: See below    IMPRESSION:    Portable chest August 16 at 9:23 AM: Exam is compared to earlier exam   August 15 at 9:05 PM.  medical apparatus unchanged. No pneumothorax. Some   subcutaneous emphysema still seen in the right neck and right lateral   chest. Pneumomediastinum is still suggested. Some increased density   behind the heart. Continued follow-up suggested.    --- End of Report ---            SABIHA LESTER MD; Attending Radiologist  This document has been electronically signed. Aug 16 2023 12:00PM    < end of copied text >        RECENT CULTURES:        RESPIRATORY CULTURES:          Studies  Chest X-RAY  CT SCAN Chest   Venous Dopplers: LE:   CT Abdomen  Others              
POST ANESTHESIA EVALUATION    63y Male POSTOP DAY 1 S/P     MENTAL STATUS: Patient participation [ x ] Awake     [  ] Arousable     [  ] Sedated    AIRWAY PATENCY: [x  ] Satisfactory  [  ] Other:     Vital Signs Last 24 Hrs  T(C): 36.9 (16 Aug 2023 12:00), Max: 36.9 (16 Aug 2023 04:00)  T(F): 98.4 (16 Aug 2023 12:00), Max: 98.5 (16 Aug 2023 04:00)  HR: 89 (16 Aug 2023 13:00) (81 - 109)  BP: 135/81 (15 Aug 2023 20:15) (130/82 - 135/81)  BP(mean): 97 (15 Aug 2023 20:15) (97 - 97)  RR: 14 (16 Aug 2023 13:00) (12 - 20)  SpO2: 100% (16 Aug 2023 13:00) (98% - 100%)    Parameters below as of 16 Aug 2023 13:00  Patient On (Oxygen Delivery Method): room air      I&O's Summary    15 Aug 2023 07:01  -  16 Aug 2023 07:00  --------------------------------------------------------  IN: 1315 mL / OUT: 1375 mL / NET: -60 mL    16 Aug 2023 07:01  -  16 Aug 2023 14:35  --------------------------------------------------------  IN: 535 mL / OUT: 380 mL / NET: 155 mL          NAUSEA/ VOMITTING:  [x  ] NONE  [  ] CONTROLLED [  ] OTHER     PAIN: [x  ] CONTROLLED WITH CURRENT REGIMEN  [  ] OTHER    x[  ] NO APPARENT ANESTHESIA COMPLICATIONS      Comments: 
Surgery Daily Progress Note  =====================================================  Interval / Overnight Events: Complained of nausea overnight - now improved.      HPI:  Patient is a 63 year old male with a PMHx of HTN and DM2 who presented to pre-surgical testing with diagnosis of esophageal mass. (08 Aug 2023 10:56)      PAST MEDICAL & SURGICAL HISTORY:  Esophageal mass  HTN (hypertension)  Esophageal cancer  Type 2 diabetes mellitus  Malignant neoplasm of cardia  History of hypotension  History of tachycardia  Chemotherapy-induced neuropathy  S/P cholecystectomy  H/O lithotripsy  H/O endoscopy  History of chemotherapy  S/P radiation therapy  Esophageal stenosis  History of esophagogastroduodenoscopy (EGD)      ALLERGIES:  No Known Allergies    --------------------------------------------------------------------------------------    MEDICATIONS:    Neurologic Medications  acetaminophen   IVPB .. 1000 milliGRAM(s) IV Intermittent once  HYDROmorphone PCA (1 mG/mL) 30 milliLiter(s) PCA Continuous PCA Continuous  HYDROmorphone PCA (1 mG/mL) Rescue Clinician Bolus 0.5 milliGRAM(s) IV Push every 15 minutes PRN for Pain Scale GREATER THAN 6  nalbuphine Injectable 2.5 milliGRAM(s) IV Push every 6 hours PRN Pruritus  ondansetron Injectable 4 milliGRAM(s) IV Push every 6 hours PRN Nausea    Gastrointestinal Medications  dextrose 5%. 1000 milliLiter(s) IV Continuous <Continuous>  dextrose 5%. 1000 milliLiter(s) IV Continuous <Continuous>  lactated ringers. 1000 milliLiter(s) IV Continuous <Continuous>  pantoprazole  Injectable 40 milliGRAM(s) IV Push daily  potassium chloride  10 mEq/100 mL IVPB 10 milliEquivalent(s) IV Intermittent every 1 hour  sodium chloride 0.9% lock flush 3 milliLiter(s) IV Push Once    Hematologic/Oncologic Medications  heparin   Injectable 5000 Unit(s) SubCutaneous every 12 hours    Antimicrobial/Immunologic Medications  cefoTEtan  IVPB 2 Gram(s) IV Intermittent every 12 hours    Endocrine/Metabolic Medications  dextrose 50% Injectable 25 Gram(s) IV Push once  dextrose 50% Injectable 12.5 Gram(s) IV Push once  dextrose 50% Injectable 25 Gram(s) IV Push once  dextrose Oral Gel 15 Gram(s) Oral once PRN Blood Glucose LESS THAN 70 milliGRAM(s)/deciliter  glucagon  Injectable 1 milliGRAM(s) IntraMuscular once  insulin lispro (ADMELOG) corrective regimen sliding scale   SubCutaneous every 6 hours    Topical/Other Medications  lidocaine   4% Patch 1 Patch Transdermal daily PRN pain at the surgical site  naloxone Injectable 0.1 milliGRAM(s) IV Push every 3 minutes PRN For ANY of the following changes in patient status:  A. RR LESS THAN 10 breaths per minute, B. Oxygen saturation LESS THAN 90%, C. Sedation score of 6    --------------------------------------------------------------------------------------    VITAL SIGNS:  T(C): 36.9 (16 Aug 2023 04:00), Max: 36.9 (16 Aug 2023 04:00)  T(F): 98.5 (16 Aug 2023 04:00), Max: 98.5 (16 Aug 2023 04:00)  HR: 85 (16 Aug 2023 07:00) (81 - 109)  BP: 135/81 (15 Aug 2023 20:15) (130/82 - 135/81)  BP(mean): 97 (15 Aug 2023 20:15) (97 - 97)  ABP: 119/49 (16 Aug 2023 07:00) (103/48 - 135/69)  ABP(mean): 72 (16 Aug 2023 07:00) (69 - 97)  RR: 12 (16 Aug 2023 07:00) (12 - 20)  SpO2: 99% (16 Aug 2023 07:00) (98% - 100%)    --------------------------------------------------------------------------------------    INS AND OUTS:    15 Aug 2023 07:01  -  16 Aug 2023 07:00  --------------------------------------------------------  IN:    dextrose 5% + lactated ringers: 300 mL    Enteral Tube Flush: 90 mL    IV PiggyBack: 400 mL    Lactated Ringers: 525 mL  Total IN: 1315 mL    OUT:    Bulb (mL): 80 mL    Chest Tube (mL): 220 mL    Indwelling Catheter - Urethral (mL): 875 mL    Nasogastric/Oral tube (mL): 100 mL    Voided (mL): 525 mL  Total OUT: 1800 mL    Total NET: -485 mL    --------------------------------------------------------------------------------------    EXAM    NEUROLOGY  Exam: Normal, in no acute distress.    HEENT  Exam: Normocephalic, atraumatic.    RESPIRATORY  Exam: Normal expansion / effort.  Chest tube.    CARDIOVASCULAR  Exam: S1, S2.  Regular rate and rhythm.    GI/NUTRITION  Exam: Abdomen soft, Non-tender, Non-distended.  Port sites clean, dry and intact.  NIMISHA drain x1 with serosanguinous output.  Current Diet: NPO    MUSCULOSKELETAL  Exam: All extremities moving spontaneously without limitations.      METABOLIC / FLUIDS / ELECTROLYTES  dextrose 5%. 1000 milliLiter(s) IV Continuous <Continuous>  dextrose 5%. 1000 milliLiter(s) IV Continuous <Continuous>  lactated ringers. 1000 milliLiter(s) IV Continuous <Continuous>  potassium chloride  10 mEq/100 mL IVPB 10 milliEquivalent(s) IV Intermittent every 1 hour  sodium chloride 0.9% lock flush 3 milliLiter(s) IV Push Once      HEMATOLOGIC  [x] VTE Prophylaxis: heparin   Injectable 5000 Unit(s) SubCutaneous every 12 hours      INFECTIOUS DISEASE  Antimicrobials/Immunologic Medications:  cefoTEtan  IVPB 2 Gram(s) IV Intermittent every 12 hours    --------------------------------------------------------------------------------------
TJ HAYWOOD                     [id number]    HPI:  64 y/o male [facility] pre[facility] for robotic assisted laparoscopic, laparoscopic ANDREW Bahman esophagogastrectomy esophagogastroduodenoscopy, possible open, Dr. Avery- Robotic assisted ANDREW Bahman esophagogastrectomy, possible open. Pt reported dysphagia and unintentional weight loss. An upper EUS in Feb 2023 revealed esophageal lesion. A biopsy confirmed invasive adenocarcinoma. Pt s/p chemotherapy and radiation therapy, completed on 5/15/23.  (08 Aug 2023 10:56)    CHIEF COMPLAINT: Follow up in ICU  for postoperative care of patient who is s/p     Procedure: FB, EGD, Robotic RVATS, Andrew Bahman Esophagogastrectomy 8/15/2023    Issues:               Esophageal cancer               Postop pain               Chest tube in place   HTN   DM-2   History of tachycardia   Chemotherapy-induced neuropathy   S/P cholecystectomy   H/O lithotripsy  History of chemotherapy  S/P radiation therapy    PAST MEDICAL & SURGICAL HISTORY:  Esophageal mass      HTN (hypertension)      Esophageal cancer      Type 2 diabetes mellitus      Malignant neoplasm of cardia      History of hypotension      History of tachycardia      Chemotherapy-induced neuropathy      S/P cholecystectomy      H/O lithotripsy      H/O endoscopy      History of chemotherapy      S/P radiation therapy      Esophageal stenosis      History of esophagogastroduodenoscopy (EGD)                VITAL SIGNS:  Vital Signs Last 24 Hrs  T(C): 36.7 (16 Aug 2023 08:00), Max: 36.9 (16 Aug 2023 04:00)  T(F): 98 (16 Aug 2023 08:00), Max: 98.5 (16 Aug 2023 04:00)  HR: 100 (16 Aug 2023 10:00) (81 - 109)  BP: 135/81 (15 Aug 2023 20:15) (130/82 - 135/81)  BP(mean): 97 (15 Aug 2023 20:15) (97 - 97)  RR: 20 (16 Aug 2023 10:00) (12 - 20)  SpO2: 99% (16 Aug 2023 10:00) (98% - 100%)    Parameters below as of 16 Aug 2023 10:00  Patient On (Oxygen Delivery Method): room air        I/Os:   I&O's Detail    15 Aug 2023 07:01  -  16 Aug 2023 07:00  --------------------------------------------------------  IN:    dextrose 5% + lactated ringers: 300 mL    Enteral Tube Flush: 90 mL    IV PiggyBack: 400 mL    Lactated Ringers: 525 mL  Total IN: 1315 mL    OUT:    Bulb (mL): 80 mL    Chest Tube (mL): 220 mL    Indwelling Catheter - Urethral (mL): 975 mL    Nasogastric/Oral tube (mL): 100 mL  Total OUT: 1375 mL    Total NET: -60 mL      16 Aug 2023 07:01  -  16 Aug 2023 10:48  --------------------------------------------------------  IN:    Enteral Tube Flush: 30 mL    IV PiggyBack: 100 mL    Lactated Ringers: 225 mL  Total IN: 355 mL    OUT:    Bulb (mL): 0 mL    Chest Tube (mL): 30 mL    Indwelling Catheter - Urethral (mL): 220 mL    Nasogastric/Oral tube (mL): 0 mL  Total OUT: 250 mL    Total NET: 105 mL          CAPILLARY BLOOD GLUCOSE      POCT Blood Glucose.: 202 mg/dL (16 Aug 2023 05:19)  POCT Blood Glucose.: 239 mg/dL (16 Aug 2023 00:02)      =======================MEDICATIONS===================  MEDICATIONS  (STANDING):  acetaminophen   IVPB .. 1000 milliGRAM(s) IV Intermittent once  cefoTEtan  IVPB 2 Gram(s) IV Intermittent every 12 hours  dextrose 5%. 1000 milliLiter(s) (50 mL/Hr) IV Continuous <Continuous>  dextrose 5%. 1000 milliLiter(s) (100 mL/Hr) IV Continuous <Continuous>  dextrose 50% Injectable 25 Gram(s) IV Push once  dextrose 50% Injectable 12.5 Gram(s) IV Push once  dextrose 50% Injectable 25 Gram(s) IV Push once  glucagon  Injectable 1 milliGRAM(s) IntraMuscular once  heparin   Injectable 5000 Unit(s) SubCutaneous every 12 hours  HYDROmorphone PCA (1 mG/mL) 30 milliLiter(s) PCA Continuous PCA Continuous  insulin lispro (ADMELOG) corrective regimen sliding scale   SubCutaneous every 6 hours  lactated ringers. 1000 milliLiter(s) (75 mL/Hr) IV Continuous <Continuous>  pantoprazole  Injectable 40 milliGRAM(s) IV Push daily  sodium chloride 0.9% lock flush 3 milliLiter(s) IV Push Once    MEDICATIONS  (PRN):  dextrose Oral Gel 15 Gram(s) Oral once PRN Blood Glucose LESS THAN 70 milliGRAM(s)/deciliter  HYDROmorphone PCA (1 mG/mL) Rescue Clinician Bolus 0.5 milliGRAM(s) IV Push every 15 minutes PRN for Pain Scale GREATER THAN 6  lidocaine   4% Patch 1 Patch Transdermal daily PRN pain at the surgical site  nalbuphine Injectable 2.5 milliGRAM(s) IV Push every 6 hours PRN Pruritus  naloxone Injectable 0.1 milliGRAM(s) IV Push every 3 minutes PRN For ANY of the following changes in patient status:  A. RR LESS THAN 10 breaths per minute, B. Oxygen saturation LESS THAN 90%, C. Sedation score of 6  ondansetron Injectable 4 milliGRAM(s) IV Push every 6 hours PRN Nausea      PHYSICAL EXAM============================  General:                         Awake, alert, not in any distress  Neuro:                            Moving all extremities to commands.   Respiratory:	Air entry fair and  bilateral conducted sounds                                           Effort even and unlabored.  CV:		Regular rate and rhythm. Normal S1/S2                                          Distal pulses present.  Abdomen:	                     Soft, non-distended. Bowel sounds present   Skin:		No rash.  Extremities:	Warm, no cyanosis or edema.  Palpable pulses    ============================LABS=========================                        11.9   11.92 )-----------( 133      ( 16 Aug 2023 04:29 )             35.5     08-16    136  |  99  |  15  ----------------------------<  225<H>  3.8   |  24  |  0.59    Ca    8.6      16 Aug 2023 04:29  Phos  2.8     08-16  Mg     2.30     08-16          ABG - ( 15 Aug 2023 17:19 )  pH, Arterial: 7.22  pH, Blood: x     /  pCO2: 57    /  pO2: 168   / HCO3: 23    / Base Excess: -4.9  /  SaO2: 99.8              Urinalysis Basic - ( 16 Aug 2023 04:29 )    Color: x / Appearance: x / SG: x / pH: x  Gluc: 225 mg/dL / Ketone: x  / Bili: x / Urobili: x   Blood: x / Protein: x / Nitrite: x   Leuk Esterase: x / RBC: x / WBC x   Sq Epi: x / Non Sq Epi: x / Bacteria: x      A/P:  63yMale s/p FB, EGD, Robotic RVATS, Andrew Bahman Esophagogastrectomy 8/15/2023,  appears fairly comfortable, complaining of chest pain with deep breathing.                             Neuro: Nonfocal                                        Pain control with PCA / IV Tylenol                            Cardiovascular:                                          Continue hemodynamic monitoring.                                        Continue IVF                             Respiratory:                                         Postop hypoxemia requiring O2 via nasal cannula - Wean nasal cannula for goal O2 sat above 92%.                                         Incentive spirometry.                                                   Chest PT and frequent suctioning. Continue bronchodilators, pulmozyme and inhaled 3% saline                                                      OOB to chair & ambulate w/ assistance.                                                           Continuous pulse oximetry for support & to prevent decompensation.                                         Monitor chest tube output                                         Chest tube to suction                             GI                                          NPO , IVF                                          Continue GI prophylaxis with  Protonix                                          Continue Zofran / Reglan for nausea - PRN                                          NGT to low continuous wall suction                                           Flush both NGT and J-tube with 30cc of sterile water Q 4hrs.  	                                                                 Renal:                                          Continue D5LR 75cc/hr                                          Monitor I/Os and electrolytes                                          Coronel                                                  Hem/ Onc:                                                                                   Monitor chest tube output &  signs of bleeding.                                           Follow CBC in AM                           Infectious disease:                                             Monitor for fever / leukocytosis.                                           All surgical incision / chest tube  sites look clean                            Endocrine                                              DM-2: Continue Accu-Checks with coverage.     Pt is on SQ Heparin and Venodyne boots for DVT prophylaxis.     Pertinent clinical, laboratory, radiographic, hemodynamic, echocardiographic, respiratory data, microbiologic data and chart were reviewed and analyzed frequently throughout the course of the day and night  Patient seen, examined and plan discussed with CT Surgeon Dr. Avery / CTICU team during rounds.    Status discussed with patient  / family at bedside  and  updated plan of care.     I have spent 40 minutes with this patient including 20 minutes of time coordinating care in the ICU.          Melinda Herr DO, FACEP    
TJ HAYWOOD            MRN-[id number]         No Known Allergies               62 y/o male presents to [facility] preop for robotic assisted laparoscopic, laparoscopic MYCHAL Bahman esophagogastrectomy esophagogastroduodenoscopy, possible open, Dr. Avery- Robotic assisted MYCHAL Bahman esophagogastrectomy, possible open. Pt reported dysphagia and unintentional weight loss. An upper EUS in Feb 2023 revealed esophageal lesion. A biopsy confirmed invasive adenocarcinoma. Pt s/p chemotherapy and radiation therapy, completed on 5/15/23.  (08 Aug 2023 10:56)    CHIEF COMPLAINT: Follow up in ICU  for postoperative care of patient who is s/p     Procedure: FB, EGD, Robotic RVATS, [facility]man Esophagogastrectomy 8/15/2023    Issues:               Esophageal cancer               Postop pain               Chest tube in place   HTN   DM-2   History of tachycardia   Chemotherapy-induced neuropathy   S/P cholecystectomy   H/O lithotripsy  History of chemotherapy  S/P radiation therapy      Postop course:     Patient reports moderate pain at chest wall incision sites which is worse with coughing and deep breathing without associated fever or dyspnea. Pain is improved with use of PCA and  IV Tylenol.         PAST MEDICAL & SURGICAL HISTORY:  Esophageal mass      HTN (hypertension)      Esophageal cancer      Type 2 diabetes mellitus      Malignant neoplasm of cardia      History of hypotension      History of tachycardia      Chemotherapy-induced neuropathy      S/P cholecystectomy      H/O lithotripsy      H/O endoscopy      History of chemotherapy      S/P radiation therapy      Esophageal stenosis      History of esophagogastroduodenoscopy (EGD)          ICU Vital Signs Last 24 Hrs  T(C): 36.9 (22 Aug 2023 08:00), Max: 36.9 (21 Aug 2023 20:00)  T(F): 98.4 (22 Aug 2023 08:00), Max: 98.5 (21 Aug 2023 20:00)  HR: 83 (22 Aug 2023 09:00) (71 - 95)  BP: 116/83 (22 Aug 2023 09:00) (105/64 - 128/79)  BP(mean): 94 (22 Aug 2023 09:00) (68 - 94)  ABP: --  ABP(mean): --  RR: 10 (22 Aug 2023 09:00) (10 - 28)  SpO2: 98% (22 Aug 2023 09:00) (94% - 100%)    O2 Parameters below as of 22 Aug 2023 10:00  Patient On (Oxygen Delivery Method): room air          I&O's Detail    21 Aug 2023 07:01  -  22 Aug 2023 07:00  --------------------------------------------------------  IN:    dextrose 5% + lactated ringers: 1200 mL    IV PiggyBack: 250 mL    Oral Fluid: 350 mL  Total IN: 1800 mL    OUT:    Bulb (mL): 150 mL    Voided (mL): 1750 mL  Total OUT: 1900 mL    Total NET: -100 mL      22 Aug 2023 07:01  -  22 Aug 2023 10:21  --------------------------------------------------------  IN:    IV PiggyBack: 200 mL    Oral Fluid: 240 mL  Total IN: 440 mL    OUT:    Bulb (mL): 0 mL    Voided (mL): 200 mL  Total OUT: 200 mL    Total NET: 240 mL        CAPILLARY BLOOD GLUCOSE      POCT Blood Glucose.: 118 mg/dL (22 Aug 2023 05:58)    Home Medications:    MEDICATIONS  (STANDING):  acetaminophen   IVPB .. 1000 milliGRAM(s) IV Intermittent once  dextrose 5%. 1000 milliLiter(s) (50 mL/Hr) IV Continuous <Continuous>  dextrose 5%. 1000 milliLiter(s) (100 mL/Hr) IV Continuous <Continuous>  dextrose 50% Injectable 25 Gram(s) IV Push once  dextrose 50% Injectable 12.5 Gram(s) IV Push once  dextrose 50% Injectable 25 Gram(s) IV Push once  glucagon  Injectable 1 milliGRAM(s) IntraMuscular once  heparin   Injectable 5000 Unit(s) SubCutaneous once  insulin lispro (ADMELOG) corrective regimen sliding scale   SubCutaneous every 6 hours  metoprolol tartrate 12.5 milliGRAM(s) Oral two times a day  pantoprazole  Injectable 40 milliGRAM(s) IV Push daily    MEDICATIONS  (PRN):  dextrose Oral Gel 15 Gram(s) Oral once PRN Blood Glucose LESS THAN 70 milliGRAM(s)/deciliter  HYDROmorphone  Injectable 0.25 milliGRAM(s) IV Push every 3 hours PRN Moderate Pain (4 - 6)  HYDROmorphone  Injectable 0.5 milliGRAM(s) IV Push every 3 hours PRN Severe Pain (7 - 10)  lidocaine   4% Patch 1 Patch Transdermal daily PRN pain at the surgical site  naloxone Injectable 0.1 milliGRAM(s) IV Push every 3 minutes PRN For ANY of the following changes in patient status:  A. RR LESS THAN 10 breaths per minute, B. Oxygen saturation LESS THAN 90%, C. Sedation score of 6  ondansetron Injectable 4 milliGRAM(s) IV Push every 6 hours PRN Nausea        Physical exam:                             General:               Pt is awake, alert, not in any distress                                                  Neuro:                  Nonfocal                             Cardiovascular:   S1 & S2, regular                           Respiratory:         Air entry is fair and equal on both sides, has bilateral conducted sounds                           GI:                          Soft, nondistended and nontender, Bowel sounds active                          Ext:                        No cyanosis or edema             Labs:                                                                           10.3   5.02  )-----------( 145      ( 22 Aug 2023 04:20 )             31.6             08-22    137  |  97<L>  |  4<L>  ----------------------------<  105<H>  3.3<L>   |  28  |  0.45<L>    Ca    8.6      22 Aug 2023 04:20  Phos  3.1     08-22  Mg     1.50     08-22      CXR:  < from: Xray Chest 1 View- PORTABLE-Routine (Xray Chest 1 View- PORTABLE-Routine in AM.) (08.21.23 @ 05:40) >  Frontal expiratory view of the chest shows the heart to be normal in   size. Right chest tube, right upper quadrant clips and enteric tube are   again noted. Similar free air is seen under the right hemidiaphragm.    The lungs are clear with small left effusion and there is no evidence of   pneumothorax nor definite right pleural effusion.    Chest one view 8/21/2023 5:16 AM  Compared to the prior study, enteric tube has been removed. There is less   free air under the right hemidiaphragm.    IMPRESSION:  Decreasing free air.    < end of copied text >      < from: Xray Esophagram Single Contrast (08.21.23 @ 12:16) >  *  Status post [facility]man esophagogastrectomy.  *  No evidence for bowel leak.  *  Mild narrowing at the esophagogastric anastomosis, which could be due   to postoperative edema. Oral contrast passes freely through this point   without evidence for obstruction.    < end of copied text >    Plan:  General: 63yMale s/p FB, EGD, Robotic RVATS, [facility]man Esophagogastrectomy 8/15/2023,  appears fairly comfortable, complaining of chest pain with deep breathing.                             Neuro:                                         Pain control with Oxy  / IV Tylenol                            Cardiovascular:                                          Continue hemodynamic monitoring.                                        Tachycardia: Continue low dose Lopressor 12.5mg BID                            Respiratory:                                         Postop hypoxemia - Resolved                                             Continue  Incentive spirometry.                                                   Chest PT and frequent suctioning. Continue bronchodilators, pulmozyme and inhaled 3% saline                                                      OOB to chair & ambulate w/ assistance.                                                           Continuous pulse oximetry for support & to prevent decompensation.                                         Monitor NIMISHA output                                                                   GI                                          Advance to full liquid diet                                          Continue GI prophylaxis with  Protonix                                          Continue Zofran / Reglan for nausea - PRN	                                                                 Renal:                                          Hypomagnesemia - Replaced Mg                                           Monitor I/Os and electrolytes                                                                                         Hem/ Onc:                                                                                   No  signs of bleeding.                                           Follow CBC in AM                           Infectious disease:                                             Monitor for fever / leukocytosis.                                           All surgical incision / chest tube  sites look clean                            Endocrine                                              No active issues    Pt is on SQ Heparin and Venodyne boots for DVT prophylaxis.     Pertinent clinical, laboratory, radiographic, hemodynamic, echocardiographic, respiratory data, microbiologic data and chart were reviewed and analyzed frequently throughout the course of the day and night  Patient seen, examined and plan discussed with CT Surgeon Dr. Avery / CTICU team during rounds.    Status discussed with patient  / family at bedside  and  updated plan of care.     I have spent 50 minutes with this patient including 20 minutes of time coordinating care in the ICU.           Juan Antonio Martin MD                                                                    
    CHIEF COMPLAINT: FOLLOW UP IN ICU FOR POSTOPERATIVE CARE OF PATIENT WHO IS S/P ESOPHAGECTOMY      PROCEDURES:   - Robotic RVATS, [place] Bahman Esophagogastrectomy 8/15/2023      ISSUES:   Esophageal cancer s/p chemo and radiation  Postop pain  Chest tube in place  HTN  DM-2  History of tachycardia  Chemotherapy-induced neuropathy  S/P cholecystectomy  Hx of nephrolithiasis s/p lithotripsy        INTERVAL EVENTS:     Tolerating liquid diet.      HISTORY:   Patient reports moderate pain at chest and abdominal wall incision sites which is worse with coughing and deep breathing without associated fever or dyspnea. Pain is improved with use of pain meds.       PHYSICAL EXAM:   Gen: Comfortable, No acute distress  Eyes: Sclera white, Conjunctiva normal, Eyelids normal, Pupils symmetrical   ENT: Mucous membranes moist,  Neck: Trachea midline,  ,  ,  ,  ,  ,    CV: Rate regular, Rhythm regular,  ,  ,    Resp: Breath sounds clear, No accessory muscles use, chest tube in place  Abd: Soft, Non-distended, Non-tender, Bowel sounds normal,  ,  ,    Skin: Warm, No peripheral edema of lower extremities,  ,    : No torres  Neuro: Moving all 4 extremities,    Psych: A&Ox3      ASSESSMENT AND PLAN:     NEURO:  Post-operative Pain - Stable. Pain control with oxycodone and Tylenol PRN.              RESPIRATORY:  Stable on room air - goal O2sat above 92. Obtain CXR. Incentive spirometry. Chest PT and frequent suctioning. Continue bronchodilators. OOB to chair & ambulate w/ assistance. Continuous pulse oximetry for support & to prevent decompensation.     Right chest lexus to bulb suction - d/c chest chest. Repeat CXR          CARDIOVASCULAR:  Hemodynamically stable - Not on pressors. Continue hemodynamic monitoring.  Telemetry (medical test) - Reviewed by me today independently. Normal sinus rhythm.    HTN - stable.           RENAL:  Stable - Monitor IOs and electrolytes. Keep K above 4.0 and Mg above 2.0. Maintenance IVF while NPO              GASTROINTESTINAL:  GI prophylaxis is not indicated  Zofran and Reglan IV PRN for nausea  Swallow study with no leak  Full liquid diet    GERD - stable. Pantoprazole PO            HEMATOLOGIC:  No signs of active bleeding. Monitor Hgb in CBC in AM  DVT prophylaxis with heparin subQ and SCDs.             INFECTIOUS DISEASE:  All surgical sites appear clean. No signs of active infection. Will monitor for fever and leukocytosis.             ENDOCRINE:  DM2 – Stable. Monitor glucose fingersticks for goal 120-180. Insulin sliding scale. Carb control diet.       ONCOLOGY:  Esophageal cancer - Received preop chemo/RT. Improved. S/P resection. Follow up final pathology.      Pertinent clinical, laboratory, radiographic, hemodynamic, echocardiographic, respiratory data, microbiologic data and chart were reviewed by myself and analyzed frequently throughout the course of the day and night by myself.    Plan discussed at length with the CTICU staff and Attending CT Surgeon -   Dr Horacio Avery    Patient's status was discussed with patient at bedside.    I have spent 50 minutes of time with this patient monitoring hemodynamic status, respiratory status, and coordinating care in the ICU.        _________________________  VITAL SIGNS:  Vital Signs Last 24 Hrs  T(C): 36.9 (23 Aug 2023 12:00), Max: 37.1 (22 Aug 2023 16:00)  T(F): 98.4 (23 Aug 2023 12:00), Max: 98.7 (22 Aug 2023 16:00)  HR: 97 (23 Aug 2023 12:00) (80 - 122)  BP: 122/80 (23 Aug 2023 12:00) (106/62 - 123/69)  BP(mean): 93 (23 Aug 2023 12:00) (74 - 104)  RR: 25 (23 Aug 2023 12:00) (12 - 25)  SpO2: 98% (23 Aug 2023 12:00) (96% - 100%)    Parameters below as of 23 Aug 2023 12:00  Patient On (Oxygen Delivery Method): room air      I/Os:   I&O's Detail    22 Aug 2023 07:01  -  23 Aug 2023 07:00  --------------------------------------------------------  IN:    IV PiggyBack: 200 mL    Oral Fluid: 720 mL  Total IN: 920 mL    OUT:    Bulb (mL): 70 mL    Voided (mL): 2100 mL  Total OUT: 2170 mL    Total NET: -1250 mL      23 Aug 2023 07:01  -  23 Aug 2023 12:35  --------------------------------------------------------  IN:    IV PiggyBack: 150 mL    Oral Fluid: 480 mL  Total IN: 630 mL    OUT:    Bulb (mL): 0 mL    Voided (mL): 400 mL  Total OUT: 400 mL    Total NET: 230 mL              MEDICATIONS:  MEDICATIONS  (STANDING):  acetaminophen   IVPB .. 1000 milliGRAM(s) IV Intermittent once  dextrose 50% Injectable 25 Gram(s) IV Push once  enoxaparin Injectable 40 milliGRAM(s) SubCutaneous every 24 hours  insulin lispro (ADMELOG) corrective regimen sliding scale   SubCutaneous Before meals and at bedtime  metoprolol tartrate 12.5 milliGRAM(s) Oral two times a day    MEDICATIONS  (PRN):  dextrose Oral Gel 15 Gram(s) Oral once PRN Blood Glucose LESS THAN 70 milliGRAM(s)/deciliter  HYDROmorphone  Injectable 0.5 milliGRAM(s) IV Push every 3 hours PRN Severe Pain (7 - 10)  HYDROmorphone  Injectable 0.25 milliGRAM(s) IV Push every 3 hours PRN Moderate Pain (4 - 6)  lidocaine   4% Patch 1 Patch Transdermal daily PRN pain at the surgical site  naloxone Injectable 0.1 milliGRAM(s) IV Push every 3 minutes PRN For ANY of the following changes in patient status:  A. RR LESS THAN 10 breaths per minute, B. Oxygen saturation LESS THAN 90%, C. Sedation score of 6  ondansetron Injectable 4 milliGRAM(s) IV Push every 6 hours PRN Nausea      LABS:  Laboratory data was independently reviewed by me today.                           10.4   5.44  )-----------( 154      ( 23 Aug 2023 03:45 )             31.0     08-23    136  |  96<L>  |  4<L>  ----------------------------<  100<H>  3.7   |  30  |  0.52    Ca    8.6      23 Aug 2023 03:45  Phos  3.4     08-23  Mg     1.60     08-23            Urinalysis Basic - ( 23 Aug 2023 03:45 )    Color: x / Appearance: x / SG: x / pH: x  Gluc: 100 mg/dL / Ketone: x  / Bili: x / Urobili: x   Blood: x / Protein: x / Nitrite: x   Leuk Esterase: x / RBC: x / WBC x   Sq Epi: x / Non Sq Epi: x / Bacteria: x        RADIOLOGY:   Radiology images were independently reviewed by me today. Reports were reviewed by me today.    Xray Chest 1 View- PORTABLE-Routine:   ACC: [id number] EXAM:  XR CHEST PORTABLE ROUTINE 1V   ORDERED BY: CHRISTIANO CARRERA     ACC: [id number] EXAM:  XR CHEST PORTABLE URGENT 1V   ORDERED BY: BALDEMAR FRANZ     PROCEDURE DATE:  08/19/2023          INTERPRETATION:  EXAMINATION: XR CHEST URGENT, XR CHEST 8/19/2023 12:33 PM    CLINICAL INDICATION: Status post esophagectomy r/o ptx    TECHNIQUE: Single frontal, portable view of the chest was obtained.    COMPARISON: Chest x-ray 8/19/2023.    FINDINGS:  Overlying tubes and leads unchanged from prior study  The heart is normal in size.  Small right apical pneumothorax.  The lungs are otherwise clear.  There is no pleural effusion.  No acute bony abnormality.    Chest one view 8/22/2023 5:15 AM  Compared to the prior study, right apical pneumothorax is smaller. Less   air noted under the right hemidiaphragm.    IMPRESSION:  Resolving right pneumothorax.    --- End of Report ---          TRISHA SYKES MD; Resident Radiologist  This document has been electronically signed.  ANNA GRIFFIN MD; Attending Interventional Radiologist  This document has been electronically signed. Aug 22 2023 12:34PM (08-22-23 @ 05:39)  Xray Chest 1 View- PORTABLE-Routine:   ACC: [id number] EXAM:  XR CHEST PORTABLE ROUTINE 1V   ORDERED BY: BALDEMAR FRANZ     ACC: [id number] EXAM:  XR CHEST PORTABLE ROUTINE 1V   ORDERED BY: BALDEMAR FRANZ     PROCEDURE DATE:  08/20/2023          INTERPRETATION:  Chest one view 8/20/2023 6:17 AM    HISTORY: Postop esophagectomy    COMPARISON STUDY: 8/19/2023    Frontal expiratory view of the chest shows the heart to be normal in   size. Right chest tube, right upper quadrant clips and enteric tube are   again noted. Similar free air is seen under the right hemidiaphragm.    The lungs are clear with small left effusion and there is no evidence of   pneumothorax nor definite right pleural effusion.    Chest one view 8/21/2023 5:16 AM  Compared to the prior study, enteric tube has been removed. There is less   free air under the right hemidiaphragm.    IMPRESSION:  Decreasing free air.        Thank you for the courtesy of this referral.    --- End of Report ---            ANNA GRIFFIN MD; Attending Interventional Radiologist  This document has been electronically signed. Aug 21 2023 12:12PM (08-21-23 @ 05:40)  Xray Chest 1 View- PORTABLE-Routine:   ACC: [id number] EXAM:  XR CHEST PORTABLE ROUTINE 1V   ORDERED BY: BALDEMAR FRANZ     ACC: [id number] EXAM:  XR CHEST PORTABLE ROUTINE 1V   ORDERED BY: BALDEMAR FRANZ     PROCEDURE DATE:  08/20/2023          INTERPRETATION:  Chest one view 8/20/2023 6:17 AM    HISTORY: Postop esophagectomy    COMPARISON STUDY: 8/19/2023    Frontal expiratory view of the chest shows the heart to be normal in   size. Right chest tube, right upper quadrant clips and enteric tube are   again noted. Similar free air is seen under the right hemidiaphragm.    The lungs are clear with small left effusion and there is no evidence of   pneumothorax nor definite right pleural effusion.    Chest one view 8/21/2023 5:16 AM  Compared to the prior study, enteric tube has been removed. There is less   free air under the right hemidiaphragm.    IMPRESSION:  Decreasing free air.        Thank you for the courtesy of this referral.    --- End of Report ---            ANNA GRIFFIN MD; Attending Interventional Radiologist  This document has been electronically signed. Aug 21 2023 12:12PM (08-20-23 @ 06:37)  
Anesthesia Pain Management Service- Attending Addendum    SUBJECTIVE: Patient's pain control adequate    Therapy:	  [ X] IV PCA	   [ ] Epidural           [ ] s/p Spinal Opoid              [ ] Postpartum infusion	  [ ] Patient controlled regional anesthesia (PCRA)    [ ] prn Analgesics    Allergies    No Known Allergies    Intolerances      MEDICATIONS  (STANDING):  acetaminophen   IVPB .. 1000 milliGRAM(s) IV Intermittent once  dextrose 5% + lactated ringers. 1000 milliLiter(s) (75 mL/Hr) IV Continuous <Continuous>  dextrose 5%. 1000 milliLiter(s) (50 mL/Hr) IV Continuous <Continuous>  dextrose 5%. 1000 milliLiter(s) (100 mL/Hr) IV Continuous <Continuous>  dextrose 50% Injectable 25 Gram(s) IV Push once  dextrose 50% Injectable 12.5 Gram(s) IV Push once  dextrose 50% Injectable 25 Gram(s) IV Push once  diltiazem Infusion 5 mG/Hr (5 mL/Hr) IV Continuous <Continuous>  glucagon  Injectable 1 milliGRAM(s) IntraMuscular once  heparin   Injectable 5000 Unit(s) SubCutaneous every 12 hours  insulin lispro (ADMELOG) corrective regimen sliding scale   SubCutaneous every 6 hours  pantoprazole  Injectable 40 milliGRAM(s) IV Push daily  sodium chloride 0.9% lock flush 3 milliLiter(s) IV Push Once    MEDICATIONS  (PRN):  dextrose Oral Gel 15 Gram(s) Oral once PRN Blood Glucose LESS THAN 70 milliGRAM(s)/deciliter  HYDROmorphone  Injectable 0.25 milliGRAM(s) IV Push every 3 hours PRN Moderate Pain (4 - 6)  HYDROmorphone  Injectable 0.5 milliGRAM(s) IV Push every 3 hours PRN Severe Pain (7 - 10)  lidocaine   4% Patch 1 Patch Transdermal daily PRN pain at the surgical site  naloxone Injectable 0.1 milliGRAM(s) IV Push every 3 minutes PRN For ANY of the following changes in patient status:  A. RR LESS THAN 10 breaths per minute, B. Oxygen saturation LESS THAN 90%, C. Sedation score of 6  ondansetron Injectable 4 milliGRAM(s) IV Push every 6 hours PRN Nausea      OBJECTIVE:   [X] No new signs     [ ] Other:    Side Effects:  [X ] None			[ ] Other:      ASSESSMENT/PLAN  -Discontinue current therapy    [ ] Therapy changed to:    [ ] IV PCA       [ ] Epidural     [ X] prn Analgesics     Comments: Pain management per primary team, APS to sign off
Date of Service: 08-18-23 @ 14:05    Patient is a 63y old  Male who presents with a chief complaint of Esophageal cancer (18 Aug 2023 07:12)      Any change in ROS: Doing pretty good:  no sob:  no cough : no phlegm      MEDICATIONS  (STANDING):  acetaminophen   IVPB .. 1000 milliGRAM(s) IV Intermittent once  dextrose 5% + lactated ringers. 1000 milliLiter(s) (75 mL/Hr) IV Continuous <Continuous>  dextrose 5%. 1000 milliLiter(s) (50 mL/Hr) IV Continuous <Continuous>  dextrose 5%. 1000 milliLiter(s) (100 mL/Hr) IV Continuous <Continuous>  dextrose 50% Injectable 25 Gram(s) IV Push once  dextrose 50% Injectable 12.5 Gram(s) IV Push once  dextrose 50% Injectable 25 Gram(s) IV Push once  diltiazem Infusion 5 mG/Hr (5 mL/Hr) IV Continuous <Continuous>  glucagon  Injectable 1 milliGRAM(s) IntraMuscular once  heparin   Injectable 5000 Unit(s) SubCutaneous every 12 hours  insulin lispro (ADMELOG) corrective regimen sliding scale   SubCutaneous every 6 hours  pantoprazole  Injectable 40 milliGRAM(s) IV Push daily  sodium chloride 0.9% lock flush 3 milliLiter(s) IV Push Once    MEDICATIONS  (PRN):  dextrose Oral Gel 15 Gram(s) Oral once PRN Blood Glucose LESS THAN 70 milliGRAM(s)/deciliter  HYDROmorphone  Injectable 0.25 milliGRAM(s) IV Push every 3 hours PRN Moderate Pain (4 - 6)  HYDROmorphone  Injectable 0.5 milliGRAM(s) IV Push every 3 hours PRN Severe Pain (7 - 10)  lidocaine   4% Patch 1 Patch Transdermal daily PRN pain at the surgical site  naloxone Injectable 0.1 milliGRAM(s) IV Push every 3 minutes PRN For ANY of the following changes in patient status:  A. RR LESS THAN 10 breaths per minute, B. Oxygen saturation LESS THAN 90%, C. Sedation score of 6  ondansetron Injectable 4 milliGRAM(s) IV Push every 6 hours PRN Nausea    Vital Signs Last 24 Hrs  T(C): 36.7 (18 Aug 2023 08:00), Max: 37 (17 Aug 2023 20:00)  T(F): 98 (18 Aug 2023 08:00), Max: 98.6 (17 Aug 2023 20:00)  HR: 134 (18 Aug 2023 13:00) (70 - 146)  BP: 122/101 (18 Aug 2023 13:00) (106/70 - 131/81)  BP(mean): 109 (18 Aug 2023 13:00) (79 - 109)  RR: 18 (18 Aug 2023 13:00) (11 - 22)  SpO2: 98% (18 Aug 2023 13:00) (93% - 100%)    Parameters below as of 18 Aug 2023 13:00  Patient On (Oxygen Delivery Method): room air        I&O's Summary    17 Aug 2023 07:01  -  18 Aug 2023 07:00  --------------------------------------------------------  IN: 2745 mL / OUT: 1450 mL / NET: 1295 mL    18 Aug 2023 07:01  -  18 Aug 2023 14:05  --------------------------------------------------------  IN: 1010 mL / OUT: 500 mL / NET: 510 mL          Physical Exam:   GENERAL: NAD, well-groomed, well-developed  HEENT: GAYLE/   Atraumatic, Normocephalic  ENMT: No tonsillar erythema, exudates, or enlargement; Moist mucous membranes, Good dentition, No lesions  NECK: Supple, No JVD, Normal thyroid  CHEST/LUNG: Clear to auscultaion, ; No rales, rhonchi, wheezing, or rubs  CVS: Regular rate and rhythm; No murmurs, rubs, or gallops  GI: : Soft, Nontender, Nondistended; Bowel sounds present  NERVOUS SYSTEM:  Alert & Oriented X3  EXTREMITIES:  2+ Peripheral Pulses, No clubbing, cyanosis, or edema  LYMPH: No lymphadenopathy noted  SKIN: No rashes or lesions  ENDOCRINOLOGY: No Thyromegaly  PSYCH: Appropriate    Labs:                              10.7   8.55  )-----------( 114      ( 18 Aug 2023 04:40 )             32.9                         11.3   9.58  )-----------( 118      ( 17 Aug 2023 02:50 )             34.9                         11.9   11.92 )-----------( 133      ( 16 Aug 2023 04:29 )             35.5                         11.8   13.01 )-----------( 128      ( 15 Aug 2023 20:13 )             37.1     08-18    136  |  99  |  8   ----------------------------<  104<H>  3.9   |  25  |  0.42<L>  08-17    136  |  100  |  10  ----------------------------<  115<H>  3.5   |  27  |  0.43<L>  08-16    136  |  99  |  15  ----------------------------<  225<H>  3.8   |  24  |  0.59  08-15    136  |  99  |  13  ----------------------------<  183<H>  4.1   |  21<L>  |  0.57    Ca    8.2<L>      18 Aug 2023 04:40  Ca    8.5      17 Aug 2023 02:50  Phos  1.8     08-18  Phos  1.6     08-17  Mg     1.60     08-18  Mg     1.80     08-17      CAPILLARY BLOOD GLUCOSE      POCT Blood Glucose.: 90 mg/dL (18 Aug 2023 13:13)  POCT Blood Glucose.: 95 mg/dL (18 Aug 2023 06:01)  POCT Blood Glucose.: 81 mg/dL (17 Aug 2023 23:08)  POCT Blood Glucose.: 91 mg/dL (17 Aug 2023 17:49)          Urinalysis Basic - ( 18 Aug 2023 04:40 )    Color: x / Appearance: x / SG: x / pH: x  Gluc: 104 mg/dL / Ketone: x  / Bili: x / Urobili: x   Blood: x / Protein: x / Nitrite: x   Leuk Esterase: x / RBC: x / WBC x   Sq Epi: x / Non Sq Epi: x / Bacteria: x    a< from: Xray Chest 1 View- PORTABLE-Routine (Xray Chest 1 View- PORTABLE-Routine in AM.) (08.18.23 @ 05:46) >    ACC: [id number] EXAM:  XR CHEST PORTABLE ROUTINE 1V   ORDERED BY: CLEMENTINE JUAREZ     PROCEDURE DATE:  08/18/2023          INTERPRETATION:  CLINICAL INFORMATION: Postop esophagectomy    TIME OF EXAMINATION: August 18, 2023 at 5:26 AM    EXAM: Portablechest    FINDINGS:  The enteric and chest tubes again seen. Subcutaneous emphysema   and postop pneumoperitoneum in addition to a tiny right pneumothorax is   seen.  Lungs are clear.        COMPARISON: August 17        IMPRESSION: Follow-up post esophagectomy with clear lungs and extra   alveolar air including pneumoperitoneum and tiny right pneumothorax.    --- End of Report ---            CUATE MAN MD; Attending Radiologist  This document has been electronically signed. Aug 18 2023 12:24PM    < end of copied text >          RECENT CULTURES:        RESPIRATORY CULTURES:          Studies  Chest X-RAY  CT SCAN Chest   Venous Dopplers: LE:   CT Abdomen  Others              
Morning Surgical Progress Note  Patient is a 63y old  Male who presents with a chief complaint of Andrew Bahman esophagectomy (21 Aug 2023 13:16)    SUBJECTIVE: Patient seen and examined at bedside with surgical team, patient without complaints. States he is passing gas and had 2 bms this am, states he is ambulating    Vital Signs Last 24 Hrs  T(C): 36.8 (22 Aug 2023 04:00), Max: 36.9 (21 Aug 2023 20:00)  T(F): 98.2 (22 Aug 2023 04:00), Max: 98.5 (21 Aug 2023 20:00)  HR: 82 (22 Aug 2023 07:00) (71 - 95)  BP: 117/70 (22 Aug 2023 07:00) (105/64 - 134/76)  BP(mean): 84 (22 Aug 2023 07:00) (68 - 94)  RR: 25 (22 Aug 2023 07:00) (10 - 28)  SpO2: 99% (22 Aug 2023 07:00) (94% - 100%)    Parameters below as of 22 Aug 2023 07:00  Patient On (Oxygen Delivery Method): room air    I&O's Detail    21 Aug 2023 07:01  -  22 Aug 2023 07:00  --------------------------------------------------------  IN:    dextrose 5% + lactated ringers: 1200 mL    IV PiggyBack: 250 mL    Oral Fluid: 350 mL  Total IN: 1800 mL    OUT:    Bulb (mL): 150 mL    Voided (mL): 1750 mL  Total OUT: 1900 mL    Total NET: -100 mL        Medications  MEDICATIONS  (STANDING):  acetaminophen   IVPB .. 1000 milliGRAM(s) IV Intermittent once  dextrose 5%. 1000 milliLiter(s) (100 mL/Hr) IV Continuous <Continuous>  dextrose 5%. 1000 milliLiter(s) (50 mL/Hr) IV Continuous <Continuous>  dextrose 50% Injectable 12.5 Gram(s) IV Push once  dextrose 50% Injectable 25 Gram(s) IV Push once  dextrose 50% Injectable 25 Gram(s) IV Push once  glucagon  Injectable 1 milliGRAM(s) IntraMuscular once  heparin   Injectable 5000 Unit(s) SubCutaneous every 12 hours  insulin lispro (ADMELOG) corrective regimen sliding scale   SubCutaneous every 6 hours  metoprolol tartrate 12.5 milliGRAM(s) Oral two times a day  pantoprazole  Injectable 40 milliGRAM(s) IV Push daily  potassium chloride  10 mEq/100 mL IVPB 10 milliEquivalent(s) IV Intermittent every 1 hour    MEDICATIONS  (PRN):  dextrose Oral Gel 15 Gram(s) Oral once PRN Blood Glucose LESS THAN 70 milliGRAM(s)/deciliter  HYDROmorphone  Injectable 0.25 milliGRAM(s) IV Push every 3 hours PRN Moderate Pain (4 - 6)  HYDROmorphone  Injectable 0.5 milliGRAM(s) IV Push every 3 hours PRN Severe Pain (7 - 10)  lidocaine   4% Patch 1 Patch Transdermal daily PRN pain at the surgical site  naloxone Injectable 0.1 milliGRAM(s) IV Push every 3 minutes PRN For ANY of the following changes in patient status:  A. RR LESS THAN 10 breaths per minute, B. Oxygen saturation LESS THAN 90%, C. Sedation score of 6  ondansetron Injectable 4 milliGRAM(s) IV Push every 6 hours PRN Nausea    Physical Exam  Constitutional: A&Ox3, NAD + glasses, sitting in chair  Gastrointestinal: Soft nontender, nondistended, zenobia serous  Extremities: Moving all extremities, no edema  Skin: No Rashes, Hematoma, Ecchymosis  LABS:                        10.3   5.02  )-----------( 145      ( 22 Aug 2023 04:20 )             31.6     08-22    137  |  97<L>  |  4<L>  ----------------------------<  105<H>  3.3<L>   |  28  |  0.45<L>    Ca    8.6      22 Aug 2023 04:20  Phos  3.1     08-22  Mg     1.50     08-22          Urinalysis Basic - ( 22 Aug 2023 04:20 )    Color: x / Appearance: x / SG: x / pH: x  Gluc: 105 mg/dL / Ketone: x  / Bili: x / Urobili: x   Blood: x / Protein: x / Nitrite: x   Leuk Esterase: x / RBC: x / WBC x   Sq Epi: x / Non Sq Epi: x / Bacteria: x      ABO Interpretation: O (08-22-23 @ 04:31)  
Morning Surgical Progress Note  Patient is a 63y old  Male who presents with a chief complaint of s/p Andrew Bahman Esophagectomy (20 Aug 2023 09:14)    SUBJECTIVE: Patient seen and examined at bedside with surgical team, patient without complaints. States he is passing gas, denies bm. States his pain is controlled.     Vital Signs Last 24 Hrs  T(C): 36.7 (21 Aug 2023 04:00), Max: 37.3 (20 Aug 2023 20:00)  T(F): 98.1 (21 Aug 2023 04:00), Max: 99.1 (20 Aug 2023 20:00)  HR: 89 (21 Aug 2023 07:00) (70 - 108)  BP: 132/70 (21 Aug 2023 06:00) (105/60 - 132/70)  BP(mean): 87 (21 Aug 2023 06:00) (75 - 96)  RR: 18 (21 Aug 2023 07:00) (12 - 22)  SpO2: 98% (21 Aug 2023 07:00) (94% - 100%)  Parameters below as of 21 Aug 2023 07:00  Patient On (Oxygen Delivery Method): room air    I&O's Detail  20 Aug 2023 07:01  -  21 Aug 2023 07:00  --------------------------------------------------------  IN:    dextrose 5% + lactated ringers: 1800 mL    IV PiggyBack: 750 mL  Total IN: 2550 mL  OUT:    Bulb (mL): 5 mL    Nasogastric/Oral tube (mL): 25 mL    Voided (mL): 2050 mL  Total OUT: 2080 mL  Total NET: 470 mL    Medications  MEDICATIONS  (STANDING):  acetaminophen   IVPB .. 1000 milliGRAM(s) IV Intermittent once  dextrose 5% + lactated ringers. 1000 milliLiter(s) (75 mL/Hr) IV Continuous <Continuous>  dextrose 5%. 1000 milliLiter(s) (50 mL/Hr) IV Continuous <Continuous>  dextrose 5%. 1000 milliLiter(s) (100 mL/Hr) IV Continuous <Continuous>  dextrose 50% Injectable 25 Gram(s) IV Push once  dextrose 50% Injectable 12.5 Gram(s) IV Push once  dextrose 50% Injectable 25 Gram(s) IV Push once  glucagon  Injectable 1 milliGRAM(s) IntraMuscular once  heparin   Injectable 5000 Unit(s) SubCutaneous every 12 hours  insulin lispro (ADMELOG) corrective regimen sliding scale   SubCutaneous every 6 hours  metoprolol tartrate Injectable 2.5 milliGRAM(s) IV Push every 6 hours  pantoprazole  Injectable 40 milliGRAM(s) IV Push daily  potassium chloride  10 mEq/100 mL IVPB 10 milliEquivalent(s) IV Intermittent every 2 hours  MEDICATIONS  (PRN):  dextrose Oral Gel 15 Gram(s) Oral once PRN Blood Glucose LESS THAN 70 milliGRAM(s)/deciliter  HYDROmorphone  Injectable 0.5 milliGRAM(s) IV Push every 3 hours PRN Severe Pain (7 - 10)  HYDROmorphone  Injectable 0.25 milliGRAM(s) IV Push every 3 hours PRN Moderate Pain (4 - 6)  lidocaine   4% Patch 1 Patch Transdermal daily PRN pain at the surgical site  naloxone Injectable 0.1 milliGRAM(s) IV Push every 3 minutes PRN For ANY of the following changes in patient status:  A. RR LESS THAN 10 breaths per minute, B. Oxygen saturation LESS THAN 90%, C. Sedation score of 6  ondansetron Injectable 4 milliGRAM(s) IV Push every 6 hours PRN Nausea    Physical Exam  Constitutional: A&Ox3, NAD  Gastrointestinal: Soft nontender, nondistended  Extremities: Moving all extremities, no edema  Skin: No Rashes, Hematoma, Ecchymosis    LABS:                10.0   5.19  )-----------( 141      ( 21 Aug 2023 03:00 )             30.1   08-21  136  |  100  |  4<L>  ----------------------------<  137<H>  3.3<L>   |  29  |  0.43<L>  Ca    8.6      21 Aug 2023 03:00  Phos  3.3     08-21  Mg     1.50     08-21  Urinalysis Basic - ( 21 Aug 2023 03:00 )  Color: x / Appearance: x / SG: x / pH: x  Gluc: 137 mg/dL / Ketone: x  / Bili: x / Urobili: x   Blood: x / Protein: x / Nitrite: x   Leuk Esterase: x / RBC: x / WBC x   Sq Epi: x / Non Sq Epi: x / Bacteria: x      
SURGERY DAILY PROGRESS NOTE    SUBJECTIVE: Patient seen and evaluated on AM rounds with Dr. Knight. Pt is resting comfortably in bed with no complaints. He has passed gas without bowel movements. NPO on IVF. Pain is adequately controlled on current regimen. Denies fevers/chills, chest pain, dyspnea, abdominal pain, nausea, vomiting, and diarrhea    Overnight Events:  No acute events overnight  -----------------------------------------------------------------------------------------------------------------------------------------------------------------------------------------------------------  OBJECTIVE:  Vital Signs Last 24 Hrs  T(C): 36.7 (20 Aug 2023 04:00), Max: 37.3 (19 Aug 2023 20:00)  T(F): 98 (20 Aug 2023 04:00), Max: 99.2 (19 Aug 2023 20:00)  HR: 88 (20 Aug 2023 07:00) (76 - 119)  BP: 133/84 (20 Aug 2023 07:00) (115/70 - 148/73)  BP(mean): 98 (20 Aug 2023 07:00) (82 - 105)  RR: 20 (20 Aug 2023 07:00) (0 - 28)  SpO2: 99% (20 Aug 2023 07:00) (94% - 99%)    Parameters below as of 20 Aug 2023 07:00  Patient On (Oxygen Delivery Method): room air      I&O's Detail    19 Aug 2023 07:01  -  20 Aug 2023 07:00  --------------------------------------------------------  IN:    dextrose 5% + lactated ringers: 1800 mL    Enteral Tube Flush: 180 mL    IV PiggyBack: 550 mL  Total IN: 2530 mL    OUT:    Bulb (mL): 30 mL    Chest Tube (mL): 10 mL    Nasogastric/Oral tube (mL): 250 mL    Voided (mL): 2550 mL  Total OUT: 2840 mL    Total NET: -310 mL        Daily     Daily Weight in k.1 (20 Aug 2023 01:00)  MEDICATIONS  (STANDING):  acetaminophen   IVPB .. 1000 milliGRAM(s) IV Intermittent once  dextrose 5% + lactated ringers. 1000 milliLiter(s) (75 mL/Hr) IV Continuous <Continuous>  dextrose 5%. 1000 milliLiter(s) (100 mL/Hr) IV Continuous <Continuous>  dextrose 5%. 1000 milliLiter(s) (50 mL/Hr) IV Continuous <Continuous>  dextrose 50% Injectable 12.5 Gram(s) IV Push once  dextrose 50% Injectable 25 Gram(s) IV Push once  dextrose 50% Injectable 25 Gram(s) IV Push once  glucagon  Injectable 1 milliGRAM(s) IntraMuscular once  heparin   Injectable 5000 Unit(s) SubCutaneous every 12 hours  insulin lispro (ADMELOG) corrective regimen sliding scale   SubCutaneous every 6 hours  metoprolol tartrate Injectable 2.5 milliGRAM(s) IV Push every 6 hours  pantoprazole  Injectable 40 milliGRAM(s) IV Push daily  sodium chloride 0.9% lock flush 3 milliLiter(s) IV Push Once    MEDICATIONS  (PRN):  dextrose Oral Gel 15 Gram(s) Oral once PRN Blood Glucose LESS THAN 70 milliGRAM(s)/deciliter  HYDROmorphone  Injectable 0.5 milliGRAM(s) IV Push every 3 hours PRN Severe Pain (7 - 10)  HYDROmorphone  Injectable 0.25 milliGRAM(s) IV Push every 3 hours PRN Moderate Pain (4 - 6)  lidocaine   4% Patch 1 Patch Transdermal daily PRN pain at the surgical site  naloxone Injectable 0.1 milliGRAM(s) IV Push every 3 minutes PRN For ANY of the following changes in patient status:  A. RR LESS THAN 10 breaths per minute, B. Oxygen saturation LESS THAN 90%, C. Sedation score of 6  ondansetron Injectable 4 milliGRAM(s) IV Push every 6 hours PRN Nausea      LABS:                        10.6   6.06  )-----------( 152      ( 20 Aug 2023 02:45 )             31.9     08-20    136  |  98  |  3<L>  ----------------------------<  140<H>  3.4<L>   |  28  |  0.45<L>    Ca    8.9      20 Aug 2023 02:45  Phos  2.3     08-20  Mg     1.50     08-20        Urinalysis Basic - ( 20 Aug 2023 02:45 )    Color: x / Appearance: x / SG: x / pH: x  Gluc: 140 mg/dL / Ketone: x  / Bili: x / Urobili: x   Blood: x / Protein: x / Nitrite: x   Leuk Esterase: x / RBC: x / WBC x   Sq Epi: x / Non Sq Epi: x / Bacteria: x        PHYSICAL EXAM:  Constitutional: Well developed, well nourished, NAD  Pulmonary: Symmetric chest rise bilaterally, no increased WOB  Gastrointestinal: Abdomen soft, nontender, nondistended. Incision clean, dry, intact. R lexus drain w/ serosanguinous output  Extremities: Normal strength, warm to touch, no clubbing/cyanosis/erythema/edema/hematoma      
Surgery D team    SUBJECTIVE: Pt tachy to 165 overnight with A fib. States he has a history of a fib which was managed with metoprolol. Denies chest pain but endorses some chest discomfort during episode. +/- flatus/BM. Denies N/V.    Vital Signs Last 24 Hrs  T(C): 36.7 (19 Aug 2023 08:00), Max: 36.8 (18 Aug 2023 16:00)  T(F): 98.1 (19 Aug 2023 08:00), Max: 98.3 (18 Aug 2023 20:00)  HR: 93 (19 Aug 2023 11:16) (67 - 165)  BP: 148/73 (19 Aug 2023 11:00) (88/53 - 148/73)  BP(mean): 92 (19 Aug 2023 11:00) (66 - 109)  RR: 17 (19 Aug 2023 11:16) (12 - 24)  SpO2: 99% (19 Aug 2023 11:16) (95% - 100%)    Parameters below as of 19 Aug 2023 08:00  Patient On (Oxygen Delivery Method): room air        I&O's Detail    18 Aug 2023 07:01  -  19 Aug 2023 07:00  --------------------------------------------------------  IN:    dextrose 5% + lactated ringers: 1800 mL    Diltiazem: 125 mL    Enteral Tube Flush: 180 mL    IV PiggyBack: 150 mL    Lactated Ringers Bolus: 500 mL  Total IN: 2755 mL    OUT:    Bulb (mL): 10 mL    Chest Tube (mL): 40 mL    Nasogastric/Oral tube (mL): 500 mL    Voided (mL): 2700 mL  Total OUT: 3250 mL    Total NET: -495 mL      19 Aug 2023 07:01  -  19 Aug 2023 12:01  --------------------------------------------------------  IN:    dextrose 5% + lactated ringers: 225 mL    Enteral Tube Flush: 30 mL    IV PiggyBack: 100 mL  Total IN: 355 mL    OUT:    Chest Tube (mL): 10 mL    Voided (mL): 300 mL  Total OUT: 310 mL    Total NET: 45 mL      Physical Exam:  GEN: NAD, AOx3  HEENT: atraumatic, normocephalic  RESP: no increased work of breathing, no use of accessory muscles, equal chest expansion, CT in place with no leak  GI/ABD: soft, NTND, no rebound or guarding, Incision C/D/I. NIMISHA serosanguineous, minimal output   EXTREMITIES: warm, pink, well-perfused             LABS:                        10.1   7.84  )-----------( 127      ( 19 Aug 2023 02:36 )             30.6     08-19    135  |  98  |  4<L>  ----------------------------<  137<H>  3.3<L>   |  26  |  0.36<L>    Ca    8.4      19 Aug 2023 02:36  Phos  1.8     08-18  Mg     1.70     08-19        Urinalysis Basic - ( 19 Aug 2023 02:36 )    Color: x / Appearance: x / SG: x / pH: x  Gluc: 137 mg/dL / Ketone: x  / Bili: x / Urobili: x   Blood: x / Protein: x / Nitrite: x   Leuk Esterase: x / RBC: x / WBC x   Sq Epi: x / Non Sq Epi: x / Bacteria: x        RADIOLOGY & ADDITIONAL STUDIES:  
TEAM [ D ] Surgery Daily Progress Note  =====================================================    SUBJECTIVE: Patient seen and examined at bedside on AM rounds. Patient reports that they're feeling well. Tolerating full liquid diet, denies nausea, vomiting.   + Flatus/  +  BM. OOB/Amublating as tolerated. Denies fever, chills.    ALLERGIES:  No Known Allergies    --------------------------------------------------------------------------------------    MEDICATIONS:    Neurologic Medications  acetaminophen   IVPB .. 1000 milliGRAM(s) IV Intermittent once  HYDROmorphone  Injectable 0.25 milliGRAM(s) IV Push every 3 hours PRN Moderate Pain (4 - 6)  HYDROmorphone  Injectable 0.5 milliGRAM(s) IV Push every 3 hours PRN Severe Pain (7 - 10)  ondansetron Injectable 4 milliGRAM(s) IV Push every 6 hours PRN Nausea    Cardiovascular Medications  metoprolol tartrate 12.5 milliGRAM(s) Oral two times a day    Gastrointestinal Medications  dextrose 5%. 1000 milliLiter(s) IV Continuous <Continuous>  dextrose 5%. 1000 milliLiter(s) IV Continuous <Continuous>  pantoprazole  Injectable 40 milliGRAM(s) IV Push daily    Hematologic/Oncologic Medications  enoxaparin Injectable 40 milliGRAM(s) SubCutaneous every 24 hours    Endocrine/Metabolic Medications  dextrose 50% Injectable 12.5 Gram(s) IV Push once  dextrose 50% Injectable 25 Gram(s) IV Push once  dextrose 50% Injectable 25 Gram(s) IV Push once  dextrose Oral Gel 15 Gram(s) Oral once PRN Blood Glucose LESS THAN 70 milliGRAM(s)/deciliter  glucagon  Injectable 1 milliGRAM(s) IntraMuscular once  insulin lispro (ADMELOG) corrective regimen sliding scale   SubCutaneous Before meals and at bedtime    Topical/Other Medications  lidocaine   4% Patch 1 Patch Transdermal daily PRN pain at the surgical site  naloxone Injectable 0.1 milliGRAM(s) IV Push every 3 minutes PRN For ANY of the following changes in patient status:  A. RR LESS THAN 10 breaths per minute, B. Oxygen saturation LESS THAN 90%, C. Sedation score of 6    --------------------------------------------------------------------------------------    VITAL SIGNS:  T(C): 36.8 (08-23-23 @ 04:00), Max: 37.1 (08-22-23 @ 12:00)  HR: 90 (08-23-23 @ 04:00) (76 - 122)  BP: 121/76 (08-23-23 @ 04:00) (106/62 - 126/73)  RR: 23 (08-23-23 @ 04:00) (10 - 23)  SpO2: 96% (08-23-23 @ 04:00) (96% - 100%)  --------------------------------------------------------------------------------------    EXAM    General: NAD, resting in bed comfortably.  Cardiac: regular rate, warm and well perfused  Respiratory: Nonlabored respirations, normal cw expansion.  Abdomen: soft, nontender, nondistended, zenobia drain serous  Extremities: normal strength, FROM, no deformities    --------------------------------------------------------------------------------------    LABS    --------------------------------------------------------------------------------------    INS AND OUTS:    08-22-23 @ 07:01  -  08-23-23 @ 07:00  --------------------------------------------------------  IN: 920 mL / OUT: 2170 mL / NET: -1250 mL      --------------------------------------------------------------------------------------
    CHIEF COMPLAINT: FOLLOW UP IN ICU FOR POSTOPERATIVE CARE OF PATIENT WHO IS S/P [place] Bahman esophagogastrectomy      PROCEDURES:     FB, EGD, Robotic RVATS, Kathleen Bahman Esophagogastrectomy 8/15/2023      ISSUES:                  Esophageal cancer               Postop pain               Chest tube in place   HTN   DM-2   History of tachycardia   Chemotherapy-induced neuropathy   S/P cholecystectomy   H/O lithotripsy  History of chemotherapy  S/P radiation therapy      INTERVAL EVENTS:     Afebrile, stable oxygenation on room air, stable hemodyamics  Chest tube place to water seal, lexus to bulb suction.  NPO on IVF  NGT to remain in place, 250ml bilious output in past 24h      HISTORY:   Patient reports moderate pain at chest and abdominal wall incision sites which is worse with coughing and deep breathing without associated fever or dyspnea. Pain is improved with use of pain meds.       PHYSICAL EXAM:   Gen: Comfortable, No acute distress  Eyes: Sclera white, Conjunctiva normal, Eyelids normal, Pupils symmetrical   ENT: Mucous membranes moist,  NGT in place  Neck: Trachea midline,  ,  ,  ,  ,  ,    CV: Rate regular, Rhythm regular,  ,  ,    Resp: Breath sounds clear, No accessory muscles use, 2 R chest tubes in place,  ,    Abd: Soft, Non-distended, Non-tender, Bowel sounds normal,  ,  ,    Skin: Warm, No peripheral edema of lower extremities,  ,    : No torres  Neuro: Moving all 4 extremities,    Psych: A&Ox3      ASSESSMENT AND PLAN:     NEURO:  Post-operative Pain - Stable. Pain control with PCA and Tylenol IV PRN.              RESPIRATORY:  Hypoxia - Wean nasal cannula for goal O2sat above 92. Obtain CXR. Incentive spirometry. Chest PT and frequent suctioning. Continue bronchodilators. OOB to chair & ambulate w/ assistance. Continuous pulse oximetry for support & to prevent decompensation.       Chest tube – Pleurevac regulated water seal. Monitor chest tube output.    Right chest lexus to bulb suction - monitor output, serous and 20ml in past 24h          CARDIOVASCULAR:  Hemodynamically stable - Not on pressors. Continue hemodynamic monitoring.  Telemetry (medical test) - Reviewed by me today independently. Normal sinus rhythm.   HTN - not on any meds. Normtensive here in postop setting, continue to monitor          RENAL:  Stable - Monitor IOs and electrolytes. Keep K above 4.0 and Mg above 2.0. Maintenance IVF while NPO              GASTROINTESTINAL:  GI prophylaxis   Zofran and Reglan IV PRN for nausea  NPO  Barium swallow on 8/21  NGT to low continuous suction, monitor output            HEMATOLOGIC:  No signs of active bleeding. Monitor Hgb in CBC in AM  DVT prophylaxis with heparin subQ and SCDs.             INFECTIOUS DISEASE:  All surgical sites appear clean. No signs of active infection. Will monitor for fever and leukocytosis.             ENDOCRINE:  Stable – Monitor glucose fingersticks for goal 120-180.          ONCOLOGY:  Esophageal cancer - Received preop chemo/RT. Improved. S/P resection. Follow up final pathology.      Pertinent clinical, laboratory, radiographic, hemodynamic, echocardiographic, respiratory data, microbiologic data and chart were reviewed by myself and analyzed frequently throughout the course of the day and night by myself.    Plan discussed at length with the CTICU staff and Attending CT Surgeon -   Dr Horacio Avery    Patient's status was discussed with patient at bedside.    I have spent 50 minutes of time with this patient monitoring hemodynamic status, respiratory status, and coordinating care in the ICU.      ________________________________________________    _________________________  VITAL SIGNS:  Vital Signs Last 24 Hrs  T(C): 36.9 (17 Aug 2023 08:00), Max: 37.1 (17 Aug 2023 04:00)  T(F): 98.4 (17 Aug 2023 08:00), Max: 98.7 (17 Aug 2023 04:00)  HR: 99 (17 Aug 2023 12:00) (76 - 108)  BP: 91/53 (17 Aug 2023 12:00) (91/53 - 146/79)  BP(mean): 66 (17 Aug 2023 12:00) (66 - 93)  RR: 12 (17 Aug 2023 12:00) (12 - 23)  SpO2: 98% (17 Aug 2023 12:00) (96% - 100%)    Parameters below as of 17 Aug 2023 12:00  Patient On (Oxygen Delivery Method): room air      I/Os:   I&O's Detail    16 Aug 2023 07:01  -  17 Aug 2023 07:00  --------------------------------------------------------  IN:    Enteral Tube Flush: 180 mL    IV PiggyBack: 500 mL    Lactated Ringers: 1800 mL  Total IN: 2480 mL    OUT:    Bulb (mL): 20 mL    Chest Tube (mL): 240 mL    Indwelling Catheter - Urethral (mL): 220 mL    Nasogastric/Oral tube (mL): 250 mL    Voided (mL): 900 mL  Total OUT: 1630 mL    Total NET: 850 mL      17 Aug 2023 07:01  -  17 Aug 2023 12:33  --------------------------------------------------------  IN:    Enteral Tube Flush: 60 mL    IV PiggyBack: 515 mL    Lactated Ringers: 375 mL  Total IN: 950 mL    OUT:    Bulb (mL): 0 mL    Chest Tube (mL): 50 mL    Nasogastric/Oral tube (mL): 200 mL    Voided (mL): 350 mL  Total OUT: 600 mL    Total NET: 350 mL              MEDICATIONS:  MEDICATIONS  (STANDING):  acetaminophen   IVPB .. 1000 milliGRAM(s) IV Intermittent once  dextrose 5%. 1000 milliLiter(s) (50 mL/Hr) IV Continuous <Continuous>  dextrose 5%. 1000 milliLiter(s) (100 mL/Hr) IV Continuous <Continuous>  dextrose 50% Injectable 25 Gram(s) IV Push once  dextrose 50% Injectable 12.5 Gram(s) IV Push once  dextrose 50% Injectable 25 Gram(s) IV Push once  glucagon  Injectable 1 milliGRAM(s) IntraMuscular once  heparin   Injectable 5000 Unit(s) SubCutaneous every 12 hours  HYDROmorphone PCA (1 mG/mL) 30 milliLiter(s) PCA Continuous PCA Continuous  insulin lispro (ADMELOG) corrective regimen sliding scale   SubCutaneous every 6 hours  lactated ringers. 1000 milliLiter(s) (75 mL/Hr) IV Continuous <Continuous>  pantoprazole  Injectable 40 milliGRAM(s) IV Push daily  sodium chloride 0.9% lock flush 3 milliLiter(s) IV Push Once    MEDICATIONS  (PRN):  dextrose Oral Gel 15 Gram(s) Oral once PRN Blood Glucose LESS THAN 70 milliGRAM(s)/deciliter  HYDROmorphone PCA (1 mG/mL) Rescue Clinician Bolus 0.5 milliGRAM(s) IV Push every 15 minutes PRN for Pain Scale GREATER THAN 6  lidocaine   4% Patch 1 Patch Transdermal daily PRN pain at the surgical site  nalbuphine Injectable 2.5 milliGRAM(s) IV Push every 6 hours PRN Pruritus  naloxone Injectable 0.1 milliGRAM(s) IV Push every 3 minutes PRN For ANY of the following changes in patient status:  A. RR LESS THAN 10 breaths per minute, B. Oxygen saturation LESS THAN 90%, C. Sedation score of 6  ondansetron Injectable 4 milliGRAM(s) IV Push every 6 hours PRN Nausea      LABS:  Laboratory data was independently reviewed by me today.                           11.3   9.58  )-----------( 118      ( 17 Aug 2023 02:50 )             34.9     08-17    136  |  100  |  10  ----------------------------<  115<H>  3.5   |  27  |  0.43<L>    Ca    8.5      17 Aug 2023 02:50  Phos  1.6     08-17  Mg     1.80     08-17          ABG - ( 15 Aug 2023 17:19 )  pH, Arterial: 7.22  pH, Blood: x     /  pCO2: 57    /  pO2: 168   / HCO3: 23    / Base Excess: -4.9  /  SaO2: 99.8              Urinalysis Basic - ( 17 Aug 2023 02:50 )    Color: x / Appearance: x / SG: x / pH: x  Gluc: 115 mg/dL / Ketone: x  / Bili: x / Urobili: x   Blood: x / Protein: x / Nitrite: x   Leuk Esterase: x / RBC: x / WBC x   Sq Epi: x / Non Sq Epi: x / Bacteria: x        RADIOLOGY:   Radiology images were independently reviewed by me today. Reports were reviewed by me today.    Xray Chest 1 View AP/PA:   ACC: [id number] EXAM:  XR CHEST AP OR PA 1V   ORDERED BY: ANTONIO KOHLER     PROCEDURE DATE:  08/16/2023          INTERPRETATION:  EXAM: XR CHEST    INDICATION: Admitting Dxs: C16.0 C16.0 s/p robotic kathleen bahman   esophagectomy for esophageal cancer LXR    COMPARISON: See below    IMPRESSION:    Portable chest August 16 at 9:23 AM: Exam is compared to earlier exam   August 15 at 9:05 PM.  medical apparatus unchanged. No pneumothorax. Some   subcutaneous emphysema still seen in the right neck and right lateral   chest. Pneumomediastinum is still suggested. Some increased density   behind the heart. Continued follow-up suggested.    --- End of Report ---            SABIHA LESTER MD; Attending Radiologist  This document has been electronically signed. Aug 16 2023 12:00PM (08-16-23 @ 09:40)  Xray Chest 1 View- PORTABLE-Urgent:   ACC: [id number] EXAM:  XR CHEST PORTABLE URGENT 1V   ORDERED BY: ANTONIO KOHLER     PROCEDURE DATE:  08/15/2023          INTERPRETATION:  CLINICAL INFORMATION: Post esophagectomy    TIME OF EXAMINATION: August 15, 2023 at 9:05 PM    EXAM: Portable chest    FINDINGS:  Enteric tube with sidehole and tip beneath the diaphragm is present.   Right-sided chest tube and mediastinal drain is present.    Pneumomediastinum, subcutaneous emphysema are in postop pneumoperitoneum.    Lungs are clear withouteffusions or pneumothorax.        COMPARISON: July 6, 2023        IMPRESSION: Status post [place] Bahman esophagectomy with extra alveolar air   including pneumoperitoneum but clear lungs without effusions or   pneumothorax.    --- End of Report ---            CUATE MAN MD; Attending Radiologist  This document has been electronically signed. Aug 16 2023 12:14PM (08-15-23 @ 21:33)            
D Team (Surgical Oncology) Daily Progress Note    SUBJECTIVE:  Pt seen and examined, and is resting comfortably OOB to chair. No acute events overnight. Pain is adequately controlled on PCA. Pt has been OOB/ambulating. No chest pain, shortness of breath, headache, or nausea/vomiting. -/- for flatus and BM.     OBJECTIVE:  Vital Signs Last 24 Hrs  T(C): 36.9 (18 Aug 2023 04:00), Max: 37 (17 Aug 2023 20:00)  T(F): 98.4 (18 Aug 2023 04:00), Max: 98.6 (17 Aug 2023 20:00)  HR: 103 (18 Aug 2023 06:00) (70 - 109)  BP: 128/70 (18 Aug 2023 06:00) (89/64 - 146/79)  BP(mean): 89 (18 Aug 2023 06:00) (66 - 98)  RR: 14 (18 Aug 2023 06:00) (11 - 23)  SpO2: 100% (18 Aug 2023 06:00) (93% - 100%)    Parameters below as of 18 Aug 2023 06:00  Patient On (Oxygen Delivery Method): room air        I&O's Detail    17 Aug 2023 07:01  -  18 Aug 2023 07:00  --------------------------------------------------------  IN:    dextrose 5% + lactated ringers: 375 mL    Enteral Tube Flush: 180 mL    IV PiggyBack: 765 mL    Lactated Ringers: 1425 mL  Total IN: 2745 mL    OUT:    Bulb (mL): 10 mL    Chest Tube (mL): 140 mL    Nasogastric/Oral tube (mL): 450 mL    Voided (mL): 850 mL  Total OUT: 1450 mL    Total NET: 1295 mL        Exam:  GEN: NAD, AOx3  HEENT: atraumatic, normocephalic  RESP: no increased work of breathing, no use of accessory muscles, equal chest expansion, CT in place   GI/ABD: soft, NTND, no rebound or guarding, Incision C/D/I. NIMISHA serosanguineous, minimal output   EXTREMITIES: warm, pink, well-perfused                        10.7   8.55  )-----------( 114      ( 18 Aug 2023 04:40 )             32.9       08-18    136  |  99  |  8   ----------------------------<  104<H>  3.9   |  25  |  0.42<L>    Ca    8.2<L>      18 Aug 2023 04:40  Phos  1.8     08-18  Mg     1.60     08-18

## 2023-08-23 NOTE — PROGRESS NOTE ADULT - PROVIDER SPECIALTY LIST ADULT
Critical Care
Critical Care
Pain Medicine
Pain Medicine
Pulmonology
Surgery
Critical Care
Pain Medicine
Pain Medicine
Pulmonology
Pulmonology
Surgery
Anesthesia
Critical Care
Pain Medicine
Pulmonology
Pulmonology
Surgery
Critical Care
Surgery

## 2023-08-23 NOTE — DISCHARGE NOTE NURSING/CASE MANAGEMENT/SOCIAL WORK - NURSING SECTION COMPLETE
Date: 06/23/21  Leidy Israel  1998  Estimated Date of Delivery: 10/14/21  23w6d  OB/GYN: Simone  Diagnosis: Gestational diabetes mellitus (GDM) in second trimester    Insulin controlled            Assessment and Plan:   Due to hyperglycemia increase Lantus to 30 units daily  If 2 hours PP>120 will need bolus insulin added to regimen  Diet: Gestational diabetes meal plan; 3 meals and 3 snacks  SMBG: Checks blood sugar 4 times per day; fasting and 2 hour start of each meal    Meter: One-Touch Verio glucose meter  Activity: Walks 30 minutes daily, follow OB recommendations     Support System: Significant Other  Patient Goal: "I will walk 20-30 minutes after dinner daily "    Labs  3/2/2021 Hbg A1c = 5 6%  06/02/2021 A1c = 5 2%  Next A1c due by July 28, 2021    Ultrasounds  04/05/2021 : Level 1 NT   05/27/2021  Normal growth and GUERDA   Next US scheduled for 06/28/2021    Further fetal surveillance  Beginning at 32 weeks, NST / GUERDA twice a week     aSmm Núñez, Alee Morgan Patient/Caregiver provided printed discharge information.

## 2023-08-23 NOTE — DISCHARGE NOTE NURSING/CASE MANAGEMENT/SOCIAL WORK - PATIENT PORTAL LINK FT
You can access the FollowMyHealth Patient Portal offered by [facility] by registering at the following website: [email or web address]. By joining [employer]’s FollowMyHealth portal, you will also be able to view your health information using other applications (apps) compatible with our system.

## 2023-08-23 NOTE — DISCHARGE NOTE NURSING/CASE MANAGEMENT/SOCIAL WORK - NSDCFUADDAPPT_GEN_ALL_CORE_FT
Follow-up with Dr. Avery in 1-2 weeks [phone], as well as with Dr. Aviles.  Right chest suture to be removed at follow-up appointment with Dr. Avery.  Have CXR done morning of follow-up appointment with Dr. Avery.    Follow-up with PMD within 2 weeks concerning addition of metoprolol to medication regimen and postoperative atrial fibrillation.

## 2023-08-23 NOTE — PROGRESS NOTE ADULT - ASSESSMENT
62 Y/O male with a PMHx of HTN and DM2 who presented with diagnosis of distal esophageal mass. Patient is now POD#5 from a [place] Bahman esophagogastrectomy on 8/15/23.    PLAN:  - Plan for UGI swallow study today  - Pain control Tylenol, Dilaudid PRN  - Out of bed/ Ambulate/ IS while in bed  - Monitor GI function  - Protonix  - Monitor NIMISHA drain output  - VTE prophylaxis with Heparin subcutaneous  - Appreciate care per CTICU    D Team Surgery  [id number] 
62 Y/O male with a PMHx of HTN and DM2 who presented with diagnosis of distal esophageal mass. Patient is now s/p  [place] Bahman esophagogastrectomy on 8/15/23. Tolerating clears    PLAN:  - Continue clear diet  - Pain control Tylenol, Dilaudid PRN  - Out of bed/ Ambulate/ IS while in bed  - Monitor GI function  - Protonix  - Monitor NIMISHA drain output  - VTE prophylaxis with Heparin subcutaneous  - Appreciate care per CTICU    D Team Surgery  [id number]   
62 y/o male presents to [facility] preop for robotic assisted laparoscopic, laparoscopic MYCHAL Bahman esophagogastrectomy esophagogastroduodenoscopy, possible open, Dr. Avery- Robotic assisted MYCHAL Bahman esophagogastrectomy, possible open. Pt reported dysphagia and unintentional weight loss. An upper EUS in Feb 2023 revealed esophageal lesion. A biopsy confirmed invasive adenocarcinoma. Pt s/p chemotherapy and radiation therapy, completed on 5/15/23.  (08 Aug 2023 10:56)  pulm called :  pt has no underlying lung disease:  not a smoker:   has no sob:  extubated after the surgery  able to do incentive spirometry upto 1000cc       Pneumomediastinum/ sq emphysema:  S/P MYCHAL esophagectomy: esophageal cancer  HTN  DM    Pneumomediastinum/ sq emphysema:  -had surgery done  : pneumomediastinum post surgery  : pt is asymptomatic  cont to monitor conservatively  -cont incentive spirometry  -BD prn for excessive cough or wheezing  -keep o2 sao2 above 90%    8/17:  -he seems pretty good: no sob:   -cont incentive spirometry   -no new chest xray today  : but he  looks comfortable:   S/P MYCHAL esophagectomy: esophageal cancer  -per cticu   -on cefotetan  8/17:  -off antibiotics now  HTN  -blood pressure is controlled  8/17:  -controled  DM  -monitor and control  'DVT prophylaxis    dw team
ASSESSMENT:  62 Y/O male with a PMHx of HTN and DM2 who presented with diagnosis of distal esophageal mass. Patient is now POD#3 from a [place] Bahman esophagogastrectomy on 8/15/23.    PLAN:  - Pain control Tylenol, Dilaudid PCA  - Out of bed/ Ambulate/ IS while in bed  - NPO/ NGT to continuous low wall suction  - Protonix  - Plan for UGI swallow study POD6  - Monitor chest tube  - Monitor NIMISHA drain output  - VTE prophylaxis with Heparin subcutaneous  - Appreciate care per CTICU    D Team Surgery  [id number] 
ASSESSMENT:  62 Y/O male with a PMHx of HTN and DM2 who presented with diagnosis of distal esophageal mass. Patient is now POD#4 from a Andrew Bahman esophagogastrectomy on 8/15/23.    PLAN:  - Plan for UGI swallow study for tomorrow  - Pain control Tylenol, Dilaudid PCA  - Out of bed/ Ambulate/ IS while in bed  - NPO/ NGT to continuous low wall suction  - Monitor GI function  - Protonix  - Monitor NIMISHA drain output  - VTE prophylaxis with Heparin subcutaneous  - Appreciate care per CTICU    D Team Surgery  [id number] 
ASSESSMENT:  62 Y/O male with a PMHx of HTN and DM2 who presented with diagnosis of distal esophageal mass. Patient is now POD#4 from a [place] Bahman esophagogastrectomy on 8/15/23.    PLAN:  - Dc chest tube today  - Plan for UGI swallow study tomorrow POD#5  - Pain control Tylenol, Dilaudid PCA  - Out of bed/ Ambulate/ IS while in bed  - NPO/ NGT to continuous low wall suction  - Protonix  - Monitor NIMISHA drain output  - VTE prophylaxis with Heparin subcutaneous  - Appreciate care per CTICU    D Team Surgery  [id number] 
Patient is a 63 year old male with a PMHx of HTN and DM2 who presented to pre-surgical testing with diagnosis of esophageal mass.  Patient is now S/P an [place] Bahman esophagogastrectomy on 8/15/23.      PLAN:  - NPO  - NGT to continuous low wall suction  - Monitor chest tube  - Monitor NIMISHA drain output  - Continue with IV Cefotetan  - Out of bed  - Pain control  - VTE prophylaxis with Heparin subcutaneous  - Appreciate care per CTICU      [id number]  D Team Surgery
62 Y/O male with a PMHx of HTN and DM2 who presented with diagnosis of distal esophageal mass. Patient is now s/p  Andrew Bahman esophagogastrectomy on 8/15/23. Tolerating FLD.    PLAN:  - Cont FLD  - Pain control Tylenol, PO pain control   - Protonix  - Monitor NIMISHA drain output  - VTE prophylaxis with Heparin subcutaneous  - Out of bed/ Ambulate/ IS while in bed  - Appreciate care per CTICU  - possible dc today    D Team Surgery  [id number]     
62 y/o male presents to [facility] preop for robotic assisted laparoscopic, laparoscopic MYCHAL Bahman esophagogastrectomy esophagogastroduodenoscopy, possible open, Dr. Avery- Robotic assisted MYCHAL Bahman esophagogastrectomy, possible open. Pt reported dysphagia and unintentional weight loss. An upper EUS in Feb 2023 revealed esophageal lesion. A biopsy confirmed invasive adenocarcinoma. Pt s/p chemotherapy and radiation therapy, completed on 5/15/23.  (08 Aug 2023 10:56)  pulm called :  pt has no underlying lung disease:  not a smoker:   has no sob:  extubated after the surgery  able to do incentive spirometry upto 1000cc       Pneumomediastinum/ sq emphysema:  S/P MYCHAL esophagectomy: esophageal cancer  HTN  DM    Pneumomediastinum/ sq emphysema:  -had surgery done  : pneumomediastinum post surgery  : pt is asymptomatic  cont to monitor conservatively  -cont incentive spirometry  -BD prn for excessive cough or wheezing  -keep o2 sao2 above 90%    8/17:  -he seems pretty good: no sob:   -cont incentive spirometry   -no new chest xray today  : but he  looks comfortable:   8/18:  -he is doing ok:  on room air;   -still chest tussive  S/P MYCHAL esophagectomy: esophageal cancer  -per cticu   -on cefotetan  8/17:  -off antibiotics now  8/18:  -off antibiotics  HTN  -blood pressure is controlled  8/17:  controlled  8/18:   -controlled  DM  -monitor and control  'DVT prophylaxis    dw team
62 y/o male presents to [facility] preop for robotic assisted laparoscopic, laparoscopic MYCHAL Bahman esophagogastrectomy esophagogastroduodenoscopy, possible open, Dr. Avery- Robotic assisted MYCHAL Bahman esophagogastrectomy, possible open. Pt reported dysphagia and unintentional weight loss. An upper EUS in Feb 2023 revealed esophageal lesion. A biopsy confirmed invasive adenocarcinoma. Pt s/p chemotherapy and radiation therapy, completed on 5/15/23.  (08 Aug 2023 10:56)  pulm called :  pt has no underlying lung disease:  not a smoker:   has no sob:  extubated after the surgery  able to do incentive spirometry upto 1000cc       Pneumomediastinum/ sq emphysema:  S/P MYCHAL esophagectomy: esophageal cancer  HTN  DM    Pneumomediastinum/ sq emphysema:  -had surgery done  : pneumomediastinum post surgery  : pt is asymptomatic  cont to monitor conservatively  -cont incentive spirometry  -BD prn for excessive cough or wheezing  -keep o2 sao2 above 90%    8/17: -he seems pretty good: no sob: -cont incentive spirometry -no new chest xray today  : but he  looks comfortable:   8/18: -he is doing ok: on room air; -still chest tussive  8/20: he still has right sided ptx:  slightly increased with pneumoperitoneum: defer to cta on room air:  he is not sob   8/22: seems pretty good:  no leak on esophgram:  per surgery : pulm wise pretty good:  no sob: on room air:   S/P MYCHAL esophagectomy: esophageal cancer  -per cticu   -on cefotetan  8/17: -off antibiotics now  8/18:-off antibiotics  8/20: afebrile wbc is normal   8/22: off antibtiocs  HTN  -blood pressure is controlled  8/17:controlled  8/18: -controlled  8/20: controlled  8/22: controlled  DM  -monitor and control  'DVT prophylaxis    dw team
62 y/o male presents to [facility] preop for robotic assisted laparoscopic, laparoscopic MYCHAL Bahman esophagogastrectomy esophagogastroduodenoscopy, possible open, Dr. Avery- Robotic assisted MYCHAL Bahman esophagogastrectomy, possible open. Pt reported dysphagia and unintentional weight loss. An upper EUS in Feb 2023 revealed esophageal lesion. A biopsy confirmed invasive adenocarcinoma. Pt s/p chemotherapy and radiation therapy, completed on 5/15/23.  (08 Aug 2023 10:56)  pulm called :  pt has no underlying lung disease:  not a smoker:   has no sob:  extubated after the surgery  able to do incentive spirometry upto 1000cc       Pneumomediastinum/ sq emphysema:  S/P MYCHAL esophagectomy: esophageal cancer  HTN  DM    Pneumomediastinum/ sq emphysema:  -had surgery done  : pneumomediastinum post surgery  : pt is asymptomatic  cont to monitor conservatively  -cont incentive spirometry  -BD prn for excessive cough or wheezing  -keep o2 sao2 above 90%    8/17: -he seems pretty good: no sob: -cont incentive spirometry -no new chest xray today  : but he  looks comfortable:   8/18: -he is doing ok: on room air; -still chest tussive  8/20: he still has right sided ptx:  slightly increased with pneumoperitoneum: defer to cta on room air:  he is not sob   8/22: seems pretty good:  no leak on esophogram  per surgery : pulm wise pretty good:  no sob: on room air:   8/23: cxr yesterday : with resolving ptx:  on room air:   S/P MYCHAL esophagectomy: esophageal cancer  -per cticu   -on cefotetan  8/17: -off antibiotics now  8/18:-off antibiotics  8/20: afebrile wbc is normal   8/22: off antibiotics  8/23: stable  HTN  -blood pressure is controlled  8/17:controlled  8/18: -controlled  8/20: controlled  8/22: controlled  8/23; controlled  DM  -monitor and control  'DVT prophylaxis    dw team : jaclyn planning
64 y/o male presents to [facility] preop for robotic assisted laparoscopic, laparoscopic MYCHAL Bahman esophagogastrectomy esophagogastroduodenoscopy, possible open, Dr. Avery- Robotic assisted MYCHAL Bahman esophagogastrectomy, possible open. Pt reported dysphagia and unintentional weight loss. An upper EUS in Feb 2023 revealed esophageal lesion. A biopsy confirmed invasive adenocarcinoma. Pt s/p chemotherapy and radiation therapy, completed on 5/15/23.  (08 Aug 2023 10:56)  pulm called :  pt has no underlying lung disease:  not a smoker:   has no sob:  extubated after the surgery  able to do incentive spirometry upto 1000cc       Pneumomediastinum/ sq emphysema:  S/P MYCHAL esophagectomy: esophageal cancer  HTN  DM    Pneumomediastinum/ sq emphysema:  -had surgery done  : pneumomediastinum post surgery  : pt is asymptomatic  cont to monitor conservatively  -cont incentive spirometry  -BD prn for excessive cough or wheezing  -keep o2 sao2 above 90%    8/17: -he seems pretty good: no sob: -cont incentive spirometry -no new chest xray today  : but he  looks comfortable:   8/18: -he is doing ok: on room air; -still chest tussive  8/20: he still has right sided ptx:  slightly increased with pneumoperitoneum: defer to cta on room air:  he is not sob   S/P MYCHAL esophagectomy: esophageal cancer  -per cticu   -on cefotetan  8/17: -off antibiotics now  8/18:-off antibiotics  8/20: afebrile wbc is normal   HTN  -blood pressure is controlled  8/17:controlled  8/18: -controlled  8/20: controlled  DM  -monitor and control  'DVT prophylaxis    dw team
Patient is a 63 year old male with a PMHx of HTN and DM2 who presented with diagnosis of esophageal mass.  Patient is now S/P an [place] Bahman esophagogastrectomy on 8/15/23.    PLAN:  - Pain control Tylenol, Dilaudid PCA  - Out of bed/ Ambulate/ IS while in bed  - NPO/ NGT to continuous low wall suction  - Protonix  - UGI swallow study POD6  - Monitor chest tube  - Monitor NIMISHA drain output  - VTE prophylaxis with Heparin subcutaneous  - Appreciate care per CTICU    D Team Surgery  [id number]

## 2023-08-23 NOTE — PROGRESS NOTE ADULT - NUTRITIONAL ASSESSMENT
This patient has been assessed with a concern for Malnutrition and has been determined to have a diagnosis/diagnoses of Severe protein-calorie malnutrition.  
This patient has been assessed with a concern for Malnutrition and has been determined to have a diagnosis/diagnoses of Severe protein-calorie malnutrition.    This patient is being managed with:   Diet Soft and Bite Sized-  Entered: Aug 23 2023 10:12AM  
This patient has been assessed with a concern for Malnutrition and has been determined to have a diagnosis/diagnoses of Severe protein-calorie malnutrition.  
This patient has been assessed with a concern for Malnutrition and has been determined to have a diagnosis/diagnoses of Severe protein-calorie malnutrition.    This patient is being managed with:   Diet Clear Liquid-  No Carbonated Beverages  Supplement Feeding Modality:  Oral  Ensure Clear Cans or Servings Per Day:  3       Frequency:  Daily  Entered: Aug 21 2023 12:32PM  
This patient has been assessed with a concern for Malnutrition and has been determined to have a diagnosis/diagnoses of Severe protein-calorie malnutrition.  
This patient has been assessed with a concern for Malnutrition and has been determined to have a diagnosis/diagnoses of Severe protein-calorie malnutrition.  
This patient has been assessed with a concern for Malnutrition and has been determined to have a diagnosis/diagnoses of Severe protein-calorie malnutrition.    This patient is being managed with:   Diet Full Liquid-  No Carbonated Beverages  Supplement Feeding Modality:  Oral  Ensure Clear Cans or Servings Per Day:  3       Frequency:  Daily  Entered: Aug 22 2023  9:25AM  
This patient has been assessed with a concern for Malnutrition and has been determined to have a diagnosis/diagnoses of Severe protein-calorie malnutrition.  
This patient has been assessed with a concern for Malnutrition and has been determined to have a diagnosis/diagnoses of Severe protein-calorie malnutrition.    This patient is being managed with:   Diet Clear Liquid-  No Carbonated Beverages  Supplement Feeding Modality:  Oral  Ensure Clear Cans or Servings Per Day:  3       Frequency:  Daily  Entered: Aug 21 2023 12:32PM

## 2023-08-25 PROBLEM — Z86.79 PERSONAL HISTORY OF OTHER DISEASES OF THE CIRCULATORY SYSTEM: Chronic | Status: ACTIVE | Noted: 2023-08-08

## 2023-08-25 PROBLEM — G62.0 DRUG-INDUCED POLYNEUROPATHY: Chronic | Status: ACTIVE | Noted: 2023-08-08

## 2023-08-25 PROBLEM — Z87.898 PERSONAL HISTORY OF OTHER SPECIFIED CONDITIONS: Chronic | Status: ACTIVE | Noted: 2023-08-08

## 2023-08-25 PROBLEM — C16.0 MALIGNANT NEOPLASM OF CARDIA: Chronic | Status: ACTIVE | Noted: 2023-08-08

## 2023-08-31 LAB — SURGICAL PATHOLOGY STUDY: SIGNIFICANT CHANGE UP

## 2023-09-01 NOTE — PLAN
[FreeTextEntry1] : The patient has been advised to remain NPO after the midright prior to surgery. Risks and benefits of the surgery have been discussed by the operative physician. The patient has been advised to avoid taking aspirin and aspirin containing products from now until surgery.  Thank you for including me in the care of your patient.

## 2023-09-01 NOTE — RESULTS/DATA
[] : results reviewed [de-identified] : H/H sl. low at 10.3/32.4 [de-identified] : glu 385 [de-identified] : 7/6/23 Negative [de-identified] : NSR without acute change [de-identified] : Type O neg antibody negative

## 2023-09-01 NOTE — CONSULT LETTER
[Dear  ___] : Dear  [unfilled], [( Thank you for referring [unfilled] for consultation for _____ )] : Thank you for referring [unfilled] for consultation for [unfilled] [Please see my note below.] : Please see my note below. [Consult Closing:] : Thank you very much for allowing me to participate in the care of this patient.  If you have any questions, please do not hesitate to contact me. [Sincerely,] : Sincerely, [FreeTextEntry3] : Miles Hernández MD, FAAFP Chair, Family Medicine, [facility] , Family Medicine, [employer], [facility] [place] , Family Medicine, [employer] , Family and Community Medicine, [facility]

## 2023-09-01 NOTE — HISTORY OF PRESENT ILLNESS
[No Pertinent Cardiac History] : no history of aortic stenosis, atrial fibrillation, coronary artery disease, recent myocardial infarction, or implantable device/pacemaker [No Pertinent Pulmonary History] : no history of asthma, COPD, sleep apnea, or smoking [No Adverse Anesthesia Reaction] : no adverse anesthesia reaction in self or family member [Diabetes] : diabetes [(Patient denies any chest pain, claudication, dyspnea on exertion, orthopnea, palpitations or syncope)] : Patient denies any chest pain, claudication, dyspnea on exertion, orthopnea, palpitations or syncope [Moderate (4-6 METs)] : Moderate (4-6 METs) [Anti-Platelet Agents: _____] : Anti-Platelet Agents: [unfilled] [NSAIDs: _____] : NSAIDs: [unfilled] [Herbal Supplements: _____] : Herbal Supplements: [unfilled] [Chronic Anticoagulation] : no chronic anticoagulation [Chronic Kidney Disease] : no chronic kidney disease [FreeTextEntry1] : Partial Esophagectomy at [facility] [FreeTextEntry2] : 08/15/2023 [FreeTextEntry4] : Pre op clearance related to WC injury secondary to WTC exposure. [FreeTextEntry3] : Horacio Avery MD and Dr Natan Bingham

## 2023-09-06 PROBLEM — I97.89 POSTOPERATIVE ATRIAL FIBRILLATION: Status: ACTIVE | Noted: 2023-09-06

## 2023-09-06 RX ORDER — PANTOPRAZOLE SODIUM 40 MG/1
40 TABLET, DELAYED RELEASE ORAL DAILY
Qty: 30 | Refills: 0 | Status: ACTIVE | COMMUNITY
Start: 2023-09-06

## 2023-09-06 NOTE — COUNSELING
[Hygeine (Including Daily Shower)] : hygeine (including daily shower) [Importance of Regular Medical Follow-Up] : the importance of regular medical follow-up [No Heavy Lifting] : no heavy lifting (>15-20 lb. for 1 month or 25 lb. for 3 months from date of surgery) [Blood Pressure Control] : blood pressure control [S/S of infection] : signs and symptoms of infection (and to whom it should be reported) fall precautions [Progressive Ambulation/Activity] : progressive ambulation/activity [Medication/Vitamin/Herb/Food Interaction] : medication/vitamin/herb/food interaction [Stress Management] : stress management

## 2023-09-07 ENCOUNTER — APPOINTMENT (OUTPATIENT)
Dept: THORACIC SURGERY | Facility: CLINIC | Age: 63
End: 2023-09-07
Payer: COMMERCIAL

## 2023-09-07 ENCOUNTER — OUTPATIENT (OUTPATIENT)
Dept: OUTPATIENT SERVICES | Facility: HOSPITAL | Age: 63
LOS: 1 days | End: 2023-09-07
Payer: COMMERCIAL

## 2023-09-07 VITALS
SYSTOLIC BLOOD PRESSURE: 119 MMHG | HEART RATE: 102 BPM | DIASTOLIC BLOOD PRESSURE: 83 MMHG | RESPIRATION RATE: 16 BRPM | OXYGEN SATURATION: 97 %

## 2023-09-07 VITALS — HEIGHT: 67 IN | WEIGHT: 109 LBS | BODY MASS INDEX: 17.11 KG/M2

## 2023-09-07 DIAGNOSIS — I97.89 OTHER POSTPROCEDURAL COMPLICATIONS AND DISORDERS OF THE CIRCULATORY SYSTEM, NOT ELSEWHERE CLASSIFIED: ICD-10-CM

## 2023-09-07 DIAGNOSIS — K22.2 ESOPHAGEAL OBSTRUCTION: Chronic | ICD-10-CM

## 2023-09-07 DIAGNOSIS — Z92.21 PERSONAL HISTORY OF ANTINEOPLASTIC CHEMOTHERAPY: Chronic | ICD-10-CM

## 2023-09-07 DIAGNOSIS — Z98.890 OTHER SPECIFIED POSTPROCEDURAL STATES: Chronic | ICD-10-CM

## 2023-09-07 DIAGNOSIS — Z90.49 ACQUIRED ABSENCE OF OTHER SPECIFIED PARTS OF DIGESTIVE TRACT: Chronic | ICD-10-CM

## 2023-09-07 DIAGNOSIS — Z92.3 PERSONAL HISTORY OF IRRADIATION: Chronic | ICD-10-CM

## 2023-09-07 DIAGNOSIS — I48.91 OTHER POSTPROCEDURAL COMPLICATIONS AND DISORDERS OF THE CIRCULATORY SYSTEM, NOT ELSEWHERE CLASSIFIED: ICD-10-CM

## 2023-09-07 DIAGNOSIS — C15.9 MALIGNANT NEOPLASM OF ESOPHAGUS, UNSPECIFIED: ICD-10-CM

## 2023-09-07 PROCEDURE — 71046 X-RAY EXAM CHEST 2 VIEWS: CPT | Mod: 26

## 2023-09-07 PROCEDURE — 99024 POSTOP FOLLOW-UP VISIT: CPT

## 2023-09-07 PROCEDURE — 71046 X-RAY EXAM CHEST 2 VIEWS: CPT

## 2023-09-07 NOTE — ASSESSMENT
[FreeTextEntry1] : 62 y/o male with PMHx HTN, DM, and esophageal cancer who was admitted through Same-Day Surgery on 8/15/23 and underwent Robotic-Assisted, Laparoscopic Andrew-Bahman Esophagogastrectomy. He received 2u PRBC's intraoperatively. Postoperatively, the patient had postop Atrial Fibrillation for which he was started on Lopressor, and swallow study on 8/21 showed no evidence of leak.  He has been tolerating a full liquid diet, and his diet was advanced to soft.  He is ambulating well, hemodynamically stable, and denies any chest pains or SOB.  He was discussed with Dr. Avery this morning and seen at bedside with Dr. Dunn.  He presents today for post op visit.   He has complaints of frequent diarrhea that had resolved only if taking Imodium. He states that he has now lost 100lb. Dietary review and education provided. Encouraged small frequent meals high in proteins and hydration. He will continue to advance his diet and follow up care with Oncology. He will return to care in 2 weeks for clinical evaluation.  PLAN: - Return to care in 2 weeks       IDr. Avery personally performed the evaluation and management (E/M) services for this patient. That E/M includes conducting the initial examination, assessing all conditions, and establishing the plan of care. Today, Cristofer Edwards NP was here to observe my evaluation and management services for this patient.

## 2023-09-07 NOTE — REASON FOR VISIT
[Spouse] : spouse [de-identified] : Right VATS, [place] Bahman Esophagogastrectomy [de-identified] : 8/15/2023

## 2023-09-07 NOTE — PHYSICAL EXAM
[Respiration, Rhythm And Depth] : normal respiratory rhythm and effort [Auscultation Breath Sounds / Voice Sounds] : lungs were clear to auscultation bilaterally [Heart Rate And Rhythm] : heart rate was normal and rhythm regular [Site: ___] : Site: [unfilled] [Clean] : clean [Dry] : dry [Healing Well] : healing well

## 2023-09-11 PROBLEM — Z09 SURGICAL FOLLOW-UP CARE: Status: ACTIVE | Noted: 2023-09-11

## 2023-09-13 RX ORDER — LIDOCAINE HCL, MENTHOL 505; 175 MG/1; MG/1
3.6-1.25 PATCH TOPICAL
Qty: 14 | Refills: 0 | Status: ACTIVE | COMMUNITY
Start: 2023-09-13 | End: 1900-01-01

## 2023-09-14 ENCOUNTER — APPOINTMENT (OUTPATIENT)
Dept: SURGICAL ONCOLOGY | Facility: CLINIC | Age: 63
End: 2023-09-14
Payer: COMMERCIAL

## 2023-09-14 VITALS — HEIGHT: 67 IN | WEIGHT: 108 LBS | BODY MASS INDEX: 16.95 KG/M2

## 2023-09-14 DIAGNOSIS — Z09 ENCOUNTER FOR FOLLOW-UP EXAMINATION AFTER COMPLETED TREATMENT FOR CONDITIONS OTHER THAN MALIGNANT NEOPLASM: ICD-10-CM

## 2023-09-14 PROCEDURE — 99024 POSTOP FOLLOW-UP VISIT: CPT

## 2023-09-14 RX ORDER — ACYCLOVIR 200 MG/5ML
200 SUSPENSION ORAL 4 TIMES DAILY
Qty: 168 | Refills: 0 | Status: ACTIVE | COMMUNITY
Start: 2023-09-14 | End: 1900-01-01

## 2023-09-14 RX ORDER — GABAPENTIN 300 MG/1
300 CAPSULE ORAL
Qty: 90 | Refills: 0 | Status: ACTIVE | COMMUNITY
Start: 2023-09-14 | End: 1900-01-01

## 2023-09-21 ENCOUNTER — APPOINTMENT (OUTPATIENT)
Dept: THORACIC SURGERY | Facility: CLINIC | Age: 63
End: 2023-09-21

## 2023-09-28 ENCOUNTER — APPOINTMENT (OUTPATIENT)
Dept: SURGICAL ONCOLOGY | Facility: CLINIC | Age: 63
End: 2023-09-28
Payer: COMMERCIAL

## 2023-09-28 ENCOUNTER — APPOINTMENT (OUTPATIENT)
Dept: THORACIC SURGERY | Facility: CLINIC | Age: 63
End: 2023-09-28

## 2023-09-28 ENCOUNTER — APPOINTMENT (OUTPATIENT)
Dept: SURGICAL ONCOLOGY | Facility: CLINIC | Age: 63
End: 2023-09-28

## 2023-09-28 VITALS
RESPIRATION RATE: 21 BRPM | BODY MASS INDEX: 16.17 KG/M2 | WEIGHT: 103 LBS | OXYGEN SATURATION: 97 % | HEIGHT: 67 IN | SYSTOLIC BLOOD PRESSURE: 100 MMHG | DIASTOLIC BLOOD PRESSURE: 64 MMHG | HEART RATE: 105 BPM

## 2023-09-28 DIAGNOSIS — R00.0 TACHYCARDIA, UNSPECIFIED: ICD-10-CM

## 2023-09-28 PROCEDURE — 99024 POSTOP FOLLOW-UP VISIT: CPT

## 2023-10-09 ENCOUNTER — APPOINTMENT (OUTPATIENT)
Dept: CT IMAGING | Facility: CLINIC | Age: 63
End: 2023-10-09
Payer: COMMERCIAL

## 2023-10-09 ENCOUNTER — OUTPATIENT (OUTPATIENT)
Dept: OUTPATIENT SERVICES | Facility: HOSPITAL | Age: 63
LOS: 1 days | End: 2023-10-09
Payer: COMMERCIAL

## 2023-10-09 DIAGNOSIS — K22.2 ESOPHAGEAL OBSTRUCTION: Chronic | ICD-10-CM

## 2023-10-09 DIAGNOSIS — Z98.890 OTHER SPECIFIED POSTPROCEDURAL STATES: Chronic | ICD-10-CM

## 2023-10-09 DIAGNOSIS — Z92.3 PERSONAL HISTORY OF IRRADIATION: Chronic | ICD-10-CM

## 2023-10-09 DIAGNOSIS — C15.9 MALIGNANT NEOPLASM OF ESOPHAGUS, UNSPECIFIED: ICD-10-CM

## 2023-10-09 DIAGNOSIS — Z92.21 PERSONAL HISTORY OF ANTINEOPLASTIC CHEMOTHERAPY: Chronic | ICD-10-CM

## 2023-10-09 DIAGNOSIS — Z90.49 ACQUIRED ABSENCE OF OTHER SPECIFIED PARTS OF DIGESTIVE TRACT: Chronic | ICD-10-CM

## 2023-10-09 PROCEDURE — 71250 CT THORAX DX C-: CPT

## 2023-10-09 PROCEDURE — 71250 CT THORAX DX C-: CPT | Mod: 26

## 2023-10-17 ENCOUNTER — APPOINTMENT (OUTPATIENT)
Dept: FAMILY MEDICINE | Facility: CLINIC | Age: 63
End: 2023-10-17
Payer: COMMERCIAL

## 2023-10-17 ENCOUNTER — OUTPATIENT (OUTPATIENT)
Dept: OUTPATIENT SERVICES | Facility: HOSPITAL | Age: 63
LOS: 1 days | Discharge: HOME | End: 2023-10-17
Payer: COMMERCIAL

## 2023-10-17 VITALS
OXYGEN SATURATION: 95 % | SYSTOLIC BLOOD PRESSURE: 90 MMHG | HEIGHT: 67 IN | TEMPERATURE: 98.6 F | HEART RATE: 100 BPM | DIASTOLIC BLOOD PRESSURE: 52 MMHG

## 2023-10-17 DIAGNOSIS — Z92.21 PERSONAL HISTORY OF ANTINEOPLASTIC CHEMOTHERAPY: Chronic | ICD-10-CM

## 2023-10-17 DIAGNOSIS — Z98.890 OTHER SPECIFIED POSTPROCEDURAL STATES: Chronic | ICD-10-CM

## 2023-10-17 DIAGNOSIS — I48.0 PAROXYSMAL ATRIAL FIBRILLATION: ICD-10-CM

## 2023-10-17 DIAGNOSIS — Z92.3 PERSONAL HISTORY OF IRRADIATION: Chronic | ICD-10-CM

## 2023-10-17 DIAGNOSIS — K22.2 ESOPHAGEAL OBSTRUCTION: Chronic | ICD-10-CM

## 2023-10-17 DIAGNOSIS — C15.9 MALIGNANT NEOPLASM OF ESOPHAGUS, UNSPECIFIED: ICD-10-CM

## 2023-10-17 DIAGNOSIS — K22.2 ESOPHAGEAL OBSTRUCTION: ICD-10-CM

## 2023-10-17 DIAGNOSIS — Z23 ENCOUNTER FOR IMMUNIZATION: ICD-10-CM

## 2023-10-17 DIAGNOSIS — I10 ESSENTIAL (PRIMARY) HYPERTENSION: ICD-10-CM

## 2023-10-17 DIAGNOSIS — Z90.49 ACQUIRED ABSENCE OF OTHER SPECIFIED PARTS OF DIGESTIVE TRACT: Chronic | ICD-10-CM

## 2023-10-17 DIAGNOSIS — C16.0 MALIGNANT NEOPLASM OF CARDIA: ICD-10-CM

## 2023-10-17 PROCEDURE — G0008: CPT

## 2023-10-17 PROCEDURE — 90686 IIV4 VACC NO PRSV 0.5 ML IM: CPT

## 2023-10-17 PROCEDURE — 99214 OFFICE O/P EST MOD 30 MIN: CPT | Mod: 25

## 2023-10-17 RX ORDER — METOPROLOL TARTRATE 25 MG/1
25 TABLET, FILM COATED ORAL
Qty: 90 | Refills: 0 | Status: ACTIVE | COMMUNITY
Start: 2023-09-28

## 2023-10-18 ENCOUNTER — NON-APPOINTMENT (OUTPATIENT)
Age: 63
End: 2023-10-18

## 2023-10-18 ENCOUNTER — RESULT REVIEW (OUTPATIENT)
Age: 63
End: 2023-10-18

## 2023-10-18 ENCOUNTER — APPOINTMENT (OUTPATIENT)
Dept: HEMATOLOGY ONCOLOGY | Facility: CLINIC | Age: 63
End: 2023-10-18
Payer: COMMERCIAL

## 2023-10-18 VITALS
DIASTOLIC BLOOD PRESSURE: 71 MMHG | SYSTOLIC BLOOD PRESSURE: 97 MMHG | WEIGHT: 107 LBS | OXYGEN SATURATION: 100 % | TEMPERATURE: 98 F | HEART RATE: 112 BPM | RESPIRATION RATE: 20 BRPM | BODY MASS INDEX: 16.76 KG/M2

## 2023-10-18 DIAGNOSIS — B02.9 ZOSTER WITHOUT COMPLICATIONS: ICD-10-CM

## 2023-10-18 LAB
ACANTHOCYTES BLD QL SMEAR: SLIGHT — SIGNIFICANT CHANGE UP
ACANTHOCYTES BLD QL SMEAR: SLIGHT — SIGNIFICANT CHANGE UP
ALBUMIN SERPL ELPH-MCNC: 3.8 G/DL — SIGNIFICANT CHANGE UP (ref 3.3–5)
ALBUMIN SERPL ELPH-MCNC: 3.8 G/DL — SIGNIFICANT CHANGE UP (ref 3.3–5)
ALP SERPL-CCNC: 130 U/L — HIGH (ref 40–120)
ALP SERPL-CCNC: 130 U/L — HIGH (ref 40–120)
ALT FLD-CCNC: 20 U/L — SIGNIFICANT CHANGE UP (ref 10–45)
ALT FLD-CCNC: 20 U/L — SIGNIFICANT CHANGE UP (ref 10–45)
ANION GAP SERPL CALC-SCNC: 11 MMOL/L — SIGNIFICANT CHANGE UP (ref 5–17)
ANION GAP SERPL CALC-SCNC: 11 MMOL/L — SIGNIFICANT CHANGE UP (ref 5–17)
ANISOCYTOSIS BLD QL: SLIGHT — SIGNIFICANT CHANGE UP
ANISOCYTOSIS BLD QL: SLIGHT — SIGNIFICANT CHANGE UP
AST SERPL-CCNC: 20 U/L — SIGNIFICANT CHANGE UP (ref 10–40)
AST SERPL-CCNC: 20 U/L — SIGNIFICANT CHANGE UP (ref 10–40)
BASOPHILS # BLD AUTO: 0.02 K/UL — SIGNIFICANT CHANGE UP (ref 0–0.2)
BASOPHILS # BLD AUTO: 0.02 K/UL — SIGNIFICANT CHANGE UP (ref 0–0.2)
BASOPHILS NFR BLD AUTO: 1 % — SIGNIFICANT CHANGE UP (ref 0–2)
BASOPHILS NFR BLD AUTO: 1 % — SIGNIFICANT CHANGE UP (ref 0–2)
BILIRUB SERPL-MCNC: 0.9 MG/DL — SIGNIFICANT CHANGE UP (ref 0.2–1.2)
BILIRUB SERPL-MCNC: 0.9 MG/DL — SIGNIFICANT CHANGE UP (ref 0.2–1.2)
BUN SERPL-MCNC: 11 MG/DL — SIGNIFICANT CHANGE UP (ref 7–23)
BUN SERPL-MCNC: 11 MG/DL — SIGNIFICANT CHANGE UP (ref 7–23)
CALCIUM SERPL-MCNC: 9.2 MG/DL — SIGNIFICANT CHANGE UP (ref 8.4–10.5)
CALCIUM SERPL-MCNC: 9.2 MG/DL — SIGNIFICANT CHANGE UP (ref 8.4–10.5)
CHLORIDE SERPL-SCNC: 101 MMOL/L — SIGNIFICANT CHANGE UP (ref 96–108)
CHLORIDE SERPL-SCNC: 101 MMOL/L — SIGNIFICANT CHANGE UP (ref 96–108)
CO2 SERPL-SCNC: 32 MMOL/L — HIGH (ref 22–31)
CO2 SERPL-SCNC: 32 MMOL/L — HIGH (ref 22–31)
CREAT SERPL-MCNC: 0.6 MG/DL — SIGNIFICANT CHANGE UP (ref 0.5–1.3)
CREAT SERPL-MCNC: 0.6 MG/DL — SIGNIFICANT CHANGE UP (ref 0.5–1.3)
DACRYOCYTES BLD QL SMEAR: SLIGHT — SIGNIFICANT CHANGE UP
DACRYOCYTES BLD QL SMEAR: SLIGHT — SIGNIFICANT CHANGE UP
EGFR: 108 ML/MIN/1.73M2 — SIGNIFICANT CHANGE UP
EGFR: 108 ML/MIN/1.73M2 — SIGNIFICANT CHANGE UP
ELLIPTOCYTES BLD QL SMEAR: SLIGHT — SIGNIFICANT CHANGE UP
ELLIPTOCYTES BLD QL SMEAR: SLIGHT — SIGNIFICANT CHANGE UP
EOSINOPHIL # BLD AUTO: 0.02 K/UL — SIGNIFICANT CHANGE UP (ref 0–0.5)
EOSINOPHIL # BLD AUTO: 0.02 K/UL — SIGNIFICANT CHANGE UP (ref 0–0.5)
EOSINOPHIL NFR BLD AUTO: 1 % — SIGNIFICANT CHANGE UP (ref 0–6)
EOSINOPHIL NFR BLD AUTO: 1 % — SIGNIFICANT CHANGE UP (ref 0–6)
GLUCOSE SERPL-MCNC: 101 MG/DL — HIGH (ref 70–99)
GLUCOSE SERPL-MCNC: 101 MG/DL — HIGH (ref 70–99)
HCT VFR BLD CALC: 37 % — LOW (ref 39–50)
HCT VFR BLD CALC: 37 % — LOW (ref 39–50)
HGB BLD-MCNC: 12.4 G/DL — LOW (ref 13–17)
HGB BLD-MCNC: 12.4 G/DL — LOW (ref 13–17)
HYPOCHROMIA BLD QL: SLIGHT — SIGNIFICANT CHANGE UP
HYPOCHROMIA BLD QL: SLIGHT — SIGNIFICANT CHANGE UP
LG PLATELETS BLD QL AUTO: SLIGHT — SIGNIFICANT CHANGE UP
LG PLATELETS BLD QL AUTO: SLIGHT — SIGNIFICANT CHANGE UP
LYMPHOCYTES # BLD AUTO: 0.41 K/UL — LOW (ref 1–3.3)
LYMPHOCYTES # BLD AUTO: 0.41 K/UL — LOW (ref 1–3.3)
LYMPHOCYTES # BLD AUTO: 24 % — SIGNIFICANT CHANGE UP (ref 13–44)
LYMPHOCYTES # BLD AUTO: 24 % — SIGNIFICANT CHANGE UP (ref 13–44)
MACROCYTES BLD QL: SLIGHT — SIGNIFICANT CHANGE UP
MACROCYTES BLD QL: SLIGHT — SIGNIFICANT CHANGE UP
MAGNESIUM SERPL-MCNC: 1.5 MG/DL — LOW (ref 1.6–2.6)
MAGNESIUM SERPL-MCNC: 1.5 MG/DL — LOW (ref 1.6–2.6)
MCHC RBC-ENTMCNC: 28.4 PG — SIGNIFICANT CHANGE UP (ref 27–34)
MCHC RBC-ENTMCNC: 28.4 PG — SIGNIFICANT CHANGE UP (ref 27–34)
MCHC RBC-ENTMCNC: 33.5 GM/DL — SIGNIFICANT CHANGE UP (ref 32–36)
MCHC RBC-ENTMCNC: 33.5 GM/DL — SIGNIFICANT CHANGE UP (ref 32–36)
MCV RBC AUTO: 84.7 FL — SIGNIFICANT CHANGE UP (ref 80–100)
MCV RBC AUTO: 84.7 FL — SIGNIFICANT CHANGE UP (ref 80–100)
MICROCYTES BLD QL: SLIGHT — SIGNIFICANT CHANGE UP
MICROCYTES BLD QL: SLIGHT — SIGNIFICANT CHANGE UP
MONOCYTES # BLD AUTO: 0.14 K/UL — SIGNIFICANT CHANGE UP (ref 0–0.9)
MONOCYTES # BLD AUTO: 0.14 K/UL — SIGNIFICANT CHANGE UP (ref 0–0.9)
MONOCYTES NFR BLD AUTO: 8 % — SIGNIFICANT CHANGE UP (ref 2–14)
MONOCYTES NFR BLD AUTO: 8 % — SIGNIFICANT CHANGE UP (ref 2–14)
NEUTROPHILS # BLD AUTO: 1.12 K/UL — LOW (ref 1.8–7.4)
NEUTROPHILS # BLD AUTO: 1.12 K/UL — LOW (ref 1.8–7.4)
NEUTROPHILS NFR BLD AUTO: 61 % — SIGNIFICANT CHANGE UP (ref 43–77)
NEUTROPHILS NFR BLD AUTO: 61 % — SIGNIFICANT CHANGE UP (ref 43–77)
NEUTS BAND # BLD: 4 % — SIGNIFICANT CHANGE UP (ref 0–8)
NEUTS BAND # BLD: 4 % — SIGNIFICANT CHANGE UP (ref 0–8)
NRBC # BLD: 0 /100 — SIGNIFICANT CHANGE UP (ref 0–0)
NRBC # BLD: 0 /100 — SIGNIFICANT CHANGE UP (ref 0–0)
NRBC # BLD: SIGNIFICANT CHANGE UP /100 WBCS (ref 0–0)
NRBC # BLD: SIGNIFICANT CHANGE UP /100 WBCS (ref 0–0)
OVALOCYTES BLD QL SMEAR: SLIGHT — SIGNIFICANT CHANGE UP
OVALOCYTES BLD QL SMEAR: SLIGHT — SIGNIFICANT CHANGE UP
PLAT MORPH BLD: NORMAL — SIGNIFICANT CHANGE UP
PLAT MORPH BLD: NORMAL — SIGNIFICANT CHANGE UP
PLATELET # BLD AUTO: 130 K/UL — LOW (ref 150–400)
PLATELET # BLD AUTO: 130 K/UL — LOW (ref 150–400)
POIKILOCYTOSIS BLD QL AUTO: SLIGHT — SIGNIFICANT CHANGE UP
POIKILOCYTOSIS BLD QL AUTO: SLIGHT — SIGNIFICANT CHANGE UP
POTASSIUM SERPL-MCNC: 3.8 MMOL/L — SIGNIFICANT CHANGE UP (ref 3.5–5.3)
POTASSIUM SERPL-MCNC: 3.8 MMOL/L — SIGNIFICANT CHANGE UP (ref 3.5–5.3)
POTASSIUM SERPL-SCNC: 3.8 MMOL/L — SIGNIFICANT CHANGE UP (ref 3.5–5.3)
POTASSIUM SERPL-SCNC: 3.8 MMOL/L — SIGNIFICANT CHANGE UP (ref 3.5–5.3)
PROT SERPL-MCNC: 6.8 G/DL — SIGNIFICANT CHANGE UP (ref 6–8.3)
PROT SERPL-MCNC: 6.8 G/DL — SIGNIFICANT CHANGE UP (ref 6–8.3)
RBC # BLD: 4.37 M/UL — SIGNIFICANT CHANGE UP (ref 4.2–5.8)
RBC # BLD: 4.37 M/UL — SIGNIFICANT CHANGE UP (ref 4.2–5.8)
RBC # FLD: 16.8 % — HIGH (ref 10.3–14.5)
RBC # FLD: 16.8 % — HIGH (ref 10.3–14.5)
RBC BLD AUTO: ABNORMAL
RBC BLD AUTO: ABNORMAL
SCHISTOCYTES BLD QL AUTO: SLIGHT — SIGNIFICANT CHANGE UP
SCHISTOCYTES BLD QL AUTO: SLIGHT — SIGNIFICANT CHANGE UP
SODIUM SERPL-SCNC: 143 MMOL/L — SIGNIFICANT CHANGE UP (ref 135–145)
SODIUM SERPL-SCNC: 143 MMOL/L — SIGNIFICANT CHANGE UP (ref 135–145)
TOXIC GRANULES BLD QL SMEAR: PRESENT — SIGNIFICANT CHANGE UP
TOXIC GRANULES BLD QL SMEAR: PRESENT — SIGNIFICANT CHANGE UP
VARIANT LYMPHS # BLD: 1 % — SIGNIFICANT CHANGE UP (ref 0–6)
VARIANT LYMPHS # BLD: 1 % — SIGNIFICANT CHANGE UP (ref 0–6)
WBC # BLD: 1.72 K/UL — LOW (ref 3.8–10.5)
WBC # BLD: 1.72 K/UL — LOW (ref 3.8–10.5)
WBC # FLD AUTO: 1.72 K/UL — LOW (ref 3.8–10.5)
WBC # FLD AUTO: 1.72 K/UL — LOW (ref 3.8–10.5)

## 2023-10-18 PROCEDURE — 99215 OFFICE O/P EST HI 40 MIN: CPT

## 2023-10-25 ENCOUNTER — RESULT REVIEW (OUTPATIENT)
Age: 63
End: 2023-10-25

## 2023-10-25 ENCOUNTER — APPOINTMENT (OUTPATIENT)
Dept: INFUSION THERAPY | Facility: CLINIC | Age: 63
End: 2023-10-25

## 2023-10-25 LAB
BASOPHILS # BLD AUTO: 0.02 K/UL — SIGNIFICANT CHANGE UP (ref 0–0.2)
BASOPHILS # BLD AUTO: 0.02 K/UL — SIGNIFICANT CHANGE UP (ref 0–0.2)
BASOPHILS NFR BLD AUTO: 0.8 % — SIGNIFICANT CHANGE UP (ref 0–2)
BASOPHILS NFR BLD AUTO: 0.8 % — SIGNIFICANT CHANGE UP (ref 0–2)
EOSINOPHIL # BLD AUTO: 0.03 K/UL — SIGNIFICANT CHANGE UP (ref 0–0.5)
EOSINOPHIL # BLD AUTO: 0.03 K/UL — SIGNIFICANT CHANGE UP (ref 0–0.5)
EOSINOPHIL NFR BLD AUTO: 1.2 % — SIGNIFICANT CHANGE UP (ref 0–6)
EOSINOPHIL NFR BLD AUTO: 1.2 % — SIGNIFICANT CHANGE UP (ref 0–6)
HCT VFR BLD CALC: 35.6 % — LOW (ref 39–50)
HCT VFR BLD CALC: 35.6 % — LOW (ref 39–50)
HGB BLD-MCNC: 12.1 G/DL — LOW (ref 13–17)
HGB BLD-MCNC: 12.1 G/DL — LOW (ref 13–17)
IMM GRANULOCYTES NFR BLD AUTO: 0 % — SIGNIFICANT CHANGE UP (ref 0–0.9)
IMM GRANULOCYTES NFR BLD AUTO: 0 % — SIGNIFICANT CHANGE UP (ref 0–0.9)
LYMPHOCYTES # BLD AUTO: 0.75 K/UL — LOW (ref 1–3.3)
LYMPHOCYTES # BLD AUTO: 0.75 K/UL — LOW (ref 1–3.3)
LYMPHOCYTES # BLD AUTO: 29.5 % — SIGNIFICANT CHANGE UP (ref 13–44)
LYMPHOCYTES # BLD AUTO: 29.5 % — SIGNIFICANT CHANGE UP (ref 13–44)
MCHC RBC-ENTMCNC: 29 PG — SIGNIFICANT CHANGE UP (ref 27–34)
MCHC RBC-ENTMCNC: 29 PG — SIGNIFICANT CHANGE UP (ref 27–34)
MCHC RBC-ENTMCNC: 34 GM/DL — SIGNIFICANT CHANGE UP (ref 32–36)
MCHC RBC-ENTMCNC: 34 GM/DL — SIGNIFICANT CHANGE UP (ref 32–36)
MCV RBC AUTO: 85.4 FL — SIGNIFICANT CHANGE UP (ref 80–100)
MCV RBC AUTO: 85.4 FL — SIGNIFICANT CHANGE UP (ref 80–100)
MONOCYTES # BLD AUTO: 0.23 K/UL — SIGNIFICANT CHANGE UP (ref 0–0.9)
MONOCYTES # BLD AUTO: 0.23 K/UL — SIGNIFICANT CHANGE UP (ref 0–0.9)
MONOCYTES NFR BLD AUTO: 9.1 % — SIGNIFICANT CHANGE UP (ref 2–14)
MONOCYTES NFR BLD AUTO: 9.1 % — SIGNIFICANT CHANGE UP (ref 2–14)
NEUTROPHILS # BLD AUTO: 1.51 K/UL — LOW (ref 1.8–7.4)
NEUTROPHILS # BLD AUTO: 1.51 K/UL — LOW (ref 1.8–7.4)
NEUTROPHILS NFR BLD AUTO: 59.4 % — SIGNIFICANT CHANGE UP (ref 43–77)
NEUTROPHILS NFR BLD AUTO: 59.4 % — SIGNIFICANT CHANGE UP (ref 43–77)
NRBC # BLD: 0 /100 WBCS — SIGNIFICANT CHANGE UP (ref 0–0)
NRBC # BLD: 0 /100 WBCS — SIGNIFICANT CHANGE UP (ref 0–0)
PLATELET # BLD AUTO: 161 K/UL — SIGNIFICANT CHANGE UP (ref 150–400)
PLATELET # BLD AUTO: 161 K/UL — SIGNIFICANT CHANGE UP (ref 150–400)
RBC # BLD: 4.17 M/UL — LOW (ref 4.2–5.8)
RBC # BLD: 4.17 M/UL — LOW (ref 4.2–5.8)
RBC # FLD: 17.1 % — HIGH (ref 10.3–14.5)
RBC # FLD: 17.1 % — HIGH (ref 10.3–14.5)
WBC # BLD: 2.54 K/UL — LOW (ref 3.8–10.5)
WBC # BLD: 2.54 K/UL — LOW (ref 3.8–10.5)
WBC # FLD AUTO: 2.54 K/UL — LOW (ref 3.8–10.5)
WBC # FLD AUTO: 2.54 K/UL — LOW (ref 3.8–10.5)

## 2023-10-26 DIAGNOSIS — Z51.11 ENCOUNTER FOR ANTINEOPLASTIC CHEMOTHERAPY: ICD-10-CM

## 2023-10-26 LAB
ALBUMIN SERPL ELPH-MCNC: 3.6 G/DL — SIGNIFICANT CHANGE UP (ref 3.3–5)
ALBUMIN SERPL ELPH-MCNC: 3.6 G/DL — SIGNIFICANT CHANGE UP (ref 3.3–5)
ALP SERPL-CCNC: 131 U/L — HIGH (ref 40–120)
ALP SERPL-CCNC: 131 U/L — HIGH (ref 40–120)
ALT FLD-CCNC: 28 U/L — SIGNIFICANT CHANGE UP (ref 10–45)
ALT FLD-CCNC: 28 U/L — SIGNIFICANT CHANGE UP (ref 10–45)
ANION GAP SERPL CALC-SCNC: 11 MMOL/L — SIGNIFICANT CHANGE UP (ref 5–17)
ANION GAP SERPL CALC-SCNC: 11 MMOL/L — SIGNIFICANT CHANGE UP (ref 5–17)
AST SERPL-CCNC: 32 U/L — SIGNIFICANT CHANGE UP (ref 10–40)
AST SERPL-CCNC: 32 U/L — SIGNIFICANT CHANGE UP (ref 10–40)
BILIRUB SERPL-MCNC: 0.7 MG/DL — SIGNIFICANT CHANGE UP (ref 0.2–1.2)
BILIRUB SERPL-MCNC: 0.7 MG/DL — SIGNIFICANT CHANGE UP (ref 0.2–1.2)
BUN SERPL-MCNC: 11 MG/DL — SIGNIFICANT CHANGE UP (ref 7–23)
BUN SERPL-MCNC: 11 MG/DL — SIGNIFICANT CHANGE UP (ref 7–23)
CALCIUM SERPL-MCNC: 9 MG/DL — SIGNIFICANT CHANGE UP (ref 8.4–10.5)
CALCIUM SERPL-MCNC: 9 MG/DL — SIGNIFICANT CHANGE UP (ref 8.4–10.5)
CHLORIDE SERPL-SCNC: 102 MMOL/L — SIGNIFICANT CHANGE UP (ref 96–108)
CHLORIDE SERPL-SCNC: 102 MMOL/L — SIGNIFICANT CHANGE UP (ref 96–108)
CO2 SERPL-SCNC: 28 MMOL/L — SIGNIFICANT CHANGE UP (ref 22–31)
CO2 SERPL-SCNC: 28 MMOL/L — SIGNIFICANT CHANGE UP (ref 22–31)
CREAT SERPL-MCNC: 0.6 MG/DL — SIGNIFICANT CHANGE UP (ref 0.5–1.3)
CREAT SERPL-MCNC: 0.6 MG/DL — SIGNIFICANT CHANGE UP (ref 0.5–1.3)
EGFR: 108 ML/MIN/1.73M2 — SIGNIFICANT CHANGE UP
EGFR: 108 ML/MIN/1.73M2 — SIGNIFICANT CHANGE UP
GLUCOSE SERPL-MCNC: 98 MG/DL — SIGNIFICANT CHANGE UP (ref 70–99)
GLUCOSE SERPL-MCNC: 98 MG/DL — SIGNIFICANT CHANGE UP (ref 70–99)
MAGNESIUM SERPL-MCNC: 1.5 MG/DL — LOW (ref 1.6–2.6)
MAGNESIUM SERPL-MCNC: 1.5 MG/DL — LOW (ref 1.6–2.6)
POTASSIUM SERPL-MCNC: 3.7 MMOL/L — SIGNIFICANT CHANGE UP (ref 3.5–5.3)
POTASSIUM SERPL-MCNC: 3.7 MMOL/L — SIGNIFICANT CHANGE UP (ref 3.5–5.3)
POTASSIUM SERPL-SCNC: 3.7 MMOL/L — SIGNIFICANT CHANGE UP (ref 3.5–5.3)
POTASSIUM SERPL-SCNC: 3.7 MMOL/L — SIGNIFICANT CHANGE UP (ref 3.5–5.3)
PROT SERPL-MCNC: 6.4 G/DL — SIGNIFICANT CHANGE UP (ref 6–8.3)
PROT SERPL-MCNC: 6.4 G/DL — SIGNIFICANT CHANGE UP (ref 6–8.3)
SODIUM SERPL-SCNC: 141 MMOL/L — SIGNIFICANT CHANGE UP (ref 135–145)
SODIUM SERPL-SCNC: 141 MMOL/L — SIGNIFICANT CHANGE UP (ref 135–145)
T3FREE SERPL-MCNC: 2.26 PG/ML — SIGNIFICANT CHANGE UP (ref 2–4.4)
T3FREE SERPL-MCNC: 2.26 PG/ML — SIGNIFICANT CHANGE UP (ref 2–4.4)
T4 FREE SERPL-MCNC: 1.4 NG/DL — SIGNIFICANT CHANGE UP (ref 0.9–1.8)
T4 FREE SERPL-MCNC: 1.4 NG/DL — SIGNIFICANT CHANGE UP (ref 0.9–1.8)
TSH SERPL-MCNC: 1.6 UIU/ML — SIGNIFICANT CHANGE UP (ref 0.27–4.2)
TSH SERPL-MCNC: 1.6 UIU/ML — SIGNIFICANT CHANGE UP (ref 0.27–4.2)

## 2023-11-02 ENCOUNTER — APPOINTMENT (OUTPATIENT)
Dept: FAMILY MEDICINE | Facility: CLINIC | Age: 63
End: 2023-11-02
Payer: COMMERCIAL

## 2023-11-02 VITALS
WEIGHT: 107 LBS | HEIGHT: 67 IN | DIASTOLIC BLOOD PRESSURE: 60 MMHG | BODY MASS INDEX: 16.79 KG/M2 | SYSTOLIC BLOOD PRESSURE: 100 MMHG | TEMPERATURE: 98.3 F | OXYGEN SATURATION: 95 % | HEART RATE: 117 BPM

## 2023-11-02 PROCEDURE — 99213 OFFICE O/P EST LOW 20 MIN: CPT

## 2023-11-02 RX ORDER — VALACYCLOVIR 1 G/1
1 TABLET, FILM COATED ORAL 3 TIMES DAILY
Qty: 21 | Refills: 0 | Status: COMPLETED | COMMUNITY
Start: 2023-11-02 | End: 2023-11-09

## 2023-11-04 LAB
CORTICOSTEROID BINDING GLOBULIN RESULT: 1.9 MG/DL — SIGNIFICANT CHANGE UP
CORTICOSTEROID BINDING GLOBULIN RESULT: 1.9 MG/DL — SIGNIFICANT CHANGE UP
CORTIS F/TOTAL MFR SERPL: 28 % — SIGNIFICANT CHANGE UP
CORTIS F/TOTAL MFR SERPL: 28 % — SIGNIFICANT CHANGE UP
CORTIS SERPL-MCNC: 17 UG/DL — SIGNIFICANT CHANGE UP
CORTIS SERPL-MCNC: 17 UG/DL — SIGNIFICANT CHANGE UP
CORTISOL, FREE RESULT: 4.8 UG/DL — HIGH
CORTISOL, FREE RESULT: 4.8 UG/DL — HIGH

## 2023-11-06 ENCOUNTER — NON-APPOINTMENT (OUTPATIENT)
Age: 63
End: 2023-11-06

## 2023-11-15 ENCOUNTER — APPOINTMENT (OUTPATIENT)
Dept: HEMATOLOGY ONCOLOGY | Facility: CLINIC | Age: 63
End: 2023-11-15
Payer: COMMERCIAL

## 2023-11-15 ENCOUNTER — RESULT REVIEW (OUTPATIENT)
Age: 63
End: 2023-11-15

## 2023-11-15 ENCOUNTER — APPOINTMENT (OUTPATIENT)
Dept: INFUSION THERAPY | Facility: CLINIC | Age: 63
End: 2023-11-15

## 2023-11-15 VITALS
RESPIRATION RATE: 20 BRPM | SYSTOLIC BLOOD PRESSURE: 109 MMHG | WEIGHT: 103 LBS | HEART RATE: 103 BPM | HEIGHT: 67 IN | OXYGEN SATURATION: 99 % | TEMPERATURE: 97.8 F | BODY MASS INDEX: 16.17 KG/M2 | DIASTOLIC BLOOD PRESSURE: 66 MMHG

## 2023-11-15 LAB
ACANTHOCYTES BLD QL SMEAR: SLIGHT — SIGNIFICANT CHANGE UP
ACANTHOCYTES BLD QL SMEAR: SLIGHT — SIGNIFICANT CHANGE UP
BASO STIPL BLD QL SMEAR: PRESENT — SIGNIFICANT CHANGE UP
BASO STIPL BLD QL SMEAR: PRESENT — SIGNIFICANT CHANGE UP
BASOPHILS # BLD AUTO: 0 K/UL — SIGNIFICANT CHANGE UP (ref 0–0.2)
BASOPHILS # BLD AUTO: 0 K/UL — SIGNIFICANT CHANGE UP (ref 0–0.2)
BASOPHILS NFR BLD AUTO: 0 % — SIGNIFICANT CHANGE UP (ref 0–2)
BASOPHILS NFR BLD AUTO: 0 % — SIGNIFICANT CHANGE UP (ref 0–2)
DACRYOCYTES BLD QL SMEAR: SLIGHT — SIGNIFICANT CHANGE UP
DACRYOCYTES BLD QL SMEAR: SLIGHT — SIGNIFICANT CHANGE UP
ELLIPTOCYTES BLD QL SMEAR: SLIGHT — SIGNIFICANT CHANGE UP
ELLIPTOCYTES BLD QL SMEAR: SLIGHT — SIGNIFICANT CHANGE UP
EOSINOPHIL # BLD AUTO: 0 K/UL — SIGNIFICANT CHANGE UP (ref 0–0.5)
EOSINOPHIL # BLD AUTO: 0 K/UL — SIGNIFICANT CHANGE UP (ref 0–0.5)
EOSINOPHIL NFR BLD AUTO: 0 % — SIGNIFICANT CHANGE UP (ref 0–6)
EOSINOPHIL NFR BLD AUTO: 0 % — SIGNIFICANT CHANGE UP (ref 0–6)
HCT VFR BLD CALC: 36.5 % — LOW (ref 39–50)
HCT VFR BLD CALC: 36.5 % — LOW (ref 39–50)
HGB BLD-MCNC: 12.7 G/DL — LOW (ref 13–17)
HGB BLD-MCNC: 12.7 G/DL — LOW (ref 13–17)
LG PLATELETS BLD QL AUTO: SIGNIFICANT CHANGE UP
LG PLATELETS BLD QL AUTO: SIGNIFICANT CHANGE UP
LYMPHOCYTES # BLD AUTO: 0.26 K/UL — LOW (ref 1–3.3)
LYMPHOCYTES # BLD AUTO: 0.26 K/UL — LOW (ref 1–3.3)
LYMPHOCYTES # BLD AUTO: 14 % — SIGNIFICANT CHANGE UP (ref 13–44)
LYMPHOCYTES # BLD AUTO: 14 % — SIGNIFICANT CHANGE UP (ref 13–44)
MCHC RBC-ENTMCNC: 30 PG — SIGNIFICANT CHANGE UP (ref 27–34)
MCHC RBC-ENTMCNC: 30 PG — SIGNIFICANT CHANGE UP (ref 27–34)
MCHC RBC-ENTMCNC: 34.8 GM/DL — SIGNIFICANT CHANGE UP (ref 32–36)
MCHC RBC-ENTMCNC: 34.8 GM/DL — SIGNIFICANT CHANGE UP (ref 32–36)
MCV RBC AUTO: 86.1 FL — SIGNIFICANT CHANGE UP (ref 80–100)
MCV RBC AUTO: 86.1 FL — SIGNIFICANT CHANGE UP (ref 80–100)
MICROCYTES BLD QL: SLIGHT — SIGNIFICANT CHANGE UP
MICROCYTES BLD QL: SLIGHT — SIGNIFICANT CHANGE UP
MONOCYTES # BLD AUTO: 0.49 K/UL — SIGNIFICANT CHANGE UP (ref 0–0.9)
MONOCYTES # BLD AUTO: 0.49 K/UL — SIGNIFICANT CHANGE UP (ref 0–0.9)
MONOCYTES NFR BLD AUTO: 26 % — HIGH (ref 2–14)
MONOCYTES NFR BLD AUTO: 26 % — HIGH (ref 2–14)
NEUTROPHILS # BLD AUTO: 0.95 K/UL — LOW (ref 1.8–7.4)
NEUTROPHILS # BLD AUTO: 0.95 K/UL — LOW (ref 1.8–7.4)
NEUTROPHILS NFR BLD AUTO: 50 % — SIGNIFICANT CHANGE UP (ref 43–77)
NEUTROPHILS NFR BLD AUTO: 50 % — SIGNIFICANT CHANGE UP (ref 43–77)
NRBC # BLD: 0 /100 — SIGNIFICANT CHANGE UP (ref 0–0)
NRBC # BLD: 0 /100 — SIGNIFICANT CHANGE UP (ref 0–0)
NRBC # BLD: SIGNIFICANT CHANGE UP /100 WBCS (ref 0–0)
NRBC # BLD: SIGNIFICANT CHANGE UP /100 WBCS (ref 0–0)
PLAT MORPH BLD: NORMAL — SIGNIFICANT CHANGE UP
PLAT MORPH BLD: NORMAL — SIGNIFICANT CHANGE UP
PLATELET # BLD AUTO: 110 K/UL — LOW (ref 150–400)
PLATELET # BLD AUTO: 110 K/UL — LOW (ref 150–400)
POIKILOCYTOSIS BLD QL AUTO: SLIGHT — SIGNIFICANT CHANGE UP
POIKILOCYTOSIS BLD QL AUTO: SLIGHT — SIGNIFICANT CHANGE UP
POLYCHROMASIA BLD QL SMEAR: SLIGHT — SIGNIFICANT CHANGE UP
POLYCHROMASIA BLD QL SMEAR: SLIGHT — SIGNIFICANT CHANGE UP
RBC # BLD: 4.24 M/UL — SIGNIFICANT CHANGE UP (ref 4.2–5.8)
RBC # BLD: 4.24 M/UL — SIGNIFICANT CHANGE UP (ref 4.2–5.8)
RBC # FLD: 17.9 % — HIGH (ref 10.3–14.5)
RBC # FLD: 17.9 % — HIGH (ref 10.3–14.5)
RBC BLD AUTO: ABNORMAL
RBC BLD AUTO: ABNORMAL
SCHISTOCYTES BLD QL AUTO: SLIGHT — SIGNIFICANT CHANGE UP
SCHISTOCYTES BLD QL AUTO: SLIGHT — SIGNIFICANT CHANGE UP
VARIANT LYMPHS # BLD: 10 % — HIGH (ref 0–6)
VARIANT LYMPHS # BLD: 10 % — HIGH (ref 0–6)
WBC # BLD: 1.89 K/UL — LOW (ref 3.8–10.5)
WBC # BLD: 1.89 K/UL — LOW (ref 3.8–10.5)
WBC # FLD AUTO: 1.89 K/UL — LOW (ref 3.8–10.5)
WBC # FLD AUTO: 1.89 K/UL — LOW (ref 3.8–10.5)

## 2023-11-15 PROCEDURE — 99214 OFFICE O/P EST MOD 30 MIN: CPT

## 2023-11-16 LAB
ALBUMIN SERPL ELPH-MCNC: 4 G/DL — SIGNIFICANT CHANGE UP (ref 3.3–5)
ALBUMIN SERPL ELPH-MCNC: 4 G/DL — SIGNIFICANT CHANGE UP (ref 3.3–5)
ALP SERPL-CCNC: 137 U/L — HIGH (ref 40–120)
ALP SERPL-CCNC: 137 U/L — HIGH (ref 40–120)
ALT FLD-CCNC: 30 U/L — SIGNIFICANT CHANGE UP (ref 10–45)
ALT FLD-CCNC: 30 U/L — SIGNIFICANT CHANGE UP (ref 10–45)
ANION GAP SERPL CALC-SCNC: 9 MMOL/L — SIGNIFICANT CHANGE UP (ref 5–17)
ANION GAP SERPL CALC-SCNC: 9 MMOL/L — SIGNIFICANT CHANGE UP (ref 5–17)
AST SERPL-CCNC: 30 U/L — SIGNIFICANT CHANGE UP (ref 10–40)
AST SERPL-CCNC: 30 U/L — SIGNIFICANT CHANGE UP (ref 10–40)
BILIRUB SERPL-MCNC: 0.9 MG/DL — SIGNIFICANT CHANGE UP (ref 0.2–1.2)
BILIRUB SERPL-MCNC: 0.9 MG/DL — SIGNIFICANT CHANGE UP (ref 0.2–1.2)
BUN SERPL-MCNC: 15 MG/DL — SIGNIFICANT CHANGE UP (ref 7–23)
BUN SERPL-MCNC: 15 MG/DL — SIGNIFICANT CHANGE UP (ref 7–23)
CALCIUM SERPL-MCNC: 9.1 MG/DL — SIGNIFICANT CHANGE UP (ref 8.4–10.5)
CALCIUM SERPL-MCNC: 9.1 MG/DL — SIGNIFICANT CHANGE UP (ref 8.4–10.5)
CHLORIDE SERPL-SCNC: 104 MMOL/L — SIGNIFICANT CHANGE UP (ref 96–108)
CHLORIDE SERPL-SCNC: 104 MMOL/L — SIGNIFICANT CHANGE UP (ref 96–108)
CO2 SERPL-SCNC: 31 MMOL/L — SIGNIFICANT CHANGE UP (ref 22–31)
CO2 SERPL-SCNC: 31 MMOL/L — SIGNIFICANT CHANGE UP (ref 22–31)
CREAT SERPL-MCNC: 0.62 MG/DL — SIGNIFICANT CHANGE UP (ref 0.5–1.3)
CREAT SERPL-MCNC: 0.62 MG/DL — SIGNIFICANT CHANGE UP (ref 0.5–1.3)
EGFR: 107 ML/MIN/1.73M2 — SIGNIFICANT CHANGE UP
EGFR: 107 ML/MIN/1.73M2 — SIGNIFICANT CHANGE UP
GLUCOSE SERPL-MCNC: 103 MG/DL — HIGH (ref 70–99)
GLUCOSE SERPL-MCNC: 103 MG/DL — HIGH (ref 70–99)
MAGNESIUM SERPL-MCNC: 1.5 MG/DL — LOW (ref 1.6–2.6)
MAGNESIUM SERPL-MCNC: 1.5 MG/DL — LOW (ref 1.6–2.6)
POTASSIUM SERPL-MCNC: 3.6 MMOL/L — SIGNIFICANT CHANGE UP (ref 3.5–5.3)
POTASSIUM SERPL-MCNC: 3.6 MMOL/L — SIGNIFICANT CHANGE UP (ref 3.5–5.3)
POTASSIUM SERPL-SCNC: 3.6 MMOL/L — SIGNIFICANT CHANGE UP (ref 3.5–5.3)
POTASSIUM SERPL-SCNC: 3.6 MMOL/L — SIGNIFICANT CHANGE UP (ref 3.5–5.3)
PROT SERPL-MCNC: 6.7 G/DL — SIGNIFICANT CHANGE UP (ref 6–8.3)
PROT SERPL-MCNC: 6.7 G/DL — SIGNIFICANT CHANGE UP (ref 6–8.3)
SODIUM SERPL-SCNC: 145 MMOL/L — SIGNIFICANT CHANGE UP (ref 135–145)
SODIUM SERPL-SCNC: 145 MMOL/L — SIGNIFICANT CHANGE UP (ref 135–145)

## 2023-11-21 ENCOUNTER — RESULT REVIEW (OUTPATIENT)
Age: 63
End: 2023-11-21

## 2023-11-21 ENCOUNTER — APPOINTMENT (OUTPATIENT)
Dept: HEMATOLOGY ONCOLOGY | Facility: CLINIC | Age: 63
End: 2023-11-21

## 2023-11-21 LAB
ACANTHOCYTES BLD QL SMEAR: SLIGHT — SIGNIFICANT CHANGE UP
ACANTHOCYTES BLD QL SMEAR: SLIGHT — SIGNIFICANT CHANGE UP
ANISOCYTOSIS BLD QL: SLIGHT — SIGNIFICANT CHANGE UP
ANISOCYTOSIS BLD QL: SLIGHT — SIGNIFICANT CHANGE UP
BASO STIPL BLD QL SMEAR: PRESENT — SIGNIFICANT CHANGE UP
BASO STIPL BLD QL SMEAR: PRESENT — SIGNIFICANT CHANGE UP
BASOPHILS # BLD AUTO: 0.02 K/UL — SIGNIFICANT CHANGE UP (ref 0–0.2)
BASOPHILS # BLD AUTO: 0.02 K/UL — SIGNIFICANT CHANGE UP (ref 0–0.2)
BASOPHILS NFR BLD AUTO: 1 % — SIGNIFICANT CHANGE UP (ref 0–2)
BASOPHILS NFR BLD AUTO: 1 % — SIGNIFICANT CHANGE UP (ref 0–2)
DACRYOCYTES BLD QL SMEAR: SLIGHT — SIGNIFICANT CHANGE UP
DACRYOCYTES BLD QL SMEAR: SLIGHT — SIGNIFICANT CHANGE UP
ELLIPTOCYTES BLD QL SMEAR: SLIGHT — SIGNIFICANT CHANGE UP
ELLIPTOCYTES BLD QL SMEAR: SLIGHT — SIGNIFICANT CHANGE UP
EOSINOPHIL # BLD AUTO: 0.02 K/UL — SIGNIFICANT CHANGE UP (ref 0–0.5)
EOSINOPHIL # BLD AUTO: 0.02 K/UL — SIGNIFICANT CHANGE UP (ref 0–0.5)
EOSINOPHIL NFR BLD AUTO: 1 % — SIGNIFICANT CHANGE UP (ref 0–6)
EOSINOPHIL NFR BLD AUTO: 1 % — SIGNIFICANT CHANGE UP (ref 0–6)
GIANT PLATELETS BLD QL SMEAR: PRESENT — SIGNIFICANT CHANGE UP
GIANT PLATELETS BLD QL SMEAR: PRESENT — SIGNIFICANT CHANGE UP
HCT VFR BLD CALC: 33.8 % — LOW (ref 39–50)
HCT VFR BLD CALC: 33.8 % — LOW (ref 39–50)
HGB BLD-MCNC: 11.9 G/DL — LOW (ref 13–17)
HGB BLD-MCNC: 11.9 G/DL — LOW (ref 13–17)
LG PLATELETS BLD QL AUTO: SIGNIFICANT CHANGE UP
LG PLATELETS BLD QL AUTO: SIGNIFICANT CHANGE UP
LYMPHOCYTES # BLD AUTO: 0.5 K/UL — LOW (ref 1–3.3)
LYMPHOCYTES # BLD AUTO: 0.5 K/UL — LOW (ref 1–3.3)
LYMPHOCYTES # BLD AUTO: 23 % — SIGNIFICANT CHANGE UP (ref 13–44)
LYMPHOCYTES # BLD AUTO: 23 % — SIGNIFICANT CHANGE UP (ref 13–44)
LYMPHOCYTES # SPEC AUTO: 1 % — HIGH (ref 0–0)
LYMPHOCYTES # SPEC AUTO: 1 % — HIGH (ref 0–0)
MCHC RBC-ENTMCNC: 30.7 PG — SIGNIFICANT CHANGE UP (ref 27–34)
MCHC RBC-ENTMCNC: 30.7 PG — SIGNIFICANT CHANGE UP (ref 27–34)
MCHC RBC-ENTMCNC: 35.2 GM/DL — SIGNIFICANT CHANGE UP (ref 32–36)
MCHC RBC-ENTMCNC: 35.2 GM/DL — SIGNIFICANT CHANGE UP (ref 32–36)
MCV RBC AUTO: 87.3 FL — SIGNIFICANT CHANGE UP (ref 80–100)
MCV RBC AUTO: 87.3 FL — SIGNIFICANT CHANGE UP (ref 80–100)
MICROCYTES BLD QL: SLIGHT — SIGNIFICANT CHANGE UP
MICROCYTES BLD QL: SLIGHT — SIGNIFICANT CHANGE UP
MONOCYTES # BLD AUTO: 0.28 K/UL — SIGNIFICANT CHANGE UP (ref 0–0.9)
MONOCYTES # BLD AUTO: 0.28 K/UL — SIGNIFICANT CHANGE UP (ref 0–0.9)
MONOCYTES NFR BLD AUTO: 13 % — SIGNIFICANT CHANGE UP (ref 2–14)
MONOCYTES NFR BLD AUTO: 13 % — SIGNIFICANT CHANGE UP (ref 2–14)
NEUTROPHILS # BLD AUTO: 1.16 K/UL — LOW (ref 1.8–7.4)
NEUTROPHILS # BLD AUTO: 1.16 K/UL — LOW (ref 1.8–7.4)
NEUTROPHILS NFR BLD AUTO: 53 % — SIGNIFICANT CHANGE UP (ref 43–77)
NEUTROPHILS NFR BLD AUTO: 53 % — SIGNIFICANT CHANGE UP (ref 43–77)
NRBC # BLD: 0 /100 — SIGNIFICANT CHANGE UP (ref 0–0)
NRBC # BLD: 0 /100 — SIGNIFICANT CHANGE UP (ref 0–0)
NRBC # BLD: SIGNIFICANT CHANGE UP /100 WBCS (ref 0–0)
NRBC # BLD: SIGNIFICANT CHANGE UP /100 WBCS (ref 0–0)
PLAT MORPH BLD: NORMAL — SIGNIFICANT CHANGE UP
PLAT MORPH BLD: NORMAL — SIGNIFICANT CHANGE UP
PLATELET # BLD AUTO: 123 K/UL — LOW (ref 150–400)
PLATELET # BLD AUTO: 123 K/UL — LOW (ref 150–400)
POIKILOCYTOSIS BLD QL AUTO: SLIGHT — SIGNIFICANT CHANGE UP
POIKILOCYTOSIS BLD QL AUTO: SLIGHT — SIGNIFICANT CHANGE UP
POLYCHROMASIA BLD QL SMEAR: SLIGHT — SIGNIFICANT CHANGE UP
POLYCHROMASIA BLD QL SMEAR: SLIGHT — SIGNIFICANT CHANGE UP
RBC # BLD: 3.87 M/UL — LOW (ref 4.2–5.8)
RBC # BLD: 3.87 M/UL — LOW (ref 4.2–5.8)
RBC # FLD: 17.6 % — HIGH (ref 10.3–14.5)
RBC # FLD: 17.6 % — HIGH (ref 10.3–14.5)
RBC BLD AUTO: ABNORMAL
RBC BLD AUTO: ABNORMAL
SCHISTOCYTES BLD QL AUTO: SIGNIFICANT CHANGE UP
SCHISTOCYTES BLD QL AUTO: SIGNIFICANT CHANGE UP
VARIANT LYMPHS # BLD: 8 % — HIGH (ref 0–6)
VARIANT LYMPHS # BLD: 8 % — HIGH (ref 0–6)
WBC # BLD: 2.19 K/UL — LOW (ref 3.8–10.5)
WBC # BLD: 2.19 K/UL — LOW (ref 3.8–10.5)
WBC # FLD AUTO: 2.19 K/UL — LOW (ref 3.8–10.5)
WBC # FLD AUTO: 2.19 K/UL — LOW (ref 3.8–10.5)

## 2023-11-22 LAB
ALBUMIN SERPL ELPH-MCNC: 3.8 G/DL — SIGNIFICANT CHANGE UP (ref 3.3–5)
ALBUMIN SERPL ELPH-MCNC: 3.8 G/DL — SIGNIFICANT CHANGE UP (ref 3.3–5)
ALP SERPL-CCNC: 131 U/L — HIGH (ref 40–120)
ALP SERPL-CCNC: 131 U/L — HIGH (ref 40–120)
ALT FLD-CCNC: 34 U/L — SIGNIFICANT CHANGE UP (ref 10–45)
ALT FLD-CCNC: 34 U/L — SIGNIFICANT CHANGE UP (ref 10–45)
ANION GAP SERPL CALC-SCNC: 11 MMOL/L — SIGNIFICANT CHANGE UP (ref 5–17)
ANION GAP SERPL CALC-SCNC: 11 MMOL/L — SIGNIFICANT CHANGE UP (ref 5–17)
AST SERPL-CCNC: 33 U/L — SIGNIFICANT CHANGE UP (ref 10–40)
AST SERPL-CCNC: 33 U/L — SIGNIFICANT CHANGE UP (ref 10–40)
BILIRUB SERPL-MCNC: 0.8 MG/DL — SIGNIFICANT CHANGE UP (ref 0.2–1.2)
BILIRUB SERPL-MCNC: 0.8 MG/DL — SIGNIFICANT CHANGE UP (ref 0.2–1.2)
BUN SERPL-MCNC: 13 MG/DL — SIGNIFICANT CHANGE UP (ref 7–23)
BUN SERPL-MCNC: 13 MG/DL — SIGNIFICANT CHANGE UP (ref 7–23)
CALCIUM SERPL-MCNC: 9.1 MG/DL — SIGNIFICANT CHANGE UP (ref 8.4–10.5)
CALCIUM SERPL-MCNC: 9.1 MG/DL — SIGNIFICANT CHANGE UP (ref 8.4–10.5)
CHLORIDE SERPL-SCNC: 104 MMOL/L — SIGNIFICANT CHANGE UP (ref 96–108)
CHLORIDE SERPL-SCNC: 104 MMOL/L — SIGNIFICANT CHANGE UP (ref 96–108)
CO2 SERPL-SCNC: 33 MMOL/L — HIGH (ref 22–31)
CO2 SERPL-SCNC: 33 MMOL/L — HIGH (ref 22–31)
CREAT SERPL-MCNC: 0.61 MG/DL — SIGNIFICANT CHANGE UP (ref 0.5–1.3)
CREAT SERPL-MCNC: 0.61 MG/DL — SIGNIFICANT CHANGE UP (ref 0.5–1.3)
EGFR: 107 ML/MIN/1.73M2 — SIGNIFICANT CHANGE UP
EGFR: 107 ML/MIN/1.73M2 — SIGNIFICANT CHANGE UP
GLUCOSE SERPL-MCNC: 104 MG/DL — HIGH (ref 70–99)
GLUCOSE SERPL-MCNC: 104 MG/DL — HIGH (ref 70–99)
MAGNESIUM SERPL-MCNC: 1.7 MG/DL — SIGNIFICANT CHANGE UP (ref 1.6–2.6)
MAGNESIUM SERPL-MCNC: 1.7 MG/DL — SIGNIFICANT CHANGE UP (ref 1.6–2.6)
POTASSIUM SERPL-MCNC: 4.3 MMOL/L — SIGNIFICANT CHANGE UP (ref 3.5–5.3)
POTASSIUM SERPL-MCNC: 4.3 MMOL/L — SIGNIFICANT CHANGE UP (ref 3.5–5.3)
POTASSIUM SERPL-SCNC: 4.3 MMOL/L — SIGNIFICANT CHANGE UP (ref 3.5–5.3)
POTASSIUM SERPL-SCNC: 4.3 MMOL/L — SIGNIFICANT CHANGE UP (ref 3.5–5.3)
PROT SERPL-MCNC: 6.4 G/DL — SIGNIFICANT CHANGE UP (ref 6–8.3)
PROT SERPL-MCNC: 6.4 G/DL — SIGNIFICANT CHANGE UP (ref 6–8.3)
SODIUM SERPL-SCNC: 147 MMOL/L — HIGH (ref 135–145)
SODIUM SERPL-SCNC: 147 MMOL/L — HIGH (ref 135–145)

## 2023-11-28 ENCOUNTER — APPOINTMENT (OUTPATIENT)
Dept: HEMATOLOGY ONCOLOGY | Facility: CLINIC | Age: 63
End: 2023-11-28

## 2023-11-28 ENCOUNTER — RESULT REVIEW (OUTPATIENT)
Age: 63
End: 2023-11-28

## 2023-11-28 ENCOUNTER — NON-APPOINTMENT (OUTPATIENT)
Age: 63
End: 2023-11-28

## 2023-11-28 LAB
BASOPHILS # BLD AUTO: 0.02 K/UL — SIGNIFICANT CHANGE UP (ref 0–0.2)
BASOPHILS # BLD AUTO: 0.02 K/UL — SIGNIFICANT CHANGE UP (ref 0–0.2)
BASOPHILS NFR BLD AUTO: 0.8 % — SIGNIFICANT CHANGE UP (ref 0–2)
BASOPHILS NFR BLD AUTO: 0.8 % — SIGNIFICANT CHANGE UP (ref 0–2)
EOSINOPHIL # BLD AUTO: 0.03 K/UL — SIGNIFICANT CHANGE UP (ref 0–0.5)
EOSINOPHIL # BLD AUTO: 0.03 K/UL — SIGNIFICANT CHANGE UP (ref 0–0.5)
EOSINOPHIL NFR BLD AUTO: 1.3 % — SIGNIFICANT CHANGE UP (ref 0–6)
EOSINOPHIL NFR BLD AUTO: 1.3 % — SIGNIFICANT CHANGE UP (ref 0–6)
HCT VFR BLD CALC: 34 % — LOW (ref 39–50)
HCT VFR BLD CALC: 34 % — LOW (ref 39–50)
HGB BLD-MCNC: 12.1 G/DL — LOW (ref 13–17)
HGB BLD-MCNC: 12.1 G/DL — LOW (ref 13–17)
IMM GRANULOCYTES NFR BLD AUTO: 0.4 % — SIGNIFICANT CHANGE UP (ref 0–0.9)
IMM GRANULOCYTES NFR BLD AUTO: 0.4 % — SIGNIFICANT CHANGE UP (ref 0–0.9)
LYMPHOCYTES # BLD AUTO: 0.85 K/UL — LOW (ref 1–3.3)
LYMPHOCYTES # BLD AUTO: 0.85 K/UL — LOW (ref 1–3.3)
LYMPHOCYTES # BLD AUTO: 36 % — SIGNIFICANT CHANGE UP (ref 13–44)
LYMPHOCYTES # BLD AUTO: 36 % — SIGNIFICANT CHANGE UP (ref 13–44)
MCHC RBC-ENTMCNC: 31.2 PG — SIGNIFICANT CHANGE UP (ref 27–34)
MCHC RBC-ENTMCNC: 31.2 PG — SIGNIFICANT CHANGE UP (ref 27–34)
MCHC RBC-ENTMCNC: 35.6 GM/DL — SIGNIFICANT CHANGE UP (ref 32–36)
MCHC RBC-ENTMCNC: 35.6 GM/DL — SIGNIFICANT CHANGE UP (ref 32–36)
MCV RBC AUTO: 87.6 FL — SIGNIFICANT CHANGE UP (ref 80–100)
MCV RBC AUTO: 87.6 FL — SIGNIFICANT CHANGE UP (ref 80–100)
MONOCYTES # BLD AUTO: 0.29 K/UL — SIGNIFICANT CHANGE UP (ref 0–0.9)
MONOCYTES # BLD AUTO: 0.29 K/UL — SIGNIFICANT CHANGE UP (ref 0–0.9)
MONOCYTES NFR BLD AUTO: 12.3 % — SIGNIFICANT CHANGE UP (ref 2–14)
MONOCYTES NFR BLD AUTO: 12.3 % — SIGNIFICANT CHANGE UP (ref 2–14)
NEUTROPHILS # BLD AUTO: 1.16 K/UL — LOW (ref 1.8–7.4)
NEUTROPHILS # BLD AUTO: 1.16 K/UL — LOW (ref 1.8–7.4)
NEUTROPHILS NFR BLD AUTO: 49.2 % — SIGNIFICANT CHANGE UP (ref 43–77)
NEUTROPHILS NFR BLD AUTO: 49.2 % — SIGNIFICANT CHANGE UP (ref 43–77)
NRBC # BLD: 0 /100 WBCS — SIGNIFICANT CHANGE UP (ref 0–0)
NRBC # BLD: 0 /100 WBCS — SIGNIFICANT CHANGE UP (ref 0–0)
PLATELET # BLD AUTO: 156 K/UL — SIGNIFICANT CHANGE UP (ref 150–400)
PLATELET # BLD AUTO: 156 K/UL — SIGNIFICANT CHANGE UP (ref 150–400)
RBC # BLD: 3.88 M/UL — LOW (ref 4.2–5.8)
RBC # BLD: 3.88 M/UL — LOW (ref 4.2–5.8)
RBC # FLD: 17.7 % — HIGH (ref 10.3–14.5)
RBC # FLD: 17.7 % — HIGH (ref 10.3–14.5)
WBC # BLD: 2.36 K/UL — LOW (ref 3.8–10.5)
WBC # BLD: 2.36 K/UL — LOW (ref 3.8–10.5)
WBC # FLD AUTO: 2.36 K/UL — LOW (ref 3.8–10.5)
WBC # FLD AUTO: 2.36 K/UL — LOW (ref 3.8–10.5)

## 2023-11-29 ENCOUNTER — APPOINTMENT (OUTPATIENT)
Dept: INFUSION THERAPY | Facility: CLINIC | Age: 63
End: 2023-11-29

## 2023-12-06 ENCOUNTER — APPOINTMENT (OUTPATIENT)
Dept: HEMATOLOGY ONCOLOGY | Facility: CLINIC | Age: 63
End: 2023-12-06

## 2023-12-06 ENCOUNTER — RESULT REVIEW (OUTPATIENT)
Age: 63
End: 2023-12-06

## 2023-12-06 VITALS — BODY MASS INDEX: 16.59 KG/M2 | WEIGHT: 105.9 LBS

## 2023-12-06 LAB
ANISOCYTOSIS BLD QL: SLIGHT — SIGNIFICANT CHANGE UP
ANISOCYTOSIS BLD QL: SLIGHT — SIGNIFICANT CHANGE UP
BASOPHILS # BLD AUTO: 0.03 K/UL — SIGNIFICANT CHANGE UP (ref 0–0.2)
BASOPHILS # BLD AUTO: 0.03 K/UL — SIGNIFICANT CHANGE UP (ref 0–0.2)
BASOPHILS NFR BLD AUTO: 1 % — SIGNIFICANT CHANGE UP (ref 0–2)
BASOPHILS NFR BLD AUTO: 1 % — SIGNIFICANT CHANGE UP (ref 0–2)
BURR CELLS BLD QL SMEAR: PRESENT — SIGNIFICANT CHANGE UP
BURR CELLS BLD QL SMEAR: PRESENT — SIGNIFICANT CHANGE UP
DACRYOCYTES BLD QL SMEAR: SLIGHT — SIGNIFICANT CHANGE UP
DACRYOCYTES BLD QL SMEAR: SLIGHT — SIGNIFICANT CHANGE UP
ELLIPTOCYTES BLD QL SMEAR: SLIGHT — SIGNIFICANT CHANGE UP
ELLIPTOCYTES BLD QL SMEAR: SLIGHT — SIGNIFICANT CHANGE UP
EOSINOPHIL # BLD AUTO: 0.05 K/UL — SIGNIFICANT CHANGE UP (ref 0–0.5)
EOSINOPHIL # BLD AUTO: 0.05 K/UL — SIGNIFICANT CHANGE UP (ref 0–0.5)
EOSINOPHIL NFR BLD AUTO: 2 % — SIGNIFICANT CHANGE UP (ref 0–6)
EOSINOPHIL NFR BLD AUTO: 2 % — SIGNIFICANT CHANGE UP (ref 0–6)
HCT VFR BLD CALC: 34.4 % — LOW (ref 39–50)
HCT VFR BLD CALC: 34.4 % — LOW (ref 39–50)
HGB BLD-MCNC: 11.9 G/DL — LOW (ref 13–17)
HGB BLD-MCNC: 11.9 G/DL — LOW (ref 13–17)
LG PLATELETS BLD QL AUTO: SLIGHT — SIGNIFICANT CHANGE UP
LG PLATELETS BLD QL AUTO: SLIGHT — SIGNIFICANT CHANGE UP
LYMPHOCYTES # BLD AUTO: 0.92 K/UL — LOW (ref 1–3.3)
LYMPHOCYTES # BLD AUTO: 0.92 K/UL — LOW (ref 1–3.3)
LYMPHOCYTES # BLD AUTO: 35 % — SIGNIFICANT CHANGE UP (ref 13–44)
LYMPHOCYTES # BLD AUTO: 35 % — SIGNIFICANT CHANGE UP (ref 13–44)
MACROCYTES BLD QL: SLIGHT — SIGNIFICANT CHANGE UP
MACROCYTES BLD QL: SLIGHT — SIGNIFICANT CHANGE UP
MCHC RBC-ENTMCNC: 31.1 PG — SIGNIFICANT CHANGE UP (ref 27–34)
MCHC RBC-ENTMCNC: 31.1 PG — SIGNIFICANT CHANGE UP (ref 27–34)
MCHC RBC-ENTMCNC: 34.6 GM/DL — SIGNIFICANT CHANGE UP (ref 32–36)
MCHC RBC-ENTMCNC: 34.6 GM/DL — SIGNIFICANT CHANGE UP (ref 32–36)
MCV RBC AUTO: 89.8 FL — SIGNIFICANT CHANGE UP (ref 80–100)
MCV RBC AUTO: 89.8 FL — SIGNIFICANT CHANGE UP (ref 80–100)
MICROCYTES BLD QL: SLIGHT — SIGNIFICANT CHANGE UP
MICROCYTES BLD QL: SLIGHT — SIGNIFICANT CHANGE UP
MONOCYTES # BLD AUTO: 0.29 K/UL — SIGNIFICANT CHANGE UP (ref 0–0.9)
MONOCYTES # BLD AUTO: 0.29 K/UL — SIGNIFICANT CHANGE UP (ref 0–0.9)
MONOCYTES NFR BLD AUTO: 11 % — SIGNIFICANT CHANGE UP (ref 2–14)
MONOCYTES NFR BLD AUTO: 11 % — SIGNIFICANT CHANGE UP (ref 2–14)
NEUTROPHILS # BLD AUTO: 1.26 K/UL — LOW (ref 1.8–7.4)
NEUTROPHILS # BLD AUTO: 1.26 K/UL — LOW (ref 1.8–7.4)
NEUTROPHILS NFR BLD AUTO: 47 % — SIGNIFICANT CHANGE UP (ref 43–77)
NEUTROPHILS NFR BLD AUTO: 47 % — SIGNIFICANT CHANGE UP (ref 43–77)
NEUTS BAND # BLD: 1 % — SIGNIFICANT CHANGE UP (ref 0–8)
NEUTS BAND # BLD: 1 % — SIGNIFICANT CHANGE UP (ref 0–8)
NRBC # BLD: 0 /100 — SIGNIFICANT CHANGE UP (ref 0–0)
NRBC # BLD: 0 /100 — SIGNIFICANT CHANGE UP (ref 0–0)
NRBC # BLD: SIGNIFICANT CHANGE UP /100 WBCS (ref 0–0)
NRBC # BLD: SIGNIFICANT CHANGE UP /100 WBCS (ref 0–0)
OVALOCYTES BLD QL SMEAR: SLIGHT — SIGNIFICANT CHANGE UP
OVALOCYTES BLD QL SMEAR: SLIGHT — SIGNIFICANT CHANGE UP
PLAT MORPH BLD: NORMAL — SIGNIFICANT CHANGE UP
PLAT MORPH BLD: NORMAL — SIGNIFICANT CHANGE UP
PLATELET # BLD AUTO: 142 K/UL — LOW (ref 150–400)
PLATELET # BLD AUTO: 142 K/UL — LOW (ref 150–400)
POIKILOCYTOSIS BLD QL AUTO: SLIGHT — SIGNIFICANT CHANGE UP
POIKILOCYTOSIS BLD QL AUTO: SLIGHT — SIGNIFICANT CHANGE UP
RBC # BLD: 3.83 M/UL — LOW (ref 4.2–5.8)
RBC # BLD: 3.83 M/UL — LOW (ref 4.2–5.8)
RBC # FLD: 17.4 % — HIGH (ref 10.3–14.5)
RBC # FLD: 17.4 % — HIGH (ref 10.3–14.5)
RBC BLD AUTO: SIGNIFICANT CHANGE UP
RBC BLD AUTO: SIGNIFICANT CHANGE UP
VARIANT LYMPHS # BLD: 3 % — SIGNIFICANT CHANGE UP (ref 0–6)
VARIANT LYMPHS # BLD: 3 % — SIGNIFICANT CHANGE UP (ref 0–6)
WBC # BLD: 2.63 K/UL — LOW (ref 3.8–10.5)
WBC # BLD: 2.63 K/UL — LOW (ref 3.8–10.5)
WBC # FLD AUTO: 2.63 K/UL — LOW (ref 3.8–10.5)
WBC # FLD AUTO: 2.63 K/UL — LOW (ref 3.8–10.5)

## 2023-12-08 ENCOUNTER — APPOINTMENT (OUTPATIENT)
Dept: HEMATOLOGY ONCOLOGY | Facility: CLINIC | Age: 63
End: 2023-12-08
Payer: COMMERCIAL

## 2023-12-08 VITALS
HEART RATE: 78 BPM | OXYGEN SATURATION: 99 % | WEIGHT: 107.44 LBS | TEMPERATURE: 98.6 F | DIASTOLIC BLOOD PRESSURE: 70 MMHG | SYSTOLIC BLOOD PRESSURE: 107 MMHG | HEIGHT: 67 IN | BODY MASS INDEX: 16.86 KG/M2 | RESPIRATION RATE: 18 BRPM

## 2023-12-08 PROCEDURE — 99215 OFFICE O/P EST HI 40 MIN: CPT

## 2023-12-13 ENCOUNTER — RESULT REVIEW (OUTPATIENT)
Age: 63
End: 2023-12-13

## 2023-12-13 ENCOUNTER — APPOINTMENT (OUTPATIENT)
Dept: INFUSION THERAPY | Facility: CLINIC | Age: 63
End: 2023-12-13

## 2023-12-13 VITALS
DIASTOLIC BLOOD PRESSURE: 70 MMHG | TEMPERATURE: 98.3 F | WEIGHT: 107 LBS | BODY MASS INDEX: 16.79 KG/M2 | SYSTOLIC BLOOD PRESSURE: 110 MMHG | HEIGHT: 67 IN | HEART RATE: 103 BPM | OXYGEN SATURATION: 97 % | RESPIRATION RATE: 18 BRPM

## 2023-12-13 LAB
BASOPHILS # BLD AUTO: 0.02 K/UL — SIGNIFICANT CHANGE UP (ref 0–0.2)
BASOPHILS # BLD AUTO: 0.02 K/UL — SIGNIFICANT CHANGE UP (ref 0–0.2)
BASOPHILS NFR BLD AUTO: 0.7 % — SIGNIFICANT CHANGE UP (ref 0–2)
BASOPHILS NFR BLD AUTO: 0.7 % — SIGNIFICANT CHANGE UP (ref 0–2)
EOSINOPHIL # BLD AUTO: 0.03 K/UL — SIGNIFICANT CHANGE UP (ref 0–0.5)
EOSINOPHIL # BLD AUTO: 0.03 K/UL — SIGNIFICANT CHANGE UP (ref 0–0.5)
EOSINOPHIL NFR BLD AUTO: 1 % — SIGNIFICANT CHANGE UP (ref 0–6)
EOSINOPHIL NFR BLD AUTO: 1 % — SIGNIFICANT CHANGE UP (ref 0–6)
HCT VFR BLD CALC: 32.9 % — LOW (ref 39–50)
HCT VFR BLD CALC: 32.9 % — LOW (ref 39–50)
HGB BLD-MCNC: 11.4 G/DL — LOW (ref 13–17)
HGB BLD-MCNC: 11.4 G/DL — LOW (ref 13–17)
IMM GRANULOCYTES NFR BLD AUTO: 0.3 % — SIGNIFICANT CHANGE UP (ref 0–0.9)
IMM GRANULOCYTES NFR BLD AUTO: 0.3 % — SIGNIFICANT CHANGE UP (ref 0–0.9)
LYMPHOCYTES # BLD AUTO: 0.98 K/UL — LOW (ref 1–3.3)
LYMPHOCYTES # BLD AUTO: 0.98 K/UL — LOW (ref 1–3.3)
LYMPHOCYTES # BLD AUTO: 32.6 % — SIGNIFICANT CHANGE UP (ref 13–44)
LYMPHOCYTES # BLD AUTO: 32.6 % — SIGNIFICANT CHANGE UP (ref 13–44)
MCHC RBC-ENTMCNC: 31.8 PG — SIGNIFICANT CHANGE UP (ref 27–34)
MCHC RBC-ENTMCNC: 31.8 PG — SIGNIFICANT CHANGE UP (ref 27–34)
MCHC RBC-ENTMCNC: 34.7 GM/DL — SIGNIFICANT CHANGE UP (ref 32–36)
MCHC RBC-ENTMCNC: 34.7 GM/DL — SIGNIFICANT CHANGE UP (ref 32–36)
MCV RBC AUTO: 91.6 FL — SIGNIFICANT CHANGE UP (ref 80–100)
MCV RBC AUTO: 91.6 FL — SIGNIFICANT CHANGE UP (ref 80–100)
MONOCYTES # BLD AUTO: 0.39 K/UL — SIGNIFICANT CHANGE UP (ref 0–0.9)
MONOCYTES # BLD AUTO: 0.39 K/UL — SIGNIFICANT CHANGE UP (ref 0–0.9)
MONOCYTES NFR BLD AUTO: 13 % — SIGNIFICANT CHANGE UP (ref 2–14)
MONOCYTES NFR BLD AUTO: 13 % — SIGNIFICANT CHANGE UP (ref 2–14)
NEUTROPHILS # BLD AUTO: 1.58 K/UL — LOW (ref 1.8–7.4)
NEUTROPHILS # BLD AUTO: 1.58 K/UL — LOW (ref 1.8–7.4)
NEUTROPHILS NFR BLD AUTO: 52.4 % — SIGNIFICANT CHANGE UP (ref 43–77)
NEUTROPHILS NFR BLD AUTO: 52.4 % — SIGNIFICANT CHANGE UP (ref 43–77)
NRBC # BLD: 0 /100 WBCS — SIGNIFICANT CHANGE UP (ref 0–0)
NRBC # BLD: 0 /100 WBCS — SIGNIFICANT CHANGE UP (ref 0–0)
PLATELET # BLD AUTO: 146 K/UL — LOW (ref 150–400)
PLATELET # BLD AUTO: 146 K/UL — LOW (ref 150–400)
RBC # BLD: 3.59 M/UL — LOW (ref 4.2–5.8)
RBC # BLD: 3.59 M/UL — LOW (ref 4.2–5.8)
RBC # FLD: 17 % — HIGH (ref 10.3–14.5)
RBC # FLD: 17 % — HIGH (ref 10.3–14.5)
WBC # BLD: 3.01 K/UL — LOW (ref 3.8–10.5)
WBC # BLD: 3.01 K/UL — LOW (ref 3.8–10.5)
WBC # FLD AUTO: 3.01 K/UL — LOW (ref 3.8–10.5)
WBC # FLD AUTO: 3.01 K/UL — LOW (ref 3.8–10.5)

## 2023-12-20 RX ORDER — METOPROLOL TARTRATE 25 MG/1
25 TABLET, FILM COATED ORAL TWICE DAILY
Qty: 180 | Refills: 0 | Status: ACTIVE | COMMUNITY
Start: 2023-09-06 | End: 1900-01-01

## 2023-12-26 ENCOUNTER — OUTPATIENT (OUTPATIENT)
Dept: OUTPATIENT SERVICES | Facility: HOSPITAL | Age: 63
LOS: 1 days | Discharge: HOME | End: 2023-12-26

## 2023-12-26 DIAGNOSIS — Z98.890 OTHER SPECIFIED POSTPROCEDURAL STATES: Chronic | ICD-10-CM

## 2023-12-26 DIAGNOSIS — C15.9 MALIGNANT NEOPLASM OF ESOPHAGUS, UNSPECIFIED: ICD-10-CM

## 2023-12-26 DIAGNOSIS — Z92.21 PERSONAL HISTORY OF ANTINEOPLASTIC CHEMOTHERAPY: Chronic | ICD-10-CM

## 2023-12-26 DIAGNOSIS — Z92.3 PERSONAL HISTORY OF IRRADIATION: Chronic | ICD-10-CM

## 2023-12-26 DIAGNOSIS — K22.2 ESOPHAGEAL OBSTRUCTION: Chronic | ICD-10-CM

## 2023-12-26 DIAGNOSIS — Z90.49 ACQUIRED ABSENCE OF OTHER SPECIFIED PARTS OF DIGESTIVE TRACT: Chronic | ICD-10-CM

## 2023-12-27 ENCOUNTER — APPOINTMENT (OUTPATIENT)
Dept: HEMATOLOGY ONCOLOGY | Facility: CLINIC | Age: 63
End: 2023-12-27

## 2023-12-28 ENCOUNTER — RESULT REVIEW (OUTPATIENT)
Age: 63
End: 2023-12-28

## 2023-12-28 ENCOUNTER — APPOINTMENT (OUTPATIENT)
Dept: INFUSION THERAPY | Facility: CLINIC | Age: 63
End: 2023-12-28
Payer: COMMERCIAL

## 2023-12-28 ENCOUNTER — APPOINTMENT (OUTPATIENT)
Dept: HEMATOLOGY ONCOLOGY | Facility: CLINIC | Age: 63
End: 2023-12-28
Payer: COMMERCIAL

## 2023-12-28 VITALS
OXYGEN SATURATION: 100 % | HEART RATE: 89 BPM | BODY MASS INDEX: 16.52 KG/M2 | WEIGHT: 105.25 LBS | DIASTOLIC BLOOD PRESSURE: 79 MMHG | HEIGHT: 67 IN | RESPIRATION RATE: 17 BRPM | SYSTOLIC BLOOD PRESSURE: 118 MMHG | TEMPERATURE: 97.6 F

## 2023-12-28 LAB
ALBUMIN SERPL ELPH-MCNC: 4.3 G/DL — SIGNIFICANT CHANGE UP (ref 3.3–5)
ALBUMIN SERPL ELPH-MCNC: 4.3 G/DL — SIGNIFICANT CHANGE UP (ref 3.3–5)
ALP SERPL-CCNC: 129 U/L — HIGH (ref 40–120)
ALP SERPL-CCNC: 129 U/L — HIGH (ref 40–120)
ALT FLD-CCNC: 27 U/L — SIGNIFICANT CHANGE UP (ref 10–45)
ALT FLD-CCNC: 27 U/L — SIGNIFICANT CHANGE UP (ref 10–45)
ANION GAP SERPL CALC-SCNC: 10 MMOL/L — SIGNIFICANT CHANGE UP (ref 5–17)
ANION GAP SERPL CALC-SCNC: 10 MMOL/L — SIGNIFICANT CHANGE UP (ref 5–17)
AST SERPL-CCNC: 30 U/L — SIGNIFICANT CHANGE UP (ref 10–40)
AST SERPL-CCNC: 30 U/L — SIGNIFICANT CHANGE UP (ref 10–40)
BASOPHILS # BLD AUTO: 0.03 K/UL — SIGNIFICANT CHANGE UP (ref 0–0.2)
BASOPHILS # BLD AUTO: 0.03 K/UL — SIGNIFICANT CHANGE UP (ref 0–0.2)
BASOPHILS NFR BLD AUTO: 0.9 % — SIGNIFICANT CHANGE UP (ref 0–2)
BASOPHILS NFR BLD AUTO: 0.9 % — SIGNIFICANT CHANGE UP (ref 0–2)
BILIRUB SERPL-MCNC: 0.8 MG/DL — SIGNIFICANT CHANGE UP (ref 0.2–1.2)
BILIRUB SERPL-MCNC: 0.8 MG/DL — SIGNIFICANT CHANGE UP (ref 0.2–1.2)
BUN SERPL-MCNC: 14 MG/DL — SIGNIFICANT CHANGE UP (ref 7–23)
BUN SERPL-MCNC: 14 MG/DL — SIGNIFICANT CHANGE UP (ref 7–23)
CALCIUM SERPL-MCNC: 9 MG/DL — SIGNIFICANT CHANGE UP (ref 8.4–10.5)
CALCIUM SERPL-MCNC: 9 MG/DL — SIGNIFICANT CHANGE UP (ref 8.4–10.5)
CHLORIDE SERPL-SCNC: 101 MMOL/L — SIGNIFICANT CHANGE UP (ref 96–108)
CHLORIDE SERPL-SCNC: 101 MMOL/L — SIGNIFICANT CHANGE UP (ref 96–108)
CO2 SERPL-SCNC: 32 MMOL/L — HIGH (ref 22–31)
CO2 SERPL-SCNC: 32 MMOL/L — HIGH (ref 22–31)
CREAT SERPL-MCNC: 0.56 MG/DL — SIGNIFICANT CHANGE UP (ref 0.5–1.3)
CREAT SERPL-MCNC: 0.56 MG/DL — SIGNIFICANT CHANGE UP (ref 0.5–1.3)
EGFR: 110 ML/MIN/1.73M2 — SIGNIFICANT CHANGE UP
EGFR: 110 ML/MIN/1.73M2 — SIGNIFICANT CHANGE UP
EOSINOPHIL # BLD AUTO: 0.03 K/UL — SIGNIFICANT CHANGE UP (ref 0–0.5)
EOSINOPHIL # BLD AUTO: 0.03 K/UL — SIGNIFICANT CHANGE UP (ref 0–0.5)
EOSINOPHIL NFR BLD AUTO: 0.9 % — SIGNIFICANT CHANGE UP (ref 0–6)
EOSINOPHIL NFR BLD AUTO: 0.9 % — SIGNIFICANT CHANGE UP (ref 0–6)
GLUCOSE SERPL-MCNC: 103 MG/DL — HIGH (ref 70–99)
GLUCOSE SERPL-MCNC: 103 MG/DL — HIGH (ref 70–99)
HCT VFR BLD CALC: 35.9 % — LOW (ref 39–50)
HCT VFR BLD CALC: 35.9 % — LOW (ref 39–50)
HGB BLD-MCNC: 12.5 G/DL — LOW (ref 13–17)
HGB BLD-MCNC: 12.5 G/DL — LOW (ref 13–17)
IMM GRANULOCYTES NFR BLD AUTO: 0.3 % — SIGNIFICANT CHANGE UP (ref 0–0.9)
IMM GRANULOCYTES NFR BLD AUTO: 0.3 % — SIGNIFICANT CHANGE UP (ref 0–0.9)
LYMPHOCYTES # BLD AUTO: 0.79 K/UL — LOW (ref 1–3.3)
LYMPHOCYTES # BLD AUTO: 0.79 K/UL — LOW (ref 1–3.3)
LYMPHOCYTES # BLD AUTO: 24.8 % — SIGNIFICANT CHANGE UP (ref 13–44)
LYMPHOCYTES # BLD AUTO: 24.8 % — SIGNIFICANT CHANGE UP (ref 13–44)
MAGNESIUM SERPL-MCNC: 1.7 MG/DL — SIGNIFICANT CHANGE UP (ref 1.6–2.6)
MAGNESIUM SERPL-MCNC: 1.7 MG/DL — SIGNIFICANT CHANGE UP (ref 1.6–2.6)
MCHC RBC-ENTMCNC: 32.6 PG — SIGNIFICANT CHANGE UP (ref 27–34)
MCHC RBC-ENTMCNC: 32.6 PG — SIGNIFICANT CHANGE UP (ref 27–34)
MCHC RBC-ENTMCNC: 34.8 GM/DL — SIGNIFICANT CHANGE UP (ref 32–36)
MCHC RBC-ENTMCNC: 34.8 GM/DL — SIGNIFICANT CHANGE UP (ref 32–36)
MCV RBC AUTO: 93.5 FL — SIGNIFICANT CHANGE UP (ref 80–100)
MCV RBC AUTO: 93.5 FL — SIGNIFICANT CHANGE UP (ref 80–100)
MONOCYTES # BLD AUTO: 0.4 K/UL — SIGNIFICANT CHANGE UP (ref 0–0.9)
MONOCYTES # BLD AUTO: 0.4 K/UL — SIGNIFICANT CHANGE UP (ref 0–0.9)
MONOCYTES NFR BLD AUTO: 12.6 % — SIGNIFICANT CHANGE UP (ref 2–14)
MONOCYTES NFR BLD AUTO: 12.6 % — SIGNIFICANT CHANGE UP (ref 2–14)
NEUTROPHILS # BLD AUTO: 1.92 K/UL — SIGNIFICANT CHANGE UP (ref 1.8–7.4)
NEUTROPHILS # BLD AUTO: 1.92 K/UL — SIGNIFICANT CHANGE UP (ref 1.8–7.4)
NEUTROPHILS NFR BLD AUTO: 60.5 % — SIGNIFICANT CHANGE UP (ref 43–77)
NEUTROPHILS NFR BLD AUTO: 60.5 % — SIGNIFICANT CHANGE UP (ref 43–77)
NRBC # BLD: 0 /100 WBCS — SIGNIFICANT CHANGE UP (ref 0–0)
NRBC # BLD: 0 /100 WBCS — SIGNIFICANT CHANGE UP (ref 0–0)
PLATELET # BLD AUTO: 141 K/UL — LOW (ref 150–400)
PLATELET # BLD AUTO: 141 K/UL — LOW (ref 150–400)
POTASSIUM SERPL-MCNC: 4 MMOL/L — SIGNIFICANT CHANGE UP (ref 3.5–5.3)
POTASSIUM SERPL-MCNC: 4 MMOL/L — SIGNIFICANT CHANGE UP (ref 3.5–5.3)
POTASSIUM SERPL-SCNC: 4 MMOL/L — SIGNIFICANT CHANGE UP (ref 3.5–5.3)
POTASSIUM SERPL-SCNC: 4 MMOL/L — SIGNIFICANT CHANGE UP (ref 3.5–5.3)
PROT SERPL-MCNC: 6.9 G/DL — SIGNIFICANT CHANGE UP (ref 6–8.3)
PROT SERPL-MCNC: 6.9 G/DL — SIGNIFICANT CHANGE UP (ref 6–8.3)
RBC # BLD: 3.84 M/UL — LOW (ref 4.2–5.8)
RBC # BLD: 3.84 M/UL — LOW (ref 4.2–5.8)
RBC # FLD: 15 % — HIGH (ref 10.3–14.5)
RBC # FLD: 15 % — HIGH (ref 10.3–14.5)
SODIUM SERPL-SCNC: 143 MMOL/L — SIGNIFICANT CHANGE UP (ref 135–145)
SODIUM SERPL-SCNC: 143 MMOL/L — SIGNIFICANT CHANGE UP (ref 135–145)
WBC # BLD: 3.18 K/UL — LOW (ref 3.8–10.5)
WBC # BLD: 3.18 K/UL — LOW (ref 3.8–10.5)
WBC # FLD AUTO: 3.18 K/UL — LOW (ref 3.8–10.5)
WBC # FLD AUTO: 3.18 K/UL — LOW (ref 3.8–10.5)

## 2023-12-28 PROCEDURE — 99214 OFFICE O/P EST MOD 30 MIN: CPT

## 2023-12-28 NOTE — ASSESSMENT
[FreeTextEntry1] : Benefit for nivolumab was shown in the CheckMate 577 trial, in which [id number] patients who had received neoadjuvant CRT for esophageal or EGJ cancer (70 percent AC) and had residual pathologic disease at the time of surgery were randomly assigned to nivolumab (240 mg) or placebo every 2 weeks for 16 weeks followed by nivolumab 480 mg or placebo every 4 weeks; the maximum treatment duration was one year. Enrollment was irrespective of programmed death receptor-1 ligand 1 (PD-L1) overexpression. Tumor site was esophagus in 60 percent and EGJ in 40 percent; histology was AC in 71 percent and SCC in 29 percent. At a median follow-up of 24.4 months, median disease-free survival, the primary endpoint, was twice as long with nivolumab (22.4 versus 11 months, HR for disease progression or death was 0.69, 95% CI 0.56-0.86), and the benefits were seen across all patient subgroups (histology, location, initial and post-treatment disease stage, PD-L1 overexpression or not). Overall survival data were not mature. Although treatment-related adverse effects were frequent, most were grade 1 or 2 and only 9 percent of patients discontinued adjuvant nivolumab because of adverse effects. The benefits were gained without any significant decline in patient-reported health-related quality of life over the year of nivolumab treatment.  Based on these results, the [place] Food and Drug Administration has approved nivolumab as adjuvant therapy for patients who previously received neoadjuvant CRT following complete resection of esophageal or gastroesophageal junction cancer with residual pathologic disease. This approach was also endorsed by a year 2021 updated ASCO guideline on the treatment of patients with locally advanced esophageal carcinoma. [AdvancecareDate] : 12/8/23

## 2023-12-28 NOTE — HISTORY OF PRESENT ILLNESS
[T: ___] : T[unfilled] [N: ___] : N[unfilled] [M: ___] : M[unfilled] [AJCC Stage: ____] : AJCC Stage: [unfilled] [de-identified] : The patient was diagnosed with esophageal adenocarcinoma in February 2023 at the age of 62. He presented to his PCP with complaints of dysphagia, intermittently since 2021, however, since January 2023 it has become severe to the point where he can no longer eat solid foods. On 2/6/23, he had an xray esophagram which showed a 5 cm distal esophageal stricture suspect for neoplasm. On 2/15/23, he underwent an endoscopy which showed at least a T3N1Mx lesion.  Almost completely obstructing esophageal/ GE junction tumor, Siewert Type II.  Stented with an 18 x 103 mm Walflex stent. 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  No loss of nuclear expression of MMR proteins (MLH1, MSH2, MSH6, and PMS2). HER-2: Negative (0/1+ membrane staining). A CT C/A/P on 2/27/23 showed nonspecific bilateral noncalcified pulmonary nodules measuring up to 4 mm Lower third esophageal masslike wall thickening extends to the GE junction and gastric cardia compatible with tumor. Masslike wall thickening extends upstream of the proximal end of the indwelling esophageal Wallstent. Lower esophageal, gastrohepatic and periceliac lymphadenopathy, measures 4.5 x 3.2 cm . Minimal nonspecific nodularity involving the omentum suspicious however inconclusive for carcinomatosis. Probable left hepatic lobe cysts however consider liver MRI if additional imaging is warranted.  [de-identified] : Medical Hx: HTN; GERD; kidney stones \par  Surgical Hx: cholecystectomy; lithotripsy Left kidney  \par  Family Hx: father prostrate ca; paternal uncle lymphoma \par  Social Hx: never smoker; ETOH never; , live together; retired [place] ; family close by [de-identified] : He completed 3 cycles of induction for carbo / taxol on 3/30/23. He completed 6 cycles of carbo / Taxol with RT 5/15/23.  He began Nivolumab q 2 weeks for 8 cycles (started 10/23/23) then monthly for 1 year. Today he is cycle 4 due to dx of shingles in Nov.  He is feeling better, still with pain and itching on left flank. Denies diarrhea or rash. He notes fatigue x 1 day after treatment. His wt is stable overall, he is tracking his calories and is trying to increase to 1800+ per day. He feels well overall.

## 2023-12-28 NOTE — REVIEW OF SYSTEMS
[Fatigue] : fatigue [Chest Pain] : no chest pain [Shortness Of Breath] : no shortness of breath [Abdominal Pain] : no abdominal pain [Constipation] : no constipation [Diarrhea: Grade 0] : Diarrhea: Grade 0 [Negative] : Allergic/Immunologic [FreeTextEntry2] : wt stable overall  [de-identified] : itchy flank

## 2023-12-28 NOTE — PHYSICAL EXAM
[Restricted in physically strenuous activity but ambulatory and able to carry out work of a light or sedentary nature] : Status 1- Restricted in physically strenuous activity but ambulatory and able to carry out work of a light or sedentary nature, e.g., light house work, office work [Normal] : affect appropriate [de-identified] : wt stable overall today  [de-identified] : well healed shingles

## 2023-12-28 NOTE — RESULTS/DATA
[FreeTextEntry1] : 10/9/23 CT Chest: Stable scattered smaller than 5 mm nodules including a left lower lobe 4 mm nodule.  8/15/23 Path: Esophagogastrectomy Invasive adenocarcinoma, moderately differentiated with squamous and neuroendocrine differentiation, status post treatment (score 2). Metastatic carcinoma present in 4 out of 14 lymph nodes (4/14). Note: The tumor shows neuroendocrine differentiation and squamous differentiation. Immunostains were performed on block 1P and 1AG. CAM5.2, synaptophysin and chromogranin stains tumor cells and shows neuroendocrine differentiation; p40 highlights tumor cells and show squamous differentiation. Final esophageal margin, resection: Segment of esophagus, negative for carcinoma. Two lymph nodes, negative for carcinoma (0/2). Synoptic Summary Esophagus Procedure: Esophagogastrectomy Tumor Site: Distal esophagus (low thoracic esophagus) Relationship of Tumor to Esophagogastric Junction: Tumor midpoint lies in the distal esophagus AND tumor involves the esophagogastric junction Distance of Tumor Center from Esophagogastric Junction: 3.2 cm Histologic Type: Adenocarcinoma Histologic Type Comment: The tumor has squamous and neuroendocrine differentiation Histologic Grade: Moderately (G2) - poorly (G3) differentiated Tumor Size: 6.2 Centimeters (cm) Tumor Extent: Invades adventitia Treatment Effect: Present, with residual cancer showing evident tumor regression, but more than single cells or rare small groups of cancer cells (partial response, score 2). Lymphovascular Invasion: Present Perineural Invasion: Present Margin Status for Invasive Carcinoma: Invasive carcinoma present at margin Margin(s) Involved by Invasive Carcinoma: Distal Margin Status for Dysplasia and Intestinal Metaplasia: Not applicable Regional Lymph Node Status: Tumor present in regional lymph node(s) Number of Lymph Nodes with Tumor: 4 Number of Lymph Nodes Examined: 16 Pathologic Stage Classification  TNM Descriptors: y (post-treatment) pT Category: pT3 pN Category: pN2  6/12/23 PET: Activity extending from the distal esophagus to the gastric cardia appears grossly unchanged in intensity. However, this now appears to extend further proximally than on the prior PET/CT scan, with an area of suspected narrowing in the subcarinal esophageal region. The degree of uptake otherwise appears unchanged in intensity. Previously seen hypermetabolic nodes in the UPPER abdomen have nearly resolved.  3/10/23 MRI: No liver metastases. Small scattered hepatic cysts. Metastatic lymphadenopathy upper abdomen with central necrosis.  3/8/23 PET: Foci of increased activity at the gastroesophageal junction and within the gastric cardia related to known site of malignancy. Hypermetabolic upper mesenteric nodes including portacaval/periceliac sandra conglomerate with necrosis compatible with metastases. Sites of omental nodularity not visualized on low-dose CT and without abnormal FDG activity at the sites. Nonavid subcentimeter lung nodules below PET detection; these will be reassessed on follow-up.  2/27/23 CT C/A/P: Nonspecific bilateral noncalcified pulmonary nodules measuring up to 4 mm Lower third esophageal masslike wall thickening extends to the GE junction and gastric cardia compatible with tumor. Masslike wall thickening extends upstream of the proximal end of the indwelling esophageal Wallstent. Lower esophageal, gastrohepatic and periceliac lymphadenopathy, measures 4.5 x 3.2 cm . Minimal nonspecific nodularity involving the omentum suspicious however inconclusive for carcinomatosis. Probable left hepatic lobe cysts however consider liver MRI if additional imaging is warranted.   2/15/23 Path: Esophagus, mass, 40 cm (biopsy): Adenocarcinoma, invasive, poorly differentiated with siddhartha of signet ring cell morphology. Alcian blue stain is noncontributory. No loss of nuclear expression of MMR proteins (MLH1, MSH2, MSH6, and PMS2). HER-2: Negative (0/1+ membrane staining).  2/15/23 Endoscopy: At least a T3N1Mx lesion on this exam. Biopsied. Almost completely obstructing esophageal/ GE junction tumor, Siewert Type II.  Stented with an 18 x 103 mm Walflex stent.   2/6/23 Xray Esophagram: 5 cm distal esophageal stricture suspect for neoplasm.

## 2023-12-29 DIAGNOSIS — Z51.11 ENCOUNTER FOR ANTINEOPLASTIC CHEMOTHERAPY: ICD-10-CM

## 2023-12-29 DIAGNOSIS — B02.9 ZOSTER WITHOUT COMPLICATIONS: ICD-10-CM

## 2024-01-10 ENCOUNTER — RESULT REVIEW (OUTPATIENT)
Age: 64
End: 2024-01-10

## 2024-01-10 ENCOUNTER — APPOINTMENT (OUTPATIENT)
Dept: INFUSION THERAPY | Facility: CLINIC | Age: 64
End: 2024-01-10

## 2024-01-10 VITALS
BODY MASS INDEX: 16.41 KG/M2 | OXYGEN SATURATION: 98 % | RESPIRATION RATE: 17 BRPM | WEIGHT: 104.56 LBS | HEART RATE: 100 BPM | SYSTOLIC BLOOD PRESSURE: 110 MMHG | DIASTOLIC BLOOD PRESSURE: 74 MMHG | TEMPERATURE: 98.2 F | HEIGHT: 67 IN

## 2024-01-10 LAB
BASOPHILS # BLD AUTO: 0.02 K/UL — SIGNIFICANT CHANGE UP (ref 0–0.2)
BASOPHILS # BLD AUTO: 0.02 K/UL — SIGNIFICANT CHANGE UP (ref 0–0.2)
BASOPHILS NFR BLD AUTO: 0.7 % — SIGNIFICANT CHANGE UP (ref 0–2)
BASOPHILS NFR BLD AUTO: 0.7 % — SIGNIFICANT CHANGE UP (ref 0–2)
EOSINOPHIL # BLD AUTO: 0.08 K/UL — SIGNIFICANT CHANGE UP (ref 0–0.5)
EOSINOPHIL # BLD AUTO: 0.08 K/UL — SIGNIFICANT CHANGE UP (ref 0–0.5)
EOSINOPHIL NFR BLD AUTO: 2.8 % — SIGNIFICANT CHANGE UP (ref 0–6)
EOSINOPHIL NFR BLD AUTO: 2.8 % — SIGNIFICANT CHANGE UP (ref 0–6)
HCT VFR BLD CALC: 33.4 % — LOW (ref 39–50)
HCT VFR BLD CALC: 33.4 % — LOW (ref 39–50)
HGB BLD-MCNC: 11.6 G/DL — LOW (ref 13–17)
HGB BLD-MCNC: 11.6 G/DL — LOW (ref 13–17)
IMM GRANULOCYTES NFR BLD AUTO: 0.3 % — SIGNIFICANT CHANGE UP (ref 0–0.9)
IMM GRANULOCYTES NFR BLD AUTO: 0.3 % — SIGNIFICANT CHANGE UP (ref 0–0.9)
LYMPHOCYTES # BLD AUTO: 0.78 K/UL — LOW (ref 1–3.3)
LYMPHOCYTES # BLD AUTO: 0.78 K/UL — LOW (ref 1–3.3)
LYMPHOCYTES # BLD AUTO: 27 % — SIGNIFICANT CHANGE UP (ref 13–44)
LYMPHOCYTES # BLD AUTO: 27 % — SIGNIFICANT CHANGE UP (ref 13–44)
MCHC RBC-ENTMCNC: 32.8 PG — SIGNIFICANT CHANGE UP (ref 27–34)
MCHC RBC-ENTMCNC: 32.8 PG — SIGNIFICANT CHANGE UP (ref 27–34)
MCHC RBC-ENTMCNC: 34.7 GM/DL — SIGNIFICANT CHANGE UP (ref 32–36)
MCHC RBC-ENTMCNC: 34.7 GM/DL — SIGNIFICANT CHANGE UP (ref 32–36)
MCV RBC AUTO: 94.4 FL — SIGNIFICANT CHANGE UP (ref 80–100)
MCV RBC AUTO: 94.4 FL — SIGNIFICANT CHANGE UP (ref 80–100)
MONOCYTES # BLD AUTO: 0.35 K/UL — SIGNIFICANT CHANGE UP (ref 0–0.9)
MONOCYTES # BLD AUTO: 0.35 K/UL — SIGNIFICANT CHANGE UP (ref 0–0.9)
MONOCYTES NFR BLD AUTO: 12.1 % — SIGNIFICANT CHANGE UP (ref 2–14)
MONOCYTES NFR BLD AUTO: 12.1 % — SIGNIFICANT CHANGE UP (ref 2–14)
NEUTROPHILS # BLD AUTO: 1.65 K/UL — LOW (ref 1.8–7.4)
NEUTROPHILS # BLD AUTO: 1.65 K/UL — LOW (ref 1.8–7.4)
NEUTROPHILS NFR BLD AUTO: 57.1 % — SIGNIFICANT CHANGE UP (ref 43–77)
NEUTROPHILS NFR BLD AUTO: 57.1 % — SIGNIFICANT CHANGE UP (ref 43–77)
NRBC # BLD: 0 /100 WBCS — SIGNIFICANT CHANGE UP (ref 0–0)
NRBC # BLD: 0 /100 WBCS — SIGNIFICANT CHANGE UP (ref 0–0)
PLATELET # BLD AUTO: 121 K/UL — LOW (ref 150–400)
PLATELET # BLD AUTO: 121 K/UL — LOW (ref 150–400)
RBC # BLD: 3.54 M/UL — LOW (ref 4.2–5.8)
RBC # BLD: 3.54 M/UL — LOW (ref 4.2–5.8)
RBC # FLD: 13.7 % — SIGNIFICANT CHANGE UP (ref 10.3–14.5)
RBC # FLD: 13.7 % — SIGNIFICANT CHANGE UP (ref 10.3–14.5)
WBC # BLD: 2.89 K/UL — LOW (ref 3.8–10.5)
WBC # BLD: 2.89 K/UL — LOW (ref 3.8–10.5)
WBC # FLD AUTO: 2.89 K/UL — LOW (ref 3.8–10.5)
WBC # FLD AUTO: 2.89 K/UL — LOW (ref 3.8–10.5)

## 2024-01-11 LAB
ALBUMIN SERPL ELPH-MCNC: 4 G/DL — SIGNIFICANT CHANGE UP (ref 3.3–5)
ALBUMIN SERPL ELPH-MCNC: 4 G/DL — SIGNIFICANT CHANGE UP (ref 3.3–5)
ALP SERPL-CCNC: 130 U/L — HIGH (ref 40–120)
ALP SERPL-CCNC: 130 U/L — HIGH (ref 40–120)
ALT FLD-CCNC: 22 U/L — SIGNIFICANT CHANGE UP (ref 10–45)
ALT FLD-CCNC: 22 U/L — SIGNIFICANT CHANGE UP (ref 10–45)
ANION GAP SERPL CALC-SCNC: 13 MMOL/L — SIGNIFICANT CHANGE UP (ref 5–17)
ANION GAP SERPL CALC-SCNC: 13 MMOL/L — SIGNIFICANT CHANGE UP (ref 5–17)
AST SERPL-CCNC: 24 U/L — SIGNIFICANT CHANGE UP (ref 10–40)
AST SERPL-CCNC: 24 U/L — SIGNIFICANT CHANGE UP (ref 10–40)
BILIRUB SERPL-MCNC: 0.6 MG/DL — SIGNIFICANT CHANGE UP (ref 0.2–1.2)
BILIRUB SERPL-MCNC: 0.6 MG/DL — SIGNIFICANT CHANGE UP (ref 0.2–1.2)
BUN SERPL-MCNC: 10 MG/DL — SIGNIFICANT CHANGE UP (ref 7–23)
BUN SERPL-MCNC: 10 MG/DL — SIGNIFICANT CHANGE UP (ref 7–23)
CALCIUM SERPL-MCNC: 8.9 MG/DL — SIGNIFICANT CHANGE UP (ref 8.4–10.5)
CALCIUM SERPL-MCNC: 8.9 MG/DL — SIGNIFICANT CHANGE UP (ref 8.4–10.5)
CHLORIDE SERPL-SCNC: 100 MMOL/L — SIGNIFICANT CHANGE UP (ref 96–108)
CHLORIDE SERPL-SCNC: 100 MMOL/L — SIGNIFICANT CHANGE UP (ref 96–108)
CO2 SERPL-SCNC: 29 MMOL/L — SIGNIFICANT CHANGE UP (ref 22–31)
CO2 SERPL-SCNC: 29 MMOL/L — SIGNIFICANT CHANGE UP (ref 22–31)
CREAT SERPL-MCNC: 0.57 MG/DL — SIGNIFICANT CHANGE UP (ref 0.5–1.3)
CREAT SERPL-MCNC: 0.57 MG/DL — SIGNIFICANT CHANGE UP (ref 0.5–1.3)
EGFR: 109 ML/MIN/1.73M2 — SIGNIFICANT CHANGE UP
EGFR: 109 ML/MIN/1.73M2 — SIGNIFICANT CHANGE UP
GLUCOSE SERPL-MCNC: 93 MG/DL — SIGNIFICANT CHANGE UP (ref 70–99)
GLUCOSE SERPL-MCNC: 93 MG/DL — SIGNIFICANT CHANGE UP (ref 70–99)
MAGNESIUM SERPL-MCNC: 1.7 MG/DL — SIGNIFICANT CHANGE UP (ref 1.6–2.6)
MAGNESIUM SERPL-MCNC: 1.7 MG/DL — SIGNIFICANT CHANGE UP (ref 1.6–2.6)
POTASSIUM SERPL-MCNC: 3.3 MMOL/L — LOW (ref 3.5–5.3)
POTASSIUM SERPL-MCNC: 3.3 MMOL/L — LOW (ref 3.5–5.3)
POTASSIUM SERPL-SCNC: 3.3 MMOL/L — LOW (ref 3.5–5.3)
POTASSIUM SERPL-SCNC: 3.3 MMOL/L — LOW (ref 3.5–5.3)
PROT SERPL-MCNC: 6.6 G/DL — SIGNIFICANT CHANGE UP (ref 6–8.3)
PROT SERPL-MCNC: 6.6 G/DL — SIGNIFICANT CHANGE UP (ref 6–8.3)
SODIUM SERPL-SCNC: 142 MMOL/L — SIGNIFICANT CHANGE UP (ref 135–145)
SODIUM SERPL-SCNC: 142 MMOL/L — SIGNIFICANT CHANGE UP (ref 135–145)

## 2024-01-22 ENCOUNTER — APPOINTMENT (OUTPATIENT)
Dept: HEMATOLOGY ONCOLOGY | Facility: CLINIC | Age: 64
End: 2024-01-22

## 2024-01-24 RX ORDER — GABAPENTIN 300 MG/1
300 CAPSULE ORAL
Qty: 90 | Refills: 2 | Status: ACTIVE | COMMUNITY
Start: 2023-10-17 | End: 1900-01-01

## 2024-01-25 ENCOUNTER — RESULT REVIEW (OUTPATIENT)
Age: 64
End: 2024-01-25

## 2024-01-25 ENCOUNTER — APPOINTMENT (OUTPATIENT)
Dept: INFUSION THERAPY | Facility: CLINIC | Age: 64
End: 2024-01-25

## 2024-01-25 LAB
BASOPHILS # BLD AUTO: 0.02 K/UL — SIGNIFICANT CHANGE UP (ref 0–0.2)
BASOPHILS NFR BLD AUTO: 0.8 % — SIGNIFICANT CHANGE UP (ref 0–2)
EOSINOPHIL # BLD AUTO: 0.07 K/UL — SIGNIFICANT CHANGE UP (ref 0–0.5)
EOSINOPHIL NFR BLD AUTO: 2.6 % — SIGNIFICANT CHANGE UP (ref 0–6)
HCT VFR BLD CALC: 32.8 % — LOW (ref 39–50)
HGB BLD-MCNC: 11.7 G/DL — LOW (ref 13–17)
IMM GRANULOCYTES NFR BLD AUTO: 0.4 % — SIGNIFICANT CHANGE UP (ref 0–0.9)
LYMPHOCYTES # BLD AUTO: 0.55 K/UL — LOW (ref 1–3.3)
LYMPHOCYTES # BLD AUTO: 20.7 % — SIGNIFICANT CHANGE UP (ref 13–44)
MCHC RBC-ENTMCNC: 32.9 PG — SIGNIFICANT CHANGE UP (ref 27–34)
MCHC RBC-ENTMCNC: 35.7 GM/DL — SIGNIFICANT CHANGE UP (ref 32–36)
MCV RBC AUTO: 92.1 FL — SIGNIFICANT CHANGE UP (ref 80–100)
MONOCYTES # BLD AUTO: 0.36 K/UL — SIGNIFICANT CHANGE UP (ref 0–0.9)
MONOCYTES NFR BLD AUTO: 13.5 % — SIGNIFICANT CHANGE UP (ref 2–14)
NEUTROPHILS # BLD AUTO: 1.65 K/UL — LOW (ref 1.8–7.4)
NEUTROPHILS NFR BLD AUTO: 62 % — SIGNIFICANT CHANGE UP (ref 43–77)
NRBC # BLD: 0 /100 WBCS — SIGNIFICANT CHANGE UP (ref 0–0)
PLAT MORPH BLD: NORMAL — SIGNIFICANT CHANGE UP
PLATELET # BLD AUTO: 122 K/UL — LOW (ref 150–400)
RBC # BLD: 3.56 M/UL — LOW (ref 4.2–5.8)
RBC # FLD: 13.2 % — SIGNIFICANT CHANGE UP (ref 10.3–14.5)
RBC BLD AUTO: SIGNIFICANT CHANGE UP
WBC # BLD: 2.66 K/UL — LOW (ref 3.8–10.5)
WBC # FLD AUTO: 2.66 K/UL — LOW (ref 3.8–10.5)

## 2024-01-26 LAB
ALBUMIN SERPL ELPH-MCNC: 3.6 G/DL — SIGNIFICANT CHANGE UP (ref 3.3–5)
ALP SERPL-CCNC: 125 U/L — HIGH (ref 40–120)
ALT FLD-CCNC: 18 U/L — SIGNIFICANT CHANGE UP (ref 10–45)
ANION GAP SERPL CALC-SCNC: 11 MMOL/L — SIGNIFICANT CHANGE UP (ref 5–17)
AST SERPL-CCNC: 30 U/L — SIGNIFICANT CHANGE UP (ref 10–40)
BILIRUB SERPL-MCNC: 0.5 MG/DL — SIGNIFICANT CHANGE UP (ref 0.2–1.2)
BUN SERPL-MCNC: 12 MG/DL — SIGNIFICANT CHANGE UP (ref 7–23)
CALCIUM SERPL-MCNC: 8.8 MG/DL — SIGNIFICANT CHANGE UP (ref 8.4–10.5)
CHLORIDE SERPL-SCNC: 102 MMOL/L — SIGNIFICANT CHANGE UP (ref 96–108)
CO2 SERPL-SCNC: 28 MMOL/L — SIGNIFICANT CHANGE UP (ref 22–31)
CREAT SERPL-MCNC: 0.63 MG/DL — SIGNIFICANT CHANGE UP (ref 0.5–1.3)
EGFR: 106 ML/MIN/1.73M2 — SIGNIFICANT CHANGE UP
GLUCOSE SERPL-MCNC: 113 MG/DL — HIGH (ref 70–99)
MAGNESIUM SERPL-MCNC: 1.7 MG/DL — SIGNIFICANT CHANGE UP (ref 1.6–2.6)
POTASSIUM SERPL-MCNC: 4.5 MMOL/L — SIGNIFICANT CHANGE UP (ref 3.5–5.3)
POTASSIUM SERPL-SCNC: 4.5 MMOL/L — SIGNIFICANT CHANGE UP (ref 3.5–5.3)
PROT SERPL-MCNC: 6.4 G/DL — SIGNIFICANT CHANGE UP (ref 6–8.3)
SODIUM SERPL-SCNC: 141 MMOL/L — SIGNIFICANT CHANGE UP (ref 135–145)
T3FREE SERPL-MCNC: 1.89 PG/ML — LOW (ref 2–4.4)
T4 FREE SERPL-MCNC: 1.3 NG/DL — SIGNIFICANT CHANGE UP (ref 0.9–1.8)
TSH SERPL-MCNC: 1.59 UIU/ML — SIGNIFICANT CHANGE UP (ref 0.27–4.2)

## 2024-02-08 ENCOUNTER — RESULT REVIEW (OUTPATIENT)
Age: 64
End: 2024-02-08

## 2024-02-08 ENCOUNTER — APPOINTMENT (OUTPATIENT)
Dept: INFUSION THERAPY | Facility: CLINIC | Age: 64
End: 2024-02-08

## 2024-02-08 LAB
BASOPHILS # BLD AUTO: 0.01 K/UL — SIGNIFICANT CHANGE UP (ref 0–0.2)
BASOPHILS NFR BLD AUTO: 0.3 % — SIGNIFICANT CHANGE UP (ref 0–2)
EOSINOPHIL # BLD AUTO: 0.08 K/UL — SIGNIFICANT CHANGE UP (ref 0–0.5)
EOSINOPHIL NFR BLD AUTO: 2.5 % — SIGNIFICANT CHANGE UP (ref 0–6)
HCT VFR BLD CALC: 35.9 % — LOW (ref 39–50)
HGB BLD-MCNC: 12.6 G/DL — LOW (ref 13–17)
IMM GRANULOCYTES NFR BLD AUTO: 0.3 % — SIGNIFICANT CHANGE UP (ref 0–0.9)
LYMPHOCYTES # BLD AUTO: 0.78 K/UL — LOW (ref 1–3.3)
LYMPHOCYTES # BLD AUTO: 24.2 % — SIGNIFICANT CHANGE UP (ref 13–44)
MCHC RBC-ENTMCNC: 32.3 PG — SIGNIFICANT CHANGE UP (ref 27–34)
MCHC RBC-ENTMCNC: 35.1 GM/DL — SIGNIFICANT CHANGE UP (ref 32–36)
MCV RBC AUTO: 92.1 FL — SIGNIFICANT CHANGE UP (ref 80–100)
MONOCYTES # BLD AUTO: 0.42 K/UL — SIGNIFICANT CHANGE UP (ref 0–0.9)
MONOCYTES NFR BLD AUTO: 13 % — SIGNIFICANT CHANGE UP (ref 2–14)
NEUTROPHILS # BLD AUTO: 1.92 K/UL — SIGNIFICANT CHANGE UP (ref 1.8–7.4)
NEUTROPHILS NFR BLD AUTO: 59.7 % — SIGNIFICANT CHANGE UP (ref 43–77)
NRBC # BLD: 0 /100 WBCS — SIGNIFICANT CHANGE UP (ref 0–0)
PLATELET # BLD AUTO: 156 K/UL — SIGNIFICANT CHANGE UP (ref 150–400)
RBC # BLD: 3.9 M/UL — LOW (ref 4.2–5.8)
RBC # FLD: 13.3 % — SIGNIFICANT CHANGE UP (ref 10.3–14.5)
WBC # BLD: 3.22 K/UL — LOW (ref 3.8–10.5)
WBC # FLD AUTO: 3.22 K/UL — LOW (ref 3.8–10.5)

## 2024-02-09 ENCOUNTER — APPOINTMENT (OUTPATIENT)
Dept: NUCLEAR MEDICINE | Facility: CLINIC | Age: 64
End: 2024-02-09
Payer: COMMERCIAL

## 2024-02-09 ENCOUNTER — OUTPATIENT (OUTPATIENT)
Dept: OUTPATIENT SERVICES | Facility: HOSPITAL | Age: 64
LOS: 1 days | End: 2024-02-09
Payer: COMMERCIAL

## 2024-02-09 DIAGNOSIS — Z92.21 PERSONAL HISTORY OF ANTINEOPLASTIC CHEMOTHERAPY: Chronic | ICD-10-CM

## 2024-02-09 DIAGNOSIS — Z98.890 OTHER SPECIFIED POSTPROCEDURAL STATES: Chronic | ICD-10-CM

## 2024-02-09 DIAGNOSIS — Z92.3 PERSONAL HISTORY OF IRRADIATION: Chronic | ICD-10-CM

## 2024-02-09 DIAGNOSIS — K22.2 ESOPHAGEAL OBSTRUCTION: Chronic | ICD-10-CM

## 2024-02-09 DIAGNOSIS — Z90.49 ACQUIRED ABSENCE OF OTHER SPECIFIED PARTS OF DIGESTIVE TRACT: Chronic | ICD-10-CM

## 2024-02-09 DIAGNOSIS — C16.0 MALIGNANT NEOPLASM OF CARDIA: ICD-10-CM

## 2024-02-09 LAB
ALBUMIN SERPL ELPH-MCNC: 4.1 G/DL — SIGNIFICANT CHANGE UP (ref 3.3–5)
ALP SERPL-CCNC: 134 U/L — HIGH (ref 40–120)
ALT FLD-CCNC: 25 U/L — SIGNIFICANT CHANGE UP (ref 10–45)
ANION GAP SERPL CALC-SCNC: 14 MMOL/L — SIGNIFICANT CHANGE UP (ref 5–17)
AST SERPL-CCNC: 31 U/L — SIGNIFICANT CHANGE UP (ref 10–40)
BILIRUB SERPL-MCNC: 0.6 MG/DL — SIGNIFICANT CHANGE UP (ref 0.2–1.2)
BUN SERPL-MCNC: 8 MG/DL — SIGNIFICANT CHANGE UP (ref 7–23)
CALCIUM SERPL-MCNC: 9.4 MG/DL — SIGNIFICANT CHANGE UP (ref 8.4–10.5)
CHLORIDE SERPL-SCNC: 99 MMOL/L — SIGNIFICANT CHANGE UP (ref 96–108)
CO2 SERPL-SCNC: 29 MMOL/L — SIGNIFICANT CHANGE UP (ref 22–31)
CREAT SERPL-MCNC: 0.75 MG/DL — SIGNIFICANT CHANGE UP (ref 0.5–1.3)
EGFR: 101 ML/MIN/1.73M2 — SIGNIFICANT CHANGE UP
GLUCOSE SERPL-MCNC: 91 MG/DL — SIGNIFICANT CHANGE UP (ref 70–99)
MAGNESIUM SERPL-MCNC: 1.7 MG/DL — SIGNIFICANT CHANGE UP (ref 1.6–2.6)
POTASSIUM SERPL-MCNC: 4.2 MMOL/L — SIGNIFICANT CHANGE UP (ref 3.5–5.3)
POTASSIUM SERPL-SCNC: 4.2 MMOL/L — SIGNIFICANT CHANGE UP (ref 3.5–5.3)
PROT SERPL-MCNC: 7 G/DL — SIGNIFICANT CHANGE UP (ref 6–8.3)
SODIUM SERPL-SCNC: 142 MMOL/L — SIGNIFICANT CHANGE UP (ref 135–145)

## 2024-02-09 PROCEDURE — 78815 PET IMAGE W/CT SKULL-THIGH: CPT | Mod: 26,PS

## 2024-02-09 PROCEDURE — 78815 PET IMAGE W/CT SKULL-THIGH: CPT

## 2024-02-09 PROCEDURE — A9552: CPT

## 2024-02-14 ENCOUNTER — APPOINTMENT (OUTPATIENT)
Dept: HEMATOLOGY ONCOLOGY | Facility: CLINIC | Age: 64
End: 2024-02-14
Payer: COMMERCIAL

## 2024-02-14 ENCOUNTER — NON-APPOINTMENT (OUTPATIENT)
Age: 64
End: 2024-02-14

## 2024-02-14 VITALS
HEART RATE: 100 BPM | DIASTOLIC BLOOD PRESSURE: 67 MMHG | TEMPERATURE: 98.6 F | BODY MASS INDEX: 16.32 KG/M2 | OXYGEN SATURATION: 98 % | RESPIRATION RATE: 16 BRPM | WEIGHT: 104 LBS | SYSTOLIC BLOOD PRESSURE: 89 MMHG | HEIGHT: 67 IN

## 2024-02-14 DIAGNOSIS — I95.0 IDIOPATHIC HYPOTENSION: ICD-10-CM

## 2024-02-14 DIAGNOSIS — I95.9 HYPOTENSION, UNSPECIFIED: ICD-10-CM

## 2024-02-14 PROCEDURE — 99215 OFFICE O/P EST HI 40 MIN: CPT

## 2024-02-14 RX ORDER — ENOXAPARIN SODIUM 40 MG/.4ML
40 INJECTION SUBCUTANEOUS
Refills: 0 | Status: DISCONTINUED | COMMUNITY
Start: 2023-09-06 | End: 2024-02-14

## 2024-02-14 NOTE — REVIEW OF SYSTEMS
[Chest Pain] : no chest pain [Shortness Of Breath] : no shortness of breath [Abdominal Pain] : no abdominal pain [Constipation] : no constipation [FreeTextEntry2] : wt loss due to infection  [FreeTextEntry7] : nausea  [de-identified] : itchy flank

## 2024-02-14 NOTE — RESULTS/DATA
[FreeTextEntry1] : 2/9/24 PET: 1. New FDG avid right hepatic hypodensity highly suspicious for metastasis. 2. Right upper lobe nodule anteriorly increased in size with minimal activity suspicious for malignant process. Other new nonavid subcentimeter right upper lobe and right lower lobe nodules may represent neoplastic or inflammatory process. 3. Gastric pull through with no abnormal changes in the wall caliber and showing smaller area and less intense patches of activity compared to prior PET/CT; no distinct suspicious area. No hypermetabolic paraesophageal or mesenteric nodes.   10/9/23 CT Chest: Stable scattered smaller than 5 mm nodules including a left lower lobe 4 mm nodule.  8/15/23 Path: Esophagogastrectomy Invasive adenocarcinoma, moderately differentiated with squamous and neuroendocrine differentiation, status post treatment (score 2). Metastatic carcinoma present in 4 out of 14 lymph nodes (4/14). Note: The tumor shows neuroendocrine differentiation and squamous differentiation. Immunostains were performed on block 1P and 1AG. CAM5.2, synaptophysin and chromogranin stains tumor cells and shows neuroendocrine differentiation; p40 highlights tumor cells and show squamous differentiation. Final esophageal margin, resection: Segment of esophagus, negative for carcinoma. Two lymph nodes, negative for carcinoma (0/2). Synoptic Summary Esophagus Procedure: Esophagogastrectomy Tumor Site: Distal esophagus (low thoracic esophagus) Relationship of Tumor to Esophagogastric Junction: Tumor midpoint lies in the distal esophagus AND tumor involves the esophagogastric junction Distance of Tumor Center from Esophagogastric Junction: 3.2 cm Histologic Type: Adenocarcinoma Histologic Type Comment: The tumor has squamous and neuroendocrine differentiation Histologic Grade: Moderately (G2) - poorly (G3) differentiated Tumor Size: 6.2 Centimeters (cm) Tumor Extent: Invades adventitia Treatment Effect: Present, with residual cancer showing evident tumor regression, but more than single cells or rare small groups of cancer cells (partial response, score 2). Lymphovascular Invasion: Present Perineural Invasion: Present Margin Status for Invasive Carcinoma: Invasive carcinoma present at margin Margin(s) Involved by Invasive Carcinoma: Distal Margin Status for Dysplasia and Intestinal Metaplasia: Not applicable Regional Lymph Node Status: Tumor present in regional lymph node(s) Number of Lymph Nodes with Tumor: 4 Number of Lymph Nodes Examined: 16 Pathologic Stage Classification  TNM Descriptors: y (post-treatment) pT Category: pT3 pN Category: pN2  6/12/23 PET: Activity extending from the distal esophagus to the gastric cardia appears grossly unchanged in intensity. However, this now appears to extend further proximally than on the prior PET/CT scan, with an area of suspected narrowing in the subcarinal esophageal region. The degree of uptake otherwise appears unchanged in intensity. Previously seen hypermetabolic nodes in the UPPER abdomen have nearly resolved.  3/10/23 MRI: No liver metastases. Small scattered hepatic cysts. Metastatic lymphadenopathy upper abdomen with central necrosis.  3/8/23 PET: Foci of increased activity at the gastroesophageal junction and within the gastric cardia related to known site of malignancy. Hypermetabolic upper mesenteric nodes including portacaval/periceliac sandra conglomerate with necrosis compatible with metastases. Sites of omental nodularity not visualized on low-dose CT and without abnormal FDG activity at the sites. Nonavid subcentimeter lung nodules below PET detection; these will be reassessed on follow-up.  2/27/23 CT C/A/P: Nonspecific bilateral noncalcified pulmonary nodules measuring up to 4 mm Lower third esophageal masslike wall thickening extends to the GE junction and gastric cardia compatible with tumor. Masslike wall thickening extends upstream of the proximal end of the indwelling esophageal Wallstent. Lower esophageal, gastrohepatic and periceliac lymphadenopathy, measures 4.5 x 3.2 cm . Minimal nonspecific nodularity involving the omentum suspicious however inconclusive for carcinomatosis. Probable left hepatic lobe cysts however consider liver MRI if additional imaging is warranted.   2/15/23 Path: Esophagus, mass, 40 cm (biopsy): Adenocarcinoma, invasive, poorly differentiated with siddhartha of signet ring cell morphology. Alcian blue stain is noncontributory. No loss of nuclear expression of MMR proteins (MLH1, MSH2, MSH6, and PMS2). HER-2: Negative (0/1+ membrane staining).  2/15/23 Endoscopy: At least a T3N1Mx lesion on this exam. Biopsied. Almost completely obstructing esophageal/ GE junction tumor, Siewert Type II.  Stented with an 18 x 103 mm Walflex stent.   2/6/23 Xray Esophagram: 5 cm distal esophageal stricture suspect for neoplasm.

## 2024-02-14 NOTE — HISTORY OF PRESENT ILLNESS
[de-identified] : The patient was diagnosed with esophageal adenocarcinoma in February 2023 at the age of 62. He presented to his PCP with complaints of dysphagia, intermittently since 2021, however, since January 2023 it has become severe to the point where he can no longer eat solid foods. On 2/6/23, he had an xray esophagram which showed a 5 cm distal esophageal stricture suspect for neoplasm. On 2/15/23, he underwent an endoscopy which showed at least a T3N1Mx lesion.  Almost completely obstructing esophageal/ GE junction tumor, Siewert Type II.  Stented with an 18 x 103 mm Walflex stent. 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  No loss of nuclear expression of MMR proteins (MLH1, MSH2, MSH6, and PMS2). HER-2: Negative (0/1+ membrane staining). A CT C/A/P on 2/27/23 showed nonspecific bilateral noncalcified pulmonary nodules measuring up to 4 mm Lower third esophageal masslike wall thickening extends to the GE junction and gastric cardia compatible with tumor. Masslike wall thickening extends upstream of the proximal end of the indwelling esophageal Wallstent. Lower esophageal, gastrohepatic and periceliac lymphadenopathy, measures 4.5 x 3.2 cm . Minimal nonspecific nodularity involving the omentum suspicious however inconclusive for carcinomatosis. Probable left hepatic lobe cysts however consider liver MRI if additional imaging is warranted.  [de-identified] : Medical Hx: HTN; GERD; kidney stones \par  Surgical Hx: cholecystectomy; lithotripsy Left kidney  \par  Family Hx: father prostrate ca; paternal uncle lymphoma \par  Social Hx: never smoker; ETOH never; , live together; retired [place] ; family close by [de-identified] : He completed 3 cycles of induction for carbo / taxol on 3/30/23. He completed 6 cycles of carbo / Taxol with RT 5/15/23.  He began Nivolumab q 2 weeks for 8 cycles (started 10/23/23) then monthly for 1 year. Today he is cycle 2 due to a hold, he had shingles.  He is feeling better, still with pain and itching on left flank. Denies diarrhea or rash.

## 2024-02-22 ENCOUNTER — OUTPATIENT (OUTPATIENT)
Dept: OUTPATIENT SERVICES | Facility: HOSPITAL | Age: 64
LOS: 1 days | Discharge: HOME | End: 2024-02-22

## 2024-02-22 DIAGNOSIS — Z98.890 OTHER SPECIFIED POSTPROCEDURAL STATES: Chronic | ICD-10-CM

## 2024-02-22 DIAGNOSIS — K22.2 ESOPHAGEAL OBSTRUCTION: Chronic | ICD-10-CM

## 2024-02-22 DIAGNOSIS — Z92.21 PERSONAL HISTORY OF ANTINEOPLASTIC CHEMOTHERAPY: Chronic | ICD-10-CM

## 2024-02-22 DIAGNOSIS — C15.9 MALIGNANT NEOPLASM OF ESOPHAGUS, UNSPECIFIED: ICD-10-CM

## 2024-02-22 DIAGNOSIS — Z92.3 PERSONAL HISTORY OF IRRADIATION: Chronic | ICD-10-CM

## 2024-02-22 DIAGNOSIS — Z90.49 ACQUIRED ABSENCE OF OTHER SPECIFIED PARTS OF DIGESTIVE TRACT: Chronic | ICD-10-CM

## 2024-02-27 ENCOUNTER — APPOINTMENT (OUTPATIENT)
Dept: HEMATOLOGY ONCOLOGY | Facility: CLINIC | Age: 64
End: 2024-02-27
Payer: COMMERCIAL

## 2024-02-27 ENCOUNTER — RESULT REVIEW (OUTPATIENT)
Age: 64
End: 2024-02-27

## 2024-02-27 ENCOUNTER — APPOINTMENT (OUTPATIENT)
Dept: INFUSION THERAPY | Facility: CLINIC | Age: 64
End: 2024-02-27

## 2024-02-27 VITALS
OXYGEN SATURATION: 98 % | BODY MASS INDEX: 15.85 KG/M2 | TEMPERATURE: 98.4 F | SYSTOLIC BLOOD PRESSURE: 105 MMHG | HEIGHT: 67 IN | DIASTOLIC BLOOD PRESSURE: 64 MMHG | HEART RATE: 100 BPM | WEIGHT: 101 LBS | RESPIRATION RATE: 16 BRPM

## 2024-02-27 DIAGNOSIS — B02.9 ZOSTER W/OUT COMPLICATIONS: ICD-10-CM

## 2024-02-27 LAB
BASOPHILS # BLD AUTO: 0.03 K/UL — SIGNIFICANT CHANGE UP (ref 0–0.2)
BASOPHILS NFR BLD AUTO: 1 % — SIGNIFICANT CHANGE UP (ref 0–2)
EOSINOPHIL # BLD AUTO: 0.04 K/UL — SIGNIFICANT CHANGE UP (ref 0–0.5)
EOSINOPHIL NFR BLD AUTO: 1.3 % — SIGNIFICANT CHANGE UP (ref 0–6)
HCT VFR BLD CALC: 33.8 % — LOW (ref 39–50)
HGB BLD-MCNC: 12.4 G/DL — LOW (ref 13–17)
IMM GRANULOCYTES NFR BLD AUTO: 0.3 % — SIGNIFICANT CHANGE UP (ref 0–0.9)
LYMPHOCYTES # BLD AUTO: 0.73 K/UL — LOW (ref 1–3.3)
LYMPHOCYTES # BLD AUTO: 23.6 % — SIGNIFICANT CHANGE UP (ref 13–44)
MCHC RBC-ENTMCNC: 32.8 PG — SIGNIFICANT CHANGE UP (ref 27–34)
MCHC RBC-ENTMCNC: 36.7 GM/DL — HIGH (ref 32–36)
MCV RBC AUTO: 89.4 FL — SIGNIFICANT CHANGE UP (ref 80–100)
MONOCYTES # BLD AUTO: 0.26 K/UL — SIGNIFICANT CHANGE UP (ref 0–0.9)
MONOCYTES NFR BLD AUTO: 8.4 % — SIGNIFICANT CHANGE UP (ref 2–14)
NEUTROPHILS # BLD AUTO: 2.02 K/UL — SIGNIFICANT CHANGE UP (ref 1.8–7.4)
NEUTROPHILS NFR BLD AUTO: 65.4 % — SIGNIFICANT CHANGE UP (ref 43–77)
NRBC # BLD: 0 /100 WBCS — SIGNIFICANT CHANGE UP (ref 0–0)
PLATELET # BLD AUTO: 164 K/UL — SIGNIFICANT CHANGE UP (ref 150–400)
RBC # BLD: 3.78 M/UL — LOW (ref 4.2–5.8)
RBC # FLD: 13.4 % — SIGNIFICANT CHANGE UP (ref 10.3–14.5)
WBC # BLD: 3.09 K/UL — LOW (ref 3.8–10.5)
WBC # FLD AUTO: 3.09 K/UL — LOW (ref 3.8–10.5)

## 2024-02-27 PROCEDURE — 99214 OFFICE O/P EST MOD 30 MIN: CPT

## 2024-02-28 ENCOUNTER — NON-APPOINTMENT (OUTPATIENT)
Age: 64
End: 2024-02-28

## 2024-02-28 DIAGNOSIS — Z51.11 ENCOUNTER FOR ANTINEOPLASTIC CHEMOTHERAPY: ICD-10-CM

## 2024-02-28 DIAGNOSIS — E86.0 DEHYDRATION: ICD-10-CM

## 2024-02-28 DIAGNOSIS — B02.9 ZOSTER WITHOUT COMPLICATIONS: ICD-10-CM

## 2024-02-28 LAB
ALBUMIN SERPL ELPH-MCNC: 3.9 G/DL — SIGNIFICANT CHANGE UP (ref 3.3–5)
ALP SERPL-CCNC: 100 U/L — SIGNIFICANT CHANGE UP (ref 40–120)
ALT FLD-CCNC: 28 U/L — SIGNIFICANT CHANGE UP (ref 10–45)
ANION GAP SERPL CALC-SCNC: 11 MMOL/L — SIGNIFICANT CHANGE UP (ref 5–17)
AST SERPL-CCNC: 38 U/L — SIGNIFICANT CHANGE UP (ref 10–40)
BILIRUB SERPL-MCNC: 0.5 MG/DL — SIGNIFICANT CHANGE UP (ref 0.2–1.2)
BUN SERPL-MCNC: 9 MG/DL — SIGNIFICANT CHANGE UP (ref 7–23)
CALCIUM SERPL-MCNC: 8.9 MG/DL — SIGNIFICANT CHANGE UP (ref 8.4–10.5)
CHLORIDE SERPL-SCNC: 100 MMOL/L — SIGNIFICANT CHANGE UP (ref 96–108)
CO2 SERPL-SCNC: 30 MMOL/L — SIGNIFICANT CHANGE UP (ref 22–31)
CREAT SERPL-MCNC: 0.75 MG/DL — SIGNIFICANT CHANGE UP (ref 0.5–1.3)
EGFR: 101 ML/MIN/1.73M2 — SIGNIFICANT CHANGE UP
GLUCOSE SERPL-MCNC: 131 MG/DL — HIGH (ref 70–99)
MAGNESIUM SERPL-MCNC: 1.5 MG/DL — LOW (ref 1.6–2.6)
POTASSIUM SERPL-MCNC: 3.4 MMOL/L — LOW (ref 3.5–5.3)
POTASSIUM SERPL-SCNC: 3.4 MMOL/L — LOW (ref 3.5–5.3)
PROT SERPL-MCNC: 6.7 G/DL — SIGNIFICANT CHANGE UP (ref 6–8.3)
SODIUM SERPL-SCNC: 141 MMOL/L — SIGNIFICANT CHANGE UP (ref 135–145)

## 2024-02-28 NOTE — REVIEW OF SYSTEMS
[Fatigue] : fatigue [Recent Change In Weight] : ~T recent weight change [Diarrhea: Grade 0] : Diarrhea: Grade 0 [Negative] : Heme/Lymph [FreeTextEntry7] : nausea  [de-identified] : itchy flank  [Chest Pain] : no chest pain [Shortness Of Breath] : no shortness of breath [Abdominal Pain] : no abdominal pain [Vomiting] : no vomiting [Constipation] : no constipation [FreeTextEntry2] : wt loss

## 2024-02-28 NOTE — PHYSICAL EXAM
[Restricted in physically strenuous activity but ambulatory and able to carry out work of a light or sedentary nature] : Status 1- Restricted in physically strenuous activity but ambulatory and able to carry out work of a light or sedentary nature, e.g., light house work, office work [Normal] : affect appropriate [de-identified] : well healed shingles [de-identified] : wt loss noted today

## 2024-02-28 NOTE — HISTORY OF PRESENT ILLNESS
[T: ___] : T[unfilled] [N: ___] : N[unfilled] [M: ___] : M[unfilled] [AJCC Stage: ____] : AJCC Stage: [unfilled] [de-identified] : The patient was diagnosed with esophageal adenocarcinoma in February 2023 at the age of 62. He presented to his PCP with complaints of dysphagia, intermittently since 2021, however, since January 2023 it has become severe to the point where he can no longer eat solid foods. On 2/6/23, he had an xray esophagram which showed a 5 cm distal esophageal stricture suspect for neoplasm. On 2/15/23, he underwent an endoscopy which showed at least a T3N1Mx lesion.  Almost completely obstructing esophageal/ GE junction tumor, Siewert Type II.  Stented with an 18 x 103 mm Walflex stent. 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  No loss of nuclear expression of MMR proteins (MLH1, MSH2, MSH6, and PMS2). HER-2: Negative (0/1+ membrane staining). A CT C/A/P on 2/27/23 showed nonspecific bilateral noncalcified pulmonary nodules measuring up to 4 mm Lower third esophageal masslike wall thickening extends to the GE junction and gastric cardia compatible with tumor. Masslike wall thickening extends upstream of the proximal end of the indwelling esophageal Wallstent. Lower esophageal, gastrohepatic and periceliac lymphadenopathy, measures 4.5 x 3.2 cm . Minimal nonspecific nodularity involving the omentum suspicious however inconclusive for carcinomatosis. Probable left hepatic lobe cysts however consider liver MRI if additional imaging is warranted.  [de-identified] : Medical Hx: HTN; GERD; kidney stones \par  Surgical Hx: cholecystectomy; lithotripsy Left kidney  \par  Family Hx: father prostrate ca; paternal uncle lymphoma \par  Social Hx: never smoker; ETOH never; , live together; retired [place] ; family close by [de-identified] : He completed 3 cycles of induction for carbo / taxol on 3/30/23. He completed 6 cycles of carbo / Taxol with RT 5/15/23.  He began Nivolumab q 2 weeks for 8 cycles (started 10/23/23) then monthly for 1 year. Today he is cycle 8. Multiple cycles held due to shingles.  He will return in 2 weeks for higher dose and will be monthly moving forward. He also has a liver biopsy 3/1/24.  His shingle pain is 50% better, remains on neurontin. Reports he is sleeping better, no longer feels discomfort when he rolls over. Denies diarrhea or rash.  His wt remains between 100 and 107 lbs, states he is eating well, following a new diet with less carbs and healthier foods. Will reach out to  again.

## 2024-03-01 ENCOUNTER — RESULT REVIEW (OUTPATIENT)
Age: 64
End: 2024-03-01

## 2024-03-01 ENCOUNTER — OUTPATIENT (OUTPATIENT)
Dept: INPATIENT UNIT | Facility: HOSPITAL | Age: 64
LOS: 1 days | Discharge: HOME | End: 2024-03-01
Payer: COMMERCIAL

## 2024-03-01 ENCOUNTER — TRANSCRIPTION ENCOUNTER (OUTPATIENT)
Age: 64
End: 2024-03-01

## 2024-03-01 VITALS
OXYGEN SATURATION: 97 % | SYSTOLIC BLOOD PRESSURE: 124 MMHG | HEART RATE: 83 BPM | TEMPERATURE: 98 F | RESPIRATION RATE: 18 BRPM | DIASTOLIC BLOOD PRESSURE: 65 MMHG

## 2024-03-01 VITALS
OXYGEN SATURATION: 100 % | DIASTOLIC BLOOD PRESSURE: 77 MMHG | TEMPERATURE: 97 F | WEIGHT: 110.01 LBS | HEART RATE: 80 BPM | HEIGHT: 67 IN | SYSTOLIC BLOOD PRESSURE: 117 MMHG | RESPIRATION RATE: 15 BRPM

## 2024-03-01 DIAGNOSIS — R16.0 HEPATOMEGALY, NOT ELSEWHERE CLASSIFIED: ICD-10-CM

## 2024-03-01 DIAGNOSIS — Z92.3 PERSONAL HISTORY OF IRRADIATION: Chronic | ICD-10-CM

## 2024-03-01 DIAGNOSIS — Z98.890 OTHER SPECIFIED POSTPROCEDURAL STATES: Chronic | ICD-10-CM

## 2024-03-01 DIAGNOSIS — Z90.49 ACQUIRED ABSENCE OF OTHER SPECIFIED PARTS OF DIGESTIVE TRACT: Chronic | ICD-10-CM

## 2024-03-01 DIAGNOSIS — Z92.21 PERSONAL HISTORY OF ANTINEOPLASTIC CHEMOTHERAPY: Chronic | ICD-10-CM

## 2024-03-01 DIAGNOSIS — K22.2 ESOPHAGEAL OBSTRUCTION: Chronic | ICD-10-CM

## 2024-03-01 DIAGNOSIS — C15.9 MALIGNANT NEOPLASM OF ESOPHAGUS, UNSPECIFIED: ICD-10-CM

## 2024-03-01 LAB
ANION GAP SERPL CALC-SCNC: 5 MMOL/L — SIGNIFICANT CHANGE UP (ref 5–17)
BUN SERPL-MCNC: 8 MG/DL — SIGNIFICANT CHANGE UP (ref 7–23)
CALCIUM SERPL-MCNC: 9.2 MG/DL — SIGNIFICANT CHANGE UP (ref 8.5–10.1)
CHLORIDE SERPL-SCNC: 104 MMOL/L — SIGNIFICANT CHANGE UP (ref 96–108)
CO2 SERPL-SCNC: 33 MMOL/L — HIGH (ref 22–31)
CREAT SERPL-MCNC: 0.76 MG/DL — SIGNIFICANT CHANGE UP (ref 0.5–1.3)
EGFR: 100 ML/MIN/1.73M2 — SIGNIFICANT CHANGE UP
GLUCOSE SERPL-MCNC: 92 MG/DL — SIGNIFICANT CHANGE UP (ref 70–99)
INR BLD: 0.98 RATIO — SIGNIFICANT CHANGE UP (ref 0.85–1.18)
POTASSIUM SERPL-MCNC: 2.8 MMOL/L — CRITICAL LOW (ref 3.5–5.3)
POTASSIUM SERPL-SCNC: 2.8 MMOL/L — CRITICAL LOW (ref 3.5–5.3)
PROTHROM AB SERPL-ACNC: 11.1 SEC — SIGNIFICANT CHANGE UP (ref 9.5–13)
SODIUM SERPL-SCNC: 142 MMOL/L — SIGNIFICANT CHANGE UP (ref 135–145)

## 2024-03-01 PROCEDURE — 88307 TISSUE EXAM BY PATHOLOGIST: CPT | Mod: 26

## 2024-03-01 PROCEDURE — 76942 ECHO GUIDE FOR BIOPSY: CPT | Mod: 26

## 2024-03-01 PROCEDURE — 88342 IMHCHEM/IMCYTCHM 1ST ANTB: CPT | Mod: 26

## 2024-03-01 PROCEDURE — 88342 IMHCHEM/IMCYTCHM 1ST ANTB: CPT

## 2024-03-01 PROCEDURE — 36415 COLL VENOUS BLD VENIPUNCTURE: CPT

## 2024-03-01 PROCEDURE — 76942 ECHO GUIDE FOR BIOPSY: CPT

## 2024-03-01 PROCEDURE — 88172 CYTP DX EVAL FNA 1ST EA SITE: CPT

## 2024-03-01 PROCEDURE — 88341 IMHCHEM/IMCYTCHM EA ADD ANTB: CPT

## 2024-03-01 PROCEDURE — 47000 NEEDLE BIOPSY OF LIVER PERQ: CPT

## 2024-03-01 PROCEDURE — 93010 ELECTROCARDIOGRAM REPORT: CPT

## 2024-03-01 PROCEDURE — 88341 IMHCHEM/IMCYTCHM EA ADD ANTB: CPT | Mod: 26

## 2024-03-01 PROCEDURE — 85610 PROTHROMBIN TIME: CPT

## 2024-03-01 PROCEDURE — 80048 BASIC METABOLIC PNL TOTAL CA: CPT

## 2024-03-01 PROCEDURE — 93005 ELECTROCARDIOGRAM TRACING: CPT

## 2024-03-01 PROCEDURE — C1889: CPT

## 2024-03-01 PROCEDURE — 88307 TISSUE EXAM BY PATHOLOGIST: CPT

## 2024-03-01 RX ORDER — POTASSIUM CHLORIDE 20 MEQ
10 PACKET (EA) ORAL
Refills: 0 | Status: COMPLETED | OUTPATIENT
Start: 2024-03-01 | End: 2024-03-01

## 2024-03-01 RX ORDER — POTASSIUM CHLORIDE 20 MEQ
20 PACKET (EA) ORAL ONCE
Refills: 0 | Status: DISCONTINUED | OUTPATIENT
Start: 2024-03-01 | End: 2024-03-01

## 2024-03-01 RX ORDER — POTASSIUM CHLORIDE 20 MEQ
10 PACKET (EA) ORAL
Refills: 0 | Status: DISCONTINUED | OUTPATIENT
Start: 2024-03-01 | End: 2024-03-01

## 2024-03-01 RX ORDER — POTASSIUM CHLORIDE 20 MEQ
10 PACKET (EA) ORAL ONCE
Refills: 0 | Status: DISCONTINUED | OUTPATIENT
Start: 2024-03-01 | End: 2024-03-01

## 2024-03-01 RX ORDER — ACETAMINOPHEN 500 MG
750 TABLET ORAL ONCE
Refills: 0 | Status: DISCONTINUED | OUTPATIENT
Start: 2024-03-01 | End: 2024-03-01

## 2024-03-01 RX ORDER — FENTANYL CITRATE 50 UG/ML
25 INJECTION INTRAVENOUS
Refills: 0 | Status: DISCONTINUED | OUTPATIENT
Start: 2024-03-01 | End: 2024-03-01

## 2024-03-01 RX ORDER — SODIUM CHLORIDE 9 MG/ML
1000 INJECTION, SOLUTION INTRAVENOUS
Refills: 0 | Status: DISCONTINUED | OUTPATIENT
Start: 2024-03-01 | End: 2024-03-01

## 2024-03-01 RX ADMIN — Medication 100 MILLIEQUIVALENT(S): at 10:49

## 2024-03-01 RX ADMIN — Medication 100 MILLIEQUIVALENT(S): at 13:00

## 2024-03-01 RX ADMIN — Medication 100 MILLIEQUIVALENT(S): at 11:43

## 2024-03-01 RX ADMIN — Medication 100 MILLIEQUIVALENT(S): at 13:55

## 2024-03-01 NOTE — ASU PREOP CHECKLIST - BP NONINVASIVE SYSTOLIC (MM HG)
[de-identified] : \par NINA HERNANDEZ is a 28 year woman with a history of cerumen impaction. She feels clogged and her hearing is down. She is not having tinnitus. She still has left cervical and tMJ spam.\par  117

## 2024-03-01 NOTE — ASU PATIENT PROFILE, ADULT - TEACHING/LEARNING FACTORS INFLUENCE READINESS TO LEARN
I called the patient and told her that I think she does indeed need to have a fine-needle aspiration biopsy done of the thyroid area that may be a nodule on the left given it is unusual and with her history of where she grew up I am concerned that there could be a thyroid cancer and there    I have ordered the biopsy under ultrasound guidance and have asked her to call back to find out how we can get that ordered to her none

## 2024-03-01 NOTE — ASU PATIENT PROFILE, ADULT - PREOP PAIN SCORE
"Gestational Diabetes Follow-up    Subjective/Objective:    Yanet Reyes Avila sent in blood glucose log for review. Last date of communication was: 10/3/19.    Gestational diabetes is being managed with medications    Taking diabetes medications:   yes:     Diabetes Medication(s)     Biguanides       metFORMIN (GLUCOPHAGE) 500 MG tablet    Take 500 mg by mouth 2 times daily (with meals)    Insulin       insulin isophane human (HUMULIN N PEN) 100 UNIT/ML injection    Inject 42 units each night before bed.          Estimated Date of Delivery: Feb 25, 2020    BG/Food Log:         Assessment:    Ketones: not noted.   Fasting blood glucoses: 40% in target.  After breakfast: 100% in target.  After lunch: 100% in target.  After dinner: 100% in target.    Plan/Response:  Recommend increase to insulin - 0-0-0-42 --> 0-0-0-46.  Follow-up in 1 week 10/16/2019    Left message via : \"Roseanne Grider this is annabelle calling with [employer], thank you for sending us an update. We would like you to increase your medication dose to 46. Please send us an update in 1 week.\"    Annabelle Selby, RD, LD, CDE    Any diabetes medication dose changes were made via the CDE Protocol and Collaborative Practice Agreement with the patient's referring provider. A copy of this encounter was shared with the provider.      "
0

## 2024-03-06 LAB — SURGICAL PATHOLOGY STUDY: SIGNIFICANT CHANGE UP

## 2024-03-10 ENCOUNTER — NON-APPOINTMENT (OUTPATIENT)
Age: 64
End: 2024-03-10

## 2024-03-11 ENCOUNTER — NON-APPOINTMENT (OUTPATIENT)
Age: 64
End: 2024-03-11

## 2024-03-11 ENCOUNTER — APPOINTMENT (OUTPATIENT)
Dept: HEMATOLOGY ONCOLOGY | Facility: CLINIC | Age: 64
End: 2024-03-11
Payer: COMMERCIAL

## 2024-03-11 VITALS
DIASTOLIC BLOOD PRESSURE: 84 MMHG | WEIGHT: 99.5 LBS | RESPIRATION RATE: 16 BRPM | SYSTOLIC BLOOD PRESSURE: 124 MMHG | OXYGEN SATURATION: 98 % | HEART RATE: 111 BPM | HEIGHT: 67 IN | BODY MASS INDEX: 15.62 KG/M2 | TEMPERATURE: 97.4 F

## 2024-03-11 PROCEDURE — 93010 ELECTROCARDIOGRAM REPORT: CPT

## 2024-03-11 PROCEDURE — G2211 COMPLEX E/M VISIT ADD ON: CPT

## 2024-03-11 PROCEDURE — 99215 OFFICE O/P EST HI 40 MIN: CPT

## 2024-03-11 NOTE — HISTORY OF PRESENT ILLNESS
[de-identified] : The patient was diagnosed with esophageal adenocarcinoma in February 2023 at the age of 62. He presented to his PCP with complaints of dysphagia, intermittently since 2021, however, since January 2023 it has become severe to the point where he can no longer eat solid foods. On 2/6/23, he had an xray esophagram which showed a 5 cm distal esophageal stricture suspect for neoplasm. On 2/15/23, he underwent an endoscopy which showed at least a T3N1Mx lesion.  Almost completely obstructing esophageal/ GE junction tumor, Siewert Type II.  Stented with an 18 x 103 mm Walflex stent. 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  No loss of nuclear expression of MMR proteins (MLH1, MSH2, MSH6, and PMS2). HER-2: Negative (0/1+ membrane staining). A CT C/A/P on 2/27/23 showed nonspecific bilateral noncalcified pulmonary nodules measuring up to 4 mm Lower third esophageal masslike wall thickening extends to the GE junction and gastric cardia compatible with tumor. Masslike wall thickening extends upstream of the proximal end of the indwelling esophageal Wallstent. Lower esophageal, gastrohepatic and periceliac lymphadenopathy, measures 4.5 x 3.2 cm . Minimal nonspecific nodularity involving the omentum suspicious however inconclusive for carcinomatosis. Probable left hepatic lobe cysts however consider liver MRI if additional imaging is warranted.  [de-identified] : Medical Hx: HTN; GERD; kidney stones \par  Surgical Hx: cholecystectomy; lithotripsy Left kidney  \par  Family Hx: father prostrate ca; paternal uncle lymphoma \par  Social Hx: never smoker; ETOH never; , live together; retired [place] ; family close by [de-identified] : He completed 3 cycles of induction for carbo / taxol on 3/30/23. He completed 6 cycles of carbo / Taxol with RT 5/15/23.  He began Nivolumab q 2 weeks for 8 cycles (started 10/23/23) then monthly for 1 year. Today he is cycle 8. Multiple cycles held due to shingles.  He will return in 2 weeks for higher dose and will be monthly moving forward. He also has a liver biopsy 3/1/24.  His shingle pain is 50% better, remains on neurontin. Reports he is sleeping better, no longer feels discomfort when he rolls over. Denies diarrhea or rash.  His wt remains between 100 and 107 lbs, states he is eating well, following a new diet with less carbs and healthier foods. Will reach out to  again.

## 2024-03-11 NOTE — REVIEW OF SYSTEMS
[Shortness Of Breath] : no shortness of breath [Chest Pain] : no chest pain [Abdominal Pain] : no abdominal pain [Vomiting] : no vomiting [Constipation] : no constipation [FreeTextEntry2] : wt loss

## 2024-03-11 NOTE — RESULTS/DATA
[FreeTextEntry1] : 3/1/2024 Liver, biopsy: Metastatic adenocarcinoma based on the pathologic findings and on the clinical findings, this most likely represents a metastasis from the patient's known esophageal primary.  However, a colonic primary cannot be entirely excluded.  2/9/24 PET: 1. New FDG avid right hepatic hypodensity highly suspicious for metastasis. 2. Right upper lobe nodule anteriorly increased in size with minimal activity suspicious for malignant process. Other new nonavid subcentimeter right upper lobe and right lower lobe nodules may represent neoplastic or inflammatory process. 3. Gastric pull through with no abnormal changes in the wall caliber and showing smaller area and less intense patches of activity compared to prior PET/CT; no distinct suspicious area. No hypermetabolic paraesophageal or mesenteric nodes.   10/9/23 CT Chest: Stable scattered smaller than 5 mm nodules including a left lower lobe 4 mm nodule.  8/15/23 Path: Esophagogastrectomy Invasive adenocarcinoma, moderately differentiated with squamous and neuroendocrine differentiation, status post treatment (score 2). Metastatic carcinoma present in 4 out of 14 lymph nodes (4/14). Note: The tumor shows neuroendocrine differentiation and squamous differentiation. Immunostains were performed on block 1P and 1AG. CAM5.2, synaptophysin and chromogranin stains tumor cells and shows neuroendocrine differentiation; p40 highlights tumor cells and show squamous differentiation. Final esophageal margin, resection: Segment of esophagus, negative for carcinoma. Two lymph nodes, negative for carcinoma (0/2). Synoptic Summary Esophagus Procedure: Esophagogastrectomy Tumor Site: Distal esophagus (low thoracic esophagus) Relationship of Tumor to Esophagogastric Junction: Tumor midpoint lies in the distal esophagus AND tumor involves the esophagogastric junction Distance of Tumor Center from Esophagogastric Junction: 3.2 cm Histologic Type: Adenocarcinoma Histologic Type Comment: The tumor has squamous and neuroendocrine differentiation Histologic Grade: Moderately (G2) - poorly (G3) differentiated Tumor Size: 6.2 Centimeters (cm) Tumor Extent: Invades adventitia Treatment Effect: Present, with residual cancer showing evident tumor regression, but more than single cells or rare small groups of cancer cells (partial response, score 2). Lymphovascular Invasion: Present Perineural Invasion: Present Margin Status for Invasive Carcinoma: Invasive carcinoma present at margin Margin(s) Involved by Invasive Carcinoma: Distal Margin Status for Dysplasia and Intestinal Metaplasia: Not applicable Regional Lymph Node Status: Tumor present in regional lymph node(s) Number of Lymph Nodes with Tumor: 4 Number of Lymph Nodes Examined: 16 Pathologic Stage Classification  TNM Descriptors: y (post-treatment) pT Category: pT3 pN Category: pN2  6/12/23 PET: Activity extending from the distal esophagus to the gastric cardia appears grossly unchanged in intensity. However, this now appears to extend further proximally than on the prior PET/CT scan, with an area of suspected narrowing in the subcarinal esophageal region. The degree of uptake otherwise appears unchanged in intensity. Previously seen hypermetabolic nodes in the UPPER abdomen have nearly resolved.  3/10/23 MRI: No liver metastases. Small scattered hepatic cysts. Metastatic lymphadenopathy upper abdomen with central necrosis.  3/8/23 PET: Foci of increased activity at the gastroesophageal junction and within the gastric cardia related to known site of malignancy. Hypermetabolic upper mesenteric nodes including portacaval/periceliac sandra conglomerate with necrosis compatible with metastases. Sites of omental nodularity not visualized on low-dose CT and without abnormal FDG activity at the sites. Nonavid subcentimeter lung nodules below PET detection; these will be reassessed on follow-up.  2/27/23 CT C/A/P: Nonspecific bilateral noncalcified pulmonary nodules measuring up to 4 mm Lower third esophageal masslike wall thickening extends to the GE junction and gastric cardia compatible with tumor. Masslike wall thickening extends upstream of the proximal end of the indwelling esophageal Wallstent. Lower esophageal, gastrohepatic and periceliac lymphadenopathy, measures 4.5 x 3.2 cm . Minimal nonspecific nodularity involving the omentum suspicious however inconclusive for carcinomatosis. Probable left hepatic lobe cysts however consider liver MRI if additional imaging is warranted.   2/15/23 Path: Esophagus, mass, 40 cm (biopsy): Adenocarcinoma, invasive, poorly differentiated with siddhartha of signet ring cell morphology. Alcian blue stain is noncontributory. No loss of nuclear expression of MMR proteins (MLH1, MSH2, MSH6, and PMS2). HER-2: Negative (0/1+ membrane staining).  2/15/23 Endoscopy: At least a T3N1Mx lesion on this exam. Biopsied. Almost completely obstructing esophageal/ GE junction tumor, Siewert Type II.  Stented with an 18 x 103 mm Walflex stent.   2/6/23 Xray Esophagram: 5 cm distal esophageal stricture suspect for neoplasm.

## 2024-03-12 ENCOUNTER — APPOINTMENT (OUTPATIENT)
Dept: INFUSION THERAPY | Facility: CLINIC | Age: 64
End: 2024-03-12

## 2024-03-12 RX ORDER — ONDANSETRON 8 MG/1
8 TABLET, ORALLY DISINTEGRATING ORAL
Qty: 90 | Refills: 3 | Status: ACTIVE | COMMUNITY
Start: 2024-03-12 | End: 1900-01-01

## 2024-03-12 RX ORDER — PROCHLORPERAZINE MALEATE 10 MG/1
10 TABLET ORAL
Qty: 120 | Refills: 6 | Status: ACTIVE | COMMUNITY
Start: 2024-03-12 | End: 1900-01-01

## 2024-03-13 ENCOUNTER — NON-APPOINTMENT (OUTPATIENT)
Age: 64
End: 2024-03-13

## 2024-03-15 ENCOUNTER — APPOINTMENT (OUTPATIENT)
Dept: CARDIOLOGY | Facility: CLINIC | Age: 64
End: 2024-03-15

## 2024-03-25 ENCOUNTER — NON-APPOINTMENT (OUTPATIENT)
Age: 64
End: 2024-03-25

## 2024-03-25 ENCOUNTER — APPOINTMENT (OUTPATIENT)
Dept: HEMATOLOGY ONCOLOGY | Facility: CLINIC | Age: 64
End: 2024-03-25

## 2024-03-27 NOTE — PHYSICAL EXAM
[Restricted in physically strenuous activity but ambulatory and able to carry out work of a light or sedentary nature] : Status 1- Restricted in physically strenuous activity but ambulatory and able to carry out work of a light or sedentary nature, e.g., light house work, office work [Normal] : grossly intact [de-identified] : wt loss noted today

## 2024-03-27 NOTE — REVIEW OF SYSTEMS
[Fatigue] : fatigue [Recent Change In Weight] : ~T recent weight change [Chest Pain] : no chest pain [Shortness Of Breath] : no shortness of breath [Abdominal Pain] : no abdominal pain [Vomiting] : no vomiting [Constipation] : no constipation [Diarrhea: Grade 0] : Diarrhea: Grade 0 [Negative] : Allergic/Immunologic [FreeTextEntry2] : wt loss

## 2024-03-27 NOTE — ASSESSMENT
[AdvancecareDate] : 12/8/23 [FreeTextEntry1] : Benefit for nivolumab was shown in the CheckMate 577 trial, in which 794 patients who had received neoadjuvant CRT for esophageal or EGJ cancer (70 percent AC) and had residual pathologic disease at the time of surgery were randomly assigned to nivolumab (240 mg) or placebo every 2 weeks for 16 weeks followed by nivolumab 480 mg or placebo every 4 weeks; the maximum treatment duration was one year. Enrollment was irrespective of programmed death receptor-1 ligand 1 (PD-L1) overexpression. Tumor site was esophagus in 60 percent and EGJ in 40 percent; histology was AC in 71 percent and SCC in 29 percent. At a median follow-up of 24.4 months, median disease-free survival, the primary endpoint, was twice as long with nivolumab (22.4 versus 11 months, HR for disease progression or death was 0.69, 95% CI 0.56-0.86), and the benefits were seen across all patient subgroups (histology, location, initial and post-treatment disease stage, PD-L1 overexpression or not). Overall survival data were not mature. Although treatment-related adverse effects were frequent, most were grade 1 or 2 and only 9 percent of patients discontinued adjuvant nivolumab because of adverse effects. The benefits were gained without any significant decline in patient-reported health-related quality of life over the year of nivolumab treatment.  Based on these results, the [place] Food and Drug Administration has approved nivolumab as adjuvant therapy for patients who previously received neoadjuvant CRT following complete resection of esophageal or gastroesophageal junction cancer with residual pathologic disease. This approach was also endorsed by a year 2021 updated ASCO guideline on the treatment of patients with locally advanced esophageal carcinoma.

## 2024-03-27 NOTE — HISTORY OF PRESENT ILLNESS
[T: ___] : T[unfilled] [M: ___] : M[unfilled] [N: ___] : N[unfilled] [de-identified] : The patient was diagnosed with esophageal adenocarcinoma in February 2023 at the age of 62. He presented to his PCP with complaints of dysphagia, intermittently since 2021, however, since January 2023 it has become severe to the point where he can no longer eat solid foods. On 2/6/23, he had an xray esophagram which showed a 5 cm distal esophageal stricture suspect for neoplasm. On 2/15/23, he underwent an endoscopy which showed at least a T3N1Mx lesion.  Almost completely obstructing esophageal/ GE junction tumor, Siewert Type II.  Stented with an 18 x 103 mm Walflex stent. 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  No loss of nuclear expression of MMR proteins (MLH1, MSH2, MSH6, and PMS2). HER-2: Negative (0/1+ membrane staining). A CT C/A/P on 2/27/23 showed nonspecific bilateral noncalcified pulmonary nodules measuring up to 4 mm Lower third esophageal masslike wall thickening extends to the GE junction and gastric cardia compatible with tumor. Masslike wall thickening extends upstream of the proximal end of the indwelling esophageal Wallstent. Lower esophageal, gastrohepatic and periceliac lymphadenopathy, measures 4.5 x 3.2 cm . Minimal nonspecific nodularity involving the omentum suspicious however inconclusive for carcinomatosis. Probable left hepatic lobe cysts however consider liver MRI if additional imaging is warranted.  [AJCC Stage: ____] : AJCC Stage: [unfilled] [de-identified] : Medical Hx: HTN; GERD; kidney stones \par  Surgical Hx: cholecystectomy; lithotripsy Left kidney  \par  Family Hx: father prostrate ca; paternal uncle lymphoma \par  Social Hx: never smoker; ETOH never; , live together; retired [place] ; family close by [de-identified] : He completed 3 cycles of induction for carbo / taxol on 3/30/23. He completed 6 cycles of carbo / Taxol with RT 5/15/23.  He began Nivolumab q 2 weeks for 8 cycles (started 10/23/23) then monthly for 1 year. Today he is cycle 8. Multiple cycles held due to shingles.  He will return in 2 weeks for higher dose and will be monthly moving forward. He also has a liver biopsy 3/1/24.  His shingle pain is 50% better, remains on neurontin. Reports he is sleeping better, no longer feels discomfort when he rolls over. Denies diarrhea or rash.  His wt remains between 100 and 107 lbs, states he is eating well, following a new diet with less carbs and healthier foods. Will reach out to  again.

## 2024-03-28 ENCOUNTER — APPOINTMENT (OUTPATIENT)
Dept: HEMATOLOGY ONCOLOGY | Facility: CLINIC | Age: 64
End: 2024-03-28
Payer: COMMERCIAL

## 2024-03-28 DIAGNOSIS — C15.9 MALIGNANT NEOPLASM OF ESOPHAGUS, UNSPECIFIED: ICD-10-CM

## 2024-03-28 PROCEDURE — G2211 COMPLEX E/M VISIT ADD ON: CPT | Mod: NC,1L

## 2024-03-28 PROCEDURE — 99443: CPT

## 2024-04-03 NOTE — ED STATDOCS - DATE/TIME 1
Bed: Z01  Expected date:   Expected time:   Means of arrival:   Comments:  66 y/o female to triage  N/V x 1 hour   Discharged earlier today  From home, AxO x 4  152/80, 78HR, 16RR, 98%RA, 101BG, GCS 15.   Report taken by Freddy JIMENEZ RN      06-Jul-2023 17:33

## 2024-04-09 ENCOUNTER — APPOINTMENT (OUTPATIENT)
Dept: INFUSION THERAPY | Facility: CLINIC | Age: 64
End: 2024-04-09

## 2024-04-11 ENCOUNTER — EMERGENCY (EMERGENCY)
Facility: HOSPITAL | Age: 64
LOS: 0 days | Discharge: HOME | End: 2024-04-12
Attending: EMERGENCY MEDICINE
Payer: COMMERCIAL

## 2024-04-11 VITALS
RESPIRATION RATE: 18 BRPM | OXYGEN SATURATION: 97 % | DIASTOLIC BLOOD PRESSURE: 85 MMHG | HEIGHT: 67 IN | TEMPERATURE: 98 F | WEIGHT: 100.09 LBS | HEART RATE: 84 BPM | SYSTOLIC BLOOD PRESSURE: 115 MMHG

## 2024-04-11 DIAGNOSIS — Z98.890 OTHER SPECIFIED POSTPROCEDURAL STATES: Chronic | ICD-10-CM

## 2024-04-11 DIAGNOSIS — R10.10 UPPER ABDOMINAL PAIN, UNSPECIFIED: ICD-10-CM

## 2024-04-11 DIAGNOSIS — E86.0 DEHYDRATION: ICD-10-CM

## 2024-04-11 DIAGNOSIS — Z92.21 PERSONAL HISTORY OF ANTINEOPLASTIC CHEMOTHERAPY: Chronic | ICD-10-CM

## 2024-04-11 DIAGNOSIS — R53.83 OTHER FATIGUE: ICD-10-CM

## 2024-04-11 DIAGNOSIS — Z87.442 PERSONAL HISTORY OF URINARY CALCULI: ICD-10-CM

## 2024-04-11 DIAGNOSIS — Z92.3 PERSONAL HISTORY OF IRRADIATION: Chronic | ICD-10-CM

## 2024-04-11 DIAGNOSIS — R53.1 WEAKNESS: ICD-10-CM

## 2024-04-11 DIAGNOSIS — K22.2 ESOPHAGEAL OBSTRUCTION: Chronic | ICD-10-CM

## 2024-04-11 DIAGNOSIS — I48.91 UNSPECIFIED ATRIAL FIBRILLATION: ICD-10-CM

## 2024-04-11 DIAGNOSIS — R10.11 RIGHT UPPER QUADRANT PAIN: ICD-10-CM

## 2024-04-11 PROCEDURE — 99284 EMERGENCY DEPT VISIT MOD MDM: CPT | Mod: 25

## 2024-04-11 PROCEDURE — 80053 COMPREHEN METABOLIC PANEL: CPT

## 2024-04-11 PROCEDURE — 83690 ASSAY OF LIPASE: CPT

## 2024-04-11 PROCEDURE — 85025 COMPLETE CBC W/AUTO DIFF WBC: CPT

## 2024-04-11 PROCEDURE — 99285 EMERGENCY DEPT VISIT HI MDM: CPT

## 2024-04-11 PROCEDURE — 81003 URINALYSIS AUTO W/O SCOPE: CPT

## 2024-04-11 PROCEDURE — 87086 URINE CULTURE/COLONY COUNT: CPT

## 2024-04-11 PROCEDURE — 83735 ASSAY OF MAGNESIUM: CPT

## 2024-04-11 PROCEDURE — 36415 COLL VENOUS BLD VENIPUNCTURE: CPT

## 2024-04-11 PROCEDURE — 74177 CT ABD & PELVIS W/CONTRAST: CPT | Mod: MC

## 2024-04-11 RX ORDER — SODIUM CHLORIDE 9 MG/ML
1000 INJECTION INTRAMUSCULAR; INTRAVENOUS; SUBCUTANEOUS ONCE
Refills: 0 | Status: COMPLETED | OUTPATIENT
Start: 2024-04-11 | End: 2024-04-11

## 2024-04-11 RX ADMIN — SODIUM CHLORIDE 1000 MILLILITER(S): 9 INJECTION INTRAMUSCULAR; INTRAVENOUS; SUBCUTANEOUS at 23:51

## 2024-04-11 NOTE — ED PROVIDER NOTE - PATIENT PORTAL LINK FT
You can access the FollowMyHealth Patient Portal offered by [facility] by registering at the following website: [email or web address]. By joining [employer]s FollowMyHealth portal, you will also be able to view your health information using other applications (apps) compatible with our system.

## 2024-04-11 NOTE — ED PROVIDER NOTE - PSYCHIATRIC, MLM
Patient currently in [place].  Unable to provide care as providers are not licensed in [place] territory.  Refer to local care, please contact patient to notify of inability to accommodate care request.
Alert and oriented to person, place, time/situation. normal mood and affect. no apparent risk to self or others.

## 2024-04-11 NOTE — ED PROVIDER NOTE - CLINICAL SUMMARY MEDICAL DECISION MAKING FREE TEXT BOX
Metastatic esophageal cancer now with abdominal pain and dehydration.  Labs, urine , CT abd/pelv, EKG and IV hydration planned. Metastatic esophageal cancer now with abdominal pain and dehydration.  Labs, urine , CT abd/pelv, EKG and IV hydration planned.    JG: Labs stable, patient asked about increased mucous and secretions in mouth that has happened chronically.  Told him to ask his oncologist about glycopyrrolate.  Patient's wife at bedside.  Patient wants to go home.

## 2024-04-11 NOTE — ED PROVIDER NOTE - OBJECTIVE STATEMENT
Pt is a 62 yo M with hx of esophageal cancer, hx of Bahman esophagectomy in August 2023, hx of zoster infection in 2023 on gabapentin due to post herpetic neuralgia, recurrence of adenocarcinoma in liver, hx of tachycardia and afib, hx of kidney stone 20 yrs ago presents with abdominal pain and weakness.  Patient feels weak due to decreased PO intake.  Feels dehydrated and suspects that his potassium is low.  Denies fever, vomiting or diarrhea.  No change in urination.  Pain is across upper abdomen and wraps around back on both sides.  No meds taken for pain.

## 2024-04-11 NOTE — ED ADULT TRIAGE NOTE - TEMPERATURE IN FAHRENHEIT (DEGREES F)
Ortho PA Post Op Check    Procedure: Right TKR  Surgeon: Jacek LEA    SUBJECTIVE:  Pt comfortable without complaints, pain controlled 3-4/10 on interval Rx; Denies CP, SOB, N/V, numbness/tingling  Tolerated PO fluids well. Spinal emerged.  Had Bladder Cath x 1 earlier in PACU for Symptomatic Post-Op retention after spinal emerged with Hi Bladder volume on Bladder Scan. stood and stepped briefly with PT earlier inPACU  Pain Rx:  acetaminophen     Tablet .. 1000 milliGRAM(s) Oral every 8 hours  ALPRAZolam 0.5 milliGRAM(s) Oral three times a day PRN  HYDROmorphone  Injectable 0.5 milliGRAM(s) IV Push every 3 hours PRN  ondansetron Injectable 4 milliGRAM(s) IV Push every 6 hours PRN  oxyCODONE    IR 5 milliGRAM(s) Oral every 3 hours PRN  oxyCODONE    IR 10 milliGRAM(s) Oral every 3 hours PRN  temazepam 30 milliGRAM(s) Oral at bedtime PRN      OBJECTIVE:  General Exam:  Vital Signs Last 24 Hrs  T(C): 36.8 (03-24-23 @ 15:15), Max: 36.8 (03-24-23 @ 15:15)  T(F): 98.2 (03-24-23 @ 15:15), Max: 98.2 (03-24-23 @ 15:15)  HR: 72 (03-24-23 @ 15:15) (59 - 74)  BP: 114/74 (03-24-23 @ 15:15) (93/63 - 115/84)  RR: 16 (03-24-23 @ 15:15) (10 - 16)  SpO2: 98% (03-24-23 @ 15:15) (96% - 100%)    General: Pt Alert and oriented, NAD, controlled pain.  Ext(Knee): Right Knee Lima Dressing clean, dry, & intact; Not constricting at Proximal & Distal ends; RUDDY dressing suction intact.  ROM: Extension 0 deg. Flexion  40 deg [ PROM ] in bed.  Neuro/Vasc: Feet toes warm, pink. DP = 2+. No calf tenderness bilat.. Has sensation over feet & toes bilat. Full AROM bilat feet & toes. EHL = 5/5  Urine Out [11P-7A] = Awaiting voidml; HVAC = N/A ml  VTEP: On Venodynes Bilat;  BID Ecotrin /to start in AM.      A/P: 53yFemale POD#0 s/p   - Stable from Orthopedic and Surgical standpoint  - Pain Control - stabe  - DVT ppx: ordered  - Post op abx: ordered  - Post Op Labs pending draw; Ortho team/Hospitalist to F/U  - PT eval initiated  - Weight bearing status: WBAT 98.1

## 2024-04-11 NOTE — ED PROVIDER NOTE - CARE PROVIDER_API CALL
Miles Hernández  [address], [address], [place] [address]  Phone: [phone]  Fax: [phone]  Follow Up Time:

## 2024-04-11 NOTE — ED PROVIDER NOTE - NSFOLLOWUPINSTRUCTIONS_ED_ALL_ED_FT
See your oncologist within 1-2 days.  Keep well hydrated.  Eat small frequent meals.    Rehydration, Adult  Outline of a person drinking a glass of water with ice.  Rehydration is the replacement of fluids, salts, and minerals in the body (electrolytes) that are lost during dehydration. Dehydration is when there is not enough water or other fluids in the body. This happens when you lose more fluids than you take in. Common causes of dehydration include:  Not drinking enough fluids. This can occur when you are ill or doing activities that require a lot of energy, especially in hot weather.  Conditions that cause loss of water or other fluids. These include diarrhea, vomiting, sweating, and urinating a lot.  Other illnesses, such as fever or infection.  Certain medicines, such as those that remove excess fluid from the body (diuretics).  Symptoms of mild or moderate dehydration may include thirst, dry lips and mouth, and dizziness. Symptoms of severe dehydration may include increased heart rate, confusion, fainting, and not urinating.    In severe cases, you may need to get fluids through an IV at the hospital. For mild or moderate cases, you can usually rehydrate at home by drinking certain fluids as told by your health care provider.    What are the risks?  Your health care provider will talk with you about risks. Your health care provider will talk with you about risks. This may include taking in too much fluid (overhydration). This is rare. Overhydration can cause an imbalance of electrolytes in the body, kidney failure, or a decrease in salt (sodium) levels in the body.    Supplies needed:  You will need an oral rehydration solution (ORS) if your health care provider tells you to use one. This is a drink to treat dehydration. It can be found in pharmacies and retail stores.    How to rehydrate  Fluids    Follow instructions from your health care provider about what to drink. The kind of fluid and the amount you should drink depend on your condition. In general, you should choose drinks that you prefer.  If told by your health care provider, drink an ORS.  Make an ORS by following instructions on the package.  Start by drinking small amounts, about ½ cup (120 mL) every 5–10 minutes.  Slowly increase how much you drink until you have taken in the amount recommended by your health care provider.  Drink enough clear fluids to keep your urine pale yellow. If you were told to drink an ORS, finish it first, then start slowly drinking other clear fluids. Drink fluids such as:  Water. This includes sparkling and flavored water. Drinking only water can lead to having too little sodium in your body (hyponatremia). Follow the advice of your health care provider.  Water from ice chips you suck on.  Fruit juice with water added to it (diluted).  Sports drinks.  Hot or cold herbal teas.  Broth-based soups.  Milk or milk products.  Food    Examples of foods that are good sources of electrolytes.  Follow instructions from your health care provider about what to eat while you rehydrate. Your health care provider may recommend that you slowly begin eating regular foods in small amounts.  Eat foods that contain a healthy balance of electrolytes, such as bananas, oranges, potatoes, tomatoes, and spinach.  Avoid foods that are greasy or contain a lot of sugar.  In some cases, you may get nutrition through a feeding tube that is passed through your nose and into your stomach (nasogastric tube, or NG tube). This may be done if you have uncontrolled vomiting or diarrhea.    Drinks to avoid    A sign showing that a person should not drink alcohol.  Certain drinks may make dehydration worse. While you rehydrate, avoid drinking alcohol.    How to tell if you are recovering from dehydration  You may be getting better if:  You are urinating more often than before you started rehydrating.  Your urine is pale yellow.  Your energy level improves.  You vomit less often.  You have diarrhea less often.  Your appetite improves or returns to normal.  You feel less dizzy or light-headed.  Your skin tone and color start to look more normal.  Follow these instructions at home:  Take over-the-counter and prescription medicines only as told by your health care provider.  Do not take sodium tablets. Doing this can lead to having too much sodium in your body (hypernatremia).  Contact a health care provider if:  You continue to have symptoms of mild or moderate dehydration, such as:  Thirst.  Dry lips.  Slightly dry mouth.  Dizziness.  Dark urine or less urine than normal.  Muscle cramps.  You continue to vomit or have diarrhea.  Get help right away if:  You have symptoms of dehydration that get worse.  You have a fever.  You have a severe headache.  You have been vomiting and have problems, such as:  Your vomiting gets worse or does not go away.  Your vomit includes blood or green matter (bile).  You cannot eat or drink without vomiting.  You have problems with urination or bowel movements, such as:  Diarrhea that gets worse or does not go away.  Blood in your stool (feces). This may cause stool to look black and tarry.  Not urinating, or urinating only a small amount of very dark urine, within 6–8 hours.  You have trouble breathing.  You have symptoms that get worse with treatment.  These symptoms may be an emergency. Get help right away. Call 911.  Do not wait to see if the symptoms will go away.  Do not drive yourself to the hospital.  This information is not intended to replace advice given to you by your health care provider. Make sure you discuss any questions you have with your health care provider.    Document Revised: 05/01/2023 Document Reviewed: 05/01/2023  [employer] Patient Education © 2024 [employer]  [employer] logo  Terms and Conditions  Privacy Policy  Editorial Policy  All content on this site: Copyright © 2024 Elsevier, its licensors, and contributors. All rights are reserved, including those for text and data mining, AI training, and similar technologies. For all open access content, the Creative Commons licensing terms apply.  Cookies are used by this site. To decline or learn more, visit our Cookies page.  [employer]

## 2024-04-12 VITALS
TEMPERATURE: 99 F | OXYGEN SATURATION: 96 % | DIASTOLIC BLOOD PRESSURE: 58 MMHG | SYSTOLIC BLOOD PRESSURE: 100 MMHG | RESPIRATION RATE: 17 BRPM | HEART RATE: 68 BPM

## 2024-04-12 LAB
ACANTHOCYTES BLD QL SMEAR: SLIGHT — SIGNIFICANT CHANGE UP
ADD ON TEST-SPECIMEN IN LAB: SIGNIFICANT CHANGE UP
ALBUMIN SERPL ELPH-MCNC: 3.1 G/DL — LOW (ref 3.3–5)
ALP SERPL-CCNC: 88 U/L — SIGNIFICANT CHANGE UP (ref 40–120)
ALT FLD-CCNC: 83 U/L — HIGH (ref 12–78)
ANION GAP SERPL CALC-SCNC: 5 MMOL/L — SIGNIFICANT CHANGE UP (ref 5–17)
ANISOCYTOSIS BLD QL: SLIGHT — SIGNIFICANT CHANGE UP
APPEARANCE UR: CLEAR — SIGNIFICANT CHANGE UP
AST SERPL-CCNC: 30 U/L — SIGNIFICANT CHANGE UP (ref 15–37)
BASOPHILS # BLD AUTO: 0.01 K/UL — SIGNIFICANT CHANGE UP (ref 0–0.2)
BASOPHILS NFR BLD AUTO: 0.2 % — SIGNIFICANT CHANGE UP (ref 0–2)
BILIRUB SERPL-MCNC: 0.5 MG/DL — SIGNIFICANT CHANGE UP (ref 0.2–1.2)
BILIRUB UR-MCNC: NEGATIVE — SIGNIFICANT CHANGE UP
BUN SERPL-MCNC: 37 MG/DL — HIGH (ref 7–23)
CALCIUM SERPL-MCNC: 9.1 MG/DL — SIGNIFICANT CHANGE UP (ref 8.5–10.1)
CHLORIDE SERPL-SCNC: 101 MMOL/L — SIGNIFICANT CHANGE UP (ref 96–108)
CO2 SERPL-SCNC: 34 MMOL/L — HIGH (ref 22–31)
COLOR SPEC: YELLOW — SIGNIFICANT CHANGE UP
CREAT SERPL-MCNC: 1.32 MG/DL — HIGH (ref 0.5–1.3)
DIFF PNL FLD: NEGATIVE — SIGNIFICANT CHANGE UP
EGFR: 60 ML/MIN/1.73M2 — SIGNIFICANT CHANGE UP
EOSINOPHIL # BLD AUTO: 0.05 K/UL — SIGNIFICANT CHANGE UP (ref 0–0.5)
EOSINOPHIL NFR BLD AUTO: 1.1 % — SIGNIFICANT CHANGE UP (ref 0–6)
GLUCOSE SERPL-MCNC: 105 MG/DL — HIGH (ref 70–99)
GLUCOSE UR QL: NEGATIVE MG/DL — SIGNIFICANT CHANGE UP
HCT VFR BLD CALC: 32.1 % — LOW (ref 39–50)
HGB BLD-MCNC: 11.2 G/DL — LOW (ref 13–17)
IMM GRANULOCYTES NFR BLD AUTO: 0.2 % — SIGNIFICANT CHANGE UP (ref 0–0.9)
KETONES UR-MCNC: NEGATIVE MG/DL — SIGNIFICANT CHANGE UP
LEUKOCYTE ESTERASE UR-ACNC: NEGATIVE — SIGNIFICANT CHANGE UP
LIDOCAIN IGE QN: 21 U/L — SIGNIFICANT CHANGE UP (ref 13–75)
LYMPHOCYTES # BLD AUTO: 0.61 K/UL — LOW (ref 1–3.3)
LYMPHOCYTES # BLD AUTO: 14 % — SIGNIFICANT CHANGE UP (ref 13–44)
MAGNESIUM SERPL-MCNC: 1.8 MG/DL — SIGNIFICANT CHANGE UP (ref 1.6–2.6)
MANUAL SMEAR VERIFICATION: SIGNIFICANT CHANGE UP
MCHC RBC-ENTMCNC: 32.4 PG — SIGNIFICANT CHANGE UP (ref 27–34)
MCHC RBC-ENTMCNC: 34.9 GM/DL — SIGNIFICANT CHANGE UP (ref 32–36)
MCV RBC AUTO: 92.8 FL — SIGNIFICANT CHANGE UP (ref 80–100)
MONOCYTES # BLD AUTO: 0.46 K/UL — SIGNIFICANT CHANGE UP (ref 0–0.9)
MONOCYTES NFR BLD AUTO: 10.5 % — SIGNIFICANT CHANGE UP (ref 2–14)
NEUTROPHILS # BLD AUTO: 3.23 K/UL — SIGNIFICANT CHANGE UP (ref 1.8–7.4)
NEUTROPHILS NFR BLD AUTO: 74 % — SIGNIFICANT CHANGE UP (ref 43–77)
NITRITE UR-MCNC: NEGATIVE — SIGNIFICANT CHANGE UP
OVALOCYTES BLD QL SMEAR: SLIGHT — SIGNIFICANT CHANGE UP
PH UR: 6 — SIGNIFICANT CHANGE UP (ref 5–8)
PLAT MORPH BLD: NORMAL — SIGNIFICANT CHANGE UP
PLATELET # BLD AUTO: 121 K/UL — LOW (ref 150–400)
POIKILOCYTOSIS BLD QL AUTO: SLIGHT — SIGNIFICANT CHANGE UP
POTASSIUM SERPL-MCNC: 4 MMOL/L — SIGNIFICANT CHANGE UP (ref 3.5–5.3)
POTASSIUM SERPL-SCNC: 4 MMOL/L — SIGNIFICANT CHANGE UP (ref 3.5–5.3)
PROT SERPL-MCNC: 6.9 GM/DL — SIGNIFICANT CHANGE UP (ref 6–8.3)
PROT UR-MCNC: NEGATIVE MG/DL — SIGNIFICANT CHANGE UP
RBC # BLD: 3.46 M/UL — LOW (ref 4.2–5.8)
RBC # FLD: 13.7 % — SIGNIFICANT CHANGE UP (ref 10.3–14.5)
RBC BLD AUTO: ABNORMAL
SODIUM SERPL-SCNC: 140 MMOL/L — SIGNIFICANT CHANGE UP (ref 135–145)
SP GR SPEC: >1.03 — HIGH (ref 1–1.03)
UROBILINOGEN FLD QL: 0.2 MG/DL — SIGNIFICANT CHANGE UP (ref 0.2–1)
WBC # BLD: 4.37 K/UL — SIGNIFICANT CHANGE UP (ref 3.8–10.5)
WBC # FLD AUTO: 4.37 K/UL — SIGNIFICANT CHANGE UP (ref 3.8–10.5)

## 2024-04-12 PROCEDURE — 74177 CT ABD & PELVIS W/CONTRAST: CPT | Mod: 26,MC

## 2024-04-12 RX ORDER — SODIUM CHLORIDE 9 MG/ML
1000 INJECTION INTRAMUSCULAR; INTRAVENOUS; SUBCUTANEOUS ONCE
Refills: 0 | Status: COMPLETED | OUTPATIENT
Start: 2024-04-12 | End: 2024-04-12

## 2024-04-12 RX ADMIN — SODIUM CHLORIDE 1000 MILLILITER(S): 9 INJECTION INTRAMUSCULAR; INTRAVENOUS; SUBCUTANEOUS at 03:10

## 2024-04-12 NOTE — ED ADULT NURSE NOTE - OBJECTIVE STATEMENT
Pt presents to ED w c/o L sided flank pain x2days, pt states pain came on out of nowhere and that he feels dehydrated. Pt states that pain on arrival to ED has had no pain. Labs obtained, pt has no other concerns at this time. pt does not appear to be in any distress, respirations are even and unlabored.  Pt A&O4 w clear speech and follows commands, ambulatory

## 2024-04-12 NOTE — ED ADULT NURSE NOTE - NSFALLRISKINTERV_ED_ALL_ED
Assistance OOB with selected safe patient handling equipment if applicable/Communicate fall risk and risk factors to all staff, patient, and family/Provide patient with walking aids/Provide visual cue: yellow wristband, yellow gown, etc/Reinforce activity limits and safety measures with patient and family/Call bell, personal items and telephone in reach/Instruct patient to call for assistance before getting out of bed/chair/stretcher/Non-slip footwear applied when patient is off stretcher/[place] to call system/Physically safe environment - no spills, clutter or unnecessary equipment/Purposeful Proactive Rounding/Room/bathroom lighting operational, light cord in reach

## 2024-04-13 LAB
CULTURE RESULTS: SIGNIFICANT CHANGE UP
SPECIMEN SOURCE: SIGNIFICANT CHANGE UP

## 2024-04-25 ENCOUNTER — INPATIENT (INPATIENT)
Facility: HOSPITAL | Age: 64
LOS: 3 days | Discharge: HOME | DRG: 374 | End: 2024-04-29
Attending: FAMILY MEDICINE | Admitting: INTERNAL MEDICINE
Payer: COMMERCIAL

## 2024-04-25 VITALS
HEART RATE: 91 BPM | TEMPERATURE: 98 F | DIASTOLIC BLOOD PRESSURE: 92 MMHG | HEIGHT: 67 IN | RESPIRATION RATE: 18 BRPM | SYSTOLIC BLOOD PRESSURE: 142 MMHG | OXYGEN SATURATION: 100 % | WEIGHT: 91.05 LBS

## 2024-04-25 DIAGNOSIS — Z98.890 OTHER SPECIFIED POSTPROCEDURAL STATES: Chronic | ICD-10-CM

## 2024-04-25 DIAGNOSIS — Z90.49 ACQUIRED ABSENCE OF OTHER SPECIFIED PARTS OF DIGESTIVE TRACT: Chronic | ICD-10-CM

## 2024-04-25 DIAGNOSIS — Z92.21 PERSONAL HISTORY OF ANTINEOPLASTIC CHEMOTHERAPY: Chronic | ICD-10-CM

## 2024-04-25 DIAGNOSIS — Z92.3 PERSONAL HISTORY OF IRRADIATION: Chronic | ICD-10-CM

## 2024-04-25 DIAGNOSIS — K22.2 ESOPHAGEAL OBSTRUCTION: Chronic | ICD-10-CM

## 2024-04-25 DIAGNOSIS — R62.7 ADULT FAILURE TO THRIVE: ICD-10-CM

## 2024-04-25 LAB
ALBUMIN SERPL ELPH-MCNC: 3.4 G/DL — SIGNIFICANT CHANGE UP (ref 3.3–5)
ALP SERPL-CCNC: 232 U/L — HIGH (ref 40–120)
ALT FLD-CCNC: 652 U/L — HIGH (ref 12–78)
ANION GAP SERPL CALC-SCNC: 3 MMOL/L — LOW (ref 5–17)
APPEARANCE UR: CLEAR — SIGNIFICANT CHANGE UP
AST SERPL-CCNC: 390 U/L — HIGH (ref 15–37)
BACTERIA # UR AUTO: NEGATIVE /HPF — SIGNIFICANT CHANGE UP
BASOPHILS # BLD AUTO: 0.01 K/UL — SIGNIFICANT CHANGE UP (ref 0–0.2)
BASOPHILS NFR BLD AUTO: 0.1 % — SIGNIFICANT CHANGE UP (ref 0–2)
BILIRUB SERPL-MCNC: 1 MG/DL — SIGNIFICANT CHANGE UP (ref 0.2–1.2)
BILIRUB UR-MCNC: NEGATIVE — SIGNIFICANT CHANGE UP
BUN SERPL-MCNC: 43 MG/DL — HIGH (ref 7–23)
CALCIUM SERPL-MCNC: 8.9 MG/DL — SIGNIFICANT CHANGE UP (ref 8.5–10.1)
CAST: 2 /LPF — SIGNIFICANT CHANGE UP (ref 0–4)
CHLORIDE SERPL-SCNC: 105 MMOL/L — SIGNIFICANT CHANGE UP (ref 96–108)
CO2 SERPL-SCNC: 35 MMOL/L — HIGH (ref 22–31)
COLOR SPEC: YELLOW — SIGNIFICANT CHANGE UP
CREAT SERPL-MCNC: 0.9 MG/DL — SIGNIFICANT CHANGE UP (ref 0.5–1.3)
DIFF PNL FLD: ABNORMAL
EGFR: 95 ML/MIN/1.73M2 — SIGNIFICANT CHANGE UP
EOSINOPHIL # BLD AUTO: 0 K/UL — SIGNIFICANT CHANGE UP (ref 0–0.5)
EOSINOPHIL NFR BLD AUTO: 0 % — SIGNIFICANT CHANGE UP (ref 0–6)
GLUCOSE SERPL-MCNC: 114 MG/DL — HIGH (ref 70–99)
GLUCOSE UR QL: NEGATIVE MG/DL — SIGNIFICANT CHANGE UP
HCT VFR BLD CALC: 40.3 % — SIGNIFICANT CHANGE UP (ref 39–50)
HGB BLD-MCNC: 13.8 G/DL — SIGNIFICANT CHANGE UP (ref 13–17)
IMM GRANULOCYTES NFR BLD AUTO: 0.6 % — SIGNIFICANT CHANGE UP (ref 0–0.9)
KETONES UR-MCNC: NEGATIVE MG/DL — SIGNIFICANT CHANGE UP
LACTATE SERPL-SCNC: 1.2 MMOL/L — SIGNIFICANT CHANGE UP (ref 0.7–2)
LEUKOCYTE ESTERASE UR-ACNC: NEGATIVE — SIGNIFICANT CHANGE UP
LYMPHOCYTES # BLD AUTO: 0.6 K/UL — LOW (ref 1–3.3)
LYMPHOCYTES # BLD AUTO: 7.2 % — LOW (ref 13–44)
MAGNESIUM SERPL-MCNC: 2 MG/DL — SIGNIFICANT CHANGE UP (ref 1.6–2.6)
MCHC RBC-ENTMCNC: 32.5 PG — SIGNIFICANT CHANGE UP (ref 27–34)
MCHC RBC-ENTMCNC: 34.2 GM/DL — SIGNIFICANT CHANGE UP (ref 32–36)
MCV RBC AUTO: 95 FL — SIGNIFICANT CHANGE UP (ref 80–100)
MONOCYTES # BLD AUTO: 0.32 K/UL — SIGNIFICANT CHANGE UP (ref 0–0.9)
MONOCYTES NFR BLD AUTO: 3.8 % — SIGNIFICANT CHANGE UP (ref 2–14)
NEUTROPHILS # BLD AUTO: 7.35 K/UL — SIGNIFICANT CHANGE UP (ref 1.8–7.4)
NEUTROPHILS NFR BLD AUTO: 88.3 % — HIGH (ref 43–77)
NITRITE UR-MCNC: NEGATIVE — SIGNIFICANT CHANGE UP
PH UR: 5.5 — SIGNIFICANT CHANGE UP (ref 5–8)
PLATELET # BLD AUTO: 159 K/UL — SIGNIFICANT CHANGE UP (ref 150–400)
POTASSIUM SERPL-MCNC: 3.9 MMOL/L — SIGNIFICANT CHANGE UP (ref 3.5–5.3)
POTASSIUM SERPL-SCNC: 3.9 MMOL/L — SIGNIFICANT CHANGE UP (ref 3.5–5.3)
PROT SERPL-MCNC: 7.2 GM/DL — SIGNIFICANT CHANGE UP (ref 6–8.3)
PROT UR-MCNC: 30 MG/DL
RBC # BLD: 4.24 M/UL — SIGNIFICANT CHANGE UP (ref 4.2–5.8)
RBC # FLD: 14 % — SIGNIFICANT CHANGE UP (ref 10.3–14.5)
RBC CASTS # UR COMP ASSIST: 29 /HPF — HIGH (ref 0–4)
SODIUM SERPL-SCNC: 143 MMOL/L — SIGNIFICANT CHANGE UP (ref 135–145)
SP GR SPEC: 1.02 — SIGNIFICANT CHANGE UP (ref 1–1.03)
SQUAMOUS # UR AUTO: 0 /HPF — SIGNIFICANT CHANGE UP (ref 0–5)
TROPONIN I, HIGH SENSITIVITY RESULT: 5.62 NG/L — SIGNIFICANT CHANGE UP
UROBILINOGEN FLD QL: 0.2 MG/DL — SIGNIFICANT CHANGE UP (ref 0.2–1)
WBC # BLD: 8.33 K/UL — SIGNIFICANT CHANGE UP (ref 3.8–10.5)
WBC # FLD AUTO: 8.33 K/UL — SIGNIFICANT CHANGE UP (ref 3.8–10.5)
WBC UR QL: 1 /HPF — SIGNIFICANT CHANGE UP (ref 0–5)

## 2024-04-25 PROCEDURE — 93005 ELECTROCARDIOGRAM TRACING: CPT

## 2024-04-25 PROCEDURE — 99285 EMERGENCY DEPT VISIT HI MDM: CPT

## 2024-04-25 PROCEDURE — 71045 X-RAY EXAM CHEST 1 VIEW: CPT | Mod: 26

## 2024-04-25 PROCEDURE — 70450 CT HEAD/BRAIN W/O DYE: CPT | Mod: 26,MC

## 2024-04-25 PROCEDURE — 74177 CT ABD & PELVIS W/CONTRAST: CPT | Mod: MC

## 2024-04-25 PROCEDURE — 87040 BLOOD CULTURE FOR BACTERIA: CPT

## 2024-04-25 PROCEDURE — 99223 1ST HOSP IP/OBS HIGH 75: CPT

## 2024-04-25 PROCEDURE — 82962 GLUCOSE BLOOD TEST: CPT

## 2024-04-25 PROCEDURE — 93010 ELECTROCARDIOGRAM REPORT: CPT

## 2024-04-25 PROCEDURE — 97116 GAIT TRAINING THERAPY: CPT | Mod: GP

## 2024-04-25 PROCEDURE — 97163 PT EVAL HIGH COMPLEX 45 MIN: CPT | Mod: GP

## 2024-04-25 PROCEDURE — 87086 URINE CULTURE/COLONY COUNT: CPT

## 2024-04-25 PROCEDURE — 71045 X-RAY EXAM CHEST 1 VIEW: CPT

## 2024-04-25 PROCEDURE — 81001 URINALYSIS AUTO W/SCOPE: CPT

## 2024-04-25 RX ORDER — SODIUM CHLORIDE 9 MG/ML
1000 INJECTION INTRAMUSCULAR; INTRAVENOUS; SUBCUTANEOUS
Refills: 0 | Status: DISCONTINUED | OUTPATIENT
Start: 2024-04-25 | End: 2024-04-29

## 2024-04-25 RX ORDER — GABAPENTIN 400 MG/1
300 CAPSULE ORAL
Refills: 0 | Status: DISCONTINUED | OUTPATIENT
Start: 2024-04-25 | End: 2024-04-29

## 2024-04-25 RX ORDER — ACETAMINOPHEN 500 MG
650 TABLET ORAL EVERY 6 HOURS
Refills: 0 | Status: DISCONTINUED | OUTPATIENT
Start: 2024-04-25 | End: 2024-04-29

## 2024-04-25 RX ORDER — INFLUENZA VIRUS VACCINE 15; 15; 15; 15 UG/.5ML; UG/.5ML; UG/.5ML; UG/.5ML
0.7 SUSPENSION INTRAMUSCULAR ONCE
Refills: 0 | Status: DISCONTINUED | OUTPATIENT
Start: 2024-04-25 | End: 2024-04-29

## 2024-04-25 RX ORDER — SCOPALAMINE 1 MG/3D
1 PATCH, EXTENDED RELEASE TRANSDERMAL ONCE
Refills: 0 | Status: COMPLETED | OUTPATIENT
Start: 2024-04-25 | End: 2024-04-26

## 2024-04-25 RX ORDER — LANOLIN ALCOHOL/MO/W.PET/CERES
3 CREAM (GRAM) TOPICAL AT BEDTIME
Refills: 0 | Status: DISCONTINUED | OUTPATIENT
Start: 2024-04-25 | End: 2024-04-29

## 2024-04-25 RX ORDER — NIVOLUMAB 10 MG/ML
240 INJECTION INTRAVENOUS
Refills: 0 | DISCHARGE

## 2024-04-25 RX ORDER — SODIUM CHLORIDE 9 MG/ML
1000 INJECTION INTRAMUSCULAR; INTRAVENOUS; SUBCUTANEOUS ONCE
Refills: 0 | Status: COMPLETED | OUTPATIENT
Start: 2024-04-25 | End: 2024-04-25

## 2024-04-25 RX ORDER — HEPARIN SODIUM 5000 [USP'U]/ML
5000 INJECTION INTRAVENOUS; SUBCUTANEOUS EVERY 12 HOURS
Refills: 0 | Status: DISCONTINUED | OUTPATIENT
Start: 2024-04-25 | End: 2024-04-29

## 2024-04-25 RX ORDER — ONDANSETRON 8 MG/1
4 TABLET, FILM COATED ORAL EVERY 8 HOURS
Refills: 0 | Status: DISCONTINUED | OUTPATIENT
Start: 2024-04-25 | End: 2024-04-29

## 2024-04-25 RX ADMIN — SODIUM CHLORIDE 100 MILLILITER(S): 9 INJECTION INTRAMUSCULAR; INTRAVENOUS; SUBCUTANEOUS at 18:31

## 2024-04-25 RX ADMIN — HEPARIN SODIUM 5000 UNIT(S): 5000 INJECTION INTRAVENOUS; SUBCUTANEOUS at 22:31

## 2024-04-25 RX ADMIN — SODIUM CHLORIDE 1000 MILLILITER(S): 9 INJECTION INTRAMUSCULAR; INTRAVENOUS; SUBCUTANEOUS at 12:51

## 2024-04-25 RX ADMIN — GABAPENTIN 300 MILLIGRAM(S): 400 CAPSULE ORAL at 22:31

## 2024-04-25 NOTE — H&P ADULT - NSHPPHYSICALEXAM_GEN_ALL_CORE
Vital Signs Last 24 Hrs  T(C): 36.5 (25 Apr 2024 11:38), Max: 36.5 (25 Apr 2024 11:38)  T(F): 97.7 (25 Apr 2024 11:38), Max: 97.7 (25 Apr 2024 11:38)  HR: 91 (25 Apr 2024 11:38) (91 - 91)  BP: 142/92 (25 Apr 2024 11:38) (142/92 - 142/92)  BP(mean): --  RR: 18 (25 Apr 2024 11:38) (18 - 18)  SpO2: 100% (25 Apr 2024 11:38) (100% - 100%)    Parameters below as of 25 Apr 2024 11:38  Patient On (Oxygen Delivery Method): room air    PHYSICAL EXAM:      Constitutional: severe cachexia and muscle loss  Eyes: perrl, no conjunctival changes  ENMT: no exudates, moist oral muc, uvula midline  Neck: no JVD, no LAD  Back: no cva tenderness  Respiratory: CTA, no exp wheezes  Cardiovascular: S1S2 reg, no murmur gallop or rub  Gastrointestinal: abd soft, NT/ND + BS, scaphoid abdomen   Genitourinary: voiding  Extremities: FROM, no joint effusions, no edema, no clubbing , no cyanosis, severe muscle wasting  Vascular: pedal pulses + bilateral, warm extremities  Neurological: non focal, mot str 5/5/ all extr  Skin: no rashes  Lymph Nodes: no LAD

## 2024-04-25 NOTE — ED PROVIDER NOTE - OBJECTIVE STATEMENT
64-year-old male history of hypertension esophageal cancer type 2 diabetes cancer mets to the liver presents the emergency department for generalized weakness.  Patient states that he has been having weight loss weakness feels very fatigued.  No chest pain shortness of breath abdominal pain dysuria.  States he feels dry and dehydrated and feels like he needs fluids and needs to be admitted and be placed.  Had goals of care conversation with patient states that he is not currently on chemotherapy states he was told he is going to be on a break until May states that he is DNR/DNI does not think he needs a PEG tube now is unsure if that sign that he would want.  Discussed what matters most and it seems that being able to eat and comfort is what is most important to him.  Exam with cachectic patient otherwise nonfocal will check labs place palliative consult and reassess.+

## 2024-04-25 NOTE — H&P ADULT - HISTORY OF PRESENT ILLNESS
64-year-old male history of hypertension esophageal cancer, laparoscopic MYCHAL Bahman esophagogastrectomy esophagogastroduodenoscopy, type 2 diabetes cancer mets to the liver presents the emergency department for generalized weakness.  Patient states that he has been having weight loss weakness feels very fatigued.  No chest pain shortness of breath abdominal pain dysuria.  States he feels dry and dehydrated and feels like he needs fluids and needs to be admitted and be placed.  Had goals of care conversation with patient states that he is not currently on chemotherapy states he was told he is going to be on a break until May states that he is DNR/DNI does not think he needs a PEG tube now is unsure if that sign that he would want.  Discussed what matters most and it seems that being able to eat and comfort is what is most important to him.  Pt is unable to eat for the past 3 days, extremely cachectic adm for further eval he will need Rehab. Case d/w Dr Hernández PCP will resume care in am.

## 2024-04-25 NOTE — PATIENT PROFILE ADULT - FALL HARM RISK - PATIENT NEEDS ASSISTANCE
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  He has been on Tikosyn since 2010 without any issues.  We discussed what hypertrophic obstructive cardiomyopathy is.  We discussed Tikosyn is not the best medication for HOCM and we should consider switching him to Sotalol or amiodarone.  He has been on Tikosyn for the past 9 years without any issues or syncope and I would like to speak with his cardiologist before considering switching him.  He would also like to think about this more before considering the switch.  We also discussed what atrial fibrillation is, the natural progression of this arrhythmia and the association with stroke.  He is maintained on oral anticoagulation.  He has infrequent palpitations that he treats with PRN Metoprolol.  Treatment options include continuing or switching antiarrhythmic medications, ablation and switching to a rate control strategy.  We discussed an ablation is an option but given his HOCM his atrium could be thicker and make this more difficult.  He will follow up in 3 months or sooner if needed and knows to call with any questions or concerns.  \par    H Standing/Walking/Toileting/Moving from bed to chair

## 2024-04-25 NOTE — PATIENT PROFILE ADULT - STATED REASON FOR ADMISSION
Pt states "Dehydration, anemia, probably iron deficiency, I chew ice, and I cannot walk, I have nothing left."

## 2024-04-25 NOTE — ED PROVIDER NOTE - IV ALTEPLASE ADMIN OUTSIDE HIDDEN
Writer received call from Jenaro in ARU regarding new pt notation regarding pt agitation and hallucinations. ARU cannot accept this pt until this is resolved. Writer placed call to bedside RN Marcela regarding this. Marcela has paged Dr. Moses Martin regarding this.    Electronically signed by SURINDER Hodges on 12/18/2023 at 2:49 PM show

## 2024-04-25 NOTE — ED ADULT NURSE REASSESSMENT NOTE - NS ED NURSE REASSESS COMMENT FT1
Pt A/OX4, VSS. U/S guided IV placed in right upper arm. No c/o pain, pt with dehydration and exhaustion. Unable to ambulate currently, urinal at the bedside. Eating dinner and tolerating, however pt with c/o thick phlegm r/t esophageal CA. MD made aware, scopolamine patch behind the ear ordered. NS running at 100ml/hr. Family at the bedside.

## 2024-04-25 NOTE — PATIENT PROFILE ADULT - FALL HARM RISK - HARM RISK INTERVENTIONS

## 2024-04-25 NOTE — ED ADULT TRIAGE NOTE - CHIEF COMPLAINT QUOTE
patient brought in by EMS from home c/o weakness.  reports decreased PO intake, not eating or drinking much.  hx esophageal ca.

## 2024-04-25 NOTE — ED ADULT NURSE NOTE - NSFALLHARMRISKINTERV_ED_ALL_ED

## 2024-04-25 NOTE — H&P ADULT - NSHPLABSRESULTS_GEN_ALL_CORE
13.8   8.33  )-----------( 159      ( 25 Apr 2024 12:45 )             40.3   04-25    143  |  105  |  43<H>  ----------------------------<  114<H>  3.9   |  35<H>  |  0.90    Ca    8.9      25 Apr 2024 12:45  Mg     2.0     04-25    TPro  7.2  /  Alb  3.4  /  TBili  1.0  /  DBili  x   /  AST  390<H>  /  ALT  652<H>  /  AlkPhos  232<H>  04-25      no ekg      3/28 ONC note:   Esophageal cancer    · T3N1Mx GEJ/esophageal adenocarcinoma, Siewert Type II. No MMR deficiency. Her 2-      -no  mutations on FM      -Upper EUS: lesion almost completely obstructing s/p stent       -Pathology: esophagus mass 40 cm, adenocarcinoma, invasive, poorly differentiated      with areas of signet ring cell morphology.       -s/p 3 cycles induction carbo / taxol 3/30/23.       -s/p 6 cycles carbo/taxol with RT end 5/15/23    8/15/23 Path: Invasive adenocarcinoma, moderately differentiated with squamous and      neuroendocrine differentiation, status post treatment (score 2). Metastatic carcinoma      present in 4/14 lymph nodes. Note: The tumor shows neuroendocrine differentiation and      squamous differentiation.       -on 1 year adjuvant nivolumab, began 10/25/23      -2/9/24 PET: 1. New FDG avid right hepatic hypodensity highly suspicious for metastasis.      2. Right upper lobe nodule anteriorly increased in size with minimal activity suspicious      for malignant process. Other new nonavid subcentimeter right upper lobe and right lower      lobe nodules may represent neoplastic or inflammatory process.      -3/1/2024 Liver, biopsy: Metastatic adenocarcinoma based on the pathologic findings      and on the clinical findings, this most likely represents a metastasis from the patient's      known esophageal primary. However, a colonic primary cannot be entirely excluded.      -send FM on above.      -s/p 8 cycles adjuvant Nivolumab       -plan for folfox.

## 2024-04-25 NOTE — ED ADULT NURSE NOTE - OBJECTIVE STATEMENT
Pt with failure to thrive. has esophageal cancer and is cachectic. says he would like palliative care and rehab. denies any pain and says he has an appetite. no longer has a stomach so has diarrhea. denies sob, cp.

## 2024-04-25 NOTE — ED PROVIDER NOTE - PHYSICAL EXAMINATION
Constitutional: cachectic   Eyes: PERRLA EOMI  Head: Normocephalic atraumatic  Mouth: MMM  Cardiac: regular rate   Resp: unlabored breathing clear b/l   GI: Abd s/nt/nd  Neuro: grossly normal and intact  Skin: No visible rashes Constitutional: cachectic   Eyes: PERRLA EOMI  Head: Normocephalic atraumatic  Mouth: dry cracked lips  Cardiac: regular rate   Resp: unlabored breathing clear b/l   GI: Abd s/nt/nd  Neuro: grossly normal and intact  Skin: No visible rashes

## 2024-04-25 NOTE — PATIENT PROFILE ADULT - FALL HARM RISK - FACTORS NURSING JUDGEMENT
[address], [facility] [address]                                 PROCEDURE NOTE    PATIENT NAME: Sudhir Zarate                      :        1958  MED REC NO:   [id number]                            ROOM:  ACCOUNT NO:   [de-identified]                           ADMIT DATE: 2023  PROVIDER:     Guy Quinn MD    DATE OF PROCEDURE:  2023    PREPROCEDURE DIAGNOSES:  Change in bowel habits. Colonic polyps. POSTPROCEDURE DIAGNOSES:  Residual polyps of three; one at 65, 75 and  the cecal area. Diverticulosis throughout. PROCEDURE:  Consisted of colonoscopy, polypectomy x3 with hot biopsy  forceps. HEMOSTASIS:  Adequate. BLOOD LOSS:  Negligible. SURGEON:  Guy Quinn MD    DESCRIPTION OF THE PROCEDURE:  Procedure was carried out under awake  sedation. The patient left supine position. Rectal examination was  performed which revealed good anal tone. No masses in the rectal  ampulla. Prostate was not enlarged with preservation of intraprostatic  groove. Endoscope was introduced and progressed without difficulty  through sigmoid colon. At approximately 65 cm, there was a polyp,  inflammatory nature due to the extent of the diverticular disease. This  was biopsied and cauterized with a hot biopsy forceps. The second was  present at approximately 75 cm which was biopsied and cauterized. Diverticulosis was present throughout all the way into the distal  portion of the ascending colon. Within the cecal area, there was a  small inflammatory polyp which again was biopsied and cauterized. Ileocecal valve was well visualized without any pathology. With the  patient in supine position, the endoscope was progressively withdrawn  with similar findings as mentioned. Hemostasis was adequate. Blood  loss negligible. The patient tolerated the procedure well.   The patient  and wife were informed of the
Yes

## 2024-04-25 NOTE — ED PROVIDER NOTE - CLINICAL SUMMARY MEDICAL DECISION MAKING FREE TEXT BOX
64-year-old male history of hypertension esophageal cancer type 2 diabetes cancer mets to the liver presents the emergency department for generalized weakness.  Patient states that he has been having weight loss weakness feels very fatigued.  No chest pain shortness of breath abdominal pain dysuria.  States he feels dry and dehydrated and feels like he needs fluids and needs to be admitted and be placed.  Had goals of care conversation with patient states that he is not currently on chemotherapy states he was told he is going to be on a break until May states that he is DNR/DNI does not think he needs a PEG tube now is unsure if that sign that he would want.  Discussed what matters most and it seems that being able to eat and comfort is what is most important to him.  Exam with cachectic patient otherwise nonfocal will check labs place palliative consult and reassess.+ 64-year-old male history of hypertension esophageal cancer type 2 diabetes cancer mets to the liver presents the emergency department for generalized weakness.  Patient states that he has been having weight loss weakness feels very fatigued.  No chest pain shortness of breath abdominal pain dysuria.  States he feels dry and dehydrated and feels like he needs fluids and needs to be admitted and be placed.  Had goals of care conversation with patient states that he is not currently on chemotherapy states he was told he is going to be on a break until May states that he is DNR/DNI does not think he needs a PEG tube now is unsure if that sign that he would want.  Discussed what matters most and it seems that being able to eat and comfort is what is most important to him.  Exam with cachectic patient otherwise nonfocal will check labs place palliative consult and reassess.  104CT head negative.  Case discussed with attending hospitalist Dr. Brizuela  As patient has failure to thrive weakness dehydration accepts patient.   discussed with patient hospitalist states he is not ready for this but his goals are to be able to eat and gain weight.  Still awaiting CMP patient will be do not move until labs result.  109 spoke with palliative will see patient.

## 2024-04-25 NOTE — H&P ADULT - ASSESSMENT
* FTT,, h/o metastatic esophageal cancer stage  4  IVF  nutrition consult  Onc and Pall consult  pt with severe muscle mass loss unable to ambulate   PT eval re: rehab  needs further GOC pt is very sweet, some goc clarified further more to be discussed

## 2024-04-25 NOTE — ED PROVIDER NOTE - CARE PLAN
Principal Discharge DX:	Adult failure to thrive  Secondary Diagnosis:	Weakness  Secondary Diagnosis:	Acute dehydration   1

## 2024-04-26 LAB
CULTURE RESULTS: SIGNIFICANT CHANGE UP
SPECIMEN SOURCE: SIGNIFICANT CHANGE UP

## 2024-04-26 PROCEDURE — 99223 1ST HOSP IP/OBS HIGH 75: CPT

## 2024-04-26 PROCEDURE — 99233 SBSQ HOSP IP/OBS HIGH 50: CPT

## 2024-04-26 PROCEDURE — 99497 ADVNCD CARE PLAN 30 MIN: CPT | Mod: 25

## 2024-04-26 PROCEDURE — 74177 CT ABD & PELVIS W/CONTRAST: CPT | Mod: 26

## 2024-04-26 RX ADMIN — GABAPENTIN 300 MILLIGRAM(S): 400 CAPSULE ORAL at 21:35

## 2024-04-26 RX ADMIN — HEPARIN SODIUM 5000 UNIT(S): 5000 INJECTION INTRAVENOUS; SUBCUTANEOUS at 21:35

## 2024-04-26 RX ADMIN — SCOPALAMINE 1 PATCH: 1 PATCH, EXTENDED RELEASE TRANSDERMAL at 10:38

## 2024-04-26 RX ADMIN — SODIUM CHLORIDE 100 MILLILITER(S): 9 INJECTION INTRAMUSCULAR; INTRAVENOUS; SUBCUTANEOUS at 21:35

## 2024-04-26 RX ADMIN — SCOPALAMINE 1 PATCH: 1 PATCH, EXTENDED RELEASE TRANSDERMAL at 19:55

## 2024-04-26 RX ADMIN — GABAPENTIN 300 MILLIGRAM(S): 400 CAPSULE ORAL at 10:37

## 2024-04-26 RX ADMIN — HEPARIN SODIUM 5000 UNIT(S): 5000 INJECTION INTRAVENOUS; SUBCUTANEOUS at 10:37

## 2024-04-26 NOTE — PROGRESS NOTE ADULT - NEUROLOGICAL
Pt obviously weak. Assistance needed to even sit up in bed/normal/cranial nerves II-XII intact/sensation intact

## 2024-04-26 NOTE — DIETITIAN INITIAL EVALUATION ADULT - NUTRITON FOCUSED PHYSICAL EXAM
yes... Odomzo Counseling- I discussed with the patient the risks of Odomzo including but not limited to nausea, vomiting, diarrhea, constipation, weight loss, changes in the sense of taste, decreased appetite, muscle spasms, and hair loss.  The patient verbalized understanding of the proper use and possible adverse effects of Odomzo.  All of the patient's questions and concerns were addressed.

## 2024-04-26 NOTE — DIETITIAN INITIAL EVALUATION ADULT - ORAL NUTRITION SUPPLEMENTS
Add [employer] BID (325kcal, 16 protein) AND high-protein berry smoothie 1x/day (370 kcal, 26g protein) to optimize nutritional needs

## 2024-04-26 NOTE — CONSULT NOTE ADULT - SUBJECTIVE AND OBJECTIVE BOX
HPI: Pt is a 64y old Male with hx of       PAIN: ( )Yes   ( )No  Level:  Location:  Intensity:    /10  Quality:  Aggravating Factors:  Alleviating Factors:  Radiation:  Duration/Timing:  Impact on ADLs:    DYSPNEA: ( ) Yes  ( ) No  Level:    PAST MEDICAL & SURGICAL HISTORY:  Esophageal mass  HTN (hypertension)  Esophageal cancer  Type 2 diabetes mellitus  Malignant neoplasm of cardia  History of hypotension  History of tachycardia  Chemotherapy-induced neuropathy  S/P cholecystectomy  H/O lithotripsy  H/O endoscopy  History of chemotherapy  S/P radiation therapy  Esophageal stenosis  History of esophagogastroduodenoscopy (EGD)    SOCIAL HX:     Hx opiate tolerance ( )YES  ( )NO    Baseline ADLs  (Prior to Admission)  ( ) Independent   ( )Dependent    FAMILY HISTORY:  FH: prostate cancer (Father)        Review of Systems:    Anxiety-  Depression-  Physical Discomfort-  Dyspnea-  Constipation-  Diarrhea-  Nausea-  Vomiting-  Anorexia-  Weight Loss-   Cough-  Secretions-  Fatigue-  Weakness-  Delirium-    All other systems reviewed and negative  Unable to obtain/Limited due to:      PHYSICAL EXAM:    Vital Signs Last 24 Hrs  T(C): 36.3 (26 Apr 2024 07:34), Max: 36.6 (25 Apr 2024 23:03)  T(F): 97.3 (26 Apr 2024 07:34), Max: 97.9 (25 Apr 2024 23:03)  HR: 63 (26 Apr 2024 07:34) (63 - 91)  BP: 128/75 (26 Apr 2024 07:34) (113/75 - 142/92)  BP(mean): 97 (25 Apr 2024 15:31) (97 - 97)  RR: 18 (26 Apr 2024 07:34) (17 - 18)  SpO2: 100% (26 Apr 2024 07:34) (98% - 100%)    Parameters below as of 26 Apr 2024 07:34  Patient On (Oxygen Delivery Method): room air      Daily Height in cm: 170.18 (25 Apr 2024 11:38)    Daily     PPSV2:   %  FAST:    General:  Mental Status:  HEENT:  Lungs:  Cardiac:  GI:  :  Ext:  Neuro:      LABS:                        13.8   8.33  )-----------( 159      ( 25 Apr 2024 12:45 )             40.3     04-25    143  |  105  |  43<H>  ----------------------------<  114<H>  3.9   |  35<H>  |  0.90    Ca    8.9      25 Apr 2024 12:45  Mg     2.0     04-25    TPro  7.2  /  Alb  3.4  /  TBili  1.0  /  DBili  x   /  AST  390<H>  /  ALT  652<H>  /  AlkPhos  232<H>  04-25      Albumin: Albumin: 3.4 g/dL (04-25 @ 12:45)      Allergies    No Known Allergies    Intolerances      MEDICATIONS  (STANDING):  gabapentin 300 milliGRAM(s) Oral two times a day  heparin   Injectable 5000 Unit(s) SubCutaneous every 12 hours  influenza  Vaccine (HIGH DOSE) 0.7 milliLiter(s) IntraMuscular once  sodium chloride 0.9%. 1000 milliLiter(s) (100 mL/Hr) IV Continuous <Continuous>    MEDICATIONS  (PRN):  acetaminophen     Tablet .. 650 milliGRAM(s) Oral every 6 hours PRN Temp greater or equal to 38C (100.4F), Mild Pain (1 - 3)  aluminum hydroxide/magnesium hydroxide/simethicone Suspension 30 milliLiter(s) Oral every 4 hours PRN Dyspepsia  melatonin 3 milliGRAM(s) Oral at bedtime PRN Insomnia  ondansetron Injectable 4 milliGRAM(s) IV Push every 8 hours PRN Nausea and/or Vomiting      RADIOLOGY/ADDITIONAL STUDIES: HPI: hypertension esophageal cancer, laparoscopic MYCHAL Bahman esophagogastrectomy esophagogastroduodenoscopy, type 2 diabetes cancer mets to the liver presents the emergency department for generalized weakness.  Patient states that he has been having weight loss weakness feels very fatigued.  No chest pain shortness of breath abdominal pain dysuria.  States he feels dry and dehydrated and feels like he needs fluids and needs to be admitted and be placed.  Had goals of care conversation with patient states that he is not currently on chemotherapy states he was told he is going to be on a break until May states that he is DNR/DNI does not think he needs a PEG tube now is unsure if that sign that he would want.  Discussed what matters most and it seems that being able to eat and comfort is what is most important to him.  Palliative medicine consulted to help establish GOC and advance care planning     4/26/2024 pt seen and examined with no family at bedside, patient states he was able to tolerate his breakfast and that his goal is to work on getting stronger, patient friend showed up will follow up with GOC meeting. Patient states comfortable at this time       PAIN: ( )Yes   (x )No  DYSPNEA: ( ) Yes  (x ) No  Level:    PAST MEDICAL & SURGICAL HISTORY:  Esophageal mass  HTN (hypertension)  Esophageal cancer  Type 2 diabetes mellitus  Malignant neoplasm of cardia  History of hypotension  History of tachycardia  Chemotherapy-induced neuropathy  S/P cholecystectomy  H/O lithotripsy  H/O endoscopy  History of chemotherapy  S/P radiation therapy  Esophageal stenosis  History of esophagogastroduodenoscopy (EGD)    SOCIAL HX:     Hx opiate tolerance ( )YES  ( )NO    Baseline ADLs  (Prior to Admission)  ( ) Independent   ( )Dependent    FAMILY HISTORY:  FH: prostate cancer (Father)    Review of Systems:    Anxiety-  Depression-  Physical Discomfort-  Dyspnea-  Constipation-  Diarrhea-  Nausea-  Vomiting-  Anorexia-  Weight Loss-   Cough-  Secretions-  Fatigue-  Weakness-  Delirium-    All other systems reviewed and negative  Unable to obtain/Limited due to:      PHYSICAL EXAM:    Vital Signs Last 24 Hrs  T(C): 36.3 (26 Apr 2024 07:34), Max: 36.6 (25 Apr 2024 23:03)  T(F): 97.3 (26 Apr 2024 07:34), Max: 97.9 (25 Apr 2024 23:03)  HR: 63 (26 Apr 2024 07:34) (63 - 91)  BP: 128/75 (26 Apr 2024 07:34) (113/75 - 142/92)  BP(mean): 97 (25 Apr 2024 15:31) (97 - 97)  RR: 18 (26 Apr 2024 07:34) (17 - 18)  SpO2: 100% (26 Apr 2024 07:34) (98% - 100%)    Parameters below as of 26 Apr 2024 07:34  Patient On (Oxygen Delivery Method): room air      Daily Height in cm: 170.18 (25 Apr 2024 11:38)    Daily     PPSV2:   %  FAST:    General:  Mental Status:  HEENT:  Lungs:  Cardiac:  GI:  :  Ext:  Neuro:      LABS:                        13.8   8.33  )-----------( 159      ( 25 Apr 2024 12:45 )             40.3     04-25    143  |  105  |  43<H>  ----------------------------<  114<H>  3.9   |  35<H>  |  0.90    Ca    8.9      25 Apr 2024 12:45  Mg     2.0     04-25    TPro  7.2  /  Alb  3.4  /  TBili  1.0  /  DBili  x   /  AST  390<H>  /  ALT  652<H>  /  AlkPhos  232<H>  04-25      Albumin: Albumin: 3.4 g/dL (04-25 @ 12:45)      Allergies    No Known Allergies    Intolerances      MEDICATIONS  (STANDING):  gabapentin 300 milliGRAM(s) Oral two times a day  heparin   Injectable 5000 Unit(s) SubCutaneous every 12 hours  influenza  Vaccine (HIGH DOSE) 0.7 milliLiter(s) IntraMuscular once  sodium chloride 0.9%. 1000 milliLiter(s) (100 mL/Hr) IV Continuous <Continuous>    MEDICATIONS  (PRN):  acetaminophen     Tablet .. 650 milliGRAM(s) Oral every 6 hours PRN Temp greater or equal to 38C (100.4F), Mild Pain (1 - 3)  aluminum hydroxide/magnesium hydroxide/simethicone Suspension 30 milliLiter(s) Oral every 4 hours PRN Dyspepsia  melatonin 3 milliGRAM(s) Oral at bedtime PRN Insomnia  ondansetron Injectable 4 milliGRAM(s) IV Push every 8 hours PRN Nausea and/or Vomiting      RADIOLOGY/ADDITIONAL STUDIES: HPI: hypertension esophageal cancer, laparoscopic MYCHAL Bahman esophagogastrectomy esophagogastroduodenoscopy, type 2 diabetes cancer mets to the liver presents the emergency department for generalized weakness.  Patient states that he has been having weight loss weakness feels very fatigued.  No chest pain shortness of breath abdominal pain dysuria.  States he feels dry and dehydrated and feels like he needs fluids and needs to be admitted and be placed.  Had goals of care conversation with patient states that he is not currently on chemotherapy states he was told he is going to be on a break until May states that he is DNR/DNI does not think he needs a PEG tube now is unsure if that sign that he would want.  Discussed what matters most and it seems that being able to eat and comfort is what is most important to him.  Palliative medicine consulted to help establish GOC and advance care planning     4/26/2024 pt seen and examined with no family at bedside, patient states he was able to tolerate his breakfast and that his goal is to work on getting stronger, patient friend showed up will follow up with GOC meeting. Patient states comfortable at this time       PAIN: ( )Yes   (x )No  DYSPNEA: ( ) Yes  (x ) No  Level:    PAST MEDICAL & SURGICAL HISTORY:  Esophageal mass  HTN (hypertension)  Esophageal cancer  Type 2 diabetes mellitus  Malignant neoplasm of cardia  History of hypotension  History of tachycardia  Chemotherapy-induced neuropathy  S/P cholecystectomy  H/O lithotripsy  H/O endoscopy  History of chemotherapy  S/P radiation therapy  Esophageal stenosis  History of esophagogastroduodenoscopy (EGD)    SOCIAL HX:     Hx opiate tolerance ( )YES  ( )NO    Baseline ADLs  (Prior to Admission)  ( ) Independent   ( )Dependent    FAMILY HISTORY:  FH: prostate cancer (Father)    Review of Systems:    Anxiety- no   Depression - sadness a times   Physical Discomfort- denies   Dyspnea- at times   Constipation- denies   Diarrhea- denies   Nausea- at times   Vomiting- at times   Anorexia- yes   Weight Loss-  yes   Cough- denies   Secretions- denies   Fatigue- yes  Weakness- severe   Delirium- denies     All other systems reviewed and negative    PHYSICAL EXAM:    Vital Signs Last 24 Hrs  T(C): 36.3 (26 Apr 2024 07:34), Max: 36.6 (25 Apr 2024 23:03)  T(F): 97.3 (26 Apr 2024 07:34), Max: 97.9 (25 Apr 2024 23:03)  HR: 63 (26 Apr 2024 07:34) (63 - 91)  BP: 128/75 (26 Apr 2024 07:34) (113/75 - 142/92)  BP(mean): 97 (25 Apr 2024 15:31) (97 - 97)  RR: 18 (26 Apr 2024 07:34) (17 - 18)  SpO2: 100% (26 Apr 2024 07:34) (98% - 100%)    Parameters below as of 26 Apr 2024 07:34  Patient On (Oxygen Delivery Method): room air    Daily Height in cm: 170.18 (25 Apr 2024 11:38)      PPSV2:   %  FAST:    General:  Mental Status:  HEENT:  Lungs:  Cardiac:  GI:  :  Ext:  Neuro:      LABS:                        13.8   8.33  )-----------( 159      ( 25 Apr 2024 12:45 )             40.3     04-25    143  |  105  |  43<H>  ----------------------------<  114<H>  3.9   |  35<H>  |  0.90    Ca    8.9      25 Apr 2024 12:45  Mg     2.0     04-25    TPro  7.2  /  Alb  3.4  /  TBili  1.0  /  DBili  x   /  AST  390<H>  /  ALT  652<H>  /  AlkPhos  232<H>  04-25  Albumin: Albumin: 3.4 g/dL (04-25 @ 12:45)    Allergies - No Known Allergies      MEDICATIONS  (STANDING):  gabapentin 300 milliGRAM(s) Oral two times a day  heparin   Injectable 5000 Unit(s) SubCutaneous every 12 hours  influenza  Vaccine (HIGH DOSE) 0.7 milliLiter(s) IntraMuscular once  sodium chloride 0.9%. 1000 milliLiter(s) (100 mL/Hr) IV Continuous <Continuous>    MEDICATIONS  (PRN):  acetaminophen     Tablet .. 650 milliGRAM(s) Oral every 6 hours PRN Temp greater or equal to 38C (100.4F), Mild Pain (1 - 3)  aluminum hydroxide/magnesium hydroxide/simethicone Suspension 30 milliLiter(s) Oral every 4 hours PRN Dyspepsia  melatonin 3 milliGRAM(s) Oral at bedtime PRN Insomnia  ondansetron Injectable 4 milliGRAM(s) IV Push every 8 hours PRN Nausea and/or Vomiting      RADIOLOGY/ADDITIONAL STUDIES:    ACC: [id number] EXAM:  CT ABDOMEN AND PELVIS IC   ORDERED BY: MARCELLA MEJIA   PROCEDURE DATE:  04/26/2024    INTERPRETATION:  CLINICAL INFORMATION: Acute rise in LFTs. Known liver   metastases from metastatic esophageal cancer.  COMPARISON: April 12, 2024  CONTRAST/COMPLICATIONS:  IV Contrast: Omnipaque 350  90 cc administered   0 cc discarded  Oral Contrast: NONE  Complications: None reported at time of study completion  PROCEDURE:  CT of the Abdomen and Pelvis was performed.  Sagittal and coronal reformats were performed.    FINDINGS:  LOWER CHEST: Within normal limits.  LIVER: 2.6 cm segment 7 hypoattenuating lesion, previously 2.4 cm. A few   additional small cysts.  BILE DUCTS: Normal caliber.  GALLBLADDER: Cholecystectomy.  SPLEEN: Within normal limits.  PANCREAS: Within normal limits.  ADRENALS: Within normal limits.  KIDNEYS/URETERS: Within normal limits.  BLADDER: Within normal limits.  REPRODUCTIVE ORGANS: Prostate is enlarged.  BOWEL: No bowel obstruction. Appendix not visualized. Gastric   pull-through.  PERITONEUM: No ascites.  VESSELS: Within normal limits.  RETROPERITONEUM/LYMPH NODES: No lymphadenopathy.  ABDOMINAL WALL: Within normal limits.  BONES: Anasarca. Marked paucity of subcutaneous and visceral fat.    IMPRESSION:  No significant change in solitary liver metastasis.      ACC: [id number] EXAM:  XR CHEST PORTABLE URGENT 1V   ORDERED BY: TANYA HARRIS   PROCEDURE DATE:  04/25/2024    INTERPRETATION:  AP chest on April 25, 2024 at 2:04 PM. Patient has chest   pain.  Heart size normal.  Lungs are clear.  Chest is similar to September 7, 2023.    IMPRESSION: No acute finding.      ACC: [id number] EXAM:  CT BRAIN   ORDERED BY: TANYA HARRIS   PROCEDURE DATE:  04/25/2024    INTERPRETATION:  EXAM: CT HEAD WITHOUT INTRAVENOUS CONTRAST  HISTORY: Weakness  TECHNIQUE: Multiple axial images were obtained from the skull base to the   vertex. Sagittal and coronal reformatted images were obtained from the   axial data set. The images were reviewed in brain and bone windows.  COMPARISON: No prior studies are available for comparison.    FINDINGS:  No acute intracranial hemorrhage. Small areas of decreased attenuation   along the periventricular white matter, compatible with chronic small   vessel disease. No hydrocephalus. The extra-axial spaces and basal   cisterns are within normal limits. No midline shift or mass effect   present.  The cranial cervical junction is within normal limits. The sella is not   expanded. No depressed calvarial fracture. Frothy lacelike secretions in   the left sphenoid sinus. The visualized mastoid air cells are well   aerated. Thevisualized orbits are within normal limits.    IMPRESSION:    1.  No evidence of acute intracranial hemorrhage or midline shift.  2.  Chronic small vessel disease. If there is continued concern for acute   neurologic compromise, recommend MRI of the brain for further evaluation.

## 2024-04-26 NOTE — DIETITIAN INITIAL EVALUATION ADULT - ORAL INTAKE PTA/DIET HISTORY
Pt lives at home w/ wife and daughter that cook/grocery shop; endorses very poor appetite and consuming <50% of ENN chronically 2/2 dry mouth, weakness, increased mucus secretions. Hx of stage 4 metastatic esophageal cancer, s/p chemo x 1 year ago. Was previously noted in TF on 8/16/23 as per previous RD note- no longer w/ PEG.  Pt lives at home w/ wife and daughter that cook/grocery shop; endorses very poor appetite and consuming <50% of ENN chronically 2/2 dry mouth, weakness, increased mucus secretions. Tried boost high kcal 2x/day this past week, but w/ persistent diarrhea afterwards. Hx of stage 4 metastatic esophageal cancer, s/p chemo x 1 year ago. Was previously noted in TF on 8/16/23 as per previous RD note - no longer w/ PEG.

## 2024-04-26 NOTE — CONSULT NOTE ADULT - ASSESSMENT
63 y/o M with known metastatic esophageal CA not currently on cancer directed therapy, most recent tx 02/2024 with plans for repeat PET 05/2024 prior to going back on tx, currently admitted with failure to thrive / poor PO intake.      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   - Siewert Type II; No MMR deficiency; Her 2 NEG  - S/p x3 cycles induction carbo / taxol 03/30/23 then s/p x6 cycles carbo / taxol with RT completed 05/15/23  - S/p Andrew Bahman 08/15/23 with surgical path noting invasive adenocarcinoma, moderately diff with squamous and neuroendocrine features; metastatic carcinoma 04/14 LNs  - 10/25/23; Started x1 year Nivolumab  - 02/09/24 PET with evidence of POD / distant mets   - 03/01/24 liver bx confirmed metastatic adenocarcinoma  - Patient wished to take break from tx and continue to work with both Dr Richter and Dr Colton Wallace, both holistic practitioners doing alternative therapy   - Plan was to continue working with them until late April / early May 2024 and repeat PET, then decide on moving forward with FOLFOX cancer directed therapy  - Patient now with ongoing failure to thrive, poor PO intake, weight loss   - Will evaluate inpatient with covering attending this morning and discuss options   - Recommend Palliative involvement to help with GOC  - Continue supportive measures as necessary and inline with current GOC  - No planned inpatient cancer directed therapy   - Outpatient follow up with Primary Onc Dr Anjum jessica and d/c'nicholas Salinas PA-C  Hematology / Oncology  [facility] [place]  [phone] 65 y/o M with known metastatic esophageal CA not currently on cancer directed therapy, most recent tx 02/2024 with plans for repeat PET 05/2024 prior to going back on tx, currently admitted with failure to thrive / poor PO intake.      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   - Siewert Type II; No MMR deficiency; Her 2 NEG  - S/p x3 cycles induction carbo / taxol 03/30/23 then s/p x6 cycles carbo / taxol with RT completed 05/15/23  - S/p Andrew Bahman 08/15/23 with surgical path noting invasive adenocarcinoma, moderately diff with squamous and neuroendocrine features; metastatic carcinoma 04/14 LNs  - 10/25/23; Started x1 year Nivolumab  - 02/09/24 PET with evidence of POD / distant mets   - 03/01/24 liver bx confirmed metastatic adenocarcinoma  - Patient wished to take break from additional cancer directed therapy and continue to work with both Dr Richter and Dr Colton Wallace, both holistic practitioners doing alternative therapy   - Patient was agreeable to repeat PET / CT imaging late April / early May 2024, then decide on moving forward with FOLFOX / cancer directed therapy   - Patient now with ongoing failure to thrive, poor PO intake, weight loss & despite noted POD and not being on cancer directed therapy, he was not eager to receive additional cancer directed therapy at this time and wants to focus on going to rehab in attempt to improve functional / performance status   - Recommend Palliative involvement to help with GOC  - Continue supportive measures as necessary  - No planned inpatient cancer directed therapy   - Outpatient follow up with Primary Onc Dr Anjum jessica and d/c'nicholas Salinas PA-C  Hematology / Oncology  [facility] [place]  [phone] 65 y/o M with known metastatic esophageal CA not currently on cancer directed therapy, most recent tx 02/2024 with plans for repeat PET 05/2024 prior to going back on tx, currently admitted with failure to thrive / poor PO intake.      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   - Siewert Type II; No MMR deficiency; Her 2 NEG  - S/p x3 cycles induction carbo / taxol 03/30/23 then s/p x6 cycles carbo / taxol with RT completed 05/15/23  - S/p Andrew Bahman 08/15/23 with surgical path noting invasive adenocarcinoma, moderately diff with squamous and neuroendocrine features; metastatic carcinoma 04/14 LNs  - 10/25/23; Started x1 year Nivolumab  - 02/09/24 PET with evidence of POD / distant mets   - 03/01/24 liver bx confirmed metastatic adenocarcinoma  - Patient wished to take break from additional cancer directed therapy and continue to work with both Dr Richter and Dr Colton Wallace, both holistic practitioners doing alternative therapy   - Patient was agreeable to repeat PET / CT imaging late April / early May 2024, then decide on moving forward with FOLFOX / cancer directed therapy   - Patient now with ongoing failure to thrive, poor PO intake, weight loss & despite noted POD and not being on cancer directed therapy, he was not eager to receive additional cancer directed therapy at this time and wants to focus on going to rehab in attempt to improve functional / performance status   - Recommend Palliative involvement to help with GOC  - Continue supportive measures as necessary  - No planned inpatient cancer directed therapy   - Outpatient follow up with Primary Onc Dr Valero once stable and d/c'ed      # Acute Transaminitis     - LFTs jumped up over the past 2 weeks  - Most recent CT AP imaging 04/11/24  - Recommend repeat CT imaging to further evaluate liver  - Most likely from known progressing metastatic disease         Dustin Salinas PA-C  Hematology / Oncology  [facility] [place]  [phone]

## 2024-04-26 NOTE — CONSULT NOTE ADULT - NS ATTEND AMEND GEN_ALL_CORE FT
Patient with metastatic esophageal cancer, followed by Dr. Valero. He was recently diagnosed with progression of disease by liver biopsy on 3/1/2024 He has since been following more holistic approach but has had a decline in his nutritional and functional status.    He is admitted with plans of going to rehab.  He was due for repeat imaging to assess his disease and to consider palliative chemotherapy.  At current time, he appears to weak/frail to receive treatment.  He is resistant to more palliative measures or hospice.  Wants to attempt rehab.   Concerned that his LFT's have worsened, recommend imaging of his liver to see if his liver metastases have progressed.   Palliative care consulted to further discuss GOC with him as well.

## 2024-04-26 NOTE — DIETITIAN INITIAL EVALUATION ADULT - OTHER INFO
64-year-old male history of hypertension esophageal cancer, laparoscopic MYCHAL Bahman esophagogastrectomy esophagogastroduodenoscopy, type 2 diabetes cancer mets to the liver presents the emergency department for generalized weakness.  Patient states that he has been having weight loss weakness feels very fatigued. States he feels dry, dehydrated, and feels like he needs fluids and needs to be admitted and be placed.  Had goals of care conversation with patient states that he is not currently on chemotherapy states he was told he is going to be on a break until May states that he is DNR/DNI does not think he needs a PEG tube now is unsure if that sign that he would want. Discussed what matters most and it seems that being able to eat and comfort is what is most important to him.  Pt is unable to eat for the past 3 days, extremely cachectic adm for further eval he will need Rehab.  Admit for FTT -  h/o of metastatic esophageal cancer stage 4, acute transaminitis  64-year-old male history of hypertension esophageal cancer, laparoscopic MYCHAL Bahman esophagogastrectomy esophagogastroduodenoscopy, type 2 diabetes cancer mets to the liver presents the emergency department for generalized weakness.  Patient states that he has been having weight loss weakness feels very fatigued. States he feels dry, dehydrated, and feels like he needs fluids and needs to be admitted and be placed.  Had goals of care conversation with patient states that he is not currently on chemotherapy states he was told he is going to be on a break until May states that he is DNR/DNI does not think he needs a PEG tube now is unsure if that sign that he would want. Discussed what matters most and it seems that being able to eat and comfort is what is most important to him.  Pt is unable to eat for the past 3 days, extremely cachectic adm for further eval he will need Rehab.  Admit for FTT -  h/o of metastatic esophageal cancer stage 4, acute transaminitis     Known to nutr services and dx'd w/ mal on 8/16/23; still meets criteria. Consuming breakfast at time of visit (ate 100% of fruit, cream of rice, 50% of [place] toast). Reports improved appetite and is feeling "very hungry" since admit (x 1 day). Reports UBW of ~193# x 2 years ago --> 153# x 1 year ago, now wt at ~100#. Bed scale wt of 89# taken by RD on 4/26/24. Wt hx as per EMR: 128# (as per EMR on 8/16/23). Unintentional wt loss of 64# / 41.8% wt loss x 1 year; severe & clinically significant. NFPE reveals severe muscle/ fat wasting - appears emaciated, cathexic, thin/frail, and huseyin. C/w regular diet as tolerated; will trial [employer]akes BID (325kcal, 16 protein) AND high-protein berry smoothie 1x/day (370 kcal, 26g protein) to optimize nutritional needs - pt is receptive. Is DNR/DNI; however, nutrition support NOT addressed in [facility] - ****Confirm goals of care regarding nutrition support/ RD discussed TF as an option (w/ chronically poor PO intake/appetite and unable to meet needs s/p mets cancer) & pt refuses - please update MOLST to reflect pt's wishes. See below for other recommendations.

## 2024-04-26 NOTE — CONSULT NOTE ADULT - SUBJECTIVE AND OBJECTIVE BOX
HPI:    63 y/o M with a PMHx of known metastatic esophageal CA not current on cancer directed therapy, s/p laparoscopic MYCHAL Bahman esophagogastrectomy esophagogastroduodenoscopy presented the ED for generalized weakness, poor PO intake, failure to thrive. Patient stated that he has been having more weight loss as well. He stated he feels dry, dehydrated and feels like he needs fluids and needs to be admitted and be placed?  He is not currently on cancer directed therapy, most recent 2024 and noted he was told he is going to be on a break until 2024 then supposed to receive UTD PET imaging.     24:       PAST MEDICAL & SURGICAL HISTORY:    Esophageal mass    HTN (hypertension)    Esophageal cancer    Type 2 diabetes mellitus    Malignant neoplasm of cardia    History of hypotension    History of tachycardia    Chemotherapy-induced neuropathy    S/P cholecystectomy    H/O lithotripsy    H/O endoscopy    History of chemotherapy    S/P radiation therapy    Esophageal stenosis    History of esophagogastroduodenoscopy (EGD)        MEDICATIONS  (STANDING):    gabapentin 300 milliGRAM(s) Oral two times a day  heparin   Injectable 5000 Unit(s) SubCutaneous every 12 hours  influenza  Vaccine (HIGH DOSE) 0.7 milliLiter(s) IntraMuscular once  scopolamine 1 mG/72 Hr(s) Patch 1 Patch Transdermal once  sodium chloride 0.9%. 1000 milliLiter(s) (100 mL/Hr) IV Continuous <Continuous>      MEDICATIONS  (PRN):    acetaminophen     Tablet .. 650 milliGRAM(s) Oral every 6 hours PRN Temp greater or equal to 38C (100.4F), Mild Pain (1 - 3)  aluminum hydroxide/magnesium hydroxide/simethicone Suspension 30 milliLiter(s) Oral every 4 hours PRN Dyspepsia  melatonin 3 milliGRAM(s) Oral at bedtime PRN Insomnia  ondansetron Injectable 4 milliGRAM(s) IV Push every 8 hours PRN Nausea and/or Vomiting        ALLERGIES:    No Known Allergies          FAMILY HISTORY:    prostate cancer (Father)      REVIEW OF SYSTEMS:    Constitutional, Eyes, ENT, Cardiovascular, Respiratory, Gastrointestinal, Genitourinary, Musculoskeletal, Integumentary, Neurological, Psychiatric, Endocrine, Heme/Lymph and Allergic/Immunologic review of systems are otherwise negative except as noted in HPI.       VITALS:    T(C): 36.3 (2024 07:34), Max: 36.6 (2024 23:03)  T(F): 97.3 (2024 07:34), Max: 97.9 (2024 23:03)  HR: 63 (2024 07:34) (63 - 91)  BP: 128/75 (2024 07:34) (113/75 - 142/92)  BP(mean): 97 (2024 15:31) (97 - 97)  RR: 18 (2024 07:34) (17 - 18)  SpO2: 100% (2024 07:34) (98% - 100%)    Parameters below as of 2024 07:34  Patient On (Oxygen Delivery Method): room air        PHYSICAL:    Constitutional:   Eyes: no conjunctival infection, anicteric  ENT: pharynx is unremarkable  Neck: supple without JVD  Pulmonary: clear to auscultation bilaterally  Cardiac: RRR  Vascular: no JVD no calf tenderness, venous stasis changes  Abdomen: normoactive bowel sounds, soft and nontender, no hepatosplenomegaly or masses appreciated.   Lymphatic: no peripheral adenopathy appreciated.   Musculoskeletal: full range of motion and no deformities appreciated.   Skin: normal appearance, no rash  Neurology:      LABS:    CBC Full  -  ( 2024 12:45 )  WBC Count : 8.33 K/uL  RBC Count : 4.24 M/uL  Hemoglobin : 13.8 g/dL  Hematocrit : 40.3 %  Platelet Count - Automated : 159 K/uL  Mean Cell Volume : 95.0 fl  Mean Cell Hemoglobin : 32.5 pg  Mean Cell Hemoglobin Concentration : 34.2 gm/dL  Auto Neutrophil # : 7.35 K/uL  Auto Lymphocyte # : 0.60 K/uL  Auto Monocyte # : 0.32 K/uL  Auto Eosinophil # : 0.00 K/uL  Auto Basophil # : 0.01 K/uL  Auto Neutrophil % : 88.3 %  Auto Lymphocyte % : 7.2 %  Auto Monocyte % : 3.8 %  Auto Eosinophil % : 0.0 %  Auto Basophil % : 0.1 %        143  |  105  |  43<H>  ----------------------------<  114<H>  3.9   |  35<H>  |  0.90    Ca    8.9      2024 12:45  Mg     2.0         TPro  7.2  /  Alb  3.4  /  TBili  1.0  /  DBili  x   /  AST  390<H>  /  ALT  652<H>  /  AlkPhos  232<H>        Urinalysis Basic - ( 2024 17:00 )    Color: Yellow / Appearance: Clear / S.018 / pH: x  Gluc: x / Ketone: Negative mg/dL  / Bili: Negative / Urobili: 0.2 mg/dL   Blood: x / Protein: 30 mg/dL / Nitrite: Negative   Leuk Esterase: Negative / RBC: 29 /HPF / WBC 1 /HPF   Sq Epi: x / Non Sq Epi: 0 /HPF / Bacteria: Negative /HPF        RADIOLOGY & ADDITIONAL STUDIES:      CT Abdomen and Pelvis w/ IV Cont (24 @ 00:46)    FINDINGS:  LOWER CHEST: Redemonstration of 4 mm sized nodule in the lingula segment.   Previously described left lower lobe nodule is not clearly seen on the   current exam    LIVER: 2 cm sized heterogeneous hypodense lesion in the right hepatic   lobe near the dome (2-24), new since prior. A few subcentimeter hypodense   lesions without interval change, likely cysts.  BILE DUCTS: Normal caliber.  GALLBLADDER: Within normal limits.  SPLEEN: Within normal limits.  PANCREAS: Within normal limits.  ADRENALS: Within normal limits.  KIDNEYS/URETERS: Within normal limits.    BLADDER: Within normal limits.  REPRODUCTIVE ORGANS: Prostatic enlargement.    BOWEL: No bowel obstruction. Status post distal esophagogastrectomy.   Known perigastric mass is not clearly visualized on the current exam.  PERITONEUM: No ascites.  VESSELS: Known left-sided IVC is suboptimally visualized due to bolus   timing.  RETROPERITONEUM/LYMPH NODES: No lymphadenopathy.  ABDOMINAL WALL: Within normal limits.  BONES: Degenerative change. No osseous metastasis.    IMPRESSION:  Status post distal esophagogastrectomy.  Nonvisualization of previously described perigastric mass.  2 cm sized hypodense solid hepatic lesion, new since prior, concerning   for metastasis..  Stable lingular nodule  . Nonvisualization of left lower lobe nodule.            CT Head No Cont (24 @ 12:30)     FINDINGS:    No acute intracranial hemorrhage. Small areas of decreased attenuation   along the periventricular white matter, compatible with chronic small   vessel disease. No hydrocephalus. The extra-axial spaces and basal   cisterns are within normal limits. No midline shift or mass effect   present.    The cranial cervical junction is within normal limits. The sella is not   expanded. No depressed calvarial fracture. Frothy lacelike secretions in   the left sphenoid sinus. The visualized mastoid air cells are well   aerated. Thevisualized orbits are within normal limits.    IMPRESSION:    1.  No evidence of acute intracranial hemorrhage or midline shift.  2.  Chronic small vessel disease. If there is continued concern for acute   neurologic compromise, recommend MRI of the brain for further evaluation.       HPI:    65 y/o M with a PMHx of known metastatic esophageal CA not current on cancer directed therapy, s/p laparoscopic MYCHAL Bahman esophagogastrectomy esophagogastroduodenoscopy presented the ED for generalized weakness, poor PO intake, failure to thrive. Patient stated that he has been having more weight loss as well. He stated he feels dry, dehydrated and feels like he needs fluids and needs to be admitted and be placed?  He is not currently on cancer directed therapy, most recent 2024 and noted he was told he is going to be on a break until 2024 then supposed to receive UTD PET imaging.     24: Seen at bedside with attending Dr Bazan, no acute distress, eating breakfast, wishes to pursue rehab       PAST MEDICAL & SURGICAL HISTORY:    Esophageal mass    HTN (hypertension)    Esophageal cancer    Type 2 diabetes mellitus    Malignant neoplasm of cardia    History of hypotension    History of tachycardia    Chemotherapy-induced neuropathy    S/P cholecystectomy    H/O lithotripsy    H/O endoscopy    History of chemotherapy    S/P radiation therapy    Esophageal stenosis    History of esophagogastroduodenoscopy (EGD)        MEDICATIONS  (STANDING):    gabapentin 300 milliGRAM(s) Oral two times a day  heparin   Injectable 5000 Unit(s) SubCutaneous every 12 hours  influenza  Vaccine (HIGH DOSE) 0.7 milliLiter(s) IntraMuscular once  scopolamine 1 mG/72 Hr(s) Patch 1 Patch Transdermal once  sodium chloride 0.9%. 1000 milliLiter(s) (100 mL/Hr) IV Continuous <Continuous>      MEDICATIONS  (PRN):    acetaminophen     Tablet .. 650 milliGRAM(s) Oral every 6 hours PRN Temp greater or equal to 38C (100.4F), Mild Pain (1 - 3)  aluminum hydroxide/magnesium hydroxide/simethicone Suspension 30 milliLiter(s) Oral every 4 hours PRN Dyspepsia  melatonin 3 milliGRAM(s) Oral at bedtime PRN Insomnia  ondansetron Injectable 4 milliGRAM(s) IV Push every 8 hours PRN Nausea and/or Vomiting        ALLERGIES:    No Known Allergies          FAMILY HISTORY:    prostate cancer (Father)      REVIEW OF SYSTEMS:    Constitutional, Eyes, ENT, Cardiovascular, Respiratory, Gastrointestinal, Genitourinary, Musculoskeletal, Integumentary, Neurological, Psychiatric, Endocrine, Heme/Lymph and Allergic/Immunologic review of systems are otherwise negative except as noted in HPI.       VITALS:    T(C): 36.3 (2024 07:34), Max: 36.6 (2024 23:03)  T(F): 97.3 (2024 07:34), Max: 97.9 (2024 23:03)  HR: 63 (2024 07:34) (63 - 91)  BP: 128/75 (2024 07:34) (113/75 - 142/92)  BP(mean): 97 (2024 15:31) (97 - 97)  RR: 18 (:34) (17 - 18)  SpO2: 100% (:34) (98% - 100%)    Parameters below as of :34  Patient On (Oxygen Delivery Method): room air        PHYSICAL:    Constitutional: no acute distress, but ill/cachectic appearing   Eyes: no conjunctival infection, anicteric  ENT: pharynx is unremarkable  Neck: supple without JVD  Pulmonary: clear to auscultation bilaterally  Cardiac: RRR  Vascular: no JVD no calf tenderness, venous stasis changes  Abdomen: normoactive bowel sounds, soft and nontender, no hepatosplenomegaly or masses appreciated.   Lymphatic: no peripheral adenopathy appreciated.   Musculoskeletal: full range of motion and no deformities appreciated.   Skin: normal appearance, no rash  Neurology: awake, alert, oriented; no obvious focal deficits       LABS:    CBC Full  -  ( 2024 12:45 )  WBC Count : 8.33 K/uL  RBC Count : 4.24 M/uL  Hemoglobin : 13.8 g/dL  Hematocrit : 40.3 %  Platelet Count - Automated : 159 K/uL  Mean Cell Volume : 95.0 fl  Mean Cell Hemoglobin : 32.5 pg  Mean Cell Hemoglobin Concentration : 34.2 gm/dL  Auto Neutrophil # : 7.35 K/uL  Auto Lymphocyte # : 0.60 K/uL  Auto Monocyte # : 0.32 K/uL  Auto Eosinophil # : 0.00 K/uL  Auto Basophil # : 0.01 K/uL  Auto Neutrophil % : 88.3 %  Auto Lymphocyte % : 7.2 %  Auto Monocyte % : 3.8 %  Auto Eosinophil % : 0.0 %  Auto Basophil % : 0.1 %    25    143  |  105  |  43<H>  ----------------------------<  114<H>  3.9   |  35<H>  |  0.90    Ca    8.9      2024 12:45  Mg     2.0         TPro  7.2  /  Alb  3.4  /  TBili  1.0  /  DBili  x   /  AST  390<H>  /  ALT  652<H>  /  AlkPhos  232<H>        Urinalysis Basic - ( 2024 17:00 )    Color: Yellow / Appearance: Clear / S.018 / pH: x  Gluc: x / Ketone: Negative mg/dL  / Bili: Negative / Urobili: 0.2 mg/dL   Blood: x / Protein: 30 mg/dL / Nitrite: Negative   Leuk Esterase: Negative / RBC: 29 /HPF / WBC 1 /HPF   Sq Epi: x / Non Sq Epi: 0 /HPF / Bacteria: Negative /HPF        RADIOLOGY & ADDITIONAL STUDIES:      CT Abdomen and Pelvis w/ IV Cont (24 @ 00:46)    FINDINGS:  LOWER CHEST: Redemonstration of 4 mm sized nodule in the lingula segment.   Previously described left lower lobe nodule is not clearly seen on the   current exam    LIVER: 2 cm sized heterogeneous hypodense lesion in the right hepatic   lobe near the dome (2-24), new since prior. A few subcentimeter hypodense   lesions without interval change, likely cysts.  BILE DUCTS: Normal caliber.  GALLBLADDER: Within normal limits.  SPLEEN: Within normal limits.  PANCREAS: Within normal limits.  ADRENALS: Within normal limits.  KIDNEYS/URETERS: Within normal limits.    BLADDER: Within normal limits.  REPRODUCTIVE ORGANS: Prostatic enlargement.    BOWEL: No bowel obstruction. Status post distal esophagogastrectomy.   Known perigastric mass is not clearly visualized on the current exam.  PERITONEUM: No ascites.  VESSELS: Known left-sided IVC is suboptimally visualized due to bolus   timing.  RETROPERITONEUM/LYMPH NODES: No lymphadenopathy.  ABDOMINAL WALL: Within normal limits.  BONES: Degenerative change. No osseous metastasis.    IMPRESSION:  Status post distal esophagogastrectomy.  Nonvisualization of previously described perigastric mass.  2 cm sized hypodense solid hepatic lesion, new since prior, concerning   for metastasis..  Stable lingular nodule  . Nonvisualization of left lower lobe nodule.            CT Head No Cont (24 @ 12:30)     FINDINGS:    No acute intracranial hemorrhage. Small areas of decreased attenuation   along the periventricular white matter, compatible with chronic small   vessel disease. No hydrocephalus. The extra-axial spaces and basal   cisterns are within normal limits. No midline shift or mass effect   present.    The cranial cervical junction is within normal limits. The sella is not   expanded. No depressed calvarial fracture. Frothy lacelike secretions in   the left sphenoid sinus. The visualized mastoid air cells are well   aerated. Thevisualized orbits are within normal limits.    IMPRESSION:    1.  No evidence of acute intracranial hemorrhage or midline shift.  2.  Chronic small vessel disease. If there is continued concern for acute   neurologic compromise, recommend MRI of the brain for further evaluation.

## 2024-04-26 NOTE — DIETITIAN INITIAL EVALUATION ADULT - PERTINENT MEDS FT
MEDICATIONS  (STANDING):  gabapentin 300 milliGRAM(s) Oral two times a day  heparin   Injectable 5000 Unit(s) SubCutaneous every 12 hours  influenza  Vaccine (HIGH DOSE) 0.7 milliLiter(s) IntraMuscular once  sodium chloride 0.9%. 1000 milliLiter(s) (100 mL/Hr) IV Continuous <Continuous>    MEDICATIONS  (PRN):  acetaminophen     Tablet .. 650 milliGRAM(s) Oral every 6 hours PRN Temp greater or equal to 38C (100.4F), Mild Pain (1 - 3)  aluminum hydroxide/magnesium hydroxide/simethicone Suspension 30 milliLiter(s) Oral every 4 hours PRN Dyspepsia  melatonin 3 milliGRAM(s) Oral at bedtime PRN Insomnia  ondansetron Injectable 4 milliGRAM(s) IV Push every 8 hours PRN Nausea and/or Vomiting

## 2024-04-26 NOTE — DIETITIAN INITIAL EVALUATION ADULT - PERTINENT LABORATORY DATA
04-25    143  |  105  |  43<H>  ----------------------------<  114<H>  3.9   |  35<H>  |  0.90    Ca    8.9      25 Apr 2024 12:45  Mg     2.0     04-25    TPro  7.2  /  Alb  3.4  /  TBili  1.0  /  DBili  x   /  AST  390<H>  /  ALT  652<H>  /  AlkPhos  232<H>  04-25  A1C with Estimated Average Glucose Result: 6.1 % (08-08-23 @ 13:47)  A1C with Estimated Average Glucose Result: See Note (06-02-23 @ 09:51)

## 2024-04-26 NOTE — DIETITIAN INITIAL EVALUATION ADULT - ADD RECOMMEND
1) C/w regular diet as tolerated  2) Add [employer] BID (325kcal, 16 protein) AND high-protein berry smoothie 1x/day (370 kcal, 26g protein) to optimize nutritional needs   3) Monitor bowel movements, if no BM for >3 days, consider implementing bowel regimen.  4) Encourage protein-rich foods, maximize food preferences  5) MVI w/ minerals daily to ensure 100% RDA met  6) Consider adding thiamine 100 mg daily 2/2 poor PO intake/ malnutrition  7) Monitor lytes/ min and replete prn (especially K+, phos, mg) - at HIGH RISK for refeeding syndrome  8) Consider adding appetite stimulant such as Remeron or Marinol 2/2 chronically poor appetite/ PO intake  9) Confirm goals of care regarding nutrition support; Is DNR/DNI; however, nutrition support NOT addressed in [facility]. Pt refuses option of TF - please update M[facility] to reflect pt's wishes at this time  RD will continue to monitor PO intake, labs, hydration, and wt prn.

## 2024-04-26 NOTE — DIETITIAN INITIAL EVALUATION ADULT - NUTRITION CONSULT
yes Clinical documentation  indicates that this patient has                                                            Clinical indicators:   Hemoglobin: 15.5 g/dL (11-02-19 @ 09:21)  Hemoglobin: 10.6 g/dL (11-04-19 @ 07:05)  Hemoglobin: 11.3 g/dL (11-05-19 @ 18:00)  Hemoglobin: 10.6 g/dL (11-07-19 @ 06:47)    Hematocrit: 47.1 % (11-02-19 @ 09:21)  Hematocrit: 33.4 % (11-04-19 @ 07:05)  Hematocrit: 35.8 % (11-05-19 @ 18:00)  Hematocrit: 33.3 % (11-07-19 @ 06:47)    In order to accurately capture the diagnosis to the greatest degree of specificity. can you please provide us with a medical diagnosis that explains the above findings    • Dilutional anemia  • Acute blood loss anemia  • Non-significant lab findings  • Other (please specify)  • Unable to determine      Present on Admission:  Was the severity of the condition present on admission?  If so, please document in the chart that “(the condition) was present on admission.”    In responding to this request, please exercise your independent professional judgment.  The fact that a question is asked does not imply that any particular answer is desired or expected.    Documentation clarification is required for compliance, accuracy in coding and billing, and reporting severity of illness, quality data   and risk of mortality.

## 2024-04-26 NOTE — DIETITIAN INITIAL EVALUATION ADULT - NSFNSPHYEXAMSKINFT_GEN_A_CORE
Pressure Injury 1: coccyx, Stage I  Pressure Injury 2: none, none  Pressure Injury 3: none, none  Pressure Injury 4: none, none  Pressure Injury 5: none, none  Pressure Injury 6: none, none  Pressure Injury 7: none, none  Pressure Injury 8: none, none  Pressure Injury 9: none, none  Pressure Injury 10: none, none  Pressure Injury 11: none, none Alexander = 16   Pressure Injury 1: coccyx, Stage I

## 2024-04-26 NOTE — CONSULT NOTE ADULT - ASSESSMENT
hypertension esophageal cancer, laparoscopic MYCHAL Bahman esophagogastrectomy esophagogastroduodenoscopy, type 2 diabetes cancer mets to the liver presents the emergency department for generalized weakness.  Patient states that he has been having weight loss weakness feels very fatigued.      Process of Care  --Reviewed dx/treatment problems and alignment with Goals of Care    Physical Aspects of Care  --Pain  patient denies at this time  c/w current management    --Bowel Regimen  denies constipation  risk for constipation d/t immobility  daily dulcolax    --Dyspnea  No SOB at this time  comfortable and in NAD    --Nausea Vomiting  denies    --Weakness  PT as tolerated   - hoping to go to [facility] to regain some strength     Psychological and Psychiatric Aspects of Care:   --Greif/Bereavment: emotional support provided  -Pastoral Care Available PRN     Social Aspects of Care  -SW involved     Cultural Aspects  -Primary Language: English    Goals of Care:     We discussed Palliative Care team being a supportive team when a patient has ongoing illnesses.  We also discussed that it is not an end of life care service, but can help navigate symptoms and emotional support through out their hospital stay here.    Ethical and Legal Aspects:   NA    Capacity- pt has capacity     HCP/Surrogate: wife would be surrogate decision maker     Code Status- DNR/I with trial of NIV   MOLST-  Dispo Plan-    Discussed With:     Case coordinated with attending and SW and RN     Time Spent: 40 minutes including the care, coordination and counseling of this patient, 50% of which was spent coordinating and counseling.

## 2024-04-26 NOTE — CONSULT NOTE ADULT - CONVERSATION DETAILS
We discussed the Palliative Care team being a supportive team when a patient has ongoing illnesses.  We also discussed that it is not an end-of-life care service, but can help navigate symptoms and emotional support throughout their hospital stay here.  The patient already placed DNR/I with trial if NIV - confirmed with the patient and his wife - wife will support her  in whatever decision he made. The patient is a retired [place]  so is aware of the risk benefits of DNR/I and at this time would like to leave [facility] in a place that does not have these aggressive measures.       Patient stated yesterday and today he was also able to eat well. at this time he stats he is working with nutrition and is hopeful to avoid a feeding tube if possible. Will continue to discuss       The patient is requesting to go to [facility] as he stated over the past 4-5 days he became extremely weak. The patient does want to continue treatment when he can regain some of his strength but does not believe he would be able to tolerate it at this time.   Emotional Support provided will continue to follow the patient

## 2024-04-26 NOTE — DIETITIAN INITIAL EVALUATION ADULT - NSFNSGIIOFT_GEN_A_CORE
I&O's Detail    25 Apr 2024 07:01  -  26 Apr 2024 07:00  --------------------------------------------------------  IN:  Total IN: 0 mL    OUT:    Voided (mL): 700 mL  Total OUT: 700 mL    Total NET: -700 mL

## 2024-04-27 PROCEDURE — 99233 SBSQ HOSP IP/OBS HIGH 50: CPT

## 2024-04-27 RX ORDER — SCOPALAMINE 1 MG/3D
1 PATCH, EXTENDED RELEASE TRANSDERMAL
Refills: 0 | Status: DISCONTINUED | OUTPATIENT
Start: 2024-04-28 | End: 2024-04-29

## 2024-04-27 RX ADMIN — HEPARIN SODIUM 5000 UNIT(S): 5000 INJECTION INTRAVENOUS; SUBCUTANEOUS at 09:53

## 2024-04-27 RX ADMIN — SCOPALAMINE 1 PATCH: 1 PATCH, EXTENDED RELEASE TRANSDERMAL at 19:48

## 2024-04-27 RX ADMIN — GABAPENTIN 300 MILLIGRAM(S): 400 CAPSULE ORAL at 09:53

## 2024-04-27 RX ADMIN — HEPARIN SODIUM 5000 UNIT(S): 5000 INJECTION INTRAVENOUS; SUBCUTANEOUS at 21:02

## 2024-04-27 RX ADMIN — SODIUM CHLORIDE 100 MILLILITER(S): 9 INJECTION INTRAMUSCULAR; INTRAVENOUS; SUBCUTANEOUS at 06:12

## 2024-04-28 LAB — GLUCOSE BLDC GLUCOMTR-MCNC: 91 MG/DL — SIGNIFICANT CHANGE UP (ref 70–99)

## 2024-04-28 PROCEDURE — 99232 SBSQ HOSP IP/OBS MODERATE 35: CPT

## 2024-04-28 RX ORDER — POLYETHYLENE GLYCOL 3350 17 G/17G
17 POWDER, FOR SOLUTION ORAL DAILY
Refills: 0 | Status: DISCONTINUED | OUTPATIENT
Start: 2024-04-28 | End: 2024-04-29

## 2024-04-28 RX ADMIN — HEPARIN SODIUM 5000 UNIT(S): 5000 INJECTION INTRAVENOUS; SUBCUTANEOUS at 10:53

## 2024-04-28 RX ADMIN — SCOPALAMINE 1 PATCH: 1 PATCH, EXTENDED RELEASE TRANSDERMAL at 19:37

## 2024-04-28 RX ADMIN — HEPARIN SODIUM 5000 UNIT(S): 5000 INJECTION INTRAVENOUS; SUBCUTANEOUS at 21:34

## 2024-04-28 RX ADMIN — POLYETHYLENE GLYCOL 3350 17 GRAM(S): 17 POWDER, FOR SOLUTION ORAL at 17:06

## 2024-04-28 RX ADMIN — SODIUM CHLORIDE 100 MILLILITER(S): 9 INJECTION INTRAMUSCULAR; INTRAVENOUS; SUBCUTANEOUS at 00:58

## 2024-04-28 RX ADMIN — SCOPALAMINE 1 PATCH: 1 PATCH, EXTENDED RELEASE TRANSDERMAL at 07:22

## 2024-04-28 RX ADMIN — SODIUM CHLORIDE 100 MILLILITER(S): 9 INJECTION INTRAMUSCULAR; INTRAVENOUS; SUBCUTANEOUS at 20:47

## 2024-04-28 RX ADMIN — SODIUM CHLORIDE 100 MILLILITER(S): 9 INJECTION INTRAMUSCULAR; INTRAVENOUS; SUBCUTANEOUS at 11:52

## 2024-04-28 RX ADMIN — GABAPENTIN 300 MILLIGRAM(S): 400 CAPSULE ORAL at 10:45

## 2024-04-29 ENCOUNTER — TRANSCRIPTION ENCOUNTER (OUTPATIENT)
Age: 64
End: 2024-04-29

## 2024-04-29 ENCOUNTER — NON-APPOINTMENT (OUTPATIENT)
Age: 64
End: 2024-04-29

## 2024-04-29 VITALS
DIASTOLIC BLOOD PRESSURE: 65 MMHG | HEART RATE: 69 BPM | SYSTOLIC BLOOD PRESSURE: 109 MMHG | TEMPERATURE: 98 F | OXYGEN SATURATION: 100 % | RESPIRATION RATE: 20 BRPM

## 2024-04-29 LAB — GLUCOSE BLDC GLUCOMTR-MCNC: 178 MG/DL — HIGH (ref 70–99)

## 2024-04-29 PROCEDURE — 99239 HOSP IP/OBS DSCHRG MGMT >30: CPT

## 2024-04-29 PROCEDURE — 99232 SBSQ HOSP IP/OBS MODERATE 35: CPT

## 2024-04-29 RX ORDER — SCOPALAMINE 1 MG/3D
1 PATCH, EXTENDED RELEASE TRANSDERMAL
Qty: 0 | Refills: 0 | DISCHARGE
Start: 2024-04-29

## 2024-04-29 RX ORDER — POLYETHYLENE GLYCOL 3350 17 G/17G
17 POWDER, FOR SOLUTION ORAL
Qty: 0 | Refills: 0 | DISCHARGE
Start: 2024-04-29

## 2024-04-29 RX ADMIN — HEPARIN SODIUM 5000 UNIT(S): 5000 INJECTION INTRAVENOUS; SUBCUTANEOUS at 11:13

## 2024-04-29 RX ADMIN — SODIUM CHLORIDE 100 MILLILITER(S): 9 INJECTION INTRAMUSCULAR; INTRAVENOUS; SUBCUTANEOUS at 12:40

## 2024-04-29 RX ADMIN — SCOPALAMINE 1 PATCH: 1 PATCH, EXTENDED RELEASE TRANSDERMAL at 07:51

## 2024-04-29 RX ADMIN — SCOPALAMINE 1 PATCH: 1 PATCH, EXTENDED RELEASE TRANSDERMAL at 12:38

## 2024-04-29 RX ADMIN — SCOPALAMINE 1 PATCH: 1 PATCH, EXTENDED RELEASE TRANSDERMAL at 12:43

## 2024-04-29 RX ADMIN — POLYETHYLENE GLYCOL 3350 17 GRAM(S): 17 POWDER, FOR SOLUTION ORAL at 11:13

## 2024-04-29 RX ADMIN — GABAPENTIN 300 MILLIGRAM(S): 400 CAPSULE ORAL at 11:13

## 2024-04-29 NOTE — PROGRESS NOTE ADULT - SUBJECTIVE AND OBJECTIVE BOX
HPI:    63 y/o M with a PMHx of known metastatic esophageal CA not current on cancer directed therapy, s/p laparoscopic MYCHAL Bahman esophagogastrectomy esophagogastroduodenoscopy presented the ED for generalized weakness, poor PO intake, failure to thrive. Patient stated that he has been having more weight loss as well. He stated he feels dry, dehydrated and feels like he needs fluids and needs to be admitted and be placed?  He is not currently on cancer directed therapy, most recent 2024 and noted he was told he is going to be on a break until 2024 then supposed to receive UTD PET imaging.     24: Seen at bedside with attending Dr Bazan, no acute distress, eating breakfast, wishes to pursue rehab     24; Seen at bedside, alert, awake, in no acute distress. Anticipating d/c today to [facility].      PAST MEDICAL & SURGICAL HISTORY:    Esophageal mass    HTN (hypertension)    Esophageal cancer    Type 2 diabetes mellitus    Malignant neoplasm of cardia    History of hypotension    History of tachycardia    Chemotherapy-induced neuropathy    S/P cholecystectomy    H/O lithotripsy    H/O endoscopy    History of chemotherapy    S/P radiation therapy    Esophageal stenosis    History of esophagogastroduodenoscopy (EGD)        MEDICATIONS  (STANDING):    gabapentin 300 milliGRAM(s) Oral two times a day  heparin   Injectable 5000 Unit(s) SubCutaneous every 12 hours  influenza  Vaccine (HIGH DOSE) 0.7 milliLiter(s) IntraMuscular once  polyethylene glycol 3350 17 Gram(s) Oral daily  scopolamine 1 mG/72 Hr(s) Patch 1 Patch Transdermal every 72 hours  sodium chloride 0.9%. 1000 milliLiter(s) (100 mL/Hr) IV Continuous <Continuous>    MEDICATIONS  (PRN):  acetaminophen     Tablet .. 650 milliGRAM(s) Oral every 6 hours PRN Temp greater or equal to 38C (100.4F), Mild Pain (1 - 3)  aluminum hydroxide/magnesium hydroxide/simethicone Suspension 30 milliLiter(s) Oral every 4 hours PRN Dyspepsia  melatonin 3 milliGRAM(s) Oral at bedtime PRN Insomnia  ondansetron Injectable 4 milliGRAM(s) IV Push every 8 hours PRN Nausea and/or Vomiting    ALLERGIES:    No Known Allergies    FAMILY HISTORY:    prostate cancer (Father)    REVIEW OF SYSTEMS:    Constitutional, Eyes, ENT, Cardiovascular, Respiratory, Gastrointestinal, Genitourinary, Musculoskeletal, Integumentary, Neurological, Psychiatric, Endocrine, Heme/Lymph and Allergic/Immunologic review of systems are otherwise negative except as noted in HPI.       VITALS:    T(C): 36.4 (2024 07:28), Max: 36.8 (2024 15:40)  T(F): 97.5 (:28), Max: 98.2 (2024 15:40)  HR: 56 (:) (53 - 84)  BP: 110/72 (:) (110/72 - 124/68)  BP(mean): --  RR: 18 (:) (16 - 18)  SpO2: 100% (:) (98% - 100%)    Parameters below as of :28  Patient On (Oxygen Delivery Method): room air    PHYSICAL:    Constitutional: no acute distress, but ill/cachectic appearing   Eyes: no conjunctival infection, anicteric  ENT: pharynx is unremarkable  Neck: supple without JVD  Pulmonary: clear to auscultation bilaterally  Cardiac: RRR  Vascular: no JVD no calf tenderness, venous stasis changes  Abdomen: normoactive bowel sounds, soft and nontender, no hepatosplenomegaly or masses appreciated.   Lymphatic: no peripheral adenopathy appreciated.   Musculoskeletal: full range of motion and no deformities appreciated.   Skin: normal appearance, no rash  Neurology: awake, alert, oriented; no obvious focal deficits       LABS:    CBC Full  -  ( 2024 12:45 )  WBC Count : 8.33 K/uL  RBC Count : 4.24 M/uL  Hemoglobin : 13.8 g/dL  Hematocrit : 40.3 %  Platelet Count - Automated : 159 K/uL  Mean Cell Volume : 95.0 fl  Mean Cell Hemoglobin : 32.5 pg  Mean Cell Hemoglobin Concentration : 34.2 gm/dL  Auto Neutrophil # : 7.35 K/uL  Auto Lymphocyte # : 0.60 K/uL  Auto Monocyte # : 0.32 K/uL  Auto Eosinophil # : 0.00 K/uL  Auto Basophil # : 0.01 K/uL  Auto Neutrophil % : 88.3 %  Auto Lymphocyte % : 7.2 %  Auto Monocyte % : 3.8 %  Auto Eosinophil % : 0.0 %  Auto Basophil % : 0.1 %        143  |  105  |  43<H>  ----------------------------<  114<H>  3.9   |  35<H>  |  0.90    Ca    8.9      2024 12:45  Mg     2.0         TPro  7.2  /  Alb  3.4  /  TBili  1.0  /  DBili  x   /  AST  390<H>  /  ALT  652<H>  /  AlkPhos  232<H>        Urinalysis Basic - ( 2024 17:00 )    Color: Yellow / Appearance: Clear / S.018 / pH: x  Gluc: x / Ketone: Negative mg/dL  / Bili: Negative / Urobili: 0.2 mg/dL   Blood: x / Protein: 30 mg/dL / Nitrite: Negative   Leuk Esterase: Negative / RBC: 29 /HPF / WBC 1 /HPF   Sq Epi: x / Non Sq Epi: 0 /HPF / Bacteria: Negative /HPF        RADIOLOGY & ADDITIONAL STUDIES:    EXAM:  CT ABDOMEN AND PELVIS IC     PROCEDURE DATE:  2024      INTERPRETATION:  CLINICAL INFORMATION: Acute rise in LFTs. Known liver   metastases from metastatic esophageal cancer.    COMPARISON: 2024    CONTRAST/COMPLICATIONS:  IV Contrast: Omnipaque 350  90 cc administered   0 cc discarded  Oral Contrast: NONE  Complications: None reported at time of study completion    PROCEDURE:  CT of the Abdomen and Pelvis was performed.  Sagittal and coronal reformats were performed.    FINDINGS:  LOWER CHEST: Within normal limits.    LIVER: 2.6 cm segment 7 hypoattenuating lesion, previously 2.4 cm. A few   additional small cysts.  BILE DUCTS: Normal caliber.  GALLBLADDER: Cholecystectomy.  SPLEEN: Within normal limits.  PANCREAS: Within normal limits.  ADRENALS: Within normal limits.  KIDNEYS/URETERS: Within normal limits.    BLADDER: Within normal limits.  REPRODUCTIVE ORGANS: Prostate is enlarged.    BOWEL: No bowel obstruction. Appendix not visualized. Gastric   pull-through.  PERITONEUM: No ascites.  VESSELS: Within normal limits.  RETROPERITONEUM/LYMPH NODES: No lymphadenopathy.  ABDOMINAL WALL: Within normal limits.  BONES: Anasarca. Marked paucity of subcutaneous and visceral fat.    IMPRESSION:  No significant change in solitary liver metastasis.        CT Abdomen and Pelvis w/ IV Cont (24 @ 00:46)    FINDINGS:  LOWER CHEST: Re demonstration of 4 mm sized nodule in the lingula segment.   Previously described left lower lobe nodule is not clearly seen on the   current exam    LIVER: 2 cm sized heterogeneous hypodense lesion in the right hepatic   lobe near the dome (2-24), new since prior. A few subcentimeter hypodense   lesions without interval change, likely cysts.  BILE DUCTS: Normal caliber.  GALLBLADDER: Within normal limits.  SPLEEN: Within normal limits.  PANCREAS: Within normal limits.  ADRENALS: Within normal limits.  KIDNEYS/URETERS: Within normal limits.    BLADDER: Within normal limits.  REPRODUCTIVE ORGANS: Prostatic enlargement.    BOWEL: No bowel obstruction. Status post distal esophagogastrectomy.   Known perigastric mass is not clearly visualized on the current exam.  PERITONEUM: No ascites.  VESSELS: Known left-sided IVC is suboptimally visualized due to bolus   timing.  RETROPERITONEUM/LYMPH NODES: No lymphadenopathy.  ABDOMINAL WALL: Within normal limits.  BONES: Degenerative change. No osseous metastasis.    IMPRESSION:  Status post distal esophagogastrectomy.  Nonvisualization of previously described perigastric mass.  2 cm sized hypodense solid hepatic lesion, new since prior, concerning   for metastasis..  Stable lingular nodule  . Nonvisualization of left lower lobe nodule.      CT Head No Cont (24 @ 12:30)     FINDINGS:    No acute intracranial hemorrhage. Small areas of decreased attenuation   along the periventricular white matter, compatible with chronic small   vessel disease. No hydrocephalus. The extra-axial spaces and basal   cisterns are within normal limits. No midline shift or mass effect   present.    The cranial cervical junction is within normal limits. The sella is not   expanded. No depressed calvarial fracture. Frothy lacelike secretions in   the left sphenoid sinus. The visualized mastoid air cells are well   aerated. The Visualized orbits are within normal limits.    IMPRESSION:    1.  No evidence of acute intracranial hemorrhage or midline shift.  2.  Chronic small vessel disease. If there is continued concern for acute   neurologic compromise, recommend MRI of the brain for further evaluation.      
SUBJECTIVE:    CHIEF COMPLAINT:  Patient is a 64y old  Male who presents with a chief complaint of Failure to thrive (26 Apr 2024 16:05)    HPI:  64-year-old male history of hypertension esophageal cancer, laparoscopic MYCHAL Bahman esophagogastrectomy esophagogastroduodenoscopy, type 2 diabetes cancer mets to the liver presents the emergency department for generalized weakness.  Patient states that he has been having weight loss weakness feels very fatigued.  No chest pain shortness of breath abdominal pain dysuria.  States he feels dry and dehydrated and feels like he needs fluids and needs to be admitted and be placed.  Had goals of care conversation with patient states that he is not currently on chemotherapy states he was told he is going to be on a break until May states that he is DNR/DNI does not think he needs a PEG tube now is unsure if that sign that he would want.  Discussed what matters most and it seems that being able to eat and comfort is what is most important to him.  Pt is unable to eat for the past 3 days, extremely cachectic adm for further eval he will need Rehab. Case d/w Dr Hernández PCP will resume care in am.      Interval HPI and Overnight Events:Pt doing well. Tolerating diet.     REVIEW OF SYSTEMS:  CONSTITUTIONAL: No, fevers or chills  EYES/ENT: No visual changes;  No vertigo or throat pain   NECK: No pain or stiffness  RESPIRATORY: No cough, wheezing, hemoptysis; No shortness of breath  CARDIOVASCULAR: No chest pain or palpitations  GASTROINTESTINAL: No abdominal or epigastric pain. No nausea, vomiting, or hematemesis; No diarrhea or constipation. No melena or hematochezia.  GENITOURINARY: No dysuria, frequency or hematuria  NEUROLOGICAL: No numbness or weakness  SKIN: No itching, burning, rashes, or lesions   All other review of systems is negative unless indicated above    OBJECTIVE    Vital Signs Last 24 Hrs  T(C): 36.8 (28 Apr 2024 15:40), Max: 36.8 (28 Apr 2024 15:40)  T(F): 98.2 (28 Apr 2024 15:40), Max: 98.2 (28 Apr 2024 15:40)  HR: 84 (28 Apr 2024 15:40) (60 - 84)  BP: 124/62 (28 Apr 2024 15:40) (119/71 - 124/62)  BP(mean): --  RR: 16 (28 Apr 2024 15:40) (16 - 18)  SpO2: 98% (28 Apr 2024 15:40) (98% - 100%)    Parameters below as of 28 Apr 2024 15:40  Patient On (Oxygen Delivery Method): room air    MEDICATIONS  (STANDING):  gabapentin 300 milliGRAM(s) Oral two times a day  heparin   Injectable 5000 Unit(s) SubCutaneous every 12 hours  influenza  Vaccine (HIGH DOSE) 0.7 milliLiter(s) IntraMuscular once  scopolamine 1 mG/72 Hr(s) Patch 1 Patch Transdermal every 72 hours  sodium chloride 0.9%. 1000 milliLiter(s) (100 mL/Hr) IV Continuous <Continuous>    Specimen Source Clean Catch Clean Catch (Midstream)   04-25 @ 17:00  Culture Results  <10,000 CFU/mL Normal Urogenital Kanchan  Specimen Source .Blood None   04-25 @ 16:37  Culture Results  No growth at 48 Hours    
SUBJECTIVE:    CHIEF COMPLAINT:  Patient is a 64y old  Male who presents with a chief complaint of Failure to thrive in adult    HPI:  64-year-old male history of hypertension esophageal cancer, laparoscopic MYCHAL Bahman esophagogastrectomy esophagogastroduodenoscopy, type 2 diabetes cancer mets to the liver presents the emergency department for generalized weakness.  Patient states that he has been having weight loss weakness feels very fatigued.  No chest pain shortness of breath abdominal pain dysuria.  States he feels dry and dehydrated and feels like he needs fluids and needs to be admitted and be placed.  Had goals of care conversation with patient states that he is not currently on chemotherapy states he was told he is going to be on a break until May states that he is DNR/DNI does not think he needs a PEG tube now is unsure if that is what he would want.  Discussed what matters most and it seems that being able to eat and comfort is what is most important to him.  Pt is unable to eat for the past 3 days, extremely cachectic adm for further eval he will need Rehab.     Interval HPI and Overnight Events:    REVIEW OF SYSTEMS:  CONSTITUTIONAL: No  fevers or chills  EYES/ENT: No visual changes;  No vertigo or throat pain   NECK: No pain or stiffness  RESPIRATORY: No cough, wheezing, hemoptysis; No shortness of breath  CARDIOVASCULAR: No chest pain or palpitations  GASTROINTESTINAL: No abdominal or epigastric pain. No nausea, vomiting, or hematemesis; No diarrhea or constipation. No melena or hematochezia.  GENITOURINARY: No dysuria, frequency or hematuria  NEUROLOGICAL: No numbness or weakness  SKIN: No itching, burning, rashes, or lesions   All other review of systems is negative unless indicated above    OBJECTIVE    Vital Signs Last 24 Hrs  T(C): 36.3 (2024 15:34), Max: 36.6 (2024 23:03)  T(F): 97.3 (2024 15:34), Max: 97.9 (2024 23:03)  HR: 61 (2024 15:34) (61 - 85)  BP: 100/68 (2024 15:34) (100/68 - 128/75)  BP(mean): --  RR: 18 (2024 15:34) (17 - 18)  SpO2: 100% (2024 15:34) (100% - 100%)    Parameters below as of 2024 15:34  Patient On (Oxygen Delivery Method): room air        MEDICATIONS  (STANDING):  gabapentin 300 milliGRAM(s) Oral two times a day  heparin   Injectable 5000 Unit(s) SubCutaneous every 12 hours  influenza  Vaccine (HIGH DOSE) 0.7 milliLiter(s) IntraMuscular once  sodium chloride 0.9%. 1000 milliLiter(s) (100 mL/Hr) IV Continuous <Continuous>      LABS:                         13.8   8.33  )-----------( 159      ( 2024 12:45 )             40.3       143  |  105  |  43<H>  ----------------------------<  114<H>  3.9   |  35<H>  |  0.90    Ca    8.9      2024 12:45  Mg     2.0         TPro  7.2  /  Alb  3.4  /  TBili  1.0  /  DBili  x   /  AST  390<H>  /  ALT  652<H>  /  AlkPhos  232<H>      Urinalysis Basic - ( 2024 17:00 )  Color: Yellow / Appearance: Clear / S.018 / pH: x  Gluc: x / Ketone: Negative mg/dL  / Bili: Negative / Urobili: 0.2 mg/dL   Blood: x / Protein: 30 mg/dL / Nitrite: Negative   Leuk Esterase: Negative / RBC: 29 /HPF / WBC 1 /HPF   Sq Epi: x / Non Sq Epi: 0 /HPF / Bacteria: Negative /HPF    RADIOLOGY/EKG:  CT Abdomen and Pelvis w/ IV Cont (24 @ 14:58) >  IMPRESSION:  No significant change in solitary liver metastasis.    Xray Chest 1 View- PORTABLE-Urgent (24 @ 14:21) >  IMPRESSION: No acute finding.  
SUBJECTIVE:    CHIEF COMPLAINT:  Patient is a 64y old  Male who presents with a chief complaint of Failure to thrive (2024 16:05)      HPI:  64-year-old male history of hypertension esophageal cancer, laparoscopic MYCHAL Bahman esophagogastrectomy esophagogastroduodenoscopy, type 2 diabetes cancer mets to the liver presents the emergency department for generalized weakness.  Patient states that he has been having weight loss weakness feels very fatigued.  No chest pain shortness of breath abdominal pain dysuria.  States he feels dry and dehydrated and feels like he needs fluids and needs to be admitted and be placed.  Had goals of care conversation with patient states that he is not currently on chemotherapy states he was told he is going to be on a break until May states that he is DNR/DNI does not think he needs a PEG tube now is unsure if that sign that he would want.  Discussed what matters most and it seems that being able to eat and comfort is what is most important to him.  Pt is unable to eat for the past 3 days, extremely cachectic adm for further eval he will need Rehab. Case d/w Dr Hernández PCP will resume care in am.     (2024 13:45)      Interval HPI and Overnight Events: Pt states appetite better. Eating well. Still weak    REVIEW OF SYSTEMS:  CONSTITUTIONAL: No , fevers or chills  EYES/ENT: No visual changes;  No vertigo or throat pain   NECK: No pain or stiffness  RESPIRATORY: No cough, wheezing, hemoptysis; No shortness of breath  CARDIOVASCULAR: No chest pain or palpitations  GASTROINTESTINAL: No abdominal or epigastric pain. No nausea, vomiting, or hematemesis; No diarrhea or constipation. No melena or hematochezia.  GENITOURINARY: No dysuria, frequency or hematuria  NEUROLOGICAL: No numbness or weakness  SKIN: No itching, burning, rashes, or lesions   All other review of systems is negative unless indicated above    OBJECTIVE    Vital Signs Last 24 Hrs  T(C): 36.6 (2024 07:56), Max: 36.6 (2024 07:56)  T(F): 97.9 (2024 07:56), Max: 97.9 (2024 07:56)  HR: 51 (2024 07:56) (51 - 70)  BP: 92/62 (2024 07:56) (92/62 - 121/65)  BP(mean): --  RR: 18 (2024 23:27) (18 - 18)  SpO2: 100% (2024 07:56) (98% - 100%)    Parameters below as of 2024 07:56  Patient On (Oxygen Delivery Method): room air    MEDICATIONS  (STANDING):  gabapentin 300 milliGRAM(s) Oral two times a day  heparin   Injectable 5000 Unit(s) SubCutaneous every 12 hours  influenza  Vaccine (HIGH DOSE) 0.7 milliLiter(s) IntraMuscular once  sodium chloride 0.9%. 1000 milliLiter(s) (100 mL/Hr) IV Continuous <Continuous>      LABS:   Urinalysis Basic - ( 2024 17:00 )  Color: Yellow / Appearance: Clear / S.018 / pH: x  Gluc: x / Ketone: Negative mg/dL  / Bili: Negative / Urobili: 0.2 mg/dL   Blood: x / Protein: 30 mg/dL / Nitrite: Negative   Leuk Esterase: Negative / RBC: 29 /HPF / WBC 1 /HPF   Sq Epi: x / Non Sq Epi: 0 /HPF / Bacteria: Negative /HPF    Specimen Source Clean Catch Clean Catch (Midstream)    @ 17:00  Culture Results  <10,000 CFU/mL Normal Urogenital Kanchan  Specimen Source .Blood None    @ 16:37  Culture Results  No growth at 24 hours    URINE CULTURE     @ 17:00    CULTURE RESULTS   <10,000 CFU/mL Normal Urogenital Kanchan  URINE CULTURE     @ 16:37    CULTURE RESULTS   No growth at 24 hours

## 2024-04-29 NOTE — PHYSICAL THERAPY INITIAL EVALUATION ADULT - PERTINENT HX OF CURRENT PROBLEM, REHAB EVAL
Pt admitted to  secondary to generalized weakness, and failure to thrive. Pt with a hx of esophageal ca with mets to liver. CT head: neg.

## 2024-04-29 NOTE — DISCHARGE NOTE PROVIDER - NSDCMRMEDTOKEN_GEN_ALL_CORE_FT
gabapentin 300 mg oral capsule: 1 cap(s) orally 2 times a day  polyethylene glycol 3350 oral powder for reconstitution: 17 gram(s) orally once a day  scopolamine: 1 patch every 3 days Apply 1 patch every 3 days

## 2024-04-29 NOTE — DISCHARGE NOTE PROVIDER - DETAILS OF MALNUTRITION DIAGNOSIS/DIAGNOSES
This patient has been assessed with a concern for Malnutrition and was treated during this hospitalization for the following Nutrition diagnosis/diagnoses:     -  04/29/2024: Severe protein-calorie malnutrition   -  04/29/2024: Underweight (BMI < 19)

## 2024-04-29 NOTE — DISCHARGE NOTE NURSING/CASE MANAGEMENT/SOCIAL WORK - PATIENT PORTAL LINK FT
You can access the FollowMyHealth Patient Portal offered by [facility] by registering at the following website: [email or web address]. By joining [facility]’s FollowMyHealth portal, you will also be able to view your health information using other applications (apps) compatible with our system.

## 2024-04-29 NOTE — CHART NOTE - NSCHARTNOTEFT_GEN_A_CORE
Clinical Nutrition Follow Up Note:    *64-year-old male history of hypertension esophageal cancer, laparoscopic MYCHAL Bahman esophagogastrectomy esophagogastroduodenoscopy, type 2 diabetes cancer mets to the liver presents the emergency department for generalized weakness.  Patient states that he has been having weight loss weakness feels very fatigued. States he feels dry, dehydrated, and feels like he needs fluids and needs to be admitted and be placed.  Had goals of care conversation with patient states that he is not currently on chemotherapy states he was told he is going to be on a break until May states that he is DNR/DNI does not think he needs a PEG tube now is unsure if that sign that he would want. Discussed what matters most and it seems that being able to eat and comfort is what is most important to him.  Pt is unable to eat for the past 3 days, extremely cachectic adm for further eval he will need Rehab.  Admit for FTT -  h/o of metastatic esophageal cancer stage 4, acute transaminitis     *4/26: Known to nutr services and dx'd w/ mal on 8/16/23; still meets criteria. Consuming breakfast at time of visit (ate 100% of fruit, cream of rice, 50% of [place] toast). Reports improved appetite and is feeling "very hungry" since admit (x 1 day). Reports UBW of ~193# x 2 years ago --> 153# x 1 year ago, now wt at ~100#. Bed scale wt of 89# taken by RD on 4/26/24. Wt hx as per EMR: 128# (as per EMR on 8/16/23). Unintentional wt loss of 64# / 41.8% wt loss x 1 year; severe & clinically significant. NFPE reveals severe muscle/ fat wasting - appears emaciated, cathexic, thin/frail, and huseyin. C/w regular diet as tolerated; will trial [employer] Shakes BID (325kcal, 16 protein) AND high-protein berry smoothie 1x/day (370 kcal, 26g protein) to optimize nutritional needs - pt is receptive. Is DNR/DNI; however, nutrition support NOT addressed in MOLST - ****Confirm goals of care regarding nutrition support/ RD discussed TF as an option (w/ chronically poor PO intake/appetite and unable to meet needs s/p mets cancer) & pt refuses - please update MOLST to reflect pt's wishes.    *Pt being followed up for: Assessment/Education Consult    *current status: no acute changes since last RD visit. Family medicine note 4/28 stating pt is "tolerating diet." Discussed ONS w/ pt who reports he does not like Berry Smoothie and that he has been drinking [employer] BID however not finishing them. Recommend decrease [employer] (provides 325 kcal, 16 g protein/ shake) to QD and add LPS BID (provides 100 kcal, 15 g protein/ serving) to optimize nutritional needs. Endorsing constipation, received miralax 4/28.     *Labs Reviewed: no recent labs to review    BMI: BMI (kg/m2): 14.3 (04-25-24 @ 11:38)  HbA1c: A1C with Estimated Average Glucose Result: 6.1 % (08-08-23 @ 13:47)    Glucose: POCT Blood Glucose.: 91 mg/dL (04-28-24 @ 17:06)    BP: 110/72 (04-29-24 @ 07:28) (92/62 - 124/68)Vital Signs Last 24 Hrs  T(C): 36.4 (04-29-24 @ 07:28), Max: 36.8 (04-28-24 @ 15:40)  T(F): 97.5 (04-29-24 @ 07:28), Max: 98.2 (04-28-24 @ 15:40)  HR: 56 (04-29-24 @ 07:28) (53 - 84)  BP: 110/72 (04-29-24 @ 07:28) (110/72 - 124/68)  RR: 18 (04-29-24 @ 07:28) (16 - 18)  SpO2: 100% (04-29-24 @ 07:28) (98% - 100%)    POCT Blood Glucose.: 91 mg/dL (28 Apr 2024 17:06)    *pertinent meds: zofran, miralax, heparin, aluminum hydroxide/mg hydroxide/simethicone 30 mL prn    *I and O's:    04-28-24 @ 07:01  -  04-29-24 @ 07:00  --------------------------------------------------------  IN:    sodium chloride 0.9%: 1200 mL  Total IN: 1200 mL    OUT:    Voided (mL): 1150 mL  Total OUT: 1150 mL    Total NET: 50 mL    *no BMs doc'd ; pt on bowel regimen (miralax daily)    *latasha score of 16 : stage I coccyx PU documented. no edema documented.    *PO intake, meeting ~ 50% of estimated nutr needs.    *Malnutrition dx: Pt continues to meet criteria for severe malnutrition in the context of chronic illness r/t inability to meet increased needs 2/2 hx of mets cancer AEB severe muscle/fat wasting, consuming <50% of ENN chronically, 41.8% wt loss x 1 year    Diet, Regular (04-25-24 @ 13:51) [Active]    Estimated Needs: Based on bed scale wt of 89# (40.3kg) taken on 4/26/24   Calories:  1410 - 1612 Kcal (35-40 Kcal/Kg)  Protein: 72-89 g (1.8-2.2 g/Kg)  Fluids: 4227-3822  mL (35-40 mL/Kg)    *Wt Hx:  Daily   Height (cm): 170.2 (04-25-24 @ 11:38), 170.2 (04-11-24 @ 22:59)  Weight (kg): 41.3 (04-25-24 @ 11:38), 45.4 (04-11-24 @ 22:59)  BMI (kg/m2): 14.3 (04-25-24 @ 11:38), 15.7 (04-11-24 @ 22:59)  BSA (m2): 1.45 (04-25-24 @ 11:38), 1.51 (04-11-24 @ 22:59)    Recommendations:  1. C/w Regular diet and add LPS BID (provides 100 kcal, 15 g protein/ serving) to optimize nutritional needs; c/w [employer] to optimize nutritional needs (provides 325 kcal, 16 g protein/ shake) however decrease to QD; d/c high-protein berry smoothie (370 kcal, 26g protein)   2. Consider adding thiamine 100 mg daily 2/2 poor PO intake/ malnutrition   3. MVI w/ minerals daily to ensure 100% RDA met   4. Encourage protein-rich foods, maximize food preferences   5. Monitor bowel movements, c/w bowel regimen (miralax daily)  6. Please obtain weekly weights   7. Confirm goals of care regarding nutrition support   RD will continue to monitor PO intake, labs, hydration, and wt prn.     *will continue to monitor and follow up with adjustments to treatment plan prn*    Hanna Mensah MS, RDN, [phone]

## 2024-04-29 NOTE — DISCHARGE NOTE PROVIDER - NSDCCAREPROVSEEN_GEN_ALL_CORE_FT
Miles Hernández Staged Advancement Flap Text: The defect edges were debeveled with a #15 scalpel blade.  Given the location of the defect, shape of the defect and the proximity to free margins a staged advancement flap was deemed most appropriate.  Using a sterile surgical marker, an appropriate advancement flap was drawn incorporating the defect and placing the expected incisions within the relaxed skin tension lines where possible. The area thus outlined was incised deep to adipose tissue with a #15 scalpel blade.  The skin margins were undermined to an appropriate distance in all directions utilizing iris scissors.

## 2024-04-29 NOTE — PHYSICAL THERAPY INITIAL EVALUATION ADULT - ADDITIONAL COMMENTS
Pt lives with his wife in a house, +2 stairs to enter; Pt resides on the first floor. prior to admission Pt was independent with all mobility and ambulated without an assistive device.     Pt. left comfortable in bedside chair with all tubes/lines intact, call bell in reach and in NAD. Info obtained from eval 8/2023 Pt lives with his wife in a house, +2 stairs to enter; Pt resides on the first floor. prior to admission Pt was independent with all mobility and ambulated without an assistive device.     Pt. left comfortable in bedside chair with all tubes/lines intact, call bell in reach and in NAD. Info obtained from [facility] 8/2023. Update 4/29: Pt stating he was independent PTA. Pt states he has stairs to basement.

## 2024-04-29 NOTE — CHART NOTE - NSCHARTNOTEFT_GEN_A_CORE
HPI:  64-year-old male history of hypertension esophageal cancer, laparoscopic MYCHAL Bahman esophagogastrectomy esophagogastroduodenoscopy, type 2 diabetes cancer mets to the liver presents the emergency department for generalized weakness.    (25 Apr 2024 13:45)      PERTINENT PMH REVIEWED:  [ X ] YES [ ] NO           Primary Contact: Pts wife Fariha Surrogate    HCP [  ] Surrogate [  X ] Guardian [   ]    Mental Status: [ X ] Alert  [  X  ] Oriented [  ] Confused [  ] Lethargic [  ]  Concerns of Depression [  ]- denies  Anxiety [   ]- denies feelings anxious  Baseline ADLs (prior to admission):  Independent [ X ] moderately [ ] fully   Dependent   [ ] moderately [ ] fully    Family Meeting: Took place with Dahlia Harman NP Friday- plan for ELIAN     Anticipated Grief: Patient [ X ] Family [  ]    Caregiver [place] Assessed: Yes [ X ] No [  ]    Temple: Congregational     Spiritual Concerns: Not identified     Goals of Care: Comfort [  ] Rehabilitation [ X ] Curative [  ] Life Prolonging [  ]    Previous Services:  None     ADVANCE DIRECTIVES:  DNR/DNI    Anticipated D/C Plan: [facility] [place]                      Summary:     This SW spoke met with pt. at bedside to follow up and provide support. Pt. shared that he was home with his wife and daughter prior to admission. He discussed how he became weaker at home which led to him coming into the hospital Pt. had GOC discussion with Kimani NP Dahlia Urbano on Friday. He would like to go to [facility] to try and regain as much strength as possible. Pt. shared that emotionally he has a clear head and he is not afraid to die. Feelings explored and support provided. Pts goal at this time is to try and regain strength to walk as he was previously able to do more at home. Plan likely for ELIAN. Emotional support provided. Our team will continue to follow.

## 2024-04-29 NOTE — DISCHARGE NOTE PROVIDER - CARE PROVIDER_API CALL
Miles Hernández  [address], [address], [place] [address]  Phone: [phone]  Fax: [phone]  Follow Up Time:

## 2024-04-29 NOTE — DISCHARGE NOTE PROVIDER - NSDCPNSUBOBJ_GEN_ALL_CORE
SUBJECTIVE:    CHIEF COMPLAINT:  Patient is a 64y old  Male who presents with a chief complaint of Failure to thrive (26 Apr 2024 16:05)      HPI:  64-year-old male history of hypertension esophageal cancer, laparoscopic MYCHAL Bahman esophagogastrectomy esophagogastroduodenoscopy, type 2 diabetes cancer mets to the liver presents the emergency department for generalized weakness.  Patient states that he has been having weight loss weakness feels very fatigued.  No chest pain shortness of breath abdominal pain dysuria.  States he feels dry and dehydrated and feels like he needs fluids and needs to be admitted and be placed.  Had goals of care conversation with patient states that he is not currently on chemotherapy states he was told he is going to be on a break until May states that he is DNR/DNI does not think he needs a PEG tube now is unsure if that sign that he would want.  Discussed what matters most and it seems that being able to eat and comfort is what is most important to him.  Pt is unable to eat for the past 3 days, extremely cachectic adm for further eval he will need Rehab. Case d/w Dr Hernández PCP will resume care in am.     (25 Apr 2024 13:45)      Interval HPI and Overnight Events:    REVIEW OF SYSTEMS:  CONSTITUTIONAL: No fevers or chills  EYES/ENT: No visual changes;  No vertigo or throat pain   NECK: No pain or stiffness  RESPIRATORY: No cough, wheezing, hemoptysis; No shortness of breath  CARDIOVASCULAR: No chest pain or palpitations  GASTROINTESTINAL: No abdominal or epigastric pain. No vomiting, or hematemesis; No diarrhea or constipation. No melena or hematochezia.  GENITOURINARY: No dysuria, frequency or hematuria  NEUROLOGICAL: No numbness or weakness  SKIN: No itching, burning, rashes, or lesions   All other review of systems is negative unless indicated above    OBJECTIVE    PHYSICAL EXAM:  Vital Signs Last 24 Hrs  T(C): 36.4 (29 Apr 2024 07:28), Max: 36.8 (28 Apr 2024 15:40)  T(F): 97.5 (29 Apr 2024 07:28), Max: 98.2 (28 Apr 2024 15:40)  HR: 56 (29 Apr 2024 07:28) (53 - 84)  BP: 110/72 (29 Apr 2024 07:28) (110/72 - 124/68)  BP(mean): --  RR: 18 (29 Apr 2024 07:28) (16 - 18)  SpO2: 100% (29 Apr 2024 07:28) (98% - 100%)    Parameters below as of 29 Apr 2024 07:28  Patient On (Oxygen Delivery Method): room air      Constitutional: NAD, awake and alert, well-developed  HEENT: PERR, EOMI, Normal Hearing, MMM  Neck: Soft and supple, No LAD, No JVD  Respiratory: Breath sounds are clear bilaterally, No wheezing, rales or rhonchi  Cardiovascular: S1 and S2, regular rate and rhythm, no Murmurs, gallops or rubs  Gastrointestinal: Bowel Sounds present, soft, nontender, nondistended, no guarding, no rebound  Extremities: No peripheral edema  Vascular: 2+ peripheral pulses  Neurological: A/O x 3, no focal deficits  Musculoskeletal: 5/5 strength b/l upper and lower extremities  Skin: No rashes    MEDICATIONS  (STANDING):  gabapentin 300 milliGRAM(s) Oral two times a day  heparin   Injectable 5000 Unit(s) SubCutaneous every 12 hours  influenza  Vaccine (HIGH DOSE) 0.7 milliLiter(s) IntraMuscular once  polyethylene glycol 3350 17 Gram(s) Oral daily  scopolamine 1 mG/72 Hr(s) Patch 1 Patch Transdermal every 72 hours  sodium chloride 0.9%. 1000 milliLiter(s) (100 mL/Hr) IV Continuous <Continuous>      LABS:   Specimen Source Clean Catch Clean Catch (Midstream)   04-25 @ 17:00  Culture Results  <10,000 CFU/mL Normal Urogenital Kanchan  Specimen Source .Blood None   04-25 @ 16:37  Culture Results  No growth at 72 Hours    CAPILLARY BLOOD GLUCOSE  POCT Blood Glucose.: 178 mg/dL (29 Apr 2024 12:47)  POCT Blood Glucose.: 91 mg/dL (28 Apr 2024 17:06)

## 2024-04-29 NOTE — DISCHARGE NOTE PROVIDER - HOSPITAL COURSE
64-year-old male history of hypertension esophageal cancer, laparoscopic MYCHAL Bahman esophagogastrectomy esophagogastroduodenoscopy, type 2 diabetes cancer mets to the liver presents the emergency department for generalized weakness.  Admitted for weight loss weakness, fatigue.  Had goals of care conversation with patient states that he is not currently on chemotherapy states he was told he is going to be on a break until May states that he is DNR/DNI does not think he needs or wants a PEG tube . Was unable to eat for the 3 days PTA, extremely cachectic. Seen by oncology and pallieative care. Needs Rehab.  Approval obtained. Transfer when bed available

## 2024-04-29 NOTE — PROGRESS NOTE ADULT - ASSESSMENT
63 y/o M with known metastatic esophageal CA not currently on cancer directed therapy, most recent tx 02/2024 with plans for repeat PET 05/2024 prior to going back on tx, currently admitted with failure to thrive / poor PO intake.      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   - Siewert Type II; No MMR deficiency; Her 2 NEG  - S/p x3 cycles induction carbo / taxol 03/30/23 then s/p x6 cycles carbo / taxol with RT completed 05/15/23  - S/p Andrew Bahman 08/15/23 with surgical path noting invasive adenocarcinoma, moderately diff with squamous and neuroendocrine features; metastatic carcinoma 04/14 LNs  - 10/25/23; Started x1 year Nivolumab  - 02/09/24 PET with evidence of POD / distant mets   - 03/01/24 liver bx confirmed metastatic adenocarcinoma  - Patient wished to take break from additional cancer directed therapy and continue to work with both Dr Richter and Dr Colton Wallace, both holistic practitioners doing alternative therapy   - Patient was agreeable to repeat PET / CT imaging late April / early May 2024, then decide on moving forward with FOLFOX / cancer directed therapy   - Patient now with ongoing failure to thrive, poor PO intake, weight loss & despite noted POD and not being on cancer directed therapy, he was not eager to receive additional cancer directed therapy at this time and wants to focus on going to rehab in attempt to improve functional / performance status   - Recommend Palliative involvement to help with GOC  - Continue supportive measures as necessary  - No planned inpatient cancer directed therapy   - Outpatient follow up with Primary Onc Dr Valero once stable and d/c'ed      # Acute Transaminitis     - LFTs jumped up over the past 2 weeks  - Last CT AP imaging 04/12/24-2 cm sized hypodense solid hepatic lesion, new since prior, concerning for metastasis..  - Repeat  CT imaging-4/26/24:  2.6 cm segment 7 hypoattenuating lesion, previously 2.4 cm.  - Most likely from known progressing metastatic disease         
64 Year Old  male with history of Esophageal Cancer    Assessment:   Failure to Thrive  Metastatic esophageal cancer stage  4 to liver  Physical Weakness and debilitation - severe muscle mass loss unable to ambulate  Constipation    Plan:  IVF  Nutrition consult  Oncology Consult appreciated  Palliative Care Consult appreciated  PT  - pt stated too weak to participate today but states he will tomorrow.  SNF rehab    DVT Prophylaxis:  SQ Heparin    Advanced Directives:  DNR/DNI  Refuses Feeding Tube
64 Year Old  male with history of Esophageal Cancer    Assessment:   Failure to Thrive  Metastatic esophageal cancer stage  4 to liver  Physical Weakness and debilitation - severe muscle mass loss unable to ambulate    Plan:  IVF  Nutrition consult  Oncology Consult appreciated  Palliative Care Consult appreciated  PT  SNF rehab    DVT Prophylaxis:  SQ Heparin    Advanced Directives:  DNR/DNI  Refuses Feeding Tube
64 Year Old  male with history of Esophageal Cancer    Assessment:   Failure to Thrive  Metastatic esophageal cancer stage  4 to liver  Physical Weakness and debilitation - severe muscle mass loss unable to ambulate    Plan:  IVF  Nutrition consult  Oncology Consult appreciated  Palliave Care Consult appreciated  PT  SNF rehab    DVT Prophylaxis:  SQ Heparin    Advanced Directives:  DNR/DNI  Refuses Feeding Tube

## 2024-04-29 NOTE — PHYSICAL THERAPY INITIAL EVALUATION ADULT - PLANNED THERAPY INTERVENTIONS, PT EVAL
Eval, amb, transfers, bed mobility x 15'. Pt left in bed with all observation section equipment/material intact and bed alarm activated. Pt with no c/o pain. Will cont to follow./bed mobility training/gait training/strengthening/transfer training

## 2024-04-29 NOTE — DISCHARGE NOTE PROVIDER - NSDCFUSCHEDAPPT_GEN_ALL_CORE_FT
[facility]  [facility]  Scheduled Appointment: 05/09/2024

## 2024-04-29 NOTE — DISCHARGE NOTE PROVIDER - NSDCCPCAREPLAN_GEN_ALL_CORE_FT
PRINCIPAL DISCHARGE DIAGNOSIS  Diagnosis: Adult failure to thrive  Assessment and Plan of Treatment:       SECONDARY DISCHARGE DIAGNOSES  Diagnosis: Weakness  Assessment and Plan of Treatment:     Diagnosis: Acute dehydration  Assessment and Plan of Treatment:     Diagnosis: Esophageal cancer, stage IV  Assessment and Plan of Treatment:

## 2024-04-30 ENCOUNTER — NON-APPOINTMENT (OUTPATIENT)
Age: 64
End: 2024-04-30

## 2024-05-01 LAB
CULTURE RESULTS: SIGNIFICANT CHANGE UP
CULTURE RESULTS: SIGNIFICANT CHANGE UP
SPECIMEN SOURCE: SIGNIFICANT CHANGE UP
SPECIMEN SOURCE: SIGNIFICANT CHANGE UP

## 2024-05-08 DIAGNOSIS — G62.0 DRUG-INDUCED POLYNEUROPATHY: ICD-10-CM

## 2024-05-08 DIAGNOSIS — I10 ESSENTIAL (PRIMARY) HYPERTENSION: ICD-10-CM

## 2024-05-08 DIAGNOSIS — R64 CACHEXIA: ICD-10-CM

## 2024-05-08 DIAGNOSIS — E43 UNSPECIFIED SEVERE PROTEIN-CALORIE MALNUTRITION: ICD-10-CM

## 2024-05-08 DIAGNOSIS — Z66 DO NOT RESUSCITATE: ICD-10-CM

## 2024-05-08 DIAGNOSIS — E11.9 TYPE 2 DIABETES MELLITUS WITHOUT COMPLICATIONS: ICD-10-CM

## 2024-05-08 DIAGNOSIS — C78.7 SECONDARY MALIGNANT NEOPLASM OF LIVER AND INTRAHEPATIC BILE DUCT: ICD-10-CM

## 2024-05-08 DIAGNOSIS — E86.0 DEHYDRATION: ICD-10-CM

## 2024-05-08 DIAGNOSIS — R74.01 ELEVATION OF LEVELS OF LIVER TRANSAMINASE LEVELS: ICD-10-CM

## 2024-05-08 DIAGNOSIS — C15.9 MALIGNANT NEOPLASM OF ESOPHAGUS, UNSPECIFIED: ICD-10-CM

## 2024-05-08 DIAGNOSIS — R62.7 ADULT FAILURE TO THRIVE: ICD-10-CM

## 2024-05-09 ENCOUNTER — APPOINTMENT (OUTPATIENT)
Dept: HEMATOLOGY ONCOLOGY | Facility: CLINIC | Age: 64
End: 2024-05-09

## 2024-06-03 ENCOUNTER — APPOINTMENT (OUTPATIENT)
Dept: CT IMAGING | Facility: CLINIC | Age: 64
End: 2024-06-03

## 2024-06-06 ENCOUNTER — INPATIENT (INPATIENT)
Facility: HOSPITAL | Age: 64
LOS: 5 days | Discharge: OTHER INSTITUTION | DRG: 812 | End: 2024-06-12
Attending: FAMILY MEDICINE | Admitting: INTERNAL MEDICINE
Payer: MEDICARE

## 2024-06-06 VITALS
TEMPERATURE: 101 F | OXYGEN SATURATION: 95 % | DIASTOLIC BLOOD PRESSURE: 61 MMHG | WEIGHT: 100.09 LBS | HEART RATE: 112 BPM | RESPIRATION RATE: 15 BRPM | SYSTOLIC BLOOD PRESSURE: 101 MMHG | HEIGHT: 67 IN

## 2024-06-06 DIAGNOSIS — D64.9 ANEMIA, UNSPECIFIED: ICD-10-CM

## 2024-06-06 DIAGNOSIS — Z98.890 OTHER SPECIFIED POSTPROCEDURAL STATES: Chronic | ICD-10-CM

## 2024-06-06 DIAGNOSIS — K22.2 ESOPHAGEAL OBSTRUCTION: Chronic | ICD-10-CM

## 2024-06-06 DIAGNOSIS — Z92.3 PERSONAL HISTORY OF IRRADIATION: Chronic | ICD-10-CM

## 2024-06-06 DIAGNOSIS — Z90.49 ACQUIRED ABSENCE OF OTHER SPECIFIED PARTS OF DIGESTIVE TRACT: Chronic | ICD-10-CM

## 2024-06-06 DIAGNOSIS — Z92.21 PERSONAL HISTORY OF ANTINEOPLASTIC CHEMOTHERAPY: Chronic | ICD-10-CM

## 2024-06-06 LAB
ALBUMIN SERPL ELPH-MCNC: 2.4 G/DL — LOW (ref 3.3–5)
ALP SERPL-CCNC: 128 U/L — HIGH (ref 40–120)
ALT FLD-CCNC: 41 U/L — SIGNIFICANT CHANGE UP (ref 12–78)
ANION GAP SERPL CALC-SCNC: 1 MMOL/L — LOW (ref 5–17)
ANISOCYTOSIS BLD QL: SLIGHT — SIGNIFICANT CHANGE UP
APPEARANCE UR: CLEAR — SIGNIFICANT CHANGE UP
APTT BLD: 27.9 SEC — SIGNIFICANT CHANGE UP (ref 24.5–35.6)
AST SERPL-CCNC: 17 U/L — SIGNIFICANT CHANGE UP (ref 15–37)
BASOPHILS # BLD AUTO: 0.01 K/UL — SIGNIFICANT CHANGE UP (ref 0–0.2)
BASOPHILS NFR BLD AUTO: 0.1 % — SIGNIFICANT CHANGE UP (ref 0–2)
BILIRUB SERPL-MCNC: 0.2 MG/DL — SIGNIFICANT CHANGE UP (ref 0.2–1.2)
BILIRUB UR-MCNC: NEGATIVE — SIGNIFICANT CHANGE UP
BLD GP AB SCN SERPL QL: SIGNIFICANT CHANGE UP
BUN SERPL-MCNC: 15 MG/DL — SIGNIFICANT CHANGE UP (ref 7–23)
CALCIUM SERPL-MCNC: 8 MG/DL — LOW (ref 8.5–10.1)
CHLORIDE SERPL-SCNC: 113 MMOL/L — HIGH (ref 96–108)
CK SERPL-CCNC: 28 U/L — SIGNIFICANT CHANGE UP (ref 26–308)
CO2 SERPL-SCNC: 28 MMOL/L — SIGNIFICANT CHANGE UP (ref 22–31)
COLOR SPEC: YELLOW — SIGNIFICANT CHANGE UP
CREAT SERPL-MCNC: 0.64 MG/DL — SIGNIFICANT CHANGE UP (ref 0.5–1.3)
DIFF PNL FLD: NEGATIVE — SIGNIFICANT CHANGE UP
EGFR: 105 ML/MIN/1.73M2 — SIGNIFICANT CHANGE UP
EOSINOPHIL # BLD AUTO: 0.02 K/UL — SIGNIFICANT CHANGE UP (ref 0–0.5)
EOSINOPHIL NFR BLD AUTO: 0.3 % — SIGNIFICANT CHANGE UP (ref 0–6)
FLUAV AG NPH QL: SIGNIFICANT CHANGE UP
FLUBV AG NPH QL: SIGNIFICANT CHANGE UP
GLUCOSE SERPL-MCNC: 127 MG/DL — HIGH (ref 70–99)
GLUCOSE UR QL: 100 MG/DL
HCT VFR BLD CALC: 16.5 % — CRITICAL LOW (ref 39–50)
HGB BLD-MCNC: 5.3 G/DL — CRITICAL LOW (ref 13–17)
IMM GRANULOCYTES NFR BLD AUTO: 0.7 % — SIGNIFICANT CHANGE UP (ref 0–0.9)
INR BLD: 1.02 RATIO — SIGNIFICANT CHANGE UP (ref 0.85–1.18)
KETONES UR-MCNC: NEGATIVE MG/DL — SIGNIFICANT CHANGE UP
LACTATE SERPL-SCNC: 1.8 MMOL/L — SIGNIFICANT CHANGE UP (ref 0.7–2)
LEUKOCYTE ESTERASE UR-ACNC: NEGATIVE — SIGNIFICANT CHANGE UP
LIDOCAIN IGE QN: 9 U/L — LOW (ref 13–75)
LYMPHOCYTES # BLD AUTO: 0.55 K/UL — LOW (ref 1–3.3)
LYMPHOCYTES # BLD AUTO: 7.5 % — LOW (ref 13–44)
MACROCYTES BLD QL: SIGNIFICANT CHANGE UP
MANUAL SMEAR VERIFICATION: SIGNIFICANT CHANGE UP
MCHC RBC-ENTMCNC: 32.1 GM/DL — SIGNIFICANT CHANGE UP (ref 32–36)
MCHC RBC-ENTMCNC: 35.6 PG — HIGH (ref 27–34)
MCV RBC AUTO: 110.7 FL — HIGH (ref 80–100)
MONOCYTES # BLD AUTO: 0.37 K/UL — SIGNIFICANT CHANGE UP (ref 0–0.9)
MONOCYTES NFR BLD AUTO: 5.1 % — SIGNIFICANT CHANGE UP (ref 2–14)
NEUTROPHILS # BLD AUTO: 6.3 K/UL — SIGNIFICANT CHANGE UP (ref 1.8–7.4)
NEUTROPHILS NFR BLD AUTO: 86.3 % — HIGH (ref 43–77)
NITRITE UR-MCNC: NEGATIVE — SIGNIFICANT CHANGE UP
PH UR: 5 — SIGNIFICANT CHANGE UP (ref 5–8)
PLAT MORPH BLD: NORMAL — SIGNIFICANT CHANGE UP
PLATELET # BLD AUTO: 155 K/UL — SIGNIFICANT CHANGE UP (ref 150–400)
POTASSIUM SERPL-MCNC: 4.1 MMOL/L — SIGNIFICANT CHANGE UP (ref 3.5–5.3)
POTASSIUM SERPL-SCNC: 4.1 MMOL/L — SIGNIFICANT CHANGE UP (ref 3.5–5.3)
PROT SERPL-MCNC: 5.6 GM/DL — LOW (ref 6–8.3)
PROT UR-MCNC: NEGATIVE MG/DL — SIGNIFICANT CHANGE UP
PROTHROM AB SERPL-ACNC: 11.5 SEC — SIGNIFICANT CHANGE UP (ref 9.5–13)
RBC # BLD: 1.49 M/UL — LOW (ref 4.2–5.8)
RBC # FLD: 16 % — HIGH (ref 10.3–14.5)
RBC BLD AUTO: ABNORMAL
RSV RNA NPH QL NAA+NON-PROBE: SIGNIFICANT CHANGE UP
SARS-COV-2 RNA SPEC QL NAA+PROBE: DETECTED
SODIUM SERPL-SCNC: 142 MMOL/L — SIGNIFICANT CHANGE UP (ref 135–145)
SP GR SPEC: 1.02 — SIGNIFICANT CHANGE UP (ref 1–1.03)
UROBILINOGEN FLD QL: 0.2 MG/DL — SIGNIFICANT CHANGE UP (ref 0.2–1)
WBC # BLD: 7.3 K/UL — SIGNIFICANT CHANGE UP (ref 3.8–10.5)
WBC # FLD AUTO: 7.3 K/UL — SIGNIFICANT CHANGE UP (ref 3.8–10.5)

## 2024-06-06 PROCEDURE — 83540 ASSAY OF IRON: CPT

## 2024-06-06 PROCEDURE — 97162 PT EVAL MOD COMPLEX 30 MIN: CPT | Mod: GP

## 2024-06-06 PROCEDURE — 83550 IRON BINDING TEST: CPT

## 2024-06-06 PROCEDURE — 97530 THERAPEUTIC ACTIVITIES: CPT | Mod: GP

## 2024-06-06 PROCEDURE — 83010 ASSAY OF HAPTOGLOBIN QUANT: CPT

## 2024-06-06 PROCEDURE — 82728 ASSAY OF FERRITIN: CPT

## 2024-06-06 PROCEDURE — 71045 X-RAY EXAM CHEST 1 VIEW: CPT | Mod: 26

## 2024-06-06 PROCEDURE — 36430 TRANSFUSION BLD/BLD COMPNT: CPT

## 2024-06-06 PROCEDURE — 82272 OCCULT BLD FECES 1-3 TESTS: CPT

## 2024-06-06 PROCEDURE — 82746 ASSAY OF FOLIC ACID SERUM: CPT

## 2024-06-06 PROCEDURE — 80048 BASIC METABOLIC PNL TOTAL CA: CPT

## 2024-06-06 PROCEDURE — 99223 1ST HOSP IP/OBS HIGH 75: CPT

## 2024-06-06 PROCEDURE — 74174 CTA ABD&PLVS W/CONTRAST: CPT | Mod: MC

## 2024-06-06 PROCEDURE — 85027 COMPLETE CBC AUTOMATED: CPT

## 2024-06-06 PROCEDURE — 99285 EMERGENCY DEPT VISIT HI MDM: CPT

## 2024-06-06 PROCEDURE — 82607 VITAMIN B-12: CPT

## 2024-06-06 PROCEDURE — 97116 GAIT TRAINING THERAPY: CPT | Mod: GP

## 2024-06-06 PROCEDURE — 36415 COLL VENOUS BLD VENIPUNCTURE: CPT

## 2024-06-06 PROCEDURE — 84100 ASSAY OF PHOSPHORUS: CPT

## 2024-06-06 PROCEDURE — 83735 ASSAY OF MAGNESIUM: CPT

## 2024-06-06 PROCEDURE — 83615 LACTATE (LD) (LDH) ENZYME: CPT

## 2024-06-06 PROCEDURE — 82962 GLUCOSE BLOOD TEST: CPT

## 2024-06-06 PROCEDURE — 85045 AUTOMATED RETICULOCYTE COUNT: CPT

## 2024-06-06 PROCEDURE — 93010 ELECTROCARDIOGRAM REPORT: CPT

## 2024-06-06 PROCEDURE — 83036 HEMOGLOBIN GLYCOSYLATED A1C: CPT

## 2024-06-06 PROCEDURE — P9040: CPT

## 2024-06-06 RX ORDER — CEFTRIAXONE 500 MG/1
1000 INJECTION, POWDER, FOR SOLUTION INTRAMUSCULAR; INTRAVENOUS ONCE
Refills: 0 | Status: DISCONTINUED | OUTPATIENT
Start: 2024-06-06 | End: 2024-06-06

## 2024-06-06 RX ORDER — ACETAMINOPHEN 500 MG
675 TABLET ORAL ONCE
Refills: 0 | Status: COMPLETED | OUTPATIENT
Start: 2024-06-06 | End: 2024-06-06

## 2024-06-06 RX ORDER — LANOLIN ALCOHOL/MO/W.PET/CERES
3 CREAM (GRAM) TOPICAL AT BEDTIME
Refills: 0 | Status: DISCONTINUED | OUTPATIENT
Start: 2024-06-06 | End: 2024-06-12

## 2024-06-06 RX ORDER — POLYETHYLENE GLYCOL 3350 17 G/17G
17 POWDER, FOR SOLUTION ORAL DAILY
Refills: 0 | Status: DISCONTINUED | OUTPATIENT
Start: 2024-06-06 | End: 2024-06-12

## 2024-06-06 RX ORDER — GLUCAGON INJECTION, SOLUTION 0.5 MG/.1ML
1 INJECTION, SOLUTION SUBCUTANEOUS ONCE
Refills: 0 | Status: DISCONTINUED | OUTPATIENT
Start: 2024-06-06 | End: 2024-06-12

## 2024-06-06 RX ORDER — SODIUM CHLORIDE 9 MG/ML
1000 INJECTION INTRAMUSCULAR; INTRAVENOUS; SUBCUTANEOUS ONCE
Refills: 0 | Status: COMPLETED | OUTPATIENT
Start: 2024-06-06 | End: 2024-06-06

## 2024-06-06 RX ORDER — DEXTROSE 50 % IN WATER 50 %
25 SYRINGE (ML) INTRAVENOUS ONCE
Refills: 0 | Status: DISCONTINUED | OUTPATIENT
Start: 2024-06-06 | End: 2024-06-12

## 2024-06-06 RX ORDER — CEFTRIAXONE 500 MG/1
1000 INJECTION, POWDER, FOR SOLUTION INTRAMUSCULAR; INTRAVENOUS ONCE
Refills: 0 | Status: COMPLETED | OUTPATIENT
Start: 2024-06-06 | End: 2024-06-06

## 2024-06-06 RX ORDER — ACETAMINOPHEN 500 MG
650 TABLET ORAL EVERY 6 HOURS
Refills: 0 | Status: DISCONTINUED | OUTPATIENT
Start: 2024-06-06 | End: 2024-06-12

## 2024-06-06 RX ORDER — DEXTROSE 50 % IN WATER 50 %
12.5 SYRINGE (ML) INTRAVENOUS ONCE
Refills: 0 | Status: DISCONTINUED | OUTPATIENT
Start: 2024-06-06 | End: 2024-06-12

## 2024-06-06 RX ORDER — INSULIN LISPRO 100/ML
VIAL (ML) SUBCUTANEOUS
Refills: 0 | Status: DISCONTINUED | OUTPATIENT
Start: 2024-06-06 | End: 2024-06-12

## 2024-06-06 RX ORDER — SODIUM CHLORIDE 9 MG/ML
1000 INJECTION, SOLUTION INTRAVENOUS
Refills: 0 | Status: DISCONTINUED | OUTPATIENT
Start: 2024-06-06 | End: 2024-06-12

## 2024-06-06 RX ORDER — ONDANSETRON 8 MG/1
4 TABLET, FILM COATED ORAL EVERY 8 HOURS
Refills: 0 | Status: DISCONTINUED | OUTPATIENT
Start: 2024-06-06 | End: 2024-06-12

## 2024-06-06 RX ORDER — SODIUM CHLORIDE 9 MG/ML
1000 INJECTION, SOLUTION INTRAVENOUS ONCE
Refills: 0 | Status: COMPLETED | OUTPATIENT
Start: 2024-06-06 | End: 2024-06-06

## 2024-06-06 RX ORDER — DEXTROSE 10 % IN WATER 10 %
125 INTRAVENOUS SOLUTION INTRAVENOUS ONCE
Refills: 0 | Status: DISCONTINUED | OUTPATIENT
Start: 2024-06-06 | End: 2024-06-12

## 2024-06-06 RX ORDER — GABAPENTIN 400 MG/1
300 CAPSULE ORAL
Refills: 0 | Status: DISCONTINUED | OUTPATIENT
Start: 2024-06-06 | End: 2024-06-12

## 2024-06-06 RX ORDER — DEXTROSE 50 % IN WATER 50 %
15 SYRINGE (ML) INTRAVENOUS ONCE
Refills: 0 | Status: DISCONTINUED | OUTPATIENT
Start: 2024-06-06 | End: 2024-06-12

## 2024-06-06 RX ORDER — INSULIN LISPRO 100/ML
VIAL (ML) SUBCUTANEOUS AT BEDTIME
Refills: 0 | Status: DISCONTINUED | OUTPATIENT
Start: 2024-06-06 | End: 2024-06-12

## 2024-06-06 RX ORDER — SODIUM CHLORIDE 9 MG/ML
1400 INJECTION, SOLUTION INTRAVENOUS ONCE
Refills: 0 | Status: COMPLETED | OUTPATIENT
Start: 2024-06-06 | End: 2024-06-06

## 2024-06-06 RX ADMIN — Medication 270 MILLIGRAM(S): at 20:57

## 2024-06-06 RX ADMIN — CEFTRIAXONE 1000 MILLIGRAM(S): 500 INJECTION, POWDER, FOR SOLUTION INTRAMUSCULAR; INTRAVENOUS at 20:08

## 2024-06-06 RX ADMIN — SODIUM CHLORIDE 1400 MILLILITER(S): 9 INJECTION, SOLUTION INTRAVENOUS at 20:09

## 2024-06-06 NOTE — H&P ADULT - HISTORY OF PRESENT ILLNESS
Chief Complaint: abnormal lab result.    · Chief Complaint: The patient is a 64y Male complaining of abnormal lab result.  · HPI Objective Statement: 65 y/o male with PMHx of HTN, DM, esophageal cancer with mets to liver presents to the ED after having outpatient labs showing hemoglobin of 5.7, pt without any acute complaints, no chest pain, no SOB, no rectal bleeding, no hematemesis.   Chief Complaint: abnormal lab result.    The patient is a 64y Male for [facility] with PMH of HTN, DM-2, esophageal cancer with mets to liver s/p RT, constipation, hypotension, generalized cachexia, DNR & DNI and others has been sent to ED with low Hb of 5.7 for blood transfusion and further evaluation.  He feels tired and week. He denies hematemesis, hemoptysis, rectal bleed, hematochezia or hematuria. He is not on any anti-coagulant or NSAIDs.  He denies sob, chest pain, palpitation, abdominal pain, N/V or diarrhea or dysuria.  Denies smoking, alcohol or substance abuse.  He is not hypoxic.    Here in ED Hb is 5.3 (was 13.8 on 04/25/2024), Platelets 155k, , RDW 16, INR 1.02, Cr 0.64, Total protein 5.6, Albumin 2.4.  Lactate 1,8, Lipase 9.  US negative.  RVP POSITIVE for COVID-19.    CXR negative for any acute infiltrates.    Rocephin 1 gm IV and 2 units PRBC ordered by ED.   Chief Complaint: abnormal lab result.    The patient is a 64y Male for [facility] with PMH of HTN, DM-2, esophageal cancer with mets to liver s/p RT, constipation, hypotension, generalized cachexia, Herpetic neuralgia,  DNR & DNI and others has been sent to ED with low Hb of 5.7 for blood transfusion and further evaluation.  He feels tired and week. He denies hematemesis, hemoptysis, rectal bleed, hematochezia or hematuria. He is not on any anti-coagulant or NSAIDs.  He denies sob, chest pain, palpitation, abdominal pain, N/V or diarrhea or dysuria.  Denies smoking, alcohol or substance abuse.  He is not hypoxic.    Here in ED Hb is 5.3 (was 13.8 on 04/25/2024), Platelets 155k, , RDW 16, INR 1.02, Cr 0.64, Total protein 5.6, Albumin 2.4.  Lactate 1,8, Lipase 9.  US negative.  RVP POSITIVE for COVID-19.    CXR negative for any acute infiltrates.    Rocephin 1 gm IV and 2 units PRBC ordered by ED.

## 2024-06-06 NOTE — ED ADULT NURSE NOTE - CHIEF COMPLAINT QUOTE
patient brought in by EMS from [facility] c/o abnormal labs.  hgb 5.7.  hx esophageal ca with mets to liver.  EMS report patient refused 2 pm dose of midodrine.

## 2024-06-06 NOTE — H&P ADULT - NSHPLABSRESULTS_GEN_ALL_CORE
LABS:                        5.3    7.30  )-----------( 155      ( 06 Jun 2024 19:57 )             16.5     06-06    142  |  113<H>  |  15  ----------------------------<  127<H>  4.1   |  28  |  0.64    Ca    8.0<L>      06 Jun 2024 19:57    TPro  5.6<L>  /  Alb  2.4<L>  /  TBili  0.2  /  DBili  x   /  AST  17  /  ALT  41  /  AlkPhos  128<H>  06-06    PT/INR - ( 06 Jun 2024 19:57 )   PT: 11.5 sec;   INR: 1.02 ratio         PTT - ( 06 Jun 2024 19:57 )  PTT:27.9 sec

## 2024-06-06 NOTE — ED ADULT NURSE NOTE - OBJECTIVE STATEMENT
Patient presents to ED c/o abnormal lab results. Pt states he resides at [facility]; [facility] sent him to ED d/t hgb of 5.8. Patient also c/o burning with urination for approx. 2 weeks. Patient endorses fever/chills. Temp of 101.4 orally upon arrival to ED. Patient denies hematuria, SOB, CP, H/A, N/V/D. Patient AxOx4, on RA, NSR on cardiac monitor, RR even and unlabored. Patient appears cachectic, states he has worsening generalized weakness for last few days.

## 2024-06-06 NOTE — H&P ADULT - ASSESSMENT
The patient is a 64y Male for [facility] with PMH of HTN, DM-2, esophageal cancer with mets to liver s/p RT, constipation, hypotension, generalized cachexia, DNR & DNI and others has been sent to ED with low Hb of 5.7 for blood transfusion and further evaluation.  He feels tired and week. He denies hematemesis, hemoptysis, rectal bleed, hematochezia or hematuria. He is not on any anti-coagulant or NSAIDs.  He denies sob, chest pain, palpitation, abdominal pain, N/V or diarrhea or dysuria.  Denies smoking, alcohol or substance abuse.      # Severe anemia with macrocytosis of unclear etiology with h/o esophageal cancer with mets to liver.  -Hb is 5.3 (was 13.8 on 04/25/2024), , RDW 16, INR 1.02.  -Admit to medical floor.  -Continue with 2 units PRBC ordered by ED.  -Post-transfusion H/H.  -Transfuse PRBC prn if Hb<7 gm%.  -B12, Folate, Iron panel and Ferritin levels.  -Fecal occult blood ordered.  -May consider GI consult in am.    # Hypotension, possibly due to severe anemia.  -Not evidence of sepsis.  -Continue his Midodrine.  -Will hold his Metoprolol.    # COVID-19 viral infection.  -Asymptomatic, not hypoxic and CXR negative for any acute infiltrates.  -Not a candidate for Remdesivir or Dexamethasone.  -Isolation per protocol.  -Supportive care.    # DM-2 as documented.  -He is not on ant DM meds.  -.  -Will get A1c level.  -ISS with coverage.    # Esophageal cancer with mets to liver s/p RT.  # Hypoproteinemia  and hypoalbuminemia with protein calorie malnutrition.  -Follow up as outpatient.  -May need nutritionist consult.    # DVT prophylaxis: SCDs    CODE STATUS: DNR and DNI with Trial of NIV. The patient is a 64y Male for [facility] with PMH of HTN, DM-2, esophageal cancer with mets to liver s/p RT, constipation, hypotension, generalized cachexia, Herpetic neuralgia,  DNR & DNI and others has been sent to ED with low Hb of 5.7 for blood transfusion and further evaluation.  He feels tired and week. He denies hematemesis, hemoptysis, rectal bleed, hematochezia or hematuria. He is not on any anti-coagulant or NSAIDs.  He denies sob, chest pain, palpitation, abdominal pain, N/V or diarrhea or dysuria.  Denies smoking, alcohol or substance abuse.      # Severe anemia with macrocytosis of unclear etiology with h/o esophageal cancer with mets to liver.  -Hb is 5.3 (was 13.8 on 04/25/2024), , RDW 16, INR 1.02.  -Admit to medical floor.  -Continue with 2 units PRBC ordered by ED.  -Post-transfusion H/H.  -Transfuse PRBC prn if Hb<7 gm%.  -B12, Folate, Iron panel and Ferritin levels.  -Fecal occult blood ordered.  -May consider GI consult in am.    # Hypotension, possibly due to severe anemia.  -Not evidence of sepsis.  -Continue his Midodrine.  -Will hold his Metoprolol.    # COVID-19 viral infection.  -Asymptomatic, not hypoxic and CXR negative for any acute infiltrates.  -Not a candidate for Remdesivir or Dexamethasone.  -Isolation per protocol.  -Supportive care.    # DM-2 as documented.  -He is not on ant DM meds.  -.  -Will get A1c level.  -ISS with coverage.    # Herpetic neuralgia.  -On Gabapentin.    # Esophageal cancer with mets to liver s/p RT.  # Hypoproteinemia  and hypoalbuminemia with protein calorie malnutrition.  -Follow up as outpatient.  -Continue his Scopolamine patch q72 hours.  -May need nutritionist consult.    # DVT prophylaxis: SCDs    CODE STATUS: DNR and DNI with Trial of NIV. The patient is a 64y Male for [facility] with PMH of HTN, DM-2, esophageal cancer with mets to liver s/p RT, constipation, hypotension, generalized cachexia, Herpetic neuralgia,  DNR & DNI and others has been sent to ED with low Hb of 5.7 for blood transfusion and further evaluation.  He feels tired and week. He denies hematemesis, hemoptysis, rectal bleed, hematochezia or hematuria. He is not on any anti-coagulant or NSAIDs.  He denies sob, chest pain, palpitation, abdominal pain, N/V or diarrhea or dysuria.  Denies smoking, alcohol or substance abuse.      # Severe anemia with macrocytosis of unclear etiology with h/o esophageal cancer with mets to liver.  -Hb is 5.3 (was 13.8 on 04/25/2024), , RDW 16, INR 1.02.  -Admit to medical floor.  -Continue with 2 units PRBC ordered by ED.  -Post-transfusion H/H.  -Transfuse PRBC prn if Hb<7 gm%.  -B12, Folate, Iron panel and Ferritin levels.  -Fecal occult blood ordered.  -May consider GI consult in am.    # Hypotension, possibly due to severe anemia.  -Not evidence of sepsis.  -Continue his Midodrine.  -Will hold his Metoprolol.    # COVID-19 viral infection.  -Asymptomatic, not hypoxic and CXR negative for any acute infiltrates.  -Not a candidate for Remdesivir or Dexamethasone.  -Isolation per protocol.  -Supportive care.  -ID consult placed.    # DM-2 as documented.  -He is not on ant DM meds.  -.  -Will get A1c level.  -ISS with coverage.    # Herpetic neuralgia.  -On Gabapentin.    # Esophageal cancer with mets to liver s/p RT.  # Hypoproteinemia  and hypoalbuminemia with protein calorie malnutrition.  -Follow up as outpatient.  -Continue his Scopolamine patch q72 hours.  -May need nutritionist consult.    # DVT prophylaxis: SCDs    CODE STATUS: DNR and DNI with Trial of NIV.

## 2024-06-06 NOTE — H&P ADULT - NSCORESITESY/N_GEN_A_CORE_RD
History  Chief Complaint   Patient presents with    Medical Problem     Pt "popped" hip while running today, painful walking but able to, never happened before      14 yo male without significant pMH p/w right hip pain after feeling pop while running during foot ball practice  History provided by:  Medical records, patient and parent   used: No    Medical Problem  Location:  Right hip  Quality:  Achy  Severity:  Moderate  Onset quality:  Sudden  Duration:  3 hours  Timing:  Constant  Progression:  Unchanged  Chronicity:  New  Context:  Running during football practice  Relieved by:  Rest  Worsened by: Movement/weight bearing  Associated symptoms: no abdominal pain, no chest pain and no vomiting    Risk factors:  None      Prior to Admission Medications   Prescriptions Last Dose Informant Patient Reported? Taking? diphenhydrAMINE (BENADRYL) 25 mg tablet   No No   Sig: Take 1 tablet (25 mg total) by mouth every 6 (six) hours as needed for itching   triamcinolone (KENALOG) 0 1 % ointment   No No   Sig: Apply topically 2 (two) times a day for 7 days      Facility-Administered Medications: None       History reviewed  No pertinent past medical history      Past Surgical History:   Procedure Laterality Date    CIRCUMCISION         Family History   Problem Relation Age of Onset    No Known Problems Mother     No Known Problems Father     No Known Problems Sister     No Known Problems Sister     No Known Problems Sister     No Known Problems Brother     No Known Problems Brother     No Known Problems Brother     Depression Maternal Grandmother     Anxiety disorder Maternal Grandmother     Heart disease Maternal Grandfather     Hyperlipidemia Maternal Grandfather     Dementia Maternal Grandfather     Other Paternal Grandmother         medical hx unknown    Other Paternal Grandfather         medical hx unknown    Diabetes type II Family         MGF's sisters    Hypertension Family      I have reviewed and agree with the history as documented  E-Cigarette/Vaping    E-Cigarette Use Never User      E-Cigarette/Vaping Substances    Nicotine No     THC No     CBD No     Flavoring No      Social History     Tobacco Use    Smoking status: Never Smoker    Smokeless tobacco: Never Used   Vaping Use    Vaping Use: Never used   Substance Use Topics    Alcohol use: Never    Drug use: Never       Review of Systems   Cardiovascular: Negative for chest pain  Gastrointestinal: Negative for abdominal pain and vomiting  Genitourinary: Negative for dysuria, frequency, penile pain, testicular pain and urgency  Skin: Negative for wound  Neurological: Negative for weakness and numbness  All other systems reviewed and are negative  Physical Exam  Physical Exam  Vitals and nursing note reviewed  Constitutional:       Appearance: He is well-developed  He is not diaphoretic  HENT:      Head: Normocephalic and atraumatic  Eyes:      Conjunctiva/sclera: Conjunctivae normal    Cardiovascular:      Rate and Rhythm: Normal rate and regular rhythm  Pulses: Normal pulses  Dorsalis pedis pulses are 2+ on the right side  Posterior tibial pulses are 2+ on the right side  Pulmonary:      Effort: Pulmonary effort is normal  No respiratory distress  Abdominal:      Palpations: Abdomen is soft  Tenderness: There is no abdominal tenderness  There is no guarding or rebound  Musculoskeletal:         General: No deformity  Normal range of motion  Cervical back: Normal range of motion  Right hip: Tenderness and bony tenderness present  No deformity or crepitus  Normal range of motion  Normal strength  Right upper leg: Normal       Right knee: Normal       Right lower leg: No edema  Left lower leg: No edema  Skin:     General: Skin is warm and dry  Neurological:      Mental Status: He is alert and oriented to person, place, and time  Psychiatric:         Behavior: Behavior normal          Thought Content: Thought content normal          Judgment: Judgment normal          Vital Signs  ED Triage Vitals [07/19/22 1941]   Temperature Pulse Respirations Blood Pressure SpO2   98 7 °F (37 1 °C) 89 (!) 20 (!) 112/57 95 %      Temp src Heart Rate Source Patient Position - Orthostatic VS BP Location FiO2 (%)   Oral -- Sitting Right arm --      Pain Score       8           Vitals:    07/19/22 1941   BP: (!) 112/57   Pulse: 89   Patient Position - Orthostatic VS: Sitting         Visual Acuity      ED Medications  Medications - No data to display    Diagnostic Studies  Results Reviewed     None                 XR hip/pelv 2-3 vws right   ED Interpretation by Nita Cortez MD (07/19 2114)   NAD                 Procedures  Procedures         ED Course                                             MDM  Number of Diagnoses or Management Options  Hip pain, acute, right  Diagnosis management comments: Pt NV intact, imaging negative for injury  Pt denies abdominal/ symptoms  Has some TTP over right iliac crest   NO redness/warmth/swelling           Amount and/or Complexity of Data Reviewed  Tests in the radiology section of CPT®: ordered and reviewed  Review and summarize past medical records: yes  Independent visualization of images, tracings, or specimens: yes    Risk of Complications, Morbidity, and/or Mortality  Presenting problems: low  Diagnostic procedures: low  Management options: low    Patient Progress  Patient progress: stable      Disposition  Final diagnoses:   Hip pain, acute, right     Time reflects when diagnosis was documented in both MDM as applicable and the Disposition within this note     Time User Action Codes Description Comment    7/19/2022  9:14 PM Estrada Frederick Add [M25 [id number]] Hip pain, acute, right       ED Disposition     ED Disposition   Discharge    Condition   Stable    Date/Time   Tue Jul 19, 2022  9:14 PM    Comment   Marti Cárdenas Lobo Hardy discharge to home/self care  Follow-up Information     Follow up With Specialties Details Why Contact Info Additional Information    Hamida Urbina MD Pediatrics Schedule an appointment as soon as possible for a visit   [address]  [address] [facility] [address]       [address] Emergency Department Emergency Medicine  As needed, If symptoms worsen [address] [facility] [address] Emergency [address], [address], [place], [place], [facility]  Schedule an appointment as soon as possible for a visit   [place]  [phone]           Discharge Medication List as of 7/19/2022  9:23 PM      START taking these medications    Details   ibuprofen (MOTRIN) 400 mg tablet Take 1 tablet (400 mg total) by mouth every 6 (six) hours as needed for mild pain or moderate pain, Starting Tue 7/19/2022, Normal         CONTINUE these medications which have NOT CHANGED    Details   diphenhydrAMINE (BENADRYL) 25 mg tablet Take 1 tablet (25 mg total) by mouth every 6 (six) hours as needed for itching, Starting Fri 7/15/2022, Normal      triamcinolone (KENALOG) 0 1 % ointment Apply topically 2 (two) times a day for 7 days, Starting Fri 7/15/2022, Until Fri 7/22/2022, Normal             No discharge procedures on file      PDMP Review     None          ED Provider  Electronically Signed by           Majel Closs, MD  07/20/22 7237 Yes

## 2024-06-06 NOTE — ED PROVIDER NOTE - OBJECTIVE STATEMENT
63 y/o male with PMHx of HTN, DM, esophageal cancer with mets to liver presents to the ED after having outpatient labs showing hemoglobin of 5.7, pt without any acute complaints, no chest pain, no SOB, no rectal bleeding, no hematemesis.

## 2024-06-06 NOTE — H&P ADULT - NSHPPHYSICALEXAM_GEN_ALL_CORE
PHYSICAL EXAM:  GENERAL: NAD, lying in bed comfortably.  generalized cachectic.  HEAD:  Atraumatic, Normocephalic  EYES: EOMI, PERRLA, conjunctiva and sclera clear  ENT: Moist mucous membranes  NECK: Supple, No JVD  CHEST/LUNG: Clear to auscultation bilaterally; No rales, rhonchi, wheezing, or rubs. Unlabored respirations  HEART: Regular rate and rhythm; No murmurs, rubs, or gallops  ABDOMEN: Bowel sounds present; Soft, Nontender, Nondistended. No hepatomegaly  EXTREMITIES:  2+ Peripheral Pulses, brisk capillary refill. No clubbing, cyanosis, or edema  NERVOUS SYSTEM:  Alert & Oriented X3, speech clear. No deficits   MSK: FROM all 4 extremities, full and equal strength  SKIN: No rashes or lesions

## 2024-06-06 NOTE — ED ADULT TRIAGE NOTE - CHIEF COMPLAINT QUOTE
patient brought in by EMS from [facility] c/o abnormal labs.  hgb 5.7.  hx esophageal ca with mets to liver.  EMS report patient refused 2 pm dose of midodrine.

## 2024-06-06 NOTE — ED PROVIDER NOTE - NS_BEDUNITTYPES_ED_ALL_ED
Quality 110: Preventive Care And Screening: Influenza Immunization: Influenza Immunization previously received during influenza season Detail Level: Detailed Quality 226: Preventive Care And Screening: Tobacco Use: Screening And Cessation Intervention: Patient screened for tobacco use, is a smoker AND did not received Cessation Counseling for Unknown Reasons MED/SURG

## 2024-06-06 NOTE — ED PROVIDER NOTE - CONSTITUTIONAL, MLM
cachetic appearing, awake, alert, oriented to person, place, time/situation and in no apparent distress. normal...

## 2024-06-06 NOTE — ED PROVIDER NOTE - CLINICAL SUMMARY MEDICAL DECISION MAKING FREE TEXT BOX
Denies GI bleed symptoms chronic anemia secondary to chronic disease cancer denies melena bright red blood per rectum will need transfusion will need symptomatic COVID treatment to high risk for clinical decompensation sent home in my clinical judgment

## 2024-06-07 LAB
A1C WITH ESTIMATED AVERAGE GLUCOSE RESULT: 5.7 % — HIGH (ref 4–5.6)
ADD ON TEST-SPECIMEN IN LAB: SIGNIFICANT CHANGE UP
ANION GAP SERPL CALC-SCNC: 1 MMOL/L — LOW (ref 5–17)
BUN SERPL-MCNC: 14 MG/DL — SIGNIFICANT CHANGE UP (ref 7–23)
CALCIUM SERPL-MCNC: 7.9 MG/DL — LOW (ref 8.5–10.1)
CHLORIDE SERPL-SCNC: 115 MMOL/L — HIGH (ref 96–108)
CO2 SERPL-SCNC: 27 MMOL/L — SIGNIFICANT CHANGE UP (ref 22–31)
CREAT SERPL-MCNC: 0.4 MG/DL — LOW (ref 0.5–1.3)
EGFR: 121 ML/MIN/1.73M2 — SIGNIFICANT CHANGE UP
ESTIMATED AVERAGE GLUCOSE: 117 MG/DL — HIGH (ref 68–114)
FERRITIN SERPL-MCNC: 260 NG/ML — SIGNIFICANT CHANGE UP (ref 30–400)
FOLATE SERPL-MCNC: 17.2 NG/ML — SIGNIFICANT CHANGE UP
GLUCOSE BLDC GLUCOMTR-MCNC: 72 MG/DL — SIGNIFICANT CHANGE UP (ref 70–99)
GLUCOSE BLDC GLUCOMTR-MCNC: 80 MG/DL — SIGNIFICANT CHANGE UP (ref 70–99)
GLUCOSE BLDC GLUCOMTR-MCNC: 83 MG/DL — SIGNIFICANT CHANGE UP (ref 70–99)
GLUCOSE BLDC GLUCOMTR-MCNC: 86 MG/DL — SIGNIFICANT CHANGE UP (ref 70–99)
GLUCOSE BLDC GLUCOMTR-MCNC: 86 MG/DL — SIGNIFICANT CHANGE UP (ref 70–99)
GLUCOSE SERPL-MCNC: 83 MG/DL — SIGNIFICANT CHANGE UP (ref 70–99)
HAPTOGLOB SERPL-MCNC: 26 MG/DL — LOW (ref 34–200)
HCT VFR BLD CALC: 24 % — LOW (ref 39–50)
HCT VFR BLD CALC: 25.1 % — LOW (ref 39–50)
HCT VFR BLD CALC: 25.6 % — LOW (ref 39–50)
HGB BLD-MCNC: 8.4 G/DL — LOW (ref 13–17)
HGB BLD-MCNC: 8.5 G/DL — LOW (ref 13–17)
HGB BLD-MCNC: 8.7 G/DL — LOW (ref 13–17)
IRON SATN MFR SERPL: 162 UG/DL — SIGNIFICANT CHANGE UP (ref 45–165)
IRON SATN MFR SERPL: 82 % — HIGH (ref 16–55)
LDH SERPL L TO P-CCNC: 259 U/L — HIGH (ref 84–241)
MCHC RBC-ENTMCNC: 32.9 PG — SIGNIFICANT CHANGE UP (ref 27–34)
MCHC RBC-ENTMCNC: 33.6 PG — SIGNIFICANT CHANGE UP (ref 27–34)
MCHC RBC-ENTMCNC: 33.7 PG — SIGNIFICANT CHANGE UP (ref 27–34)
MCHC RBC-ENTMCNC: 33.9 GM/DL — SIGNIFICANT CHANGE UP (ref 32–36)
MCHC RBC-ENTMCNC: 34 GM/DL — SIGNIFICANT CHANGE UP (ref 32–36)
MCHC RBC-ENTMCNC: 35 GM/DL — SIGNIFICANT CHANGE UP (ref 32–36)
MCV RBC AUTO: 96.4 FL — SIGNIFICANT CHANGE UP (ref 80–100)
MCV RBC AUTO: 97.3 FL — SIGNIFICANT CHANGE UP (ref 80–100)
MCV RBC AUTO: 98.8 FL — SIGNIFICANT CHANGE UP (ref 80–100)
PLATELET # BLD AUTO: 132 K/UL — LOW (ref 150–400)
PLATELET # BLD AUTO: 154 K/UL — SIGNIFICANT CHANGE UP (ref 150–400)
PLATELET # BLD AUTO: 167 K/UL — SIGNIFICANT CHANGE UP (ref 150–400)
POTASSIUM SERPL-MCNC: 3.6 MMOL/L — SIGNIFICANT CHANGE UP (ref 3.5–5.3)
POTASSIUM SERPL-SCNC: 3.6 MMOL/L — SIGNIFICANT CHANGE UP (ref 3.5–5.3)
RBC # BLD: 2.49 M/UL — LOW (ref 4.2–5.8)
RBC # BLD: 2.58 M/UL — LOW (ref 4.2–5.8)
RBC # BLD: 2.59 M/UL — LOW (ref 4.2–5.8)
RBC # FLD: 19.5 % — HIGH (ref 10.3–14.5)
RBC # FLD: 20.5 % — HIGH (ref 10.3–14.5)
RBC # FLD: 20.5 % — HIGH (ref 10.3–14.5)
RETICS #: 140.4 K/UL — HIGH (ref 25–125)
RETICS/RBC NFR: 5.4 % — HIGH (ref 0.5–2.5)
SODIUM SERPL-SCNC: 143 MMOL/L — SIGNIFICANT CHANGE UP (ref 135–145)
TIBC SERPL-MCNC: 198 UG/DL — LOW (ref 220–430)
UIBC SERPL-MCNC: 35 UG/DL — LOW (ref 110–370)
VIT B12 SERPL-MCNC: 814 PG/ML — SIGNIFICANT CHANGE UP (ref 232–1245)
WBC # BLD: 7.45 K/UL — SIGNIFICANT CHANGE UP (ref 3.8–10.5)
WBC # BLD: 7.63 K/UL — SIGNIFICANT CHANGE UP (ref 3.8–10.5)
WBC # BLD: 9.17 K/UL — SIGNIFICANT CHANGE UP (ref 3.8–10.5)
WBC # FLD AUTO: 7.45 K/UL — SIGNIFICANT CHANGE UP (ref 3.8–10.5)
WBC # FLD AUTO: 7.63 K/UL — SIGNIFICANT CHANGE UP (ref 3.8–10.5)
WBC # FLD AUTO: 9.17 K/UL — SIGNIFICANT CHANGE UP (ref 3.8–10.5)

## 2024-06-07 PROCEDURE — 74174 CTA ABD&PLVS W/CONTRAST: CPT | Mod: 26

## 2024-06-07 PROCEDURE — 99223 1ST HOSP IP/OBS HIGH 75: CPT

## 2024-06-07 PROCEDURE — 99233 SBSQ HOSP IP/OBS HIGH 50: CPT

## 2024-06-07 RX ORDER — MAGNESIUM OXIDE 400 MG ORAL TABLET 241.3 MG
400 TABLET ORAL
Refills: 0 | Status: DISCONTINUED | OUTPATIENT
Start: 2024-06-07 | End: 2024-06-12

## 2024-06-07 RX ORDER — SCOPALAMINE 1 MG/3D
1 PATCH, EXTENDED RELEASE TRANSDERMAL
Refills: 0 | DISCHARGE

## 2024-06-07 RX ORDER — MIDODRINE HYDROCHLORIDE 2.5 MG/1
10 TABLET ORAL THREE TIMES A DAY
Refills: 0 | Status: DISCONTINUED | OUTPATIENT
Start: 2024-06-07 | End: 2024-06-12

## 2024-06-07 RX ORDER — FOLIC ACID 0.8 MG
1 TABLET ORAL
Refills: 0 | DISCHARGE

## 2024-06-07 RX ORDER — POTASSIUM CHLORIDE 20 MEQ
2 PACKET (EA) ORAL
Refills: 0 | DISCHARGE

## 2024-06-07 RX ORDER — COLLAGENASE CLOSTRIDIUM HIST. 250 UNIT/G
1 OINTMENT (GRAM) TOPICAL DAILY
Refills: 0 | Status: DISCONTINUED | OUTPATIENT
Start: 2024-06-07 | End: 2024-06-12

## 2024-06-07 RX ORDER — FOLIC ACID 0.8 MG
1 TABLET ORAL DAILY
Refills: 0 | Status: DISCONTINUED | OUTPATIENT
Start: 2024-06-07 | End: 2024-06-12

## 2024-06-07 RX ORDER — SCOPALAMINE 1 MG/3D
1 PATCH, EXTENDED RELEASE TRANSDERMAL
Refills: 0 | Status: DISCONTINUED | OUTPATIENT
Start: 2024-06-07 | End: 2024-06-12

## 2024-06-07 RX ORDER — CHLORHEXIDINE GLUCONATE 213 G/1000ML
1 SOLUTION TOPICAL
Refills: 0 | Status: DISCONTINUED | OUTPATIENT
Start: 2024-06-07 | End: 2024-06-12

## 2024-06-07 RX ORDER — MIDODRINE HYDROCHLORIDE 2.5 MG/1
2 TABLET ORAL
Refills: 0 | DISCHARGE

## 2024-06-07 RX ORDER — COLLAGENASE CLOSTRIDIUM HIST. 250 UNIT/G
1 OINTMENT (GRAM) TOPICAL
Refills: 0 | DISCHARGE

## 2024-06-07 RX ORDER — SODIUM CHLORIDE 9 MG/ML
1000 INJECTION INTRAMUSCULAR; INTRAVENOUS; SUBCUTANEOUS ONCE
Refills: 0 | Status: COMPLETED | OUTPATIENT
Start: 2024-06-07 | End: 2024-06-07

## 2024-06-07 RX ORDER — MAGNESIUM OXIDE 400 MG ORAL TABLET 241.3 MG
1 TABLET ORAL
Refills: 0 | DISCHARGE

## 2024-06-07 RX ADMIN — Medication 650 MILLIGRAM(S): at 21:38

## 2024-06-07 RX ADMIN — MAGNESIUM OXIDE 400 MG ORAL TABLET 400 MILLIGRAM(S): 241.3 TABLET ORAL at 17:45

## 2024-06-07 RX ADMIN — MIDODRINE HYDROCHLORIDE 10 MILLIGRAM(S): 2.5 TABLET ORAL at 17:45

## 2024-06-07 RX ADMIN — MIDODRINE HYDROCHLORIDE 10 MILLIGRAM(S): 2.5 TABLET ORAL at 06:49

## 2024-06-07 RX ADMIN — SODIUM CHLORIDE 1000 MILLILITER(S): 9 INJECTION INTRAMUSCULAR; INTRAVENOUS; SUBCUTANEOUS at 03:03

## 2024-06-07 RX ADMIN — MAGNESIUM OXIDE 400 MG ORAL TABLET 400 MILLIGRAM(S): 241.3 TABLET ORAL at 07:54

## 2024-06-07 RX ADMIN — GABAPENTIN 300 MILLIGRAM(S): 400 CAPSULE ORAL at 12:34

## 2024-06-07 RX ADMIN — MIDODRINE HYDROCHLORIDE 10 MILLIGRAM(S): 2.5 TABLET ORAL at 12:42

## 2024-06-07 RX ADMIN — SODIUM CHLORIDE 1000 MILLILITER(S): 9 INJECTION INTRAMUSCULAR; INTRAVENOUS; SUBCUTANEOUS at 01:47

## 2024-06-07 RX ADMIN — MIDODRINE HYDROCHLORIDE 10 MILLIGRAM(S): 2.5 TABLET ORAL at 01:55

## 2024-06-07 RX ADMIN — Medication 1 MILLIGRAM(S): at 12:34

## 2024-06-07 RX ADMIN — GABAPENTIN 300 MILLIGRAM(S): 400 CAPSULE ORAL at 21:38

## 2024-06-07 RX ADMIN — SCOPALAMINE 1 PATCH: 1 PATCH, EXTENDED RELEASE TRANSDERMAL at 21:37

## 2024-06-07 NOTE — PHARMACOTHERAPY INTERVENTION NOTE - COMMENTS
Medication history complete, reviewed medications with patient provided med list  and confirmed with doctor first med hx.

## 2024-06-07 NOTE — CHART NOTE - NSCHARTNOTEFT_GEN_A_CORE
I was informed by ED RN that BP is still running on lower side with SBP in 80s even after bolus fluid challenge.  Patient is afebrile and no hypoxia.  ICU consulted.

## 2024-06-07 NOTE — PATIENT PROFILE ADULT - NSPROGENPREVTRANSF_GEN_A_NUR
Spoke with pt. Pt states that she has been feeling consistent irritation from butrans patch. Pt states the area is reddened and constantly itchy. Pt stated that her irritation got to the point of removing the patch in the shower and disposing of it. Pt is aware of appt on 12/6 and was wondering if she should put another patch on or if she can receive a bridge of medication until then for the pain. Pt has been wearing the patch for 2-3 days.     Pt states she does not desire to take morphine because all that does is make her drowsy and stated her previous norco medication assisted best with her pain. Pt is concerned about withdrawals due to previous time she stopped using the patch.    no

## 2024-06-07 NOTE — PROGRESS NOTE ADULT - SUBJECTIVE AND OBJECTIVE BOX
HOSPITALIST ATTENDING PROGRESS NOTE    64y Male for [facility] with PMH of HTN, DM-2, esophageal cancer with mets to liver s/p RT, constipation, hypotension, generalized cachexia, Herpetic neuralgia,  DNR & DNI and others has been sent to ED with low Hb of 5.7 for blood transfusion and further evaluation. Pt received 2 units of PRBC. Hb stabilized.     Chart and meds reviewed.  Patient seen and examined.    CC: States of feeling better then overnight. Denies any SOB, Chest pain, Melena, Hematemesis and hematuria, Pending FOBT    Subjective:    All other systems reviewed and found to be negative with the exception of what has been described above.    MEDICATIONS  (STANDING):  chlorhexidine 4% Liquid 1 Application(s) Topical <User Schedule>  dextrose 10% Bolus 125 milliLiter(s) IV Bolus once  dextrose 5%. 1000 milliLiter(s) (100 mL/Hr) IV Continuous <Continuous>  dextrose 5%. 1000 milliLiter(s) (50 mL/Hr) IV Continuous <Continuous>  dextrose 50% Injectable 25 Gram(s) IV Push once  dextrose 50% Injectable 12.5 Gram(s) IV Push once  folic acid 1 milliGRAM(s) Oral daily  gabapentin 300 milliGRAM(s) Oral two times a day  glucagon  Injectable 1 milliGRAM(s) IntraMuscular once  insulin lispro (ADMELOG) corrective regimen sliding scale   SubCutaneous three times a day before meals  insulin lispro (ADMELOG) corrective regimen sliding scale   SubCutaneous at bedtime  magnesium oxide 400 milliGRAM(s) Oral two times a day with meals  midodrine. 10 milliGRAM(s) Oral three times a day  polyethylene glycol 3350 17 Gram(s) Oral daily    MEDICATIONS  (PRN):  acetaminophen     Tablet .. 650 milliGRAM(s) Oral every 6 hours PRN Temp greater or equal to 38C (100.4F), Mild Pain (1 - 3)  aluminum hydroxide/magnesium hydroxide/simethicone Suspension 30 milliLiter(s) Oral every 4 hours PRN Dyspepsia  dextrose Oral Gel 15 Gram(s) Oral once PRN Blood Glucose LESS THAN 70 milliGRAM(s)/deciliter  melatonin 3 milliGRAM(s) Oral at bedtime PRN Insomnia  ondansetron Injectable 4 milliGRAM(s) IV Push every 8 hours PRN Nausea and/or Vomiting      VITALS:  T(F): 97.3 (06-07-24 @ 10:11), Max: 101.4 (06-06-24 @ 18:45)  HR: 48 (06-07-24 @ 10:11) (45 - 112)  BP: 104/51 (06-07-24 @ 10:11) (80/55 - 120/60)  RR: 18 (06-07-24 @ 10:11) (15 - 20)  SpO2: 100% (06-07-24 @ 10:11) (95% - 100%)  Wt(kg): --    I&O's Summary      CAPILLARY BLOOD GLUCOSE      POCT Blood Glucose.: 72 mg/dL (07 Jun 2024 12:38)  POCT Blood Glucose.: 83 mg/dL (07 Jun 2024 07:11)  POCT Blood Glucose.: 86 mg/dL (07 Jun 2024 01:46)      PHYSICAL EXAM:  Physical Exam: PHYSICAL EXAM:  GENERAL: NAD, lying in bed comfortably.  generalized cachectic.  HEAD:  Atraumatic, Normocephalic  EYES: EOMI, PERRLA, conjunctiva and sclera clear  ENT: Moist mucous membranes  NECK: Supple, No JVD  CHEST/LUNG: Clear to auscultation bilaterally; No rales, rhonchi, wheezing, or rubs. Unlabored respirations  HEART: Regular rate and rhythm; No murmurs, rubs, or gallops  ABDOMEN: Bowel sounds present; Soft, Nontender, Nondistended. No hepatomegaly  EXTREMITIES:  2+ Peripheral Pulses, brisk capillary refill. No clubbing, cyanosis, or edema  NERVOUS SYSTEM:  Alert & Oriented X3, speech clear. No deficits   MSK: FROM all 4 extremities, full and equal strength  SKIN: No rashes or lesions    LABS:                            8.7    7.45  )-----------( 132      ( 07 Jun 2024 06:57 )             25.6     06-07    143  |  115<H>  |  14  ----------------------------<  83  3.6   |  27  |  0.40<L>    Ca    7.9<L>      07 Jun 2024 06:57    TPro  5.6<L>  /  Alb  2.4<L>  /  TBili  0.2  /  DBili  x   /  AST  17  /  ALT  41  /  AlkPhos  128<H>  06-06    CARDIAC MARKERS ( 06 Jun 2024 19:57 )  x     / x     / 28 U/L / x     / x          LIVER FUNCTIONS - ( 06 Jun 2024 19:57 )  Alb: 2.4 g/dL / Pro: 5.6 gm/dL / ALK PHOS: 128 U/L / ALT: 41 U/L / AST: 17 U/L / GGT: x           PT/INR - ( 06 Jun 2024 19:57 )   PT: 11.5 sec;   INR: 1.02 ratio         PTT - ( 06 Jun 2024 19:57 )  PTT:27.9 sec  Urinalysis Basic - ( 07 Jun 2024 06:57 )    Color: x / Appearance: x / SG: x / pH: x  Gluc: 83 mg/dL / Ketone: x  / Bili: x / Urobili: x   Blood: x / Protein: x / Nitrite: x   Leuk Esterase: x / RBC: x / WBC x   Sq Epi: x / Non Sq Epi: x / Bacteria: x        Lactate, Blood: 1.8 mmol/L (06-06 @ 19:57)        CULTURES:  Blood, Urine: Negative (06-06 @ 19:57)      Additional results/Imaging, I have personally reviewed:    Telemetry, personally reviewed:

## 2024-06-07 NOTE — CONSULT NOTE ADULT - NS ATTEND AMEND GEN_ALL_CORE FT
Patient seen on morning rounds. Feels better after blood transfusion.   65 y/o male with esophageal cancer diagnosed in 2023  s/p neoadjuvant chemo and chemoRT s/p surgical resection 8/15/23  followed by nivolumab. In Feb 2024 - recurrent metastatic disease to liver. he has not started systemic therapy for metastatic disease, wanted to explore alternative medicine options. Now admitted  with severe anemia- new from April 2024- likely GIB. Transfused Iron studies GI evaluation.

## 2024-06-07 NOTE — ED ADULT NURSE REASSESSMENT NOTE - NS ED NURSE REASSESS COMMENT FT1
Received pt from previous RN Liya MUNOZ. No complaints or signs of distress noted. BP improving s/p midodrine, bradycardic, asymptomatic. All questions answered. Breathing unlabored and symmetrical. Safety measure in place.

## 2024-06-07 NOTE — ED ADULT NURSE REASSESSMENT NOTE - NS ED NURSE REASSESS COMMENT FT1
Spoke to ICU yamileth. Okay for patient to go upstairs with blood pressure improvement and pt bradycardic. Patient asymptomatic. MAU Griffith from  made aware.

## 2024-06-07 NOTE — PROVIDER CONTACT NOTE (OTHER) - SITUATION
Pt's BP 77/43 (53). Midodrine given x1 hr ago. NS bolus infusing. 2nd unit of PRBCs infusing. Provider states to hang 2nd 1L bolus, MD to reassess.

## 2024-06-08 LAB
ANION GAP SERPL CALC-SCNC: 5 MMOL/L — SIGNIFICANT CHANGE UP (ref 5–17)
BUN SERPL-MCNC: 17 MG/DL — SIGNIFICANT CHANGE UP (ref 7–23)
CALCIUM SERPL-MCNC: 8.1 MG/DL — LOW (ref 8.5–10.1)
CHLORIDE SERPL-SCNC: 111 MMOL/L — HIGH (ref 96–108)
CO2 SERPL-SCNC: 25 MMOL/L — SIGNIFICANT CHANGE UP (ref 22–31)
CREAT SERPL-MCNC: 0.42 MG/DL — LOW (ref 0.5–1.3)
EGFR: 119 ML/MIN/1.73M2 — SIGNIFICANT CHANGE UP
GLUCOSE BLDC GLUCOMTR-MCNC: 130 MG/DL — HIGH (ref 70–99)
GLUCOSE BLDC GLUCOMTR-MCNC: 324 MG/DL — HIGH (ref 70–99)
GLUCOSE BLDC GLUCOMTR-MCNC: 336 MG/DL — HIGH (ref 70–99)
GLUCOSE BLDC GLUCOMTR-MCNC: 55 MG/DL — LOW (ref 70–99)
GLUCOSE BLDC GLUCOMTR-MCNC: 60 MG/DL — LOW (ref 70–99)
GLUCOSE BLDC GLUCOMTR-MCNC: 74 MG/DL — SIGNIFICANT CHANGE UP (ref 70–99)
GLUCOSE BLDC GLUCOMTR-MCNC: 76 MG/DL — SIGNIFICANT CHANGE UP (ref 70–99)
GLUCOSE SERPL-MCNC: 59 MG/DL — LOW (ref 70–99)
HCT VFR BLD CALC: 26.8 % — LOW (ref 39–50)
HGB BLD-MCNC: 9.3 G/DL — LOW (ref 13–17)
MAGNESIUM SERPL-MCNC: 1.6 MG/DL — SIGNIFICANT CHANGE UP (ref 1.6–2.6)
MCHC RBC-ENTMCNC: 33.5 PG — SIGNIFICANT CHANGE UP (ref 27–34)
MCHC RBC-ENTMCNC: 34.7 GM/DL — SIGNIFICANT CHANGE UP (ref 32–36)
MCV RBC AUTO: 96.4 FL — SIGNIFICANT CHANGE UP (ref 80–100)
PHOSPHATE SERPL-MCNC: 3.4 MG/DL — SIGNIFICANT CHANGE UP (ref 2.5–4.5)
PLATELET # BLD AUTO: 163 K/UL — SIGNIFICANT CHANGE UP (ref 150–400)
POTASSIUM SERPL-MCNC: 3.5 MMOL/L — SIGNIFICANT CHANGE UP (ref 3.5–5.3)
POTASSIUM SERPL-SCNC: 3.5 MMOL/L — SIGNIFICANT CHANGE UP (ref 3.5–5.3)
RBC # BLD: 2.78 M/UL — LOW (ref 4.2–5.8)
RBC # FLD: 20 % — HIGH (ref 10.3–14.5)
SODIUM SERPL-SCNC: 141 MMOL/L — SIGNIFICANT CHANGE UP (ref 135–145)
WBC # BLD: 6.48 K/UL — SIGNIFICANT CHANGE UP (ref 3.8–10.5)
WBC # FLD AUTO: 6.48 K/UL — SIGNIFICANT CHANGE UP (ref 3.8–10.5)

## 2024-06-08 PROCEDURE — 99233 SBSQ HOSP IP/OBS HIGH 50: CPT

## 2024-06-08 RX ORDER — PANTOPRAZOLE SODIUM 20 MG/1
40 TABLET, DELAYED RELEASE ORAL
Refills: 0 | Status: DISCONTINUED | OUTPATIENT
Start: 2024-06-08 | End: 2024-06-12

## 2024-06-08 RX ADMIN — SCOPALAMINE 1 PATCH: 1 PATCH, EXTENDED RELEASE TRANSDERMAL at 05:49

## 2024-06-08 RX ADMIN — MIDODRINE HYDROCHLORIDE 10 MILLIGRAM(S): 2.5 TABLET ORAL at 06:04

## 2024-06-08 RX ADMIN — Medication 1 APPLICATION(S): at 17:31

## 2024-06-08 RX ADMIN — SCOPALAMINE 1 PATCH: 1 PATCH, EXTENDED RELEASE TRANSDERMAL at 07:33

## 2024-06-08 RX ADMIN — Medication 1 MILLIGRAM(S): at 09:33

## 2024-06-08 RX ADMIN — MIDODRINE HYDROCHLORIDE 10 MILLIGRAM(S): 2.5 TABLET ORAL at 17:31

## 2024-06-08 RX ADMIN — POLYETHYLENE GLYCOL 3350 17 GRAM(S): 17 POWDER, FOR SOLUTION ORAL at 09:33

## 2024-06-08 RX ADMIN — MAGNESIUM OXIDE 400 MG ORAL TABLET 400 MILLIGRAM(S): 241.3 TABLET ORAL at 17:31

## 2024-06-08 RX ADMIN — MIDODRINE HYDROCHLORIDE 10 MILLIGRAM(S): 2.5 TABLET ORAL at 12:29

## 2024-06-08 RX ADMIN — MAGNESIUM OXIDE 400 MG ORAL TABLET 400 MILLIGRAM(S): 241.3 TABLET ORAL at 09:33

## 2024-06-08 RX ADMIN — GABAPENTIN 300 MILLIGRAM(S): 400 CAPSULE ORAL at 09:33

## 2024-06-08 RX ADMIN — Medication 4: at 17:38

## 2024-06-08 NOTE — CONSULT NOTE ADULT - SUBJECTIVE AND OBJECTIVE BOX
Patient is a 64y old  Male who presents with a chief complaint of Severe anemia (06 Jun 2024 23:01)    HPI:  Chief Complaint: abnormal lab result.    The patient is a 64y Male for [facility] with PMH of HTN, DM-2, esophageal cancer with mets to liver s/p RT, constipation, hypotension, generalized cachexia, Herpetic neuralgia,  DNR & DNI and others has been sent to ED with low Hb of 5.7 for blood transfusion and further evaluation.  He feels tired and week. He denies hematemesis, hemoptysis, rectal bleed, hematochezia or hematuria. He is not on any anti-coagulant or NSAIDs.  He denies sob, chest pain, palpitation, abdominal pain, N/V or diarrhea or dysuria.  Denies smoking, alcohol or substance abuse. He is not hypoxic. Here in ED Hb is 5.3 (was 13.8 on 04/25/2024), Platelets 155k, , RDW 16, INR 1.02, Cr 0.64, Total protein 5.6, Albumin 2.4.  Lactate 1,8, Lipase 9. US negative. RVP POSITIVE for COVID-19. CXR negative for any acute infiltrates. Rocephin 1 gm IV and 2 units PRBC ordered by ED.        PMH: as above  PSH: as above  Meds: per reconciliation sheet, noted below  MEDICATIONS  (STANDING):  chlorhexidine 4% Liquid 1 Application(s) Topical <User Schedule>  dextrose 10% Bolus 125 milliLiter(s) IV Bolus once  dextrose 5%. 1000 milliLiter(s) (100 mL/Hr) IV Continuous <Continuous>  dextrose 5%. 1000 milliLiter(s) (50 mL/Hr) IV Continuous <Continuous>  dextrose 50% Injectable 25 Gram(s) IV Push once  dextrose 50% Injectable 12.5 Gram(s) IV Push once  folic acid 1 milliGRAM(s) Oral daily  gabapentin 300 milliGRAM(s) Oral two times a day  glucagon  Injectable 1 milliGRAM(s) IntraMuscular once  insulin lispro (ADMELOG) corrective regimen sliding scale   SubCutaneous three times a day before meals  insulin lispro (ADMELOG) corrective regimen sliding scale   SubCutaneous at bedtime  magnesium oxide 400 milliGRAM(s) Oral two times a day with meals  midodrine. 10 milliGRAM(s) Oral three times a day  polyethylene glycol 3350 17 Gram(s) Oral daily        Allergies    No Known Allergies    Intolerances      Social: no smoking, no alcohol, no illegal drugs; no recent travel, no exposure to TB  FAMILY HISTORY:  FH: prostate cancer (Father)       no history of premature cardiovascular disease in first degree relatives    ROS: the patient denies fever, no chills, no HA, no dizziness, no sore throat, no blurry vision, no CP, no palpitations, no SOB, no cough, no abdominal pain, no diarrhea, no N/V, no dysuria, no leg pain, no claudication, no rash, no joint aches, no rectal pain or bleeding, no night sweats    All other systems reviewed and are negative    Vital Signs Last 24 Hrs  T(C): 36.9 (07 Jun 2024 09:25), Max: 38.6 (06 Jun 2024 18:45)  T(F): 98.5 (07 Jun 2024 09:25), Max: 101.4 (06 Jun 2024 18:45)  HR: 49 (07 Jun 2024 09:25) (45 - 112)  BP: 120/60 (07 Jun 2024 09:25) (80/55 - 120/60)  BP(mean): 68 (07 Jun 2024 07:00) (60 - 80)  RR: 18 (07 Jun 2024 08:03) (15 - 20)  SpO2: 100% (07 Jun 2024 08:03) (95% - 100%)    Parameters below as of 07 Jun 2024 08:03  Patient On (Oxygen Delivery Method): room air      Daily Height in cm: 170.18 (06 Jun 2024 18:45)    Daily     PE:  Constitutional: NAD   HEENT: NC/AT, EOMI, PERRLA, conjunctivae clear; ears and nose atraumatic; pharynx benign  Neck: supple; thyroid not palpable  Back: no tenderness  Respiratory: respiratory effort normal; clear to auscultation  Cardiovascular: S1S2 regular, no murmurs  Abdomen: soft, not tender, not distended, positive BS; liver and spleen WNL  Genitourinary: no suprapubic tenderness  Lymphatic: no LN palpable  Musculoskeletal: no muscle tenderness, no joint swelling or tenderness  Extremities: no pedal edema  Neurological/ Psychiatric: AxOx3, Judgement and insight normal;  moving all extremities  Skin: no rashes; no palpable lesions    Labs: all available labs reviewed                        8.7    7.45  )-----------( 132      ( 07 Jun 2024 06:57 )             25.6     06-07    143  |  115<H>  |  14  ----------------------------<  83  3.6   |  27  |  0.40<L>    Ca    7.9<L>      07 Jun 2024 06:57    TPro  5.6<L>  /  Alb  2.4<L>  /  TBili  0.2  /  DBili  x   /  AST  17  /  ALT  41  /  AlkPhos  128<H>  06-06     LIVER FUNCTIONS - ( 06 Jun 2024 19:57 )  Alb: 2.4 g/dL / Pro: 5.6 gm/dL / ALK PHOS: 128 U/L / ALT: 41 U/L / AST: 17 U/L / GGT: x           Urinalysis Basic - ( 07 Jun 2024 06:57 )    Color: x / Appearance: x / SG: x / pH: x  Gluc: 83 mg/dL / Ketone: x  / Bili: x / Urobili: x   Blood: x / Protein: x / Nitrite: x   Leuk Esterase: x / RBC: x / WBC x   Sq Epi: x / Non Sq Epi: x / Bacteria: x          Radiology: all available radiological tests reviewed        Advanced directives addressed: full resuscitation
HPI:    63 y/o M with a PMHx of metastatic esophageal CA s/p RT presented to the ED with severe acute anemia and hypotension. He admitted to feeling weaker while at [facility], did admit that his stools have been very dark but has not noticed any BRBPR, hematuria, hemoptysis or hematemesis.    06/07/24: Seen at bedside on isolation for COVID, no acute distress, concerned about GIB      PAST MEDICAL & SURGICAL HISTORY:    Esophageal mass    HTN (hypertension)    Esophageal cancer    Type 2 diabetes mellitus    Malignant neoplasm of cardia    History of hypotension    History of tachycardia    Chemotherapy-induced neuropathy    S/P cholecystectomy    H/O lithotripsy    H/O endoscopy    History of chemotherapy    S/P radiation therapy    Esophageal stenosis    History of esophagogastroduodenoscopy (EGD)        MEDICATIONS  (STANDING):    chlorhexidine 4% Liquid 1 Application(s) Topical <User Schedule>  dextrose 10% Bolus 125 milliLiter(s) IV Bolus once  dextrose 5%. 1000 milliLiter(s) (100 mL/Hr) IV Continuous <Continuous>  dextrose 5%. 1000 milliLiter(s) (50 mL/Hr) IV Continuous <Continuous>  dextrose 50% Injectable 25 Gram(s) IV Push once  dextrose 50% Injectable 12.5 Gram(s) IV Push once  folic acid 1 milliGRAM(s) Oral daily  gabapentin 300 milliGRAM(s) Oral two times a day  glucagon  Injectable 1 milliGRAM(s) IntraMuscular once  insulin lispro (ADMELOG) corrective regimen sliding scale   SubCutaneous three times a day before meals  insulin lispro (ADMELOG) corrective regimen sliding scale   SubCutaneous at bedtime  magnesium oxide 400 milliGRAM(s) Oral two times a day with meals  midodrine. 10 milliGRAM(s) Oral three times a day  polyethylene glycol 3350 17 Gram(s) Oral daily      MEDICATIONS  (PRN):    acetaminophen     Tablet .. 650 milliGRAM(s) Oral every 6 hours PRN Temp greater or equal to 38C (100.4F), Mild Pain (1 - 3)  aluminum hydroxide/magnesium hydroxide/simethicone Suspension 30 milliLiter(s) Oral every 4 hours PRN Dyspepsia  dextrose Oral Gel 15 Gram(s) Oral once PRN Blood Glucose LESS THAN 70 milliGRAM(s)/deciliter  melatonin 3 milliGRAM(s) Oral at bedtime PRN Insomnia  ondansetron Injectable 4 milliGRAM(s) IV Push every 8 hours PRN Nausea and/or Vomiting      ALLERGIES:    No Known Allergies      FAMILY HISTORY:    prostate cancer (Father)      REVIEW OF SYSTEMS:    Constitutional, Eyes, ENT, Cardiovascular, Respiratory, Gastrointestinal, Genitourinary, Musculoskeletal, Integumentary, Neurological, Psychiatric, Endocrine, Heme/Lymph and Allergic/Immunologic review of systems are otherwise negative except as noted in HPI.       VITALS:    T(C): 36.9 (07 Jun 2024 09:25), Max: 38.6 (06 Jun 2024 18:45)  T(F): 98.5 (07 Jun 2024 09:25), Max: 101.4 (06 Jun 2024 18:45)  HR: 49 (07 Jun 2024 09:25) (45 - 112)  BP: 120/60 (07 Jun 2024 09:25) (80/55 - 120/60)  BP(mean): 68 (07 Jun 2024 07:00) (60 - 80)  RR: 18 (07 Jun 2024 08:03) (15 - 20)  SpO2: 100% (07 Jun 2024 08:03) (95% - 100%)    Parameters below as of 07 Jun 2024 08:03  Patient On (Oxygen Delivery Method): room air      PHYSICAL:    Constitutional: no acute distress, weak / pale appearing  Eyes: no conjunctival infection, anicteric  ENT: pharynx is unremarkable  Neck: supple without JVD  Pulmonary: clear to auscultation bilaterally  Cardiac: RRR  Vascular: no calf tenderness, venous stasis changes, varices.   Abdomen: normoactive bowel sounds, soft and nontender, no hepatosplenomegaly or masses appreciated   Lymphatic: no peripheral adenopathy appreciated.   Musculoskeletal: full range of motion and no deformities appreciated.   Skin: normal appearance, no rash  Neurology: awake, alert, oriented; no obvious focal deficits       LABS:    CBC Full  -  ( 07 Jun 2024 06:57 )  WBC Count : 7.45 K/uL  RBC Count : 2.59 M/uL  Hemoglobin : 8.7 g/dL  Hematocrit : 25.6 %  Platelet Count - Automated : 132 K/uL  Mean Cell Volume : 98.8 fl  Mean Cell Hemoglobin : 33.6 pg  Mean Cell Hemoglobin Concentration : 34.0 gm/dL  Auto Neutrophil # : x  Auto Lymphocyte # : x  Auto Monocyte # : x  Auto Eosinophil # : x  Auto Basophil # : x  Auto Neutrophil % : x  Auto Lymphocyte % : x  Auto Monocyte % : x  Auto Eosinophil % : x  Auto Basophil % : x    06-07    143  |  115<H>  |  14  ----------------------------<  83  3.6   |  27  |  0.40<L>    Ca    7.9<L>      07 Jun 2024 06:57    TPro  5.6<L>  /  Alb  2.4<L>  /  TBili  0.2  /  DBili  x   /  AST  17  /  ALT  41  /  AlkPhos  128<H>  06-06    PT/INR - ( 06 Jun 2024 19:57 )   PT: 11.5 sec;   INR: 1.02 ratio         PTT - ( 06 Jun 2024 19:57 )  PTT:27.9 sec  Urinalysis Basic - ( 07 Jun 2024 06:57 )    Color: x / Appearance: x / SG: x / pH: x  Gluc: 83 mg/dL / Ketone: x  / Bili: x / Urobili: x   Blood: x / Protein: x / Nitrite: x   Leuk Esterase: x / RBC: x / WBC x   Sq Epi: x / Non Sq Epi: x / Bacteria: x        RADIOLOGY & ADDITIONAL STUDIES:      
HPI:    64y Male for [facility] with PMH of HTN, DM-2, esophageal cancer with mets to liver s/p RT, constipation, hypotension, generalized cachexia, Herpetic neuralgia sent to ED for low Hb of 5.7. Patient denies abdominal pain, melena, hematochezia or hematemesis. He has been able to tolerate small portions of food since his esophageal stent was removed by Dr. Velazco in 2023.     Currently hemodynamically stable and afebrile. Positive for COVID-19. Hgb 5.3 in ED otherwise unremarkable.     PAST MEDICAL & SURGICAL HISTORY:  Esophageal mass      HTN (hypertension)      Esophageal cancer      Type 2 diabetes mellitus      Malignant neoplasm of cardia      History of hypotension      History of tachycardia      Chemotherapy-induced neuropathy      S/P cholecystectomy      H/O lithotripsy      H/O endoscopy      History of chemotherapy      S/P radiation therapy      Esophageal stenosis      History of esophagogastroduodenoscopy (EGD)          Home Medications:  folic acid 1 mg oral tablet: 1 tab(s) orally once a day (07 Jun 2024 00:44)  gabapentin 300 mg oral capsule: 1 cap(s) orally 2 times a day (07 Jun 2024 00:45)  magnesium oxide 400 mg oral tablet: 1 tab(s) orally 2 times a day (07 Jun 2024 00:44)  Metoprolol Tartrate 25 mg oral tablet: 0.5 tab(s) orally 2 times a day (07 Jun 2024 00:44)  midodrine 5 mg oral tablet: 2 tab(s) orally every 8 hours (07 Jun 2024 00:44)  polyethylene glycol 3350 oral powder for reconstitution: 17 gram(s) orally once a day (07 Jun 2024 00:45)  potassium chloride 20 mEq oral tablet, extended release: 2 tab(s) orally once a day (07 Jun 2024 00:44)  Santyl 250 units/g topical ointment: Apply topically to affected area once a day as needed for (07 Jun 2024 00:44)  scopolamine 1.5 mg transdermal film, extended release (obsolete): 1 patch transdermal every 3 days (07 Jun 2024 00:44)      MEDICATIONS  (STANDING):  chlorhexidine 4% Liquid 1 Application(s) Topical <User Schedule>  collagenase Ointment 1 Application(s) Topical daily  dextrose 10% Bolus 125 milliLiter(s) IV Bolus once  dextrose 5%. 1000 milliLiter(s) (100 mL/Hr) IV Continuous <Continuous>  dextrose 5%. 1000 milliLiter(s) (50 mL/Hr) IV Continuous <Continuous>  dextrose 50% Injectable 25 Gram(s) IV Push once  dextrose 50% Injectable 12.5 Gram(s) IV Push once  folic acid 1 milliGRAM(s) Oral daily  gabapentin 300 milliGRAM(s) Oral two times a day  glucagon  Injectable 1 milliGRAM(s) IntraMuscular once  insulin lispro (ADMELOG) corrective regimen sliding scale   SubCutaneous three times a day before meals  insulin lispro (ADMELOG) corrective regimen sliding scale   SubCutaneous at bedtime  magnesium oxide 400 milliGRAM(s) Oral two times a day with meals  midodrine. 10 milliGRAM(s) Oral three times a day  polyethylene glycol 3350 17 Gram(s) Oral daily  scopolamine 1 mG/72 Hr(s) Patch 1 Patch Transdermal every 72 hours    MEDICATIONS  (PRN):  acetaminophen     Tablet .. 650 milliGRAM(s) Oral every 6 hours PRN Temp greater or equal to 38C (100.4F), Mild Pain (1 - 3)  aluminum hydroxide/magnesium hydroxide/simethicone Suspension 30 milliLiter(s) Oral every 4 hours PRN Dyspepsia  dextrose Oral Gel 15 Gram(s) Oral once PRN Blood Glucose LESS THAN 70 milliGRAM(s)/deciliter  melatonin 3 milliGRAM(s) Oral at bedtime PRN Insomnia  ondansetron Injectable 4 milliGRAM(s) IV Push every 8 hours PRN Nausea and/or Vomiting      Allergies    No Known Allergies    Intolerances        SOCIAL HISTORY:    FAMILY HISTORY:  FH: prostate cancer (Father)        ROS  As above  Otherwise unremarkable    Vital Signs Last 24 Hrs  T(C): 36.7 (08 Jun 2024 05:46), Max: 36.7 (08 Jun 2024 05:46)  T(F): 98.1 (08 Jun 2024 05:46), Max: 98.1 (08 Jun 2024 05:46)  HR: 64 (08 Jun 2024 05:46) (57 - 68)  BP: 98/64 (08 Jun 2024 05:46) (98/64 - 115/64)  BP(mean): --  RR: 18 (08 Jun 2024 05:46) (17 - 18)  SpO2: 95% (08 Jun 2024 05:46) (95% - 100%)    Parameters below as of 08 Jun 2024 05:46  Patient On (Oxygen Delivery Method): room air        Constitutional: NAD, cachetic  Respiratory: CTAB  Cardiovascular: S1 and S2, RRR  Gastrointestinal: BS+, soft, NT/ND  Extremities: No peripheral edema  Psychiatric: Normal mood, normal affect  Skin: No rashes    LABS:                        9.3    6.48  )-----------( 163      ( 08 Jun 2024 07:38 )             26.8     06-08    141  |  111<H>  |  17  ----------------------------<  59<L>  3.5   |  25  |  0.42<L>    Ca    8.1<L>      08 Jun 2024 07:38  Phos  3.4     06-08  Mg     1.6     06-08    TPro  5.6<L>  /  Alb  2.4<L>  /  TBili  0.2  /  DBili  x   /  AST  17  /  ALT  41  /  AlkPhos  128<H>  06-06    PT/INR - ( 06 Jun 2024 19:57 )   PT: 11.5 sec;   INR: 1.02 ratio         PTT - ( 06 Jun 2024 19:57 )  PTT:27.9 sec  LIVER FUNCTIONS - ( 06 Jun 2024 19:57 )  Alb: 2.4 g/dL / Pro: 5.6 gm/dL / ALK PHOS: 128 U/L / ALT: 41 U/L / AST: 17 U/L / GGT: x             RADIOLOGY & ADDITIONAL STUDIES:

## 2024-06-08 NOTE — PROGRESS NOTE ADULT - SUBJECTIVE AND OBJECTIVE BOX
SUBJECTIVE:    CHIEF COMPLAINT:  Patient is a 64y old  Male who presents with a chief complaint of Severe anemia (07 Jun 2024 15:45)      HPI:  Chief Complaint: abnormal lab result.    The patient is a 64y Male for [facility] with PMH of HTN, DM-2, esophageal cancer with mets to liver s/p RT, constipation, hypotension, generalized cachexia, Herpetic neuralgia,  DNR & DNI and others has been sent to ED with low Hb of 5.7 for blood transfusion and further evaluation.  He feels tired and week. He denies hematemesis, hemoptysis, rectal bleed, hematochezia or hematuria. He is not on any anti-coagulant or NSAIDs.  He denies sob, chest pain, palpitation, abdominal pain, N/V or diarrhea or dysuria.  Denies smoking, alcohol or substance abuse.  He is not hypoxic.    Here in ED Hb is 5.3 (was 13.8 on 04/25/2024), Platelets 155k, , RDW 16, INR 1.02, Cr 0.64, Total protein 5.6, Albumin 2.4.  Lactate 1,8, Lipase 9.  US negative.  RVP POSITIVE for COVID-19.    CXR negative for any acute infiltrates.    Rocephin 1 gm IV and 2 units PRBC ordered by ED.   (06 Jun 2024 23:01)      Interval HPI and Overnight Events: Pt states feeling better. Eating well. Trying to gain weight. No respiratory distress on room air.    REVIEW OF SYSTEMS:  CONSTITUTIONAL: No weakness, fevers or chills  EYES/ENT: No visual changes;  No vertigo or throat pain   NECK: No pain or stiffness  RESPIRATORY: No cough, wheezing, hemoptysis; No shortness of breath  CARDIOVASCULAR: No chest pain or palpitations  GASTROINTESTINAL: No abdominal or epigastric pain. No nausea, vomiting, or hematemesis; No diarrhea or constipation. No melena or hematochezia.  GENITOURINARY: No dysuria, frequency or hematuria  NEUROLOGICAL: No numbness or weakness  SKIN: No itching, burning, rashes, or lesions   All other review of systems is negative unless indicated above    OBJECTIVE    Vital Signs Last 24 Hrs  T(C): 36.7 (08 Jun 2024 05:46), Max: 36.9 (07 Jun 2024 09:25)  T(F): 98.1 (08 Jun 2024 05:46), Max: 98.5 (07 Jun 2024 09:25)  HR: 64 (08 Jun 2024 05:46) (48 - 68)  BP: 98/64 (08 Jun 2024 05:46) (98/64 - 120/60)  BP(mean): --  RR: 18 (08 Jun 2024 05:46) (17 - 18)  SpO2: 95% (08 Jun 2024 05:46) (95% - 100%)    Parameters below as of 08 Jun 2024 05:46  Patient On (Oxygen Delivery Method): room air        MEDICATIONS  (STANDING):  chlorhexidine 4% Liquid 1 Application(s) Topical <User Schedule>  collagenase Ointment 1 Application(s) Topical daily  dextrose 10% Bolus 125 milliLiter(s) IV Bolus once  dextrose 5%. 1000 milliLiter(s) (100 mL/Hr) IV Continuous <Continuous>  dextrose 5%. 1000 milliLiter(s) (50 mL/Hr) IV Continuous <Continuous>  dextrose 50% Injectable 25 Gram(s) IV Push once  dextrose 50% Injectable 12.5 Gram(s) IV Push once  folic acid 1 milliGRAM(s) Oral daily  gabapentin 300 milliGRAM(s) Oral two times a day  glucagon  Injectable 1 milliGRAM(s) IntraMuscular once  insulin lispro (ADMELOG) corrective regimen sliding scale   SubCutaneous three times a day before meals  insulin lispro (ADMELOG) corrective regimen sliding scale   SubCutaneous at bedtime  magnesium oxide 400 milliGRAM(s) Oral two times a day with meals  midodrine. 10 milliGRAM(s) Oral three times a day  polyethylene glycol 3350 17 Gram(s) Oral daily  scopolamine 1 mG/72 Hr(s) Patch 1 Patch Transdermal every 72 hours      LABS:                         9.3    6.48  )-----------( 163      ( 08 Jun 2024 07:38 )             26.8     06-08    141  |  111<H>  |  17  ----------------------------<  59<L>  3.5   |  25  |  0.42<L>    Ca    8.1<L>      08 Jun 2024 07:38  Phos  3.4     06-08  Mg     1.6     06-08    TPro  5.6<L>  /  Alb  2.4<L>  /  TBili  0.2  /  DBili  x   /  AST  17  /  ALT  41  /  AlkPhos  128<H>  06-06    Urinalysis Basic - ( 08 Jun 2024 07:38 )    Color: x / Appearance: x / SG: x / pH: x  Gluc: 59 mg/dL / Ketone: x  / Bili: x / Urobili: x   Blood: x / Protein: x / Nitrite: x   Leuk Esterase: x / RBC: x / WBC x   Sq Epi: x / Non Sq Epi: x / Bacteria: x      PT/INR - ( 06 Jun 2024 19:57 )   PT: 11.5 sec;   INR: 1.02 ratio         PTT - ( 06 Jun 2024 19:57 )  PTT:27.9 sec  CARDIAC MARKERS ( 06 Jun 2024 19:57 )  x     / x     / 28 U/L / x     / x          Specimen Source .Blood None   06-06 @ 19:57  Culture Results  No growth at 24 hours    URINE CULTURE    06-06 @ 19:57    CULTURE RESULTS   No growth at 24 hours    CAPILLARY BLOOD GLUCOSE  POCT Blood Glucose.: 86 mg/dL (07 Jun 2024 21:14)  POCT Blood Glucose.: 80 mg/dL (07 Jun 2024 16:53)  POCT Blood Glucose.: 72 mg/dL (07 Jun 2024 12:38)    RADIOLOGY/EKG:  CT Abdomen and Pelvis w/ IV Cont (04.26.24 @ 14:58) >  IMPRESSION:  No significant change in solitary liver metastasis.

## 2024-06-08 NOTE — CONSULT NOTE ADULT - ASSESSMENT
63 y/o M with known metastatic esophageal CA s/p RT & [facility] Bahman 2023, has been at [facility] since prior admission 04/2024      # Known Metastatic GEJ / Esophageal Adenocarcinoma      - S/p upper EUS: lesion almost completely obstructing s/p stent; Pathology revealed esophagus mass 40 cm, adenocarcinoma, invasive, poorly differentiated with areas of signet ring cell morphology.   - Siewert Type II; No MMR deficiency; Her 2 NEG  - S/p x3 cycles induction carbo / taxol 03/30/23 then s/p x6 cycles carbo / taxol with RT completed 05/15/23  - S/p Andrew Bahman 08/15/23 with surgical path noting invasive adenocarcinoma, moderately diff with squamous and neuroendocrine features; metastatic carcinoma 04/14 LNs  - 10/25/23; Started x1 year Nivolumab  - 02/09/24 PET with evidence of POD / distant mets   - 03/01/24 liver bx confirmed metastatic adenocarcinoma  - Patient wished to take break from additional cancer directed therapy and continue to work with both Dr Richter and Dr Colton Wallace, both holistic practitioners doing alternative therapy   - Was supposed to receive repeat PET / CT imaging 05/2024 & then decide on moving forward with FOLFOX / cancer directed therapy ---> missed outpatient appointment  - Prior admission with failure to thrive, poor PO intake, weight loss & despite noted POD and not being on cancer directed therapy, he was not eager to receive additional cancer directed therapy and went to rehab in attempt to improve functional / performance status   - Recommend Palliative involvement to help with GOC  - Continue supportive measures as necessary  - No planned inpatient cancer directed therapy   - Outpatient follow up with Primary Onc Dr Valero once stable and d/c'ed        # Acute Normocytic Anemia    - Hgb acutely dropped to <6.0 g/dL ---> s/p 2U PRBC this admission with improvement in H/H  - With known metastatic esophageal CA, without cancer directed therapy since early 2023, concern for POD with possible internal GIB  - Patient did admit to dark stools, could be melena  - Will check hemolysis labs for completeness  - Need to check FOBT  - Recommend CT AP imaging   - Need GI consult   - No AC therapy on board  - Continue to trend serial CBCs while admitted  - Continue to transfuse PRBC as clinically necessary         Dustin Salinas PA-C  Hematology / Oncology  [facility] [place]   [phone]
64y Male for [facility] with PMH of HTN, DM-2, esophageal cancer with mets to liver s/p RT, constipation, hypotension, generalized cachexia, Herpetic neuralgia,  DNR & DNI and others has been sent to ED with low Hb of 5.7 for blood transfusion and further evaluation.  He feels tired and week. He denies hematemesis, hemoptysis, rectal bleed, hematochezia or hematuria. He is not on any anti-coagulant or NSAIDs.  He denies sob, chest pain, palpitation, abdominal pain, N/V or diarrhea or dysuria.  Denies smoking, alcohol or substance abuse. He is not hypoxic. Here in ED Hb is 5.3 (was 13.8 on 04/25/2024), Platelets 155k, , RDW 16, INR 1.02, Cr 0.64, Total protein 5.6, Albumin 2.4. Lactate 1,8, Lipase 9. US negative. RVP POSITIVE for COVID-19. CXR negative for any acute infiltrates. Rocephin 1 gm IV and 2 units PRBC ordered by ED.    1. Covid-19 Viral syndrome. Metastatic esophageal cancer. Immunocompromised host  - imaging reviewed  - on RA not hypoxic  - xray clear  - isolation precautions  - does not  require remdesivir, decadron  - monitor temps  - supportive care  - fu cbc  - observe off abx    IV to Po abx token not applicable to case 
1. Severe anemia. No overt signs of bleeding and improved to 9.3 after infusions. do not suspect brisk UGIB  2. Constipation    Recommendation  1. Monitor for overt bleeding and transfuse for hgb < 7  2. Follow up CTA  3. Continue Miralax daily  4. PPI 40 mg PO daily  5. Dr. Briceño to round tomorrow and Dr. Velazco to resume care on Monday

## 2024-06-09 LAB
GLUCOSE BLDC GLUCOMTR-MCNC: 102 MG/DL — HIGH (ref 70–99)
GLUCOSE BLDC GLUCOMTR-MCNC: 123 MG/DL — HIGH (ref 70–99)
GLUCOSE BLDC GLUCOMTR-MCNC: 79 MG/DL — SIGNIFICANT CHANGE UP (ref 70–99)
GLUCOSE BLDC GLUCOMTR-MCNC: 90 MG/DL — SIGNIFICANT CHANGE UP (ref 70–99)
OB PNL STL: POSITIVE

## 2024-06-09 PROCEDURE — 99233 SBSQ HOSP IP/OBS HIGH 50: CPT

## 2024-06-09 RX ADMIN — Medication 1 APPLICATION(S): at 09:24

## 2024-06-09 RX ADMIN — GABAPENTIN 300 MILLIGRAM(S): 400 CAPSULE ORAL at 09:23

## 2024-06-09 RX ADMIN — POLYETHYLENE GLYCOL 3350 17 GRAM(S): 17 POWDER, FOR SOLUTION ORAL at 09:17

## 2024-06-09 RX ADMIN — GABAPENTIN 300 MILLIGRAM(S): 400 CAPSULE ORAL at 21:48

## 2024-06-09 RX ADMIN — MAGNESIUM OXIDE 400 MG ORAL TABLET 400 MILLIGRAM(S): 241.3 TABLET ORAL at 09:16

## 2024-06-09 RX ADMIN — SCOPALAMINE 1 PATCH: 1 PATCH, EXTENDED RELEASE TRANSDERMAL at 20:39

## 2024-06-09 RX ADMIN — Medication 3 MILLIGRAM(S): at 21:48

## 2024-06-09 RX ADMIN — Medication 1 MILLIGRAM(S): at 09:17

## 2024-06-09 RX ADMIN — PANTOPRAZOLE SODIUM 40 MILLIGRAM(S): 20 TABLET, DELAYED RELEASE ORAL at 05:53

## 2024-06-09 RX ADMIN — MAGNESIUM OXIDE 400 MG ORAL TABLET 400 MILLIGRAM(S): 241.3 TABLET ORAL at 16:43

## 2024-06-09 RX ADMIN — MIDODRINE HYDROCHLORIDE 10 MILLIGRAM(S): 2.5 TABLET ORAL at 05:54

## 2024-06-09 RX ADMIN — SCOPALAMINE 1 PATCH: 1 PATCH, EXTENDED RELEASE TRANSDERMAL at 07:34

## 2024-06-09 RX ADMIN — MIDODRINE HYDROCHLORIDE 10 MILLIGRAM(S): 2.5 TABLET ORAL at 13:42

## 2024-06-09 RX ADMIN — MIDODRINE HYDROCHLORIDE 10 MILLIGRAM(S): 2.5 TABLET ORAL at 16:44

## 2024-06-09 RX ADMIN — Medication 650 MILLIGRAM(S): at 09:20

## 2024-06-09 NOTE — PROGRESS NOTE ADULT - SUBJECTIVE AND OBJECTIVE BOX
SUBJECTIVE:    CHIEF COMPLAINT:  Patient is a 64y old  Male who presents with a chief complaint of Severe anemia (08 Jun 2024 12:32)      HPI:  Chief Complaint: abnormal lab result.    The patient is a 64y Male for [facility] with PMH of HTN, DM-2, esophageal cancer with mets to liver s/p RT, constipation, hypotension, generalized cachexia, Herpetic neuralgia,  DNR & DNI and others has been sent to ED with low Hb of 5.7 for blood transfusion and further evaluation.  He feels tired and week. He denies hematemesis, hemoptysis, rectal bleed, hematochezia or hematuria. He is not on any anti-coagulant or NSAIDs.  He denies sob, chest pain, palpitation, abdominal pain, N/V or diarrhea or dysuria.  Denies smoking, alcohol or substance abuse.  He is not hypoxic.    Here in ED Hb is 5.3 (was 13.8 on 04/25/2024), Platelets 155k, , RDW 16, INR 1.02, Cr 0.64, Total protein 5.6, Albumin 2.4.  Lactate 1,8, Lipase 9.  US negative.  RVP POSITIVE for COVID-19.    CXR negative for any acute infiltrates.    Rocephin 1 gm IV and 2 units PRBC ordered by ED.   (06 Jun 2024 23:01)      Interval HPI and Overnight Events: Pt doing better. Occult blood to stool was positive. Seen by GI yesterday.    REVIEW OF SYSTEMS:  CONSTITUTIONAL: No weakness, fevers or chills  EYES/ENT: No visual changes;  No vertigo or throat pain   NECK: No pain or stiffness  RESPIRATORY: No cough, wheezing, hemoptysis; No shortness of breath  CARDIOVASCULAR: No chest pain or palpitations  GASTROINTESTINAL: No abdominal or epigastric pain. No nausea, vomiting, or hematemesis; No diarrhea or constipation. No melena or hematochezia.  GENITOURINARY: No dysuria, frequency or hematuria  NEUROLOGICAL: No numbness or weakness  SKIN: No itching, burning, rashes, or lesions   All other review of systems is negative unless indicated above    OBJECTIVE    Vital Signs Last 24 Hrs  T(C): 36.8 (09 Jun 2024 09:19), Max: 37.5 (09 Jun 2024 05:34)  T(F): 98.2 (09 Jun 2024 09:19), Max: 99.5 (09 Jun 2024 05:34)  HR: 63 (09 Jun 2024 09:19) (63 - 75)  BP: 102/63 (09 Jun 2024 09:19) (91/58 - 102/63)  BP(mean): 75 (09 Jun 2024 09:19) (75 - 76)  RR: 18 (09 Jun 2024 09:19) (18 - 18)  SpO2: 98% (09 Jun 2024 09:19) (96% - 100%)    Parameters below as of 09 Jun 2024 09:19  Patient On (Oxygen Delivery Method): room air        MEDICATIONS  (STANDING):  chlorhexidine 4% Liquid 1 Application(s) Topical <User Schedule>  collagenase Ointment 1 Application(s) Topical daily  dextrose 10% Bolus 125 milliLiter(s) IV Bolus once  dextrose 5%. 1000 milliLiter(s) (100 mL/Hr) IV Continuous <Continuous>  dextrose 5%. 1000 milliLiter(s) (50 mL/Hr) IV Continuous <Continuous>  dextrose 50% Injectable 25 Gram(s) IV Push once  dextrose 50% Injectable 12.5 Gram(s) IV Push once  folic acid 1 milliGRAM(s) Oral daily  gabapentin 300 milliGRAM(s) Oral two times a day  glucagon  Injectable 1 milliGRAM(s) IntraMuscular once  insulin lispro (ADMELOG) corrective regimen sliding scale   SubCutaneous three times a day before meals  insulin lispro (ADMELOG) corrective regimen sliding scale   SubCutaneous at bedtime  magnesium oxide 400 milliGRAM(s) Oral two times a day with meals  midodrine. 10 milliGRAM(s) Oral three times a day  pantoprazole    Tablet 40 milliGRAM(s) Oral before breakfast  polyethylene glycol 3350 17 Gram(s) Oral daily  scopolamine 1 mG/72 Hr(s) Patch 1 Patch Transdermal every 72 hours      LABS:                         9.3    6.48  )-----------( 163      ( 08 Jun 2024 07:38 )             26.8     06-08    141  |  111<H>  |  17  ----------------------------<  59<L>  3.5   |  25  |  0.42<L>    Ca    8.1<L>      08 Jun 2024 07:38  Phos  3.4     06-08  Mg     1.6     06-08      Urinalysis Basic - ( 08 Jun 2024 07:38 )    Color: x / Appearance: x / SG: x / pH: x  Gluc: 59 mg/dL / Ketone: x  / Bili: x / Urobili: x   Blood: x / Protein: x / Nitrite: x   Leuk Esterase: x / RBC: x / WBC x   Sq Epi: x / Non Sq Epi: x / Bacteria: x            Specimen Source .Blood None   06-06 @ 19:57  Culture Results  No growth at 48 Hours            CAPILLARY BLOOD GLUCOSE      POCT Blood Glucose.: 123 mg/dL (09 Jun 2024 13:45)  POCT Blood Glucose.: 79 mg/dL (09 Jun 2024 09:16)  POCT Blood Glucose.: 130 mg/dL (08 Jun 2024 21:53)  POCT Blood Glucose.: 324 mg/dL (08 Jun 2024 17:34)  POCT Blood Glucose.: 336 mg/dL (08 Jun 2024 17:32)        RADIOLOGY/EKG:       SUBJECTIVE:    CHIEF COMPLAINT:  Patient is a 64y old  Male who presents with a chief complaint of Severe anemia (08 Jun 2024 12:32)      HPI:  Chief Complaint: abnormal lab result.    The [facility] 64[facility]H of HTN, DM-2, esophageal cancer with mets to liver s/p RT, constipation, hypotension, generalized cachexia, Herpetic neuralgia,  DNR & DNI and others has been sent to ED with low Hb of 5.7 for blood transfusion and further evaluation.  He feels tired and week. He denies hematemesis, hemoptysis, rectal bleed, hematochezia or hematuria. He is not on any anti-coagulant or NSAIDs.  He denies sob, chest pain, palpitation, abdominal pain, N/V or diarrhea or dysuria.  Denies smoking, alcohol or substance abuse.  He is not hypoxic.    Here in ED Hb is 5.3 (was 13.8 on 04/25/2024), Platelets 155k, , RDW 16, INR 1.02, Cr 0.64, Total protein 5.6, Albumin 2.4.  Lactate 1,8, Lipase 9.  US negative.  RVP POSITIVE for COVID-19.    CXR negative for any acute infiltrates.    Rocephin 1 gm IV and 2 units PRBC ordered by ED.   (06 Jun 2024 23:01)      Interval HPI and Overnight Events: Pt doing better. Occult blood to stool was positive. Seen by GI yesterday. C/o fatigue. Minimal cough    REVIEW OF SYSTEMS:  CONSTITUTIONAL: No weakness, fevers or chills  EYES/ENT: No visual changes;  No vertigo or throat pain   NECK: No pain or stiffness  RESPIRATORY: No cough, wheezing, hemoptysis; No shortness of breath  CARDIOVASCULAR: No chest pain or palpitations  GASTROINTESTINAL: No abdominal or epigastric pain. No nausea, vomiting, or hematemesis; No diarrhea or constipation. No melena or hematochezia.  GENITOURINARY: No dysuria, frequency or hematuria  NEUROLOGICAL: No numbness or weakness  SKIN: No itching, burning, rashes, or lesions   All other review of systems is negative unless indicated above    OBJECTIVE    Vital Signs Last 24 Hrs  T(C): 36.8 (09 Jun 2024 09:19), Max: 37.5 (09 Jun 2024 05:34)  T(F): 98.2 (09 Jun 2024 09:19), Max: 99.5 (09 Jun 2024 05:34)  HR: 63 (09 Jun 2024 09:19) (63 - 75)  BP: 102/63 (09 Jun 2024 09:19) (91/58 - 102/63)  BP(mean): 75 (09 Jun 2024 09:19) (75 - 76)  RR: 18 (09 Jun 2024 09:19) (18 - 18)  SpO2: 98% (09 Jun 2024 09:19) (96% - 100%)    Parameters below as of 09 Jun 2024 09:19  Patient On (Oxygen Delivery Method): room air        MEDICATIONS  (STANDING):  chlorhexidine 4% Liquid 1 Application(s) Topical <User Schedule>  collagenase Ointment 1 Application(s) Topical daily  dextrose 10% Bolus 125 milliLiter(s) IV Bolus once  dextrose 5%. 1000 milliLiter(s) (100 mL/Hr) IV Continuous <Continuous>  dextrose 5%. 1000 milliLiter(s) (50 mL/Hr) IV Continuous <Continuous>  dextrose 50% Injectable 25 Gram(s) IV Push once  dextrose 50% Injectable 12.5 Gram(s) IV Push once  folic acid 1 milliGRAM(s) Oral daily  gabapentin 300 milliGRAM(s) Oral two times a day  glucagon  Injectable 1 milliGRAM(s) IntraMuscular once  insulin lispro (ADMELOG) corrective regimen sliding scale   SubCutaneous three times a day before meals  insulin lispro (ADMELOG) corrective regimen sliding scale   SubCutaneous at bedtime  magnesium oxide 400 milliGRAM(s) Oral two times a day with meals  midodrine. 10 milliGRAM(s) Oral three times a day  pantoprazole    Tablet 40 milliGRAM(s) Oral before breakfast  polyethylene glycol 3350 17 Gram(s) Oral daily  scopolamine 1 mG/72 Hr(s) Patch 1 Patch Transdermal every 72 hours      LABS:                         9.3    6.48  )-----------( 163      ( 08 Jun 2024 07:38 )             26.8     06-08    141  |  111<H>  |  17  ----------------------------<  59<L>  3.5   |  25  |  0.42<L>    Ca    8.1<L>      08 Jun 2024 07:38  Phos  3.4     06-08  Mg     1.6     06-08      Urinalysis Basic - ( 08 Jun 2024 07:38 )    Color: x / Appearance: x / SG: x / pH: x  Gluc: 59 mg/dL / Ketone: x  / Bili: x / Urobili: x   Blood: x / Protein: x / Nitrite: x   Leuk Esterase: x / RBC: x / WBC x   Sq Epi: x / Non Sq Epi: x / Bacteria: x    Specimen Source .Blood None   06-06 @ 19:57  Culture Results  No growth at 48 Hours    CAPILLARY BLOOD GLUCOSE  POCT Blood Glucose.: 123 mg/dL (09 Jun 2024 13:45)  POCT Blood Glucose.: 79 mg/dL (09 Jun 2024 09:16)  POCT Blood Glucose.: 130 mg/dL (08 Jun 2024 21:53)  POCT Blood Glucose.: 324 mg/dL (08 Jun 2024 17:34)  POCT Blood Glucose.: 336 mg/dL (08 Jun 2024 17:32)

## 2024-06-10 LAB
GLUCOSE BLDC GLUCOMTR-MCNC: 114 MG/DL — HIGH (ref 70–99)
GLUCOSE BLDC GLUCOMTR-MCNC: 121 MG/DL — HIGH (ref 70–99)
GLUCOSE BLDC GLUCOMTR-MCNC: 129 MG/DL — HIGH (ref 70–99)
GLUCOSE BLDC GLUCOMTR-MCNC: 84 MG/DL — SIGNIFICANT CHANGE UP (ref 70–99)
HCT VFR BLD CALC: 26.5 % — LOW (ref 39–50)
HCT VFR BLD CALC: 30.1 % — LOW (ref 39–50)
HGB BLD-MCNC: 10.2 G/DL — LOW (ref 13–17)
HGB BLD-MCNC: 9 G/DL — LOW (ref 13–17)
MCHC RBC-ENTMCNC: 33.1 PG — SIGNIFICANT CHANGE UP (ref 27–34)
MCHC RBC-ENTMCNC: 33.6 PG — SIGNIFICANT CHANGE UP (ref 27–34)
MCHC RBC-ENTMCNC: 33.9 GM/DL — SIGNIFICANT CHANGE UP (ref 32–36)
MCHC RBC-ENTMCNC: 34 GM/DL — SIGNIFICANT CHANGE UP (ref 32–36)
MCV RBC AUTO: 97.4 FL — SIGNIFICANT CHANGE UP (ref 80–100)
MCV RBC AUTO: 99 FL — SIGNIFICANT CHANGE UP (ref 80–100)
PLATELET # BLD AUTO: 204 K/UL — SIGNIFICANT CHANGE UP (ref 150–400)
PLATELET # BLD AUTO: 222 K/UL — SIGNIFICANT CHANGE UP (ref 150–400)
RBC # BLD: 2.72 M/UL — LOW (ref 4.2–5.8)
RBC # BLD: 3.04 M/UL — LOW (ref 4.2–5.8)
RBC # FLD: 18.2 % — HIGH (ref 10.3–14.5)
RBC # FLD: 18.5 % — HIGH (ref 10.3–14.5)
WBC # BLD: 5.09 K/UL — SIGNIFICANT CHANGE UP (ref 3.8–10.5)
WBC # BLD: 5.46 K/UL — SIGNIFICANT CHANGE UP (ref 3.8–10.5)
WBC # FLD AUTO: 5.09 K/UL — SIGNIFICANT CHANGE UP (ref 3.8–10.5)
WBC # FLD AUTO: 5.46 K/UL — SIGNIFICANT CHANGE UP (ref 3.8–10.5)

## 2024-06-10 PROCEDURE — 99232 SBSQ HOSP IP/OBS MODERATE 35: CPT

## 2024-06-10 PROCEDURE — 99233 SBSQ HOSP IP/OBS HIGH 50: CPT

## 2024-06-10 RX ORDER — SODIUM CHLORIDE 9 MG/ML
1000 INJECTION INTRAMUSCULAR; INTRAVENOUS; SUBCUTANEOUS ONCE
Refills: 0 | Status: COMPLETED | OUTPATIENT
Start: 2024-06-10 | End: 2024-06-10

## 2024-06-10 RX ADMIN — SCOPALAMINE 1 PATCH: 1 PATCH, EXTENDED RELEASE TRANSDERMAL at 17:48

## 2024-06-10 RX ADMIN — SODIUM CHLORIDE 1000 MILLILITER(S): 9 INJECTION INTRAMUSCULAR; INTRAVENOUS; SUBCUTANEOUS at 10:40

## 2024-06-10 RX ADMIN — MIDODRINE HYDROCHLORIDE 10 MILLIGRAM(S): 2.5 TABLET ORAL at 17:22

## 2024-06-10 RX ADMIN — MAGNESIUM OXIDE 400 MG ORAL TABLET 400 MILLIGRAM(S): 241.3 TABLET ORAL at 17:22

## 2024-06-10 RX ADMIN — Medication 1 APPLICATION(S): at 11:14

## 2024-06-10 RX ADMIN — MIDODRINE HYDROCHLORIDE 10 MILLIGRAM(S): 2.5 TABLET ORAL at 06:32

## 2024-06-10 RX ADMIN — MAGNESIUM OXIDE 400 MG ORAL TABLET 400 MILLIGRAM(S): 241.3 TABLET ORAL at 09:34

## 2024-06-10 RX ADMIN — Medication 1 MILLIGRAM(S): at 09:34

## 2024-06-10 RX ADMIN — MIDODRINE HYDROCHLORIDE 10 MILLIGRAM(S): 2.5 TABLET ORAL at 10:23

## 2024-06-10 RX ADMIN — GABAPENTIN 300 MILLIGRAM(S): 400 CAPSULE ORAL at 22:40

## 2024-06-10 RX ADMIN — CHLORHEXIDINE GLUCONATE 1 APPLICATION(S): 213 SOLUTION TOPICAL at 09:49

## 2024-06-10 RX ADMIN — PANTOPRAZOLE SODIUM 40 MILLIGRAM(S): 20 TABLET, DELAYED RELEASE ORAL at 06:33

## 2024-06-10 RX ADMIN — GABAPENTIN 300 MILLIGRAM(S): 400 CAPSULE ORAL at 09:35

## 2024-06-10 NOTE — PROGRESS NOTE ADULT - SUBJECTIVE AND OBJECTIVE BOX
HPI:    65 y/o M with a PMHx of metastatic esophageal CA s/p RT presented to the ED with severe acute anemia and hypotension. He admitted to feeling weaker while at [facility], did admit that his stools have been very dark but has not noticed any BRBPR, hematuria, hemoptysis or hematemesis.    06/07/24: Seen at bedside on isolation for COVID, no acute distress, concerned about GIB    6/10/24: Seen at bedside, remains on isolation for COVID-19, in no acute distress, c/o intermittent nausea, but tolerates PO       PAST MEDICAL & SURGICAL HISTORY:    Esophageal mass    HTN (hypertension)    Esophageal cancer    Type 2 diabetes mellitus    Malignant neoplasm of cardia    History of hypotension    History of tachycardia    Chemotherapy-induced neuropathy    S/P cholecystectomy    H/O lithotripsy    H/O endoscopy    History of chemotherapy    S/P radiation therapy    Esophageal stenosis    History of esophagogastroduodenoscopy (EGD)        MEDICATIONS  (STANDING):    chlorhexidine 4% Liquid 1 Application(s) Topical <User Schedule>  collagenase Ointment 1 Application(s) Topical daily  dextrose 10% Bolus 125 milliLiter(s) IV Bolus once  dextrose 5%. 1000 milliLiter(s) (100 mL/Hr) IV Continuous <Continuous>  dextrose 5%. 1000 milliLiter(s) (50 mL/Hr) IV Continuous <Continuous>  dextrose 50% Injectable 25 Gram(s) IV Push once  dextrose 50% Injectable 12.5 Gram(s) IV Push once  folic acid 1 milliGRAM(s) Oral daily  gabapentin 300 milliGRAM(s) Oral two times a day  glucagon  Injectable 1 milliGRAM(s) IntraMuscular once  insulin lispro (ADMELOG) corrective regimen sliding scale   SubCutaneous three times a day before meals  insulin lispro (ADMELOG) corrective regimen sliding scale   SubCutaneous at bedtime  magnesium oxide 400 milliGRAM(s) Oral two times a day with meals  midodrine. 10 milliGRAM(s) Oral three times a day  pantoprazole    Tablet 40 milliGRAM(s) Oral before breakfast  polyethylene glycol 3350 17 Gram(s) Oral daily  scopolamine 1 mG/72 Hr(s) Patch 1 Patch Transdermal every 72 hours    MEDICATIONS  (PRN):    acetaminophen     Tablet .. 650 milliGRAM(s) Oral every 6 hours PRN Temp greater or equal to 38C (100.4F), Mild Pain (1 - 3)  aluminum hydroxide/magnesium hydroxide/simethicone Suspension 30 milliLiter(s) Oral every 4 hours PRN Dyspepsia  dextrose Oral Gel 15 Gram(s) Oral once PRN Blood Glucose LESS THAN 70 milliGRAM(s)/deciliter  melatonin 3 milliGRAM(s) Oral at bedtime PRN Insomnia  ondansetron Injectable 4 milliGRAM(s) IV Push every 8 hours PRN Nausea and/or Vomiting      ALLERGIES:    No Known Allergies      FAMILY HISTORY:    prostate cancer (Father)      REVIEW OF SYSTEMS:    Constitutional, Eyes, ENT, Cardiovascular, Respiratory, Gastrointestinal, Genitourinary, Musculoskeletal, Integumentary, Neurological, Psychiatric, Endocrine, Heme/Lymph and Allergic/Immunologic review of systems are otherwise negative except as noted in HPI.       VITALS:    Vital Signs Last 24 Hrs  T(C): 36.7 (10 Charan 2024 10:13), Max: 37.1 (09 Jun 2024 15:33)  T(F): 98.1 (10 Charan 2024 10:13), Max: 98.8 (09 Jun 2024 15:33)  HR: 93 (10 Charan 2024 10:13) (52 - 93)  BP: 114/51 (10 Charan 2024 11:56) (73/50 - 114/51)  BP(mean): 80 (09 Jun 2024 13:40) (80 - 80)  RR: 18 (10 Charan 2024 10:13) (18 - 18)  SpO2: 94% (10 Charan 2024 10:13) (94% - 99%)    Parameters below as of 10 Charan 2024 10:13  Patient On (Oxygen Delivery Method): room air      PHYSICAL:    Constitutional: no acute distress, weak / pale appearing  Eyes: no conjunctival infection, anicteric  ENT: pharynx is unremarkable  Neck: supple without JVD  Pulmonary: clear to auscultation bilaterally  Cardiac: RRR  Vascular: no calf tenderness, venous stasis changes, varices.   Abdomen: normoactive bowel sounds, soft and nontender, no hepatosplenomegaly or masses appreciated   Lymphatic: no peripheral adenopathy appreciated.   Musculoskeletal: full range of motion and no deformities appreciated.   Skin: normal appearance, no rash  Neurology: awake, alert, oriented; no obvious focal deficits       LABS:                          10.2   5.46  )-----------( 222      ( 10 Charan 2024 10:41 )             30.1     Basic Metabolic Panel (06.08.24 @ 07:38)   Sodium: 141 mmol/L  Potassium: 3.5 mmol/L  Chloride: 111 mmol/L  Carbon Dioxide: 25 mmol/L  Anion Gap: 5 mmol/L  Blood Urea Nitrogen: 17 mg/dL  Creatinine: 0.42 mg/dL  Glucose: 59 mg/dL  Calcium: 8.1 mg/dL  eGFR: 119:       PT/INR - ( 06 Jun 2024 19:57 )   PT: 11.5 sec;   INR: 1.02 ratio     PTT - ( 06 Jun 2024 19:57 )  PTT:27.9 sec    Urinalysis Basic - ( 07 Jun 2024 06:57 )    Color: x / Appearance: x / SG: x / pH: x  Gluc: 83 mg/dL / Ketone: x  / Bili: x / Urobili: x   Blood: x / Protein: x / Nitrite: x   Leuk Esterase: x / RBC: x / WBC x   Sq Epi: x / Non Sq Epi: x / Bacteria: x        RADIOLOGY & ADDITIONAL STUDIES:    EXAM:  CT ANGIO ABD PELV (W)AW IC     PROCEDURE DATE:  06/07/2024      INTERPRETATION:  CLINICAL INFORMATION: Metastatic esophageal disease with   severe anemia    COMPARISON: CT 4/26/2024    CONTRAST/COMPLICATIONS:  IV Contrast: Omnipaque 350  90 cc administered   0 cc discarded  Oral Contrast: NONE  Complications: None reported at time of study completion    PROCEDURE:  CT of the Abdomen and Pelvis was performed.  Precontrast, Arterial and Delayed phases were performed.  Sagittal and coronal reformats were performed.    FINDINGS:  LOWER CHEST: Partially visualized bilateral pleural effusions left   greater than right new from prior exam with associated bibasilar   atelectasis. Hiatal hernia. Coronary vascular calcification.    LIVER: Posterior medial right hepatic metastasis measuring 2.9 x 2.7 cm   not significantly changed from prior exam. Likely left hepatic cyst   unchanged. Additional scattered subcentimeter hypo densities too small to   characterize unchanged.  BILE DUCTS: Normal caliber.  GALLBLADDER: Cholecystectomy.  SPLEEN: Within normal limits.  PANCREAS: Within normal limits.  ADRENALS: Within normal limits.  KIDNEYS/URETERS: Kidneys enhance bilaterally and symmetrically without   hydronephrosis.    BLADDER: Tiny calcification the posterior left bladder lumen.  REPRODUCTIVE ORGANS: Prominent prostate gland.    BOWEL: No bowel obstruction. Appendix is not visualized. No evidence of   inflammation in the pericecal region. No intraluminal extravasation of   contrast to suggest active GI bleeding. Gastric pull-through again   identified.  PERITONEUM/RETROPERITONEUM: Within normal limits.  VESSELS: Left-sided IVC normal variant.  LYMPH NODES: No lymphadenopathy.  ABDOMINAL WALL: Diffuse anasarca.  BONES: Degenerative changes.    IMPRESSION:  No definite CT findings of active GI bleeding.    Hepatic metastasis again seen unchanged.    Interval development of bilateral pleural effusions left greater than   right with associated bibasilar compressive atelectasis with additional

## 2024-06-10 NOTE — DIETITIAN NUTRITION RISK NOTIFICATION - ADDITIONAL COMMENTS/DIETITIAN RECOMMENDATIONS
1) C/w regular diet as tolerated  2) Add high-protein chocolate mousse BID (394kcal, 14g protein), LPS 1x/day (Provides 100kcal, 15g protein), and Ciro BID (provides 90 kcal, 2.5 g collagen, 7 g L-Arginine, 7 g L-Glutamine/ 1 packet) to promote wound healing. Ciro is intended to support tissue building and collagen formation in the dietary management of wounds, which has been clinically shown to support wound healing (including pressure injuries, diabetic foot ulcers surgical incisions, burns, and other acute/chronic wounds) by enhancing collagen formation in as little as 2 weeks.  3) Encourage protein-rich foods, maximize food preferences  4) Recommend to add MVI w/minerals, Vit C 500 mg BID, add Zinc Sulfate 220 mg x 10 days to promote wound healing.  5) Consider adding thiamine 100 mg daily 2/2 poor PO intake/ malnutrition  6) Monitor lytes/ min and replete prn (especially K+, phos, mg) - at HIGH RISK for refeeding syndrome  7) Consider adding appetite stimulant such as Remeron or Marinol 2/2 chronically poor appetite/ PO intake  8) Monitor bowel movements, if no BM for >3 days, consider implementing bowel regimen.  9) Confirm goals of care regarding nutrition support. Is DNR/DNI; however, nutrition support NOT addressed in MOL[facility]. Would likely benefit from long-term nutrition support 2/2 chronically poor appetite/PO intake   RD will continue to monitor PO intake, labs, hydration, and wt prn.

## 2024-06-10 NOTE — PHYSICAL THERAPY INITIAL EVALUATION ADULT - ADDITIONAL COMMENTS
Pt lives with his wife in a house, +2 stairs to enter; Pt resides on the first floor. prior to admission Pt was independent with all mobility and ambulated without an assistive device. -information from April 2024.  pt from subacute rehab this admission. Pt resides on the first floor. prior to admission Pt was independent with all mobility and ambulated without an assistive device. -information from April 2024.  pt from subacute rehab this admission.

## 2024-06-10 NOTE — DIETITIAN INITIAL EVALUATION ADULT - PERTINENT LABORATORY DATA
POCT Blood Glucose.: 114 mg/dL (06-10-24 @ 11:51)  A1C with Estimated Average Glucose Result: 5.7 % (06-07-24 @ 06:57)  A1C with Estimated Average Glucose Result: 6.1 % (08-08-23 @ 13:47)

## 2024-06-10 NOTE — DIETITIAN INITIAL EVALUATION ADULT - OTHER INFO
64y Male for [facility] with PMH of HTN, DM-2, esophageal cancer with mets to liver s/p RT, constipation, hypotension, generalized cachexia, Herpetic neuralgia,  DNR & DNI and others has been sent to ED with low Hb of 5.7 for blood transfusion and further evaluation. He feels tired and week.  Admit for COVID+, severe anemia with macrocytosis of unclear etiology with h/o esophageal cancer with mets to liver, esophageal cancer with single metatstais to liver s/p RT    Known to nutr services and dx'd w/ mal on multiple admissions; still meets criteria. Noted w/ scopolamine patch that pt endorses it "helps him eat by suppressing mucus build-up in small intestine." W/ improved appetite since admit (x 4 days), but still consuming minimally 2/2 early satiety. Reports UBW of 192# x 2 years ago --> 153# x 1 year ago --> wt has gradually been decreasing. Bed scale wt of 99# taken by RD on 6/10/24. Wt hx as per EMR  89# (bed scale wt taken by RD on 4/26/24); 128# (as per EMR on 8/16/23). Unintentional wt loss of 29# / 22.7% wt loss x 10 mo ago; severe & clinically significant. NFPE reveals severe muscle/ fat wasting -  appears emaciated, cathexic, thin/frail, and huseyin. C/w regular diet as tolerated; denies ALL protein shakes. Will trial high-protein chocolate mousse BID (394kcal, 14g protein) and LPS 1x/day (Provides 100kcal, 15g protein) - pt is receptive. Was receiving Ciro BID at rehab - will also add to help w/ wound healing 2/2 stage 2 PI. Is DNR/DNI; however, nutrition support NOT addressed in [facility]OL[facility] - ****Confirm goals of care regarding nutrition support/ RD discussed TF as an option on previous admission on 4/26/24 (w/ chronically poor PO intake/appetite and unable to meet needs s/p mets cancer) & pt refused - please determine GOC regarding nutrition support again at this time. See below for other recommendations.

## 2024-06-10 NOTE — DIETITIAN INITIAL EVALUATION ADULT - PERTINENT MEDS FT
MEDICATIONS  (STANDING):  chlorhexidine 4% Liquid 1 Application(s) Topical <User Schedule>  collagenase Ointment 1 Application(s) Topical daily  dextrose 10% Bolus 125 milliLiter(s) IV Bolus once  dextrose 5%. 1000 milliLiter(s) (100 mL/Hr) IV Continuous <Continuous>  dextrose 5%. 1000 milliLiter(s) (50 mL/Hr) IV Continuous <Continuous>  dextrose 50% Injectable 25 Gram(s) IV Push once  dextrose 50% Injectable 12.5 Gram(s) IV Push once  folic acid 1 milliGRAM(s) Oral daily  gabapentin 300 milliGRAM(s) Oral two times a day  glucagon  Injectable 1 milliGRAM(s) IntraMuscular once  insulin lispro (ADMELOG) corrective regimen sliding scale   SubCutaneous three times a day before meals  insulin lispro (ADMELOG) corrective regimen sliding scale   SubCutaneous at bedtime  magnesium oxide 400 milliGRAM(s) Oral two times a day with meals  midodrine. 10 milliGRAM(s) Oral three times a day  pantoprazole    Tablet 40 milliGRAM(s) Oral before breakfast  polyethylene glycol 3350 17 Gram(s) Oral daily  scopolamine 1 mG/72 Hr(s) Patch 1 Patch Transdermal every 72 hours    MEDICATIONS  (PRN):  acetaminophen     Tablet .. 650 milliGRAM(s) Oral every 6 hours PRN Temp greater or equal to 38C (100.4F), Mild Pain (1 - 3)  aluminum hydroxide/magnesium hydroxide/simethicone Suspension 30 milliLiter(s) Oral every 4 hours PRN Dyspepsia  dextrose Oral Gel 15 Gram(s) Oral once PRN Blood Glucose LESS THAN 70 milliGRAM(s)/deciliter  melatonin 3 milliGRAM(s) Oral at bedtime PRN Insomnia  ondansetron Injectable 4 milliGRAM(s) IV Push every 8 hours PRN Nausea and/or Vomiting

## 2024-06-10 NOTE — DIETITIAN INITIAL EVALUATION ADULT - NSFNSGIIOFT_GEN_A_CORE
Please send with gauze and tape for daily dressing changes. Ok to shower POD4, no baths, pat dry incision. Follow up with Dr. Barrow 2 weeks post op for suture removal.   
I&O's Detail  *none doc'd

## 2024-06-10 NOTE — DIETITIAN INITIAL EVALUATION ADULT - ORAL NUTRITION SUPPLEMENTS
Add high-protein chocolate mousse BID (394kcal, 14g protein), LPS 1x/day (Provides 100kcal, 15g protein), and Ciro BID

## 2024-06-10 NOTE — PROGRESS NOTE ADULT - NS ATTEND AMEND GEN_ALL_CORE FT
65 y/o male with esophageal cancer diagnosed in 2023  s/p neoadjuvant chemo and chemoRT s/p surgical resection 8/15/23, followed by adjuvant nivolumab. In Feb 2024 - recurrent metastatic disease to liver. He has not started systemic therapy for metastatic disease, wanted to explore alternative medicine options. Now admitted with severe anemia- new from April 2024- likely GIB/ guaiac positive.  GI following. Serial CBC to monitor.

## 2024-06-10 NOTE — PHYSICAL THERAPY INITIAL EVALUATION ADULT - PERTINENT HX OF CURRENT PROBLEM, REHAB EVAL
65[facility] Male for [facility] with PMH of HTN, DM-2, esophageal cancer with mets to liver s/p RT, constipation, hypotension, generalized cachexia, Herpetic neuralgia,  DNR & DNI and others has been sent to ED with low Hb of 5.7 for blood transfusion and further evaluation.

## 2024-06-10 NOTE — DIETITIAN INITIAL EVALUATION ADULT - FACTORS AFF FOOD INTAKE
2/2 dry mouth, weakness, increased mucus secretions, early satiety/persistent lack of appetite/other (specify)

## 2024-06-10 NOTE — PHYSICAL THERAPY INITIAL EVALUATION ADULT - GENERAL OBSERVATIONS, REHAB EVAL
Pt seen for 30min PT Eval. Pt rec'd semi supine in bed in c/o nausea. Pt requires min AX1 for bedmobility. Pt sitting at EOB c/o intense nausea declining further mobility. Pt left semi supine all needs met, RN aware, +bed alarm.

## 2024-06-10 NOTE — PROGRESS NOTE ADULT - SUBJECTIVE AND OBJECTIVE BOX
SUBJECTIVE:    CHIEF COMPLAINT:  Patient is a 64y old  Male who presents with a chief complaint of Severe anemia (09 Jun 2024 14:40)      HPI:  Chief Complaint: abnormal lab result.    The patient is a 64y Male for [facility] with PMH of HTN, DM-2, esophageal cancer with mets to liver s/p RT, constipation, hypotension, generalized cachexia, Herpetic neuralgia,  DNR & DNI and others has been sent to ED with low Hb of 5.7 for blood transfusion and further evaluation.  He feels tired and week. He denies hematemesis, hemoptysis, rectal bleed, hematochezia or hematuria. He is not on any anti-coagulant or NSAIDs.  He denies sob, chest pain, palpitation, abdominal pain, N/V or diarrhea or dysuria.  Denies smoking, alcohol or substance abuse.  He is not hypoxic.    Here in ED Hb is 5.3 (was 13.8 on 04/25/2024), Platelets 155k, , RDW 16, INR 1.02, Cr 0.64, Total protein 5.6, Albumin 2.4.  Lactate 1,8, Lipase 9.  US negative.  RVP POSITIVE for COVID-19.    CXR negative for any acute infiltrates.    Rocephin 1 gm IV and 2 units PRBC ordered by ED.   (06 Jun 2024 23:01)      Interval HPI and Overnight Events: Pt feeling better. Eating what he can. No cough    REVIEW OF SYSTEMS:  CONSTITUTIONAL: No weakness, fevers or chills  EYES/ENT: No visual changes;  No vertigo or throat pain   NECK: No pain or stiffness  RESPIRATORY: No cough, wheezing, hemoptysis; No shortness of breath  CARDIOVASCULAR: No chest pain or palpitations  GASTROINTESTINAL: No abdominal or epigastric pain. No nausea, vomiting, or hematemesis; No diarrhea or constipation. No melena or hematochezia.  GENITOURINARY: No dysuria, frequency or hematuria  NEUROLOGICAL: No numbness or weakness  SKIN: No itching, burning, rashes, or lesions   All other review of systems is negative unless indicated above    OBJECTIVE    Vital Signs Last 24 Hrs  T(C): 36.3 (10 Charan 2024 06:31), Max: 37.1 (09 Jun 2024 15:33)  T(F): 97.4 (10 Charan 2024 06:31), Max: 98.8 (09 Jun 2024 15:33)  HR: 57 (10 Charan 2024 06:31) (52 - 66)  BP: 93/58 (10 Charan 2024 06:31) (93/58 - 111/71)  BP(mean): 80 (09 Jun 2024 13:40) (80 - 80)  RR: 18 (09 Jun 2024 15:33) (18 - 18)  SpO2: 98% (09 Jun 2024 23:32) (97% - 99%)    Parameters below as of 09 Jun 2024 23:32  Patient On (Oxygen Delivery Method): room air        MEDICATIONS  (STANDING):  chlorhexidine 4% Liquid 1 Application(s) Topical <User Schedule>  collagenase Ointment 1 Application(s) Topical daily  dextrose 10% Bolus 125 milliLiter(s) IV Bolus once  dextrose 5%. 1000 milliLiter(s) (100 mL/Hr) IV Continuous <Continuous>  dextrose 5%. 1000 milliLiter(s) (50 mL/Hr) IV Continuous <Continuous>  dextrose 50% Injectable 25 Gram(s) IV Push once  dextrose 50% Injectable 12.5 Gram(s) IV Push once  folic acid 1 milliGRAM(s) Oral daily  gabapentin 300 milliGRAM(s) Oral two times a day  glucagon  Injectable 1 milliGRAM(s) IntraMuscular once  insulin lispro (ADMELOG) corrective regimen sliding scale   SubCutaneous three times a day before meals  insulin lispro (ADMELOG) corrective regimen sliding scale   SubCutaneous at bedtime  magnesium oxide 400 milliGRAM(s) Oral two times a day with meals  midodrine. 10 milliGRAM(s) Oral three times a day  pantoprazole    Tablet 40 milliGRAM(s) Oral before breakfast  polyethylene glycol 3350 17 Gram(s) Oral daily  scopolamine 1 mG/72 Hr(s) Patch 1 Patch Transdermal every 72 hours      LABS:     Specimen Source .Blood None   06-06 @ 19:57  Culture Results  No growth at 72 Ho    CAPILLARY BLOOD GLUCOSE  POCT Blood Glucose.: 84 mg/dL (10 Charan 2024 07:55)  POCT Blood Glucose.: 90 mg/dL (09 Jun 2024 21:52)  POCT Blood Glucose.: 102 mg/dL (09 Jun 2024 16:43)  POCT Blood Glucose.: 123 mg/dL (09 Jun 2024 13:45)

## 2024-06-10 NOTE — DIETITIAN INITIAL EVALUATION ADULT - ORAL INTAKE PTA/DIET HISTORY
Pt lives at home w/ wife and daughter that cook/grocery shop; s/p [facility] to receive PT) & does NOT like the food there, forces self to eat. Endorses "so-so" appetite and consuming <50% of ENN chronically 2/2 dry mouth, weakness, increased mucus secretions, early satiety. Drinks boost daily at rehab, endorses aversions to other protein shakes due to GI symptoms (persistent diarrhea). Hx of stage 4 metastatic esophageal cancer, s/p RT. Was previously noted in TF on 8/16/23 as per previous RD note - no longer w/ PEG.

## 2024-06-11 LAB
GLUCOSE BLDC GLUCOMTR-MCNC: 128 MG/DL — HIGH (ref 70–99)
GLUCOSE BLDC GLUCOMTR-MCNC: 130 MG/DL — HIGH (ref 70–99)
GLUCOSE BLDC GLUCOMTR-MCNC: 142 MG/DL — HIGH (ref 70–99)
GLUCOSE BLDC GLUCOMTR-MCNC: 90 MG/DL — SIGNIFICANT CHANGE UP (ref 70–99)

## 2024-06-11 PROCEDURE — 99232 SBSQ HOSP IP/OBS MODERATE 35: CPT

## 2024-06-11 RX ADMIN — GABAPENTIN 300 MILLIGRAM(S): 400 CAPSULE ORAL at 09:43

## 2024-06-11 RX ADMIN — MAGNESIUM OXIDE 400 MG ORAL TABLET 400 MILLIGRAM(S): 241.3 TABLET ORAL at 17:08

## 2024-06-11 RX ADMIN — MIDODRINE HYDROCHLORIDE 10 MILLIGRAM(S): 2.5 TABLET ORAL at 06:55

## 2024-06-11 RX ADMIN — CHLORHEXIDINE GLUCONATE 1 APPLICATION(S): 213 SOLUTION TOPICAL at 09:33

## 2024-06-11 RX ADMIN — PANTOPRAZOLE SODIUM 40 MILLIGRAM(S): 20 TABLET, DELAYED RELEASE ORAL at 06:55

## 2024-06-11 RX ADMIN — Medication 1 APPLICATION(S): at 13:55

## 2024-06-11 RX ADMIN — MAGNESIUM OXIDE 400 MG ORAL TABLET 400 MILLIGRAM(S): 241.3 TABLET ORAL at 09:42

## 2024-06-11 RX ADMIN — GABAPENTIN 300 MILLIGRAM(S): 400 CAPSULE ORAL at 21:03

## 2024-06-11 RX ADMIN — Medication 1 MILLIGRAM(S): at 09:44

## 2024-06-11 RX ADMIN — MIDODRINE HYDROCHLORIDE 10 MILLIGRAM(S): 2.5 TABLET ORAL at 17:08

## 2024-06-11 RX ADMIN — SCOPALAMINE 1 PATCH: 1 PATCH, EXTENDED RELEASE TRANSDERMAL at 20:02

## 2024-06-11 RX ADMIN — MIDODRINE HYDROCHLORIDE 10 MILLIGRAM(S): 2.5 TABLET ORAL at 12:07

## 2024-06-11 RX ADMIN — SCOPALAMINE 1 PATCH: 1 PATCH, EXTENDED RELEASE TRANSDERMAL at 08:00

## 2024-06-11 NOTE — PROGRESS NOTE ADULT - CONSTITUTIONAL
well-groomed/cachectic
normal/well-groomed/no distress
well-groomed/no distress/cachectic
normal/well-groomed/no distress

## 2024-06-11 NOTE — PROGRESS NOTE ADULT - NUTRITIONAL ASSESSMENT
This patient has been assessed with a concern for Malnutrition and has been determined to have a diagnosis/diagnoses of Severe protein-calorie malnutrition and Underweight (BMI < 19).    This patient is being managed with:   Diet Regular-  Entered: Jun 6 2024 11:05PM

## 2024-06-11 NOTE — PROGRESS NOTE ADULT - ASSESSMENT
65 y/o M with known metastatic esophageal CA s/p RT & Hiddenite Bahman 2023, has been at [facility] since prior admission 04/2024      # Known Metastatic GEJ / Esophageal Adenocarcinoma      - S/p upper EUS: lesion almost completely obstructing s/p stent; Pathology revealed esophagus mass 40 cm, adenocarcinoma, invasive, poorly differentiated with areas of signet ring cell morphology.   - Siewert Type II; No MMR deficiency; Her 2 NEG  - S/p x3 cycles induction carbo / taxol 03/30/23 then s/p x6 cycles carbo / taxol with RT completed 05/15/23  - S/p Andrew Bahman 08/15/23 with surgical path noting invasive adenocarcinoma, moderately diff with squamous and neuroendocrine features; metastatic carcinoma 04/14 LNs  - 10/25/23; Started x1 year Nivolumab  - 02/09/24 PET with evidence of POD / distant mets   - 03/01/24 liver bx confirmed metastatic adenocarcinoma  - Patient wished to take break from additional cancer directed therapy and continue to work with both Dr Richter and Dr Colton Wallace, both holistic practitioners doing alternative therapy   - Was supposed to receive repeat PET / CT imaging 05/2024 & then decide on moving forward with FOLFOX / cancer directed therapy ---> missed outpatient appointment  - Prior admission with failure to thrive, poor PO intake, weight loss & despite noted POD and not being on cancer directed therapy, he was not eager to receive additional cancer directed therapy and went to rehab in attempt to improve functional / performance status   - Recommend Palliative involvement to help with GOC  - Continue supportive measures as necessary  - No planned inpatient cancer directed therapy   - Outpatient follow up with Primary Onc Dr Valero once stable and d/c'ed        # Acute Normocytic Anemia    - Hgb acutely dropped to <6.0 g/dL ---> s/p 2U PRBC this admission with improvement in H/H  - With known metastatic esophageal CA, without cancer directed therapy since early 2023, concern for POD with possible internal GIB  - Patient did admit to dark stools, and Guaiac Positive  - labs with high LDH, low Haptoglobin, but normal bili and high reticulocytes, unlikely hemolysis, normal iron studies.    - CT AP imaging -->No definite CT findings of active GI bleeding. Hepatic metastasis again seen unchanged. Interval development of bilateral pleural effusions left greater than   right with associated bibasilar compressive atelectasis with additional   - GI following  - No AC therapy on board  - Continue to trend serial CBCs while admitted  - Continue to transfuse PRBC as clinically necessary       
The patient is a 64y Male for [facility] with PMH of HTN, DM-2, esophageal cancer with mets to liver s/p RT, constipation, hypotension, generalized cachexia, Herpetic neuralgia,  DNR & DNI and others has been sent to ED with low Hb of 5.7 for blood transfusion and further evaluation.  He feels tired and week. He denies hematemesis, hemoptysis, rectal bleed, hematochezia or hematuria. He is not on any anti-coagulant or NSAIDs.  He denies sob, chest pain, palpitation, abdominal pain, N/V or diarrhea or dysuria.  Denies smoking, alcohol or substance abuse.      # Severe anemia with macrocytosis of unclear etiology with h/o esophageal cancer with met to liver.  -CBC in am  -Transfuse PRBC prn if Hb<7 gm. Iron studies reviewed  -Fecal occult blood positive  -GI following. No intervention for positive Occult blood      # Hypotension, possibly due to severe anemia in setting of r/o GI bleed and poor po fluid intake  -No evidence of sepsis.  -Continue his Midodrine.  -Will hold his Metoprolol.    # COVID-19 viral infection.  -Asymptomatic, not hypoxic and CXR negative for any acute infiltrates.  -Not a candidate for Remdesivir or Dexamethasone.  -Isolation per protocol.  -Supportive care.  -ID following    # DM-2 as documented.  -He is not on ant DM meds.  -.  -A1C 5.7  -ISS with coverage.    # Herpetic neuralgia.  -On Gabapentin.    # Esophageal cancer with single metatstais to liver s/p RT.  # Hypoproteinemia  and hypoalbuminemia with severe  protein calorie malnutrition.  -Follow up as outpatient.  -Continue his Scopolamine patch q72 hours.    Disposition  Back to SNF rehab tomorrow if BP remains stable    # DVT prophylaxis: SCDs    CODE STATUS: DNR and DNI with Trial of NIV.  
The patient is a 64y Male for [facility] with PMH of HTN, DM-2, esophageal cancer with mets to liver s/p RT, constipation, hypotension, generalized cachexia, Herpetic neuralgia,  DNR & DNI and others has been sent to ED with low Hb of 5.7 for blood transfusion and further evaluation.  He feels tired and week. He denies hematemesis, hemoptysis, rectal bleed, hematochezia or hematuria. He is not on any anti-coagulant or NSAIDs.  He denies sob, chest pain, palpitation, abdominal pain, N/V or diarrhea or dysuria.  Denies smoking, alcohol or substance abuse.      # Severe anemia with macrocytosis of unclear etiology with h/o esophageal cancer with mets to liver.  -Hb is 5.3 (was 13.8 on 04/25/2024), Improved to 9.3 post transfusion, Serial CBC  -Transfuse PRBC prn if Hb<7 gm. Iron studies reviewed  -Fecal occult blood pending  -Await GI consult for further evaluation      # Hypotension, possibly due to severe anemia in setting of r/o GI bleed  -Not evidence of sepsis.  -Continue his Midodrine.  -Will hold his Metoprolol.    # COVID-19 viral infection.  -Asymptomatic, not hypoxic and CXR negative for any acute infiltrates.  -Not a candidate for Remdesivir or Dexamethasone.  -Isolation per protocol.  -Supportive care.  -ID following    # DM-2 as documented.  -He is not on ant DM meds.  -.  -A1C 5.7  -ISS with coverage.    # Herpetic neuralgia.  -On Gabapentin.    # Esophageal cancer with single metatstais to liver s/p RT.  # Hypoproteinemia  and hypoalbuminemia with severe  protein calorie malnutrition.  -Follow up as outpatient.  -Continue his Scopolamine patch q72 hours.  -May need nutritionist consult.    # DVT prophylaxis: SCDs    CODE STATUS: DNR and DNI with Trial of NIV.  
The patient is a 64y Male for [facility] with PMH of HTN, DM-2, esophageal cancer with mets to liver s/p RT, constipation, hypotension, generalized cachexia, Herpetic neuralgia,  DNR & DNI and others has been sent to ED with low Hb of 5.7 for blood transfusion and further evaluation.  He feels tired and week. He denies hematemesis, hemoptysis, rectal bleed, hematochezia or hematuria. He is not on any anti-coagulant or NSAIDs.  He denies sob, chest pain, palpitation, abdominal pain, N/V or diarrhea or dysuria.  Denies smoking, alcohol or substance abuse.      # Severe anemia with macrocytosis of unclear etiology with h/o esophageal cancer with mets to liver.  -Hb pending today Improvedpost transfusion  -Serial CBC  -Transfuse PRBC prn if Hb<7 gm. Iron studies reviewed  -Fecal occult blood positive  -GI consult appreciated. Await their plan for positive Occult blood      # Hypotension, possibly due to severe anemia in setting of r/o GI bleed  -No evidence of sepsis.  -Continue his Midodrine.  -Will hold his Metoprolol.    # COVID-19 viral infection.  -Asymptomatic, not hypoxic and CXR negative for any acute infiltrates.  -Not a candidate for Remdesivir or Dexamethasone.  -Isolation per protocol.  -Supportive care.  -ID following    # DM-2 as documented.  -He is not on ant DM meds.  -.  -A1C 5.7  -ISS with coverage.    # Herpetic neuralgia.  -On Gabapentin.    # Esophageal cancer with single metatstais to liver s/p RT.  # Hypoproteinemia  and hypoalbuminemia with severe  protein calorie malnutrition.  -Follow up as outpatient.  -Continue his Scopolamine patch q72 hours.  -May need nutritionist consult.    # DVT prophylaxis: SCDs    CODE STATUS: DNR and DNI with Trial of NIV.  
The patient is a 64y Male for [facility] with PMH of HTN, DM-2, esophageal cancer with mets to liver s/p RT, constipation, hypotension, generalized cachexia, Herpetic neuralgia,  DNR & DNI and others has been sent to ED with low Hb of 5.7 for blood transfusion and further evaluation.  He feels tired and week. He denies hematemesis, hemoptysis, rectal bleed, hematochezia or hematuria. He is not on any anti-coagulant or NSAIDs.  He denies sob, chest pain, palpitation, abdominal pain, N/V or diarrhea or dysuria.  Denies smoking, alcohol or substance abuse.      # Severe anemia with macrocytosis of unclear etiology with h/o esophageal cancer with mets to liver.  -Hb is 5.3 (was 13.8 on 04/25/2024), Improved to 8.7 post transfusion, Serial CBC  -Transfuse PRBC prn if Hb<7 gm. Iron studies reviewed  -Fecal occult blood ordered.  -GI consulted for further evaluation      # Hypotension, possibly due to severe anemia in setting of r/o GI bleed  -Not evidence of sepsis.  -Continue his Midodrine.  -Will hold his Metoprolol.    # COVID-19 viral infection.  -Asymptomatic, not hypoxic and CXR negative for any acute infiltrates.  -Not a candidate for Remdesivir or Dexamethasone.  -Isolation per protocol.  -Supportive care.  -ID following    # DM-2 as documented.  -He is not on ant DM meds.  -.  -A1C 5.7  -ISS with coverage.    # Herpetic neuralgia.  -On Gabapentin.    # Esophageal cancer with mets to liver s/p RT.  # Hypoproteinemia  and hypoalbuminemia with protein calorie malnutrition.  -Follow up as outpatient.  -Continue his Scopolamine patch q72 hours.  -May need nutritionist consult.    # DVT prophylaxis: SCDs    CODE STATUS: DNR and DNI with Trial of NIV.  
The patient is a 64y Male for [facility] with PMH of HTN, DM-2, esophageal cancer with mets to liver s/p RT, constipation, hypotension, generalized cachexia, Herpetic neuralgia,  DNR & DNI and others has been sent to ED with low Hb of 5.7 for blood transfusion and further evaluation.  He feels tired and week. He denies hematemesis, hemoptysis, rectal bleed, hematochezia or hematuria. He is not on any anti-coagulant or NSAIDs.  He denies sob, chest pain, palpitation, abdominal pain, N/V or diarrhea or dysuria.  Denies smoking, alcohol or substance abuse.      # Severe anemia with macrocytosis of unclear etiology with h/o esophageal cancer with mets to liver.  -Hb is -9.3 (was 13.8 on 04/25/2024), Improvedpost transfusion, Serial CBC  -Transfuse PRBC prn if Hb<7 gm. Iron studies reviewed  -Fecal occult blood positive  -GI consult appreciated. Await their plan for positive Occult blood      # Hypotension, possibly due to severe anemia in setting of r/o GI bleed  -No evidence of sepsis.  -Continue his Midodrine.  -Will hold his Metoprolol.    # COVID-19 viral infection.  -Asymptomatic, not hypoxic and CXR negative for any acute infiltrates.  -Not a candidate for Remdesivir or Dexamethasone.  -Isolation per protocol.  -Supportive care.  -ID following    # DM-2 as documented.  -He is not on ant DM meds.  -.  -A1C 5.7  -ISS with coverage.    # Herpetic neuralgia.  -On Gabapentin.    # Esophageal cancer with single metatstais to liver s/p RT.  # Hypoproteinemia  and hypoalbuminemia with severe  protein calorie malnutrition.  -Follow up as outpatient.  -Continue his Scopolamine patch q72 hours.  -May need nutritionist consult.    # DVT prophylaxis: SCDs    CODE STATUS: DNR and DNI with Trial of NIV.

## 2024-06-11 NOTE — PROGRESS NOTE ADULT - SUBJECTIVE AND OBJECTIVE BOX
SUBJECTIVE:    CHIEF COMPLAINT:  Patient is a 64y old  Male who presents with a chief complaint of Severe anemia (10 Charan 2024 12:42)      HPI:  Chief Complaint: abnormal lab result.    The patient is a 64y Male for [facility] with PMH of HTN, DM-2, esophageal cancer with mets to liver s/p RT, constipation, hypotension, generalized cachexia, Herpetic neuralgia,  DNR & DNI and others has been sent to ED with low Hb of 5.7 for blood transfusion and further evaluation.  He feels tired and week. He denies hematemesis, hemoptysis, rectal bleed, hematochezia or hematuria. He is not on any anti-coagulant or NSAIDs.  He denies sob, chest pain, palpitation, abdominal pain, N/V or diarrhea or dysuria.  Denies smoking, alcohol or substance abuse.  He is not hypoxic.    Interval HPI and Overnight Events: Pt came up after 1L fluid bolus. Remains stable but on low side. No pain. No light headedness on ambulation  GI does not want to do any intervention for stool guiac due to underlying pathology. Heme onc agrees with not new Tx and transfuse as needed.    REVIEW OF SYSTEMS:  CONSTITUTIONAL: No weakness, fevers or chills  EYES/ENT: No visual changes;  No vertigo or throat pain   NECK: No pain or stiffness  RESPIRATORY: No cough, wheezing, hemoptysis; No shortness of breath  CARDIOVASCULAR: No chest pain or palpitations  GASTROINTESTINAL: No abdominal or epigastric pain. No nausea, vomiting, or hematemesis; No diarrhea or constipation. No melena or hematochezia.  GENITOURINARY: No dysuria, frequency or hematuria  NEUROLOGICAL: No numbness or weakness  SKIN: No itching, burning, rashes, or lesions   All other review of systems is negative unless indicated above    OBJECTIVE    Vital Signs Last 24 Hrs  T(C): 36.5 (11 Jun 2024 08:13), Max: 37.1 (10 Charan 2024 15:25)  T(F): 97.7 (11 Jun 2024 08:13), Max: 98.8 (10 Charan 2024 15:25)  HR: 74 (11 Jun 2024 08:13) (63 - 94)  BP: 94/68 (11 Jun 2024 08:13) (73/50 - 114/51)  BP(mean): 78 (11 Jun 2024 06:55) (78 - 78)  RR: 16 (11 Jun 2024 08:13) (16 - 18)  SpO2: 96% (11 Jun 2024 08:13) (94% - 98%)    Parameters below as of 11 Jun 2024 08:13  Patient On (Oxygen Delivery Method): room air        MEDICATIONS  (STANDING):  chlorhexidine 4% Liquid 1 Application(s) Topical <User Schedule>  collagenase Ointment 1 Application(s) Topical daily  dextrose 10% Bolus 125 milliLiter(s) IV Bolus once  dextrose 5%. 1000 milliLiter(s) (100 mL/Hr) IV Continuous <Continuous>  dextrose 5%. 1000 milliLiter(s) (50 mL/Hr) IV Continuous <Continuous>  dextrose 50% Injectable 25 Gram(s) IV Push once  dextrose 50% Injectable 12.5 Gram(s) IV Push once  folic acid 1 milliGRAM(s) Oral daily  gabapentin 300 milliGRAM(s) Oral two times a day  glucagon  Injectable 1 milliGRAM(s) IntraMuscular once  insulin lispro (ADMELOG) corrective regimen sliding scale   SubCutaneous three times a day before meals  insulin lispro (ADMELOG) corrective regimen sliding scale   SubCutaneous at bedtime  magnesium oxide 400 milliGRAM(s) Oral two times a day with meals  midodrine. 10 milliGRAM(s) Oral three times a day  pantoprazole    Tablet 40 milliGRAM(s) Oral before breakfast  polyethylene glycol 3350 17 Gram(s) Oral daily  scopolamine 1 mG/72 Hr(s) Patch 1 Patch Transdermal every 72 hours      LABS:                         9.0    5.09  )-----------( 204      ( 10 Charan 2024 13:11 )             26.5     Specimen Source .Blood None   06-06 @ 19:57  Culture Results  No growth at 4 days    CAPILLARY BLOOD GLUCOSE  POCT Blood Glucose.: 90 mg/dL (11 Jun 2024 08:00)  POCT Blood Glucose.: 129 mg/dL (10 Charan 2024 22:43)  POCT Blood Glucose.: 121 mg/dL (10 Charan 2024 17:24)  POCT Blood Glucose.: 114 mg/dL (10 Charan 2024 11:51)

## 2024-06-12 ENCOUNTER — TRANSCRIPTION ENCOUNTER (OUTPATIENT)
Age: 64
End: 2024-06-12

## 2024-06-12 VITALS
SYSTOLIC BLOOD PRESSURE: 108 MMHG | HEART RATE: 82 BPM | TEMPERATURE: 98 F | OXYGEN SATURATION: 99 % | RESPIRATION RATE: 18 BRPM | DIASTOLIC BLOOD PRESSURE: 71 MMHG

## 2024-06-12 LAB
CULTURE RESULTS: SIGNIFICANT CHANGE UP
CULTURE RESULTS: SIGNIFICANT CHANGE UP
GLUCOSE BLDC GLUCOMTR-MCNC: 152 MG/DL — HIGH (ref 70–99)
HCT VFR BLD CALC: 27.7 % — LOW (ref 39–50)
HGB BLD-MCNC: 9.1 G/DL — LOW (ref 13–17)
MCHC RBC-ENTMCNC: 32.7 PG — SIGNIFICANT CHANGE UP (ref 27–34)
MCHC RBC-ENTMCNC: 32.9 GM/DL — SIGNIFICANT CHANGE UP (ref 32–36)
MCV RBC AUTO: 99.6 FL — SIGNIFICANT CHANGE UP (ref 80–100)
PLATELET # BLD AUTO: 220 K/UL — SIGNIFICANT CHANGE UP (ref 150–400)
RBC # BLD: 2.78 M/UL — LOW (ref 4.2–5.8)
RBC # FLD: 17.7 % — HIGH (ref 10.3–14.5)
SPECIMEN SOURCE: SIGNIFICANT CHANGE UP
SPECIMEN SOURCE: SIGNIFICANT CHANGE UP
WBC # BLD: 5.53 K/UL — SIGNIFICANT CHANGE UP (ref 3.8–10.5)
WBC # FLD AUTO: 5.53 K/UL — SIGNIFICANT CHANGE UP (ref 3.8–10.5)

## 2024-06-12 PROCEDURE — 99239 HOSP IP/OBS DSCHRG MGMT >30: CPT

## 2024-06-12 RX ORDER — METOPROLOL TARTRATE 50 MG
0.5 TABLET ORAL
Refills: 0 | DISCHARGE

## 2024-06-12 RX ORDER — PANTOPRAZOLE SODIUM 20 MG/1
1 TABLET, DELAYED RELEASE ORAL
Qty: 0 | Refills: 0 | DISCHARGE
Start: 2024-06-12

## 2024-06-12 RX ADMIN — Medication 1: at 08:29

## 2024-06-12 RX ADMIN — PANTOPRAZOLE SODIUM 40 MILLIGRAM(S): 20 TABLET, DELAYED RELEASE ORAL at 06:34

## 2024-06-12 RX ADMIN — MIDODRINE HYDROCHLORIDE 10 MILLIGRAM(S): 2.5 TABLET ORAL at 06:35

## 2024-06-12 RX ADMIN — MAGNESIUM OXIDE 400 MG ORAL TABLET 400 MILLIGRAM(S): 241.3 TABLET ORAL at 08:29

## 2024-06-12 RX ADMIN — Medication 1 MILLIGRAM(S): at 09:30

## 2024-06-12 RX ADMIN — GABAPENTIN 300 MILLIGRAM(S): 400 CAPSULE ORAL at 09:30

## 2024-06-12 NOTE — DISCHARGE NOTE PROVIDER - NSDCPNSUBOBJ_GEN_ALL_CORE
SUBJECTIVE:    CHIEF COMPLAINT:  Patient is a 64y old  Male who presents with a chief complaint of Severe anemia (11 Jun 2024 10:01)      HPI:  Chief Complaint: abnormal lab result.    The patient is a 64y Male for [facility] with PMH of HTN, DM-2, esophageal cancer with mets to liver s/p RT, constipation, hypotension, generalized cachexia, Herpetic neuralgia,  DNR & DNI and others has been sent to ED with low Hb of 5.7 for blood transfusion and further evaluation.  He feels tired and week. He denies hematemesis, hemoptysis, rectal bleed, hematochezia or hematuria. He is not on any anti-coagulant or NSAIDs.  He denies sob, chest pain, palpitation, abdominal pain, N/V or diarrhea or dysuria.  Denies smoking, alcohol or substance abuse.  He is not hypoxic.      Interval HPI and Overnight Events: Pt feels well. Ready for d/c    REVIEW OF SYSTEMS:  CONSTITUTIONAL: No weakness, fevers or chills  EYES/ENT: No visual changes;  No vertigo or throat pain   NECK: No pain or stiffness  RESPIRATORY: No cough, wheezing, hemoptysis; No shortness of breath  CARDIOVASCULAR: No chest pain or palpitations  GASTROINTESTINAL: No abdominal or epigastric pain. No nausea, vomiting, or hematemesis; No diarrhea or constipation. No melena or hematochezia.  GENITOURINARY: No dysuria, frequency or hematuria  NEUROLOGICAL: No numbness or weakness  SKIN: No itching, burning, rashes, or lesions   All other review of systems is negative unless indicated above    OBJECTIVE    PHYSICAL EXAM:  Vital Signs Last 24 Hrs  T(C): 36.8 (12 Jun 2024 08:20), Max: 37.1 (11 Jun 2024 15:22)  T(F): 98.2 (12 Jun 2024 08:20), Max: 98.7 (11 Jun 2024 15:22)  HR: 82 (12 Jun 2024 08:20) (74 - 88)  BP: 108/71 (12 Jun 2024 08:20) (93/63 - 108/71)  BP(mean): --  RR: 18 (12 Jun 2024 08:20) (17 - 18)  SpO2: 99% (12 Jun 2024 08:20) (96% - 99%)    Parameters below as of 12 Jun 2024 08:20  Patient On (Oxygen Delivery Method): room air      Constitutional: NAD, awake and alert, cachectic   HEENT: PERR, EOMI, Normal Hearing, MMM  Neck: Soft and supple, No LAD, No JVD  Respiratory: Breath sounds are clear bilaterally, No wheezing, rales or rhonchi  Cardiovascular: S1 and S2, regular rate and rhythm, no Murmurs, gallops or rubs  Gastrointestinal: Bowel Sounds present, soft, nontender, nondistended, no guarding, no rebound  Extremities: No peripheral edema  Vascular: 2+ peripheral pulses  Neurological: A/O x 3, no focal deficits  Musculoskeletal: 5/5 strength b/l upper and lower extremities  Skin: No rashes    MEDICATIONS  (STANDING):  chlorhexidine 4% Liquid 1 Application(s) Topical <User Schedule>  collagenase Ointment 1 Application(s) Topical daily  dextrose 10% Bolus 125 milliLiter(s) IV Bolus once  dextrose 5%. 1000 milliLiter(s) (100 mL/Hr) IV Continuous <Continuous>  dextrose 5%. 1000 milliLiter(s) (50 mL/Hr) IV Continuous <Continuous>  dextrose 50% Injectable 25 Gram(s) IV Push once  dextrose 50% Injectable 12.5 Gram(s) IV Push once  folic acid 1 milliGRAM(s) Oral daily  gabapentin 300 milliGRAM(s) Oral two times a day  glucagon  Injectable 1 milliGRAM(s) IntraMuscular once  insulin lispro (ADMELOG) corrective regimen sliding scale   SubCutaneous three times a day before meals  insulin lispro (ADMELOG) corrective regimen sliding scale   SubCutaneous at bedtime  magnesium oxide 400 milliGRAM(s) Oral two times a day with meals  midodrine. 10 milliGRAM(s) Oral three times a day  pantoprazole    Tablet 40 milliGRAM(s) Oral before breakfast  polyethylene glycol 3350 17 Gram(s) Oral daily  scopolamine 1 mG/72 Hr(s) Patch 1 Patch Transdermal every 72 hours      LABS:                         9.1    5.53  )-----------( 220      ( 12 Jun 2024 08:26 )             27.7     CAPILLARY BLOOD GLUCOSE  POCT Blood Glucose.: 152 mg/dL (12 Jun 2024 07:33)  POCT Blood Glucose.: 142 mg/dL (11 Jun 2024 21:02)  POCT Blood Glucose.: 128 mg/dL (11 Jun 2024 17:10)  POCT Blood Glucose.: 130 mg/dL (11 Jun 2024 12:06)  · Gastrointestinal	soft; nontender; nondistended; normal active bowel sounds    Assessment and Plan:   · Assessment	  The patient is a 64y Male for [facility] with PMH of HTN, DM-2, esophageal cancer with mets to liver s/p RT, constipation, hypotension, generalized cachexia, Herpetic neuralgia,  DNR & DNI and others has been sent to ED with low Hb of 5.7 for blood transfusion and further evaluation.  He feels tired and week. He denies hematemesis, hemoptysis, rectal bleed, hematochezia or hematuria. He is not on any anti-coagulant or NSAIDs.  He denies sob, chest pain, palpitation, abdominal pain, N/V or diarrhea or dysuria.  Denies smoking, alcohol or substance abuse.      # Severe anemia with macrocytosis of unclear etiology with h/o esophageal cancer with met to liver.  -CBC in am  -Transfuse PRBC prn if Hb<7 gm. Iron studies reviewed  -Fecal occult blood positive  -GI following. No intervention for positive Occult blood      # Hypotension, possibly due to severe anemia in setting of r/o GI bleed and poor po fluid intake  -No evidence of sepsis.  -Continue his Midodrine.  -Will hold his Metoprolol.    # COVID-19 viral infection.  -Asymptomatic, not hypoxic and CXR negative for any acute infiltrates.  -Not a candidate for Remdesivir or Dexamethasone.  -Isolation per protocol.  -Supportive care.  -ID following    # DM-2 as documented.  -He is not on ant DM meds.  -.  -A1C 5.7  -ISS with coverage.    # Herpetic neuralgia.  -On Gabapentin.    # Esophageal cancer with single metatstais to liver s/p RT.  # Hypoproteinemia  and hypoalbuminemia with severe  protein calorie malnutrition.  -Follow up as outpatient.  -Continue his Scopolamine patch q72 hours.    Disposition  Back to SNF rehab today    # DVT prophylaxis: SCDs    CODE STATUS: DNR and DNI with Trial of NIV.        Nutritional Assessment:  · Nutritional Assessment	This patient has been assessed with a concern for Malnutrition and has been determined to have a diagnosis/diagnoses of Severe protein-calorie malnutrition and Underweight (BMI < 19).    This patient is being managed with:   Diet Regular-  Entered: Jun 6 2024 11:05PM

## 2024-06-12 NOTE — DISCHARGE NOTE NURSING/CASE MANAGEMENT/SOCIAL WORK - NSDCPEFALRISK_GEN_ALL_CORE
For information on Fall & Injury Prevention, visit: [email or web address] OR  [email or web address] OR  https://www.cdc.gov/steadi/patient.html

## 2024-06-12 NOTE — DISCHARGE NOTE PROVIDER - NSDCMRMEDTOKEN_GEN_ALL_CORE_FT
folic acid 1 mg oral tablet: 1 tab(s) orally once a day  gabapentin 300 mg oral capsule: 1 cap(s) orally 2 times a day  magnesium oxide 400 mg oral tablet: 1 tab(s) orally 2 times a day  midodrine 5 mg oral tablet: 2 tab(s) orally every 8 hours  pantoprazole 40 mg oral delayed release tablet: 1 tab(s) orally once a day (before a meal)  polyethylene glycol 3350 oral powder for reconstitution: 17 gram(s) orally once a day  potassium chloride 20 mEq oral tablet, extended release: 2 tab(s) orally once a day  Santyl 250 units/g topical ointment: Apply topically to affected area once a day as needed for  scopolamine 1.5 mg transdermal film, extended release (obsolete): 1 patch transdermal every 3 days

## 2024-06-12 NOTE — DISCHARGE NOTE PROVIDER - CARE PROVIDER_API CALL
Miles Hernández  [address], [address], [place] [address]  Phone: [phone]  Fax: [phone]  Follow Up Time:

## 2024-06-12 NOTE — DISCHARGE NOTE PROVIDER - NSDCCPCAREPLAN_GEN_ALL_CORE_FT
PRINCIPAL DISCHARGE DIAGNOSIS  Diagnosis: Anemia  Assessment and Plan of Treatment:       SECONDARY DISCHARGE DIAGNOSES  Diagnosis: 2019 novel coronavirus disease (COVID-19)  Assessment and Plan of Treatment:     Diagnosis: Metastasis from esophageal cancer  Assessment and Plan of Treatment:     Diagnosis: Hypotension  Assessment and Plan of Treatment:     Diagnosis: Hypokalemia  Assessment and Plan of Treatment:

## 2024-06-12 NOTE — DISCHARGE NOTE PROVIDER - HOSPITAL COURSE
The patient is a 64y Male for [facility] with PMH of HTN, DM-2, esophageal cancer with mets to liver s/p RT, constipation, hypotension, generalized cachexia, Herpetic neuralgia,  DNR & DNI and others has been sent to ED with low Hb of 5.7 for blood transfusion and further evaluation.  He felt tired and week. He denies hematemesis, hemoptysis, rectal bleed, hematochezia or hematuria. He is not on any anti-coagulant or NSAIDs.  He denies sob, chest pain, palpitation, abdominal pain, N/V or diarrhea or dysuria.  Denies smoking, alcohol or substance abuse. He was not hypoxic.    Admitted to telemetry floor. Received transfusion of PRBC's Found to be positive for COVID 19 on admission Was hypotensive and given midodrine. BP meds held. Received IVF. H/H remained stable Seen by GI. Stool positive for occcult blood but GI did not believe intervention was indicated in a patient with metastatic esopphageal cancer.     Once stable felt to be stable for return to SNF rehab. May transfuse periodically if needed based on H/H

## 2024-06-12 NOTE — DISCHARGE NOTE PROVIDER - NSDCQMERRANDS_GEN_ALL_CORE
Writer reviewed AVS and medication summary with patient. IV and telemetry removed. Patient did not have any further questions. Patient left unit via wheelchair transport at 1536 with all belongings in hand.  Pt's  will drive pt home.     Yes

## 2024-06-12 NOTE — DISCHARGE NOTE NURSING/CASE MANAGEMENT/SOCIAL WORK - PATIENT PORTAL LINK FT
You can access the FollowMyHealth Patient Portal offered by [facility] by registering at the following website: [email or web address]. By joining [employer]’s FollowMyHealth portal, you will also be able to view your health information using other applications (apps) compatible with our system.

## 2024-06-13 ENCOUNTER — NON-APPOINTMENT (OUTPATIENT)
Age: 64
End: 2024-06-13

## 2024-06-15 ENCOUNTER — EMERGENCY (EMERGENCY)
Facility: HOSPITAL | Age: 64
LOS: 0 days | Discharge: HOME | End: 2024-06-15
Attending: STUDENT IN AN ORGANIZED HEALTH CARE EDUCATION/TRAINING PROGRAM
Payer: MEDICARE

## 2024-06-15 VITALS
TEMPERATURE: 98 F | DIASTOLIC BLOOD PRESSURE: 68 MMHG | RESPIRATION RATE: 18 BRPM | OXYGEN SATURATION: 99 % | HEART RATE: 66 BPM | HEIGHT: 67 IN | SYSTOLIC BLOOD PRESSURE: 124 MMHG

## 2024-06-15 VITALS
HEART RATE: 70 BPM | OXYGEN SATURATION: 100 % | DIASTOLIC BLOOD PRESSURE: 73 MMHG | SYSTOLIC BLOOD PRESSURE: 123 MMHG | RESPIRATION RATE: 16 BRPM | TEMPERATURE: 98 F
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
## 2024-06-15 DIAGNOSIS — K59.00 CONSTIPATION, UNSPECIFIED: ICD-10-CM

## 2024-06-15 DIAGNOSIS — Z90.49 ACQUIRED ABSENCE OF OTHER SPECIFIED PARTS OF DIGESTIVE TRACT: Chronic | ICD-10-CM

## 2024-06-15 DIAGNOSIS — S00.432A CONTUSION OF LEFT EAR, INITIAL ENCOUNTER: ICD-10-CM

## 2024-06-15 DIAGNOSIS — Z98.890 OTHER SPECIFIED POSTPROCEDURAL STATES: Chronic | ICD-10-CM

## 2024-06-15 DIAGNOSIS — S09.90XA UNSPECIFIED INJURY OF HEAD, INITIAL ENCOUNTER: ICD-10-CM

## 2024-06-15 DIAGNOSIS — Z92.3 PERSONAL HISTORY OF IRRADIATION: Chronic | ICD-10-CM

## 2024-06-15 DIAGNOSIS — E11.9 TYPE 2 DIABETES MELLITUS WITHOUT COMPLICATIONS: ICD-10-CM

## 2024-06-15 DIAGNOSIS — Z92.21 PERSONAL HISTORY OF ANTINEOPLASTIC CHEMOTHERAPY: Chronic | ICD-10-CM
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
## 2024-06-15 DIAGNOSIS — R29.898 OTHER SYMPTOMS AND SIGNS INVOLVING THE MUSCULOSKELETAL SYSTEM: ICD-10-CM

## 2024-06-15 DIAGNOSIS — K22.2 ESOPHAGEAL OBSTRUCTION: Chronic | ICD-10-CM

## 2024-06-15 DIAGNOSIS — I10 ESSENTIAL (PRIMARY) HYPERTENSION: ICD-10-CM

## 2024-06-15 DIAGNOSIS — I95.9 HYPOTENSION, UNSPECIFIED: ICD-10-CM

## 2024-06-15 LAB
ALBUMIN SERPL ELPH-MCNC: 2.7 G/DL — LOW (ref 3.3–5)
ALP SERPL-CCNC: 188 U/L — HIGH (ref 40–120)
ALT FLD-CCNC: 58 U/L — SIGNIFICANT CHANGE UP (ref 12–78)
ANION GAP SERPL CALC-SCNC: 2 MMOL/L — LOW (ref 5–17)
APTT BLD: 30.2 SEC — SIGNIFICANT CHANGE UP (ref 24.5–35.6)
AST SERPL-CCNC: 52 U/L — HIGH (ref 15–37)
BASOPHILS # BLD AUTO: 0.04 K/UL — SIGNIFICANT CHANGE UP (ref 0–0.2)
BASOPHILS NFR BLD AUTO: 0.6 % — SIGNIFICANT CHANGE UP (ref 0–2)
BILIRUB SERPL-MCNC: 0.6 MG/DL — SIGNIFICANT CHANGE UP (ref 0.2–1.2)
BLD GP AB SCN SERPL QL: SIGNIFICANT CHANGE UP
BUN SERPL-MCNC: 8 MG/DL — SIGNIFICANT CHANGE UP (ref 7–23)
CALCIUM SERPL-MCNC: 8.8 MG/DL — SIGNIFICANT CHANGE UP (ref 8.5–10.1)
CHLORIDE SERPL-SCNC: 108 MMOL/L — SIGNIFICANT CHANGE UP (ref 96–108)
CO2 SERPL-SCNC: 36 MMOL/L — HIGH (ref 22–31)
CREAT SERPL-MCNC: 0.57 MG/DL — SIGNIFICANT CHANGE UP (ref 0.5–1.3)
EGFR: 109 ML/MIN/1.73M2 — SIGNIFICANT CHANGE UP
EOSINOPHIL # BLD AUTO: 0.03 K/UL — SIGNIFICANT CHANGE UP (ref 0–0.5)
EOSINOPHIL NFR BLD AUTO: 0.4 % — SIGNIFICANT CHANGE UP (ref 0–6)
GLUCOSE SERPL-MCNC: 95 MG/DL — SIGNIFICANT CHANGE UP (ref 70–99)
HCT VFR BLD CALC: 33 % — LOW (ref 39–50)
HGB BLD-MCNC: 11.1 G/DL — LOW (ref 13–17)
IMM GRANULOCYTES NFR BLD AUTO: 0.7 % — SIGNIFICANT CHANGE UP (ref 0–0.9)
INR BLD: 0.95 RATIO — SIGNIFICANT CHANGE UP (ref 0.85–1.18)
LYMPHOCYTES # BLD AUTO: 0.64 K/UL — LOW (ref 1–3.3)
LYMPHOCYTES # BLD AUTO: 9.2 % — LOW (ref 13–44)
MCHC RBC-ENTMCNC: 33.4 PG — SIGNIFICANT CHANGE UP (ref 27–34)
MCHC RBC-ENTMCNC: 33.6 GM/DL — SIGNIFICANT CHANGE UP (ref 32–36)
MCV RBC AUTO: 99.4 FL — SIGNIFICANT CHANGE UP (ref 80–100)
MONOCYTES # BLD AUTO: 0.34 K/UL — SIGNIFICANT CHANGE UP (ref 0–0.9)
MONOCYTES NFR BLD AUTO: 4.9 % — SIGNIFICANT CHANGE UP (ref 2–14)
NEUTROPHILS # BLD AUTO: 5.86 K/UL — SIGNIFICANT CHANGE UP (ref 1.8–7.4)
NEUTROPHILS NFR BLD AUTO: 84.2 % — HIGH (ref 43–77)
PLATELET # BLD AUTO: 220 K/UL — SIGNIFICANT CHANGE UP (ref 150–400)
POTASSIUM SERPL-MCNC: 3.6 MMOL/L — SIGNIFICANT CHANGE UP (ref 3.5–5.3)
POTASSIUM SERPL-SCNC: 3.6 MMOL/L — SIGNIFICANT CHANGE UP (ref 3.5–5.3)
PROT SERPL-MCNC: 6.3 GM/DL — SIGNIFICANT CHANGE UP (ref 6–8.3)
PROTHROM AB SERPL-ACNC: 10.7 SEC — SIGNIFICANT CHANGE UP (ref 9.5–13)
RBC # BLD: 3.32 M/UL — LOW (ref 4.2–5.8)
RBC # FLD: 16.8 % — HIGH (ref 10.3–14.5)
SODIUM SERPL-SCNC: 146 MMOL/L — HIGH (ref 135–145)
WBC # BLD: 6.96 K/UL — SIGNIFICANT CHANGE UP (ref 3.8–10.5)
WBC # FLD AUTO: 6.96 K/UL — SIGNIFICANT CHANGE UP (ref 3.8–10.5)

## 2024-06-15 PROCEDURE — 85610 PROTHROMBIN TIME: CPT

## 2024-06-15 PROCEDURE — 99284 EMERGENCY DEPT VISIT MOD MDM: CPT

## 2024-06-15 PROCEDURE — 70450 CT HEAD/BRAIN W/O DYE: CPT | Mod: MC

## 2024-06-15 PROCEDURE — 36000 PLACE NEEDLE IN VEIN: CPT

## 2024-06-15 PROCEDURE — 86850 RBC ANTIBODY SCREEN: CPT

## 2024-06-15 PROCEDURE — 86901 BLOOD TYPING SEROLOGIC RH(D): CPT

## 2024-06-15 PROCEDURE — 86900 BLOOD TYPING SEROLOGIC ABO: CPT

## 2024-06-15 PROCEDURE — 85025 COMPLETE CBC W/AUTO DIFF WBC: CPT

## 2024-06-15 PROCEDURE — 85730 THROMBOPLASTIN TIME PARTIAL: CPT

## 2024-06-15 PROCEDURE — 99284 EMERGENCY DEPT VISIT MOD MDM: CPT | Mod: 25

## 2024-06-15 PROCEDURE — 70450 CT HEAD/BRAIN W/O DYE: CPT | Mod: 26,MC

## 2024-06-15 PROCEDURE — 36415 COLL VENOUS BLD VENIPUNCTURE: CPT

## 2024-06-15 PROCEDURE — 80053 COMPREHEN METABOLIC PANEL: CPT

## 2024-06-15 NOTE — ED PROVIDER NOTE - PATIENT PORTAL LINK FT
You can access the FollowMyHealth Patient Portal offered by [facility] by registering at the following website: [email or web address]. By joining [employer]’s FollowMyHealth portal, you will also be able to view your health information using other applications (apps) compatible with our system.

## 2024-06-15 NOTE — ED ADULT NURSE NOTE - CHIEF COMPLAINT QUOTE
pt presents to the ED for fall with head strike. (-)anticoagulant use (-)LOC. pt reports minor headache. denies any other pains. pt A&Ox4, well appearing, and ambulatory at baseline with no further complaints or discomforts reported at this time.

## 2024-06-15 NOTE — ED PROVIDER NOTE - PHYSICAL EXAMINATION
PHYSICAL EXAM:    GENERAL: NAD, lying in bed comfortably  HEAD:  right sided scalp welling with no change in color.  EYES: EOMI, PERRLA, conjunctiva and sclera clear  ENT: Moist mucous membranes  NECK: Supple, no neck tenderness  CHEST/LUNG: Clear to auscultation bilaterally, good air entry bilaterally; No wheezing, rales, or rhonchi. Unlabored respirations  HEART: Regular rate and rhythm. S1 and S2. No murmurs, rubs, or gallops  ABDOMEN: Soft, Nontender, Nondistended. Bowel sounds present x4 quadrants; No hepatomegaly. No splenomegaly.  EXTREMITIES:  2+ Peripheral Pulses. Capillary refill <2 seconds. No clubbing, cyanosis, or edema  NERVOUS SYSTEM:  Alert & Oriented X3, speech clear. Left arm weaker than right arm  MSK: FROM all 4 extremities, full and equal strength, no spinal tenderness of step offs. Hips stable

## 2024-06-15 NOTE — ED PROVIDER NOTE - OBJECTIVE STATEMENT
The patient is a 64y Male with PMH of HTN (now hypotension on midodrine), DM-2, esophageal cancer with mets to liver s/p RT, constipation, hypotension, generalized cachexia, Herpetic neuralgia, , recent admission for anemia presenting s/p unwitnessed fall at rehab.   Per patient, he was getting up and his legs felt weak and gave out, leading him to fall onto his right side and hit his head. Denies LOC. Denies preceding chest pain, palpitations, dizziness. Denies tripping. Denies current headache, chest pain, palpitations, SOB, dizziness, vision changes, focal weakness.   He is not on AC.

## 2024-06-15 NOTE — ED PROVIDER NOTE - PROGRESS NOTE DETAILS
Patient evaluated by resident Ellyn Chen s/p unwitnessed fall at nursing home. Patients condition stable. Will get CTH. Given recent admission for anemia, will get CBC All labs and imaging reviewed and are nonactionable.  CT without any acute findings.  Patient currently at functional baseline.   to arrange transport back to [facility].  Patient stable for discharge.

## 2024-06-15 NOTE — ED ADULT NURSE NOTE - NSFALLRISKINTERV_ED_ALL_ED

## 2024-06-15 NOTE — ED ADULT TRIAGE NOTE - CHIEF COMPLAINT QUOTE
45.3
pt presents to the ED for fall with head strike. (-)anticoagulant use (-)LOC. pt reports minor headache. denies any other pains. pt A&Ox4, well appearing, and ambulatory at baseline with no further complaints or discomforts reported at this time.

## 2024-06-15 NOTE — ED ADULT NURSE NOTE - OBJECTIVE STATEMENT
[facility] for complaints of fall landing on right shoulder. Denies blood thinner use, head-strike, or LOC. Patient alert and oriented stating he is in rehab to strengthen his legs. Patient states he uses a walker and wheelchair at rehab.

## 2024-06-15 NOTE — ED PROVIDER NOTE - CLINICAL SUMMARY MEDICAL DECISION MAKING FREE TEXT BOX
The patient is a 64y Male with PMH of HTN (now hypotension on midodrine), DM-2, esophageal cancer with mets to liver s/p RT, constipation, hypotension, generalized cachexia, Herpetic neuralgia, , recent admission for anemia presenting s/p unwitnessed fall at rehab.

## 2024-06-15 NOTE — ED ADULT NURSE NOTE - COVID-19 RESULT DATE/TIME
Pt brought to ER by EMS for elevated BP with HA to left temporal lobe today. Pt was sent by PCP office for BP systolic in the 784'E. Pt with no neurological symptoms at this time.
06-Jun-2024 20:59

## 2024-06-15 NOTE — ED PROVIDER NOTE - ATTENDING CONTRIBUTION TO CARE
I, Alberto Méndez DO, personally made/approved the management plan and take responsibility for the patient management. I performed the initial face to face bedside interview with this patient regarding history of present illness, review of symptoms and relevant past medical, social and family history.  I completed an independent physical examination.  I was the initial provider who evaluated this patient. I have signed out the follow up of any pending tests (i.e. labs, radiological studies) to the resident.  I have communicated the patient’s plan of care and disposition with the resident.    64-year-old male presenting for injuries from a fall, states he has chronic lower extremity weakness and attempted to walk without a walker causing him to fall forward.  Currently denies any complaints at this time, does not think he hit his head.  Was recently admitted for severe anemia.    GENERAL: A&Ox4, non-toxic appearing, no acute distress  HEENT: EOMI, oral mucosa moist, normal conjunctiva, small area of ecchymosis posterior to L ear  RESP: CTAB, no respiratory distress, no wheezes/rhonchi/rales, speaking in full sentences  CV: RRR, no murmurs/rubs/gallops  ABDOMEN: soft, non-tender, non-distended, no guarding, no rebound tenderness  MSK: no visible deformities, FROM all extremities, 5/5 strength in all extremities  NEURO: no focal sensory or motor deficits, CN 2-12 grossly intact  SKIN: warm, normal color, well perfused, no rash  PSYCH: normal affect    64-year-old male presenting for injuries from a fall.  Has small spot of ecchymosis posterior to left ear which is lower in the cervical region, not consistent with Joyce sign.  Otherwise no acute findings of trauma. Will obtain CT head to rule out intracranial bleed, labs to rule out worsening anemia, anticipate discharge if workup negative.

## 2024-06-15 NOTE — ED PROVIDER NOTE - NSFOLLOWUPINSTRUCTIONS_ED_ALL_ED_FT
You were evaluated for injuries from a fall.  Your blood work and CAT scan did not demonstrate any acute findings.    Take Tylenol 650-1000 mg every 4-6 hours as needed for pain. Do not exceed 4,000 mg in a 24 hour period.     Rest, drink plenty of fluids.  Advance activity as tolerated.  Continue all previously prescribed medications as directed.  Follow up with your primary care physician in 48-72 hours- bring copies of your results.  Return to the ER for worsening or persistent symptoms, and/or ANY NEW OR CONCERNING SYMPTOMS. If you have issues obtaining follow up, please call: [phone] to obtain a doctor or specialist who takes your insurance in your area.

## 2024-06-19 DIAGNOSIS — C15.9 MALIGNANT NEOPLASM OF ESOPHAGUS, UNSPECIFIED: ICD-10-CM

## 2024-06-19 DIAGNOSIS — C16.0 MALIGNANT NEOPLASM OF CARDIA: ICD-10-CM

## 2024-06-19 DIAGNOSIS — E87.6 HYPOKALEMIA: ICD-10-CM

## 2024-06-19 DIAGNOSIS — I95.9 HYPOTENSION, UNSPECIFIED: ICD-10-CM

## 2024-06-19 DIAGNOSIS — E11.9 TYPE 2 DIABETES MELLITUS WITHOUT COMPLICATIONS: ICD-10-CM

## 2024-06-19 DIAGNOSIS — Z66 DO NOT RESUSCITATE: ICD-10-CM

## 2024-06-19 DIAGNOSIS — B02.29 OTHER POSTHERPETIC NERVOUS SYSTEM INVOLVEMENT: ICD-10-CM

## 2024-06-19 DIAGNOSIS — D63.0 ANEMIA IN NEOPLASTIC DISEASE: ICD-10-CM

## 2024-06-19 DIAGNOSIS — I10 ESSENTIAL (PRIMARY) HYPERTENSION: ICD-10-CM

## 2024-06-19 DIAGNOSIS — C78.7 SECONDARY MALIGNANT NEOPLASM OF LIVER AND INTRAHEPATIC BILE DUCT: ICD-10-CM

## 2024-06-19 DIAGNOSIS — Z90.49 ACQUIRED ABSENCE OF OTHER SPECIFIED PARTS OF DIGESTIVE TRACT: ICD-10-CM

## 2024-06-19 DIAGNOSIS — E43 UNSPECIFIED SEVERE PROTEIN-CALORIE MALNUTRITION: ICD-10-CM

## 2024-06-19 DIAGNOSIS — E77.8 OTHER DISORDERS OF GLYCOPROTEIN METABOLISM: ICD-10-CM

## 2024-06-19 DIAGNOSIS — D53.9 NUTRITIONAL ANEMIA, UNSPECIFIED: ICD-10-CM

## 2024-06-19 DIAGNOSIS — C78.89 SECONDARY MALIGNANT NEOPLASM OF OTHER DIGESTIVE ORGANS: ICD-10-CM

## 2024-06-19 DIAGNOSIS — D84.9 IMMUNODEFICIENCY, UNSPECIFIED: ICD-10-CM

## 2024-06-19 DIAGNOSIS — K59.00 CONSTIPATION, UNSPECIFIED: ICD-10-CM

## 2024-06-19 DIAGNOSIS — E88.09 OTHER DISORDERS OF PLASMA-PROTEIN METABOLISM, NOT ELSEWHERE CLASSIFIED: ICD-10-CM

## 2024-06-19 DIAGNOSIS — U07.1 COVID-19: ICD-10-CM

## 2024-07-16 ENCOUNTER — NON-APPOINTMENT (OUTPATIENT)
Age: 64
End: 2024-07-16

## 2024-07-18 ENCOUNTER — APPOINTMENT (OUTPATIENT)
Dept: FAMILY MEDICINE | Facility: CLINIC | Age: 64
End: 2024-07-18
Payer: MEDICARE

## 2024-07-18 ENCOUNTER — LABORATORY RESULT (OUTPATIENT)
Age: 64
End: 2024-07-18

## 2024-07-18 VITALS
WEIGHT: 99 LBS | TEMPERATURE: 98.3 F | DIASTOLIC BLOOD PRESSURE: 60 MMHG | BODY MASS INDEX: 15.54 KG/M2 | HEIGHT: 67 IN | OXYGEN SATURATION: 98 % | HEART RATE: 97 BPM | SYSTOLIC BLOOD PRESSURE: 110 MMHG

## 2024-07-18 DIAGNOSIS — C16.0 MALIGNANT NEOPLASM OF CARDIA: ICD-10-CM

## 2024-07-18 DIAGNOSIS — C15.9 MALIGNANT NEOPLASM OF ESOPHAGUS, UNSPECIFIED: ICD-10-CM

## 2024-07-18 DIAGNOSIS — J34.0 ABSCESS, FURUNCLE AND CARBUNCLE OF NOSE: ICD-10-CM

## 2024-07-18 DIAGNOSIS — I95.9 HYPOTENSION, UNSPECIFIED: ICD-10-CM

## 2024-07-18 DIAGNOSIS — I10 ESSENTIAL (PRIMARY) HYPERTENSION: ICD-10-CM

## 2024-07-18 DIAGNOSIS — E11.9 TYPE 2 DIABETES MELLITUS W/OUT COMPLICATIONS: ICD-10-CM

## 2024-07-18 DIAGNOSIS — H60.501 UNSPECIFIED ACUTE NONINFECTIVE OTITIS EXTERNA, RIGHT EAR: ICD-10-CM

## 2024-07-18 DIAGNOSIS — Z09 ENCOUNTER FOR FOLLOW-UP EXAMINATION AFTER COMPLETED TREATMENT FOR CONDITIONS OTHER THAN MALIGNANT NEOPLASM: ICD-10-CM

## 2024-07-18 DIAGNOSIS — I48.0 PAROXYSMAL ATRIAL FIBRILLATION: ICD-10-CM

## 2024-07-18 DIAGNOSIS — Z86.19 PERSONAL HISTORY OF OTHER INFECTIOUS AND PARASITIC DISEASES: ICD-10-CM

## 2024-07-18 PROCEDURE — 99496 TRANSJ CARE MGMT HIGH F2F 7D: CPT

## 2024-07-18 PROCEDURE — 36415 COLL VENOUS BLD VENIPUNCTURE: CPT

## 2024-07-18 PROCEDURE — G2211 COMPLEX E/M VISIT ADD ON: CPT | Mod: NC

## 2024-07-18 RX ORDER — FOLIC ACID 1 MG/1
1 TABLET ORAL DAILY
Qty: 90 | Refills: 0 | Status: ACTIVE | COMMUNITY
Start: 2024-07-18 | End: 1900-01-01

## 2024-07-18 RX ORDER — MIDODRINE HYDROCHLORIDE 5 MG/1
5 TABLET ORAL 3 TIMES DAILY
Qty: 90 | Refills: 0 | Status: ACTIVE | COMMUNITY

## 2024-07-18 RX ORDER — POTASSIUM CHLORIDE 1500 MG/1
20 TABLET, EXTENDED RELEASE ORAL
Qty: 14 | Refills: 1 | Status: ACTIVE | COMMUNITY
Start: 2024-07-18 | End: 1900-01-01

## 2024-07-22 ENCOUNTER — TRANSCRIPTION ENCOUNTER (OUTPATIENT)
Age: 64
End: 2024-07-22

## 2024-07-22 LAB
25(OH)D3 SERPL-MCNC: 14.2 NG/ML
ALBUMIN SERPL ELPH-MCNC: 3.8 G/DL
ALP BLD-CCNC: 156 U/L
ALT SERPL-CCNC: 28 U/L
ANION GAP SERPL CALC-SCNC: 13 MMOL/L
AST SERPL-CCNC: 31 U/L
BASOPHILS # BLD AUTO: 0.04 K/UL
BASOPHILS NFR BLD AUTO: 0.9 %
BILIRUB SERPL-MCNC: 0.4 MG/DL
BUN SERPL-MCNC: 12 MG/DL
CALCIUM SERPL-MCNC: 8.9 MG/DL
CHLORIDE SERPL-SCNC: 105 MMOL/L
CO2 SERPL-SCNC: 28 MMOL/L
CREAT SERPL-MCNC: 0.64 MG/DL
EGFR: 105 ML/MIN/1.73M2
EOSINOPHIL # BLD AUTO: 0.04 K/UL
EOSINOPHIL NFR BLD AUTO: 0.9 %
FOLATE SERPL-MCNC: >20 NG/ML
GLUCOSE SERPL-MCNC: 95 MG/DL
HCT VFR BLD CALC: 35.1 %
HGB BLD-MCNC: 10.8 G/DL
IMM GRANULOCYTES NFR BLD AUTO: 0.2 %
LYMPHOCYTES # BLD AUTO: 0.75 K/UL
LYMPHOCYTES NFR BLD AUTO: 16.6 %
MAN DIFF?: NORMAL
MCHC RBC-ENTMCNC: 30.8 GM/DL
MCHC RBC-ENTMCNC: 32.5 PG
MCV RBC AUTO: 105.7 FL
MONOCYTES # BLD AUTO: 0.45 K/UL
MONOCYTES NFR BLD AUTO: 10 %
NEUTROPHILS # BLD AUTO: 3.22 K/UL
NEUTROPHILS NFR BLD AUTO: 71.4 %
PLATELET # BLD AUTO: 190 K/UL
POTASSIUM SERPL-SCNC: 3.8 MMOL/L
PROT SERPL-MCNC: 6.4 G/DL
RBC # BLD: 3.32 M/UL
RBC # FLD: 14.8 %
SODIUM SERPL-SCNC: 145 MMOL/L
VIT B12 SERPL-MCNC: 555 PG/ML
WBC # FLD AUTO: 4.51 K/UL

## 2024-07-23 RX ORDER — SCOPOLAMINE 1.5 MG/1
1 PATCH, EXTENDED RELEASE TRANSDERMAL
Qty: 10 | Refills: 2 | Status: ACTIVE | COMMUNITY
Start: 2024-07-16

## 2024-07-25 NOTE — ED PROVIDER NOTE - CCCP TRG CHIEF CMPLNT
Contacted patient to schedule for his elbow pain.   He no longer needs this appointment.   He will call the office if he needs to schedule    fall

## 2024-07-30 RX ORDER — ERGOCALCIFEROL 1.25 MG/1
1.25 MG CAPSULE, LIQUID FILLED ORAL
Qty: 12 | Refills: 1 | Status: ACTIVE | COMMUNITY
Start: 2024-07-30 | End: 1900-01-01

## 2024-09-16 ENCOUNTER — INPATIENT (INPATIENT)
Facility: HOSPITAL | Age: 64
LOS: 3 days | Discharge: NURSING, REHAB OR LONG-TERM CARE | DRG: 392 | End: 2024-09-20
Attending: FAMILY MEDICINE | Admitting: STUDENT IN AN ORGANIZED HEALTH CARE EDUCATION/TRAINING PROGRAM
Payer: COMMERCIAL

## 2024-09-16 VITALS
SYSTOLIC BLOOD PRESSURE: 131 MMHG | RESPIRATION RATE: 17 BRPM | TEMPERATURE: 97 F | HEART RATE: 106 BPM | DIASTOLIC BLOOD PRESSURE: 100 MMHG | OXYGEN SATURATION: 95 %

## 2024-09-16 DIAGNOSIS — Z98.890 OTHER SPECIFIED POSTPROCEDURAL STATES: Chronic | ICD-10-CM

## 2024-09-16 DIAGNOSIS — Z92.21 PERSONAL HISTORY OF ANTINEOPLASTIC CHEMOTHERAPY: Chronic | ICD-10-CM

## 2024-09-16 DIAGNOSIS — Z92.3 PERSONAL HISTORY OF IRRADIATION: Chronic | ICD-10-CM

## 2024-09-16 DIAGNOSIS — K22.2 ESOPHAGEAL OBSTRUCTION: Chronic | ICD-10-CM

## 2024-09-16 DIAGNOSIS — Z90.49 ACQUIRED ABSENCE OF OTHER SPECIFIED PARTS OF DIGESTIVE TRACT: Chronic | ICD-10-CM

## 2024-09-16 DIAGNOSIS — R13.10 DYSPHAGIA, UNSPECIFIED: ICD-10-CM

## 2024-09-16 LAB
ALBUMIN SERPL ELPH-MCNC: 3.1 G/DL — LOW (ref 3.3–5)
ALP SERPL-CCNC: 126 U/L — HIGH (ref 40–120)
ALT FLD-CCNC: 12 U/L — SIGNIFICANT CHANGE UP (ref 12–78)
ANION GAP SERPL CALC-SCNC: 6 MMOL/L — SIGNIFICANT CHANGE UP (ref 5–17)
APPEARANCE UR: CLEAR — SIGNIFICANT CHANGE UP
AST SERPL-CCNC: 15 U/L — SIGNIFICANT CHANGE UP (ref 15–37)
BACTERIA # UR AUTO: ABNORMAL /HPF
BASOPHILS # BLD AUTO: 0.03 K/UL — SIGNIFICANT CHANGE UP (ref 0–0.2)
BASOPHILS NFR BLD AUTO: 0.3 % — SIGNIFICANT CHANGE UP (ref 0–2)
BILIRUB SERPL-MCNC: 1.2 MG/DL — SIGNIFICANT CHANGE UP (ref 0.2–1.2)
BILIRUB UR-MCNC: NEGATIVE — SIGNIFICANT CHANGE UP
BUN SERPL-MCNC: 17 MG/DL — SIGNIFICANT CHANGE UP (ref 7–23)
CALCIUM SERPL-MCNC: 9.8 MG/DL — SIGNIFICANT CHANGE UP (ref 8.5–10.1)
CAST: 0 /LPF — SIGNIFICANT CHANGE UP (ref 0–4)
CHLORIDE SERPL-SCNC: 98 MMOL/L — SIGNIFICANT CHANGE UP (ref 96–108)
CO2 SERPL-SCNC: 36 MMOL/L — HIGH (ref 22–31)
COLOR SPEC: YELLOW — SIGNIFICANT CHANGE UP
CREAT SERPL-MCNC: 0.72 MG/DL — SIGNIFICANT CHANGE UP (ref 0.5–1.3)
DIFF PNL FLD: ABNORMAL
EGFR: 101 ML/MIN/1.73M2 — SIGNIFICANT CHANGE UP
EOSINOPHIL # BLD AUTO: 0.01 K/UL — SIGNIFICANT CHANGE UP (ref 0–0.5)
EOSINOPHIL NFR BLD AUTO: 0.1 % — SIGNIFICANT CHANGE UP (ref 0–6)
GLUCOSE SERPL-MCNC: 109 MG/DL — HIGH (ref 70–99)
GLUCOSE UR QL: NEGATIVE MG/DL — SIGNIFICANT CHANGE UP
HCT VFR BLD CALC: 40.7 % — SIGNIFICANT CHANGE UP (ref 39–50)
HGB BLD-MCNC: 13.7 G/DL — SIGNIFICANT CHANGE UP (ref 13–17)
IMM GRANULOCYTES NFR BLD AUTO: 0.4 % — SIGNIFICANT CHANGE UP (ref 0–0.9)
KETONES UR-MCNC: 40 MG/DL
LEUKOCYTE ESTERASE UR-ACNC: ABNORMAL
LYMPHOCYTES # BLD AUTO: 0.43 K/UL — LOW (ref 1–3.3)
LYMPHOCYTES # BLD AUTO: 4.3 % — LOW (ref 13–44)
MCHC RBC-ENTMCNC: 31.4 PG — SIGNIFICANT CHANGE UP (ref 27–34)
MCHC RBC-ENTMCNC: 33.7 GM/DL — SIGNIFICANT CHANGE UP (ref 32–36)
MCV RBC AUTO: 93.1 FL — SIGNIFICANT CHANGE UP (ref 80–100)
MONOCYTES # BLD AUTO: 0.44 K/UL — SIGNIFICANT CHANGE UP (ref 0–0.9)
MONOCYTES NFR BLD AUTO: 4.4 % — SIGNIFICANT CHANGE UP (ref 2–14)
NEUTROPHILS # BLD AUTO: 9.14 K/UL — HIGH (ref 1.8–7.4)
NEUTROPHILS NFR BLD AUTO: 90.5 % — HIGH (ref 43–77)
NITRITE UR-MCNC: NEGATIVE — SIGNIFICANT CHANGE UP
PH UR: 5.5 — SIGNIFICANT CHANGE UP (ref 5–8)
PLATELET # BLD AUTO: 142 K/UL — LOW (ref 150–400)
POTASSIUM SERPL-MCNC: 3.2 MMOL/L — LOW (ref 3.5–5.3)
POTASSIUM SERPL-SCNC: 3.2 MMOL/L — LOW (ref 3.5–5.3)
PROT SERPL-MCNC: 7.8 GM/DL — SIGNIFICANT CHANGE UP (ref 6–8.3)
PROT UR-MCNC: 30 MG/DL
RBC # BLD: 4.37 M/UL — SIGNIFICANT CHANGE UP (ref 4.2–5.8)
RBC # FLD: 13.4 % — SIGNIFICANT CHANGE UP (ref 10.3–14.5)
RBC CASTS # UR COMP ASSIST: 15 /HPF — HIGH (ref 0–4)
SODIUM SERPL-SCNC: 140 MMOL/L — SIGNIFICANT CHANGE UP (ref 135–145)
SP GR SPEC: >1.03 — HIGH (ref 1–1.03)
SQUAMOUS # UR AUTO: 3 /HPF — SIGNIFICANT CHANGE UP (ref 0–5)
UROBILINOGEN FLD QL: 1 MG/DL — SIGNIFICANT CHANGE UP (ref 0.2–1)
WBC # BLD: 10.09 K/UL — SIGNIFICANT CHANGE UP (ref 3.8–10.5)
WBC # FLD AUTO: 10.09 K/UL — SIGNIFICANT CHANGE UP (ref 3.8–10.5)
WBC UR QL: 22 /HPF — HIGH (ref 0–5)

## 2024-09-16 PROCEDURE — 80048 BASIC METABOLIC PNL TOTAL CA: CPT

## 2024-09-16 PROCEDURE — 71260 CT THORAX DX C+: CPT | Mod: 26,MC

## 2024-09-16 PROCEDURE — 99285 EMERGENCY DEPT VISIT HI MDM: CPT

## 2024-09-16 PROCEDURE — 36415 COLL VENOUS BLD VENIPUNCTURE: CPT

## 2024-09-16 PROCEDURE — 93005 ELECTROCARDIOGRAM TRACING: CPT

## 2024-09-16 PROCEDURE — C1769: CPT

## 2024-09-16 PROCEDURE — 97162 PT EVAL MOD COMPLEX 30 MIN: CPT | Mod: GP

## 2024-09-16 PROCEDURE — 88305 TISSUE EXAM BY PATHOLOGIST: CPT

## 2024-09-16 PROCEDURE — 81001 URINALYSIS AUTO W/SCOPE: CPT

## 2024-09-16 PROCEDURE — 87077 CULTURE AEROBIC IDENTIFY: CPT

## 2024-09-16 PROCEDURE — 87086 URINE CULTURE/COLONY COUNT: CPT

## 2024-09-16 PROCEDURE — 97116 GAIT TRAINING THERAPY: CPT | Mod: GP

## 2024-09-16 PROCEDURE — 93010 ELECTROCARDIOGRAM REPORT: CPT

## 2024-09-16 PROCEDURE — 80053 COMPREHEN METABOLIC PANEL: CPT

## 2024-09-16 PROCEDURE — C1874: CPT

## 2024-09-16 PROCEDURE — 99223 1ST HOSP IP/OBS HIGH 75: CPT

## 2024-09-16 PROCEDURE — 82962 GLUCOSE BLOOD TEST: CPT

## 2024-09-16 RX ORDER — SODIUM CHLORIDE 9 MG/ML
1000 INJECTION INTRAMUSCULAR; INTRAVENOUS; SUBCUTANEOUS
Refills: 0 | Status: DISCONTINUED | OUTPATIENT
Start: 2024-09-16 | End: 2024-09-17

## 2024-09-16 RX ORDER — DEXTROSE, SODIUM ACETATE, POTASSIUM CHLORIDE, POTASSIUM PHOSPHATE, AND SODIUM CHLORIDE 5; .15; .13; .28; .091 G/100ML; G/100ML; G/100ML; G/100ML; G/100ML
1000 INJECTION, SOLUTION INTRAVENOUS
Refills: 0 | Status: DISCONTINUED | OUTPATIENT
Start: 2024-09-16 | End: 2024-09-17

## 2024-09-16 RX ORDER — SODIUM CHLORIDE 9 MG/ML
1000 INJECTION INTRAMUSCULAR; INTRAVENOUS; SUBCUTANEOUS
Refills: 0 | Status: DISCONTINUED | OUTPATIENT
Start: 2024-09-16 | End: 2024-09-16

## 2024-09-16 RX ORDER — SODIUM CHLORIDE 9 MG/ML
1000 INJECTION INTRAMUSCULAR; INTRAVENOUS; SUBCUTANEOUS ONCE
Refills: 0 | Status: COMPLETED | OUTPATIENT
Start: 2024-09-16 | End: 2024-09-16

## 2024-09-16 RX ORDER — ONDANSETRON 2 MG/ML
4 INJECTION, SOLUTION INTRAMUSCULAR; INTRAVENOUS EVERY 8 HOURS
Refills: 0 | Status: DISCONTINUED | OUTPATIENT
Start: 2024-09-16 | End: 2024-09-20

## 2024-09-16 RX ORDER — ENOXAPARIN SODIUM 100 MG/ML
40 INJECTION SUBCUTANEOUS EVERY 24 HOURS
Refills: 0 | Status: DISCONTINUED | OUTPATIENT
Start: 2024-09-16 | End: 2024-09-20

## 2024-09-16 RX ORDER — ACETAMINOPHEN 325 MG/1
650 TABLET ORAL EVERY 6 HOURS
Refills: 0 | Status: DISCONTINUED | OUTPATIENT
Start: 2024-09-16 | End: 2024-09-20

## 2024-09-16 RX ADMIN — DEXTROSE, SODIUM ACETATE, POTASSIUM CHLORIDE, POTASSIUM PHOSPHATE, AND SODIUM CHLORIDE 125 MILLILITER(S): 5; .15; .13; .28; .091 INJECTION, SOLUTION INTRAVENOUS at 23:23

## 2024-09-16 RX ADMIN — SODIUM CHLORIDE 200 MILLILITER(S): 9 INJECTION INTRAMUSCULAR; INTRAVENOUS; SUBCUTANEOUS at 18:24

## 2024-09-16 RX ADMIN — SODIUM CHLORIDE 125 MILLILITER(S): 9 INJECTION INTRAMUSCULAR; INTRAVENOUS; SUBCUTANEOUS at 18:07

## 2024-09-16 RX ADMIN — SODIUM CHLORIDE 1000 MILLILITER(S): 9 INJECTION INTRAMUSCULAR; INTRAVENOUS; SUBCUTANEOUS at 13:22

## 2024-09-16 NOTE — CONSULT NOTE ADULT - SUBJECTIVE AND OBJECTIVE BOX
Patient is a 64y old  Male who presents with a chief complaint of inability to swallow food, liquid, saliva    HPI:  This is a 64 year old male with significant past medical history of HTN, DM2, esophageal/gastric adenocarcinoma s/p Andrew Bahman Esophagogastrectomy 8/15/23 presenting with several week history of inability to tolerate PO intake. Patient seen at bedside in ED accompanied by spouse, Fariha. Per patient, has been over the past week having excessive regurgitation of large amount of mucus as well as fluids/soft foods now inclusive of his own saliva with cup to spit in at bedside during evaluation. Denies any pain with swallowing or waking up with food/liquid on pillow. Denies epigastric, abdominal, or chest discomfort. Endorses constipation with one bowel movement yesterday in past 5 days. Also with significant weight loss since 2023 of about 100lbs with worsening weight loss over past couple months, patient unable to quantify. Patient with Scopalamine patch to dry secretions although per patient, "not working" with excessive secretions, and unable to swallow. Followed by heme/onc Dr. Chambers with recent liver biopsy +adenocarcinoma, lesions on lungs, and per patient, appt for PET scan next week 9/24. Denies fever, chills, or SOB. Admits to overall generalized fatigue worsened by recent shingles infections x 2.     PAST MEDICAL & SURGICAL HISTORY:  Esophageal mass      HTN (hypertension)      Esophageal cancer      Type 2 diabetes mellitus      Malignant neoplasm of cardia      History of hypotension      History of tachycardia      Chemotherapy-induced neuropathy      S/P cholecystectomy      H/O lithotripsy      H/O endoscopy      History of chemotherapy      S/P radiation therapy      Esophageal stenosis      History of esophagogastroduodenoscopy (EGD)          MEDICATIONS  (STANDING):  sodium chloride 0.9%. 1000 milliLiter(s) (200 mL/Hr) IV Continuous <Continuous>    MEDICATIONS  (PRN):      Allergies    No Known Allergies    Intolerances        SOCIAL HISTORY:    FAMILY HISTORY:  FH: prostate cancer (Father)        REVIEW OF SYSTEMS:    CONSTITUTIONAL: No weakness, fevers or chills  EYES/ENT: No visual changes;  No vertigo or throat pain   NECK: No pain or stiffness  RESPIRATORY: No cough, wheezing, hemoptysis; No shortness of breath  CARDIOVASCULAR: No chest pain or palpitations  GASTROINTESTINAL: See HPI  GENITOURINARY: No dysuria, frequency or hematuria  NEUROLOGICAL: No numbness or weakness  SKIN: No itching, burning, rashes, or lesions   PSYCH: Normal mood and affect  All other review of systems is negative unless indicated above.    Vital Signs Last 24 Hrs  T(C): 36.3 (16 Sep 2024 12:20), Max: 36.3 (16 Sep 2024 12:20)  T(F): 97.3 (16 Sep 2024 12:20), Max: 97.3 (16 Sep 2024 12:20)  HR: 106 (16 Sep 2024 12:20) (106 - 106)  BP: 131/100 (16 Sep 2024 12:20) (131/100 - 131/100)  BP(mean): --  RR: 17 (16 Sep 2024 12:20) (17 - 17)  SpO2: 95% (16 Sep 2024 12:20) (95% - 95%)    Parameters below as of 16 Sep 2024 12:20  Patient On (Oxygen Delivery Method): room air    PHYSICAL EXAM:    Constitutional: No acute distress, cachectic, non-toxic appearing  HEENT: masked, good phonation, not icteric  Neck: supple, no lymphadenopathy  Respiratory: clear to ascultation bilaterally, no wheezing  Cardiovascular: S1 and S2, regular rate and rhythm, no murmurs rubs or gallops  Gastrointestinal: soft, non-tender, non-distended, +bowel sounds, no rebound or guarding, + surgical scars, no drains  Extremities: No peripheral edema, no cyanosis or clubbing  Vascular: 2+ peripheral pulses, no venous stasis  Neurological: A/O x 3, no focal deficits, no asterixis  Psychiatric: Normal mood, normal affect  Skin: No rashes, not jaundiced    LABS:                        13.7   10.09 )-----------( 142      ( 16 Sep 2024 12:58 )             40.7     09-16    140  |  98  |  17  ----------------------------<  109<H>  3.2<L>   |  36<H>  |  0.72    Ca    9.8      16 Sep 2024 12:58    TPro  7.8  /  Alb  3.1<L>  /  TBili  1.2  /  DBili  x   /  AST  15  /  ALT  12  /  AlkPhos  126<H>  09-16      LIVER FUNCTIONS - ( 16 Sep 2024 12:58 )  Alb: 3.1 g/dL / Pro: 7.8 gm/dL / ALK PHOS: 126 U/L / ALT: 12 U/L / AST: 15 U/L / GGT: x             RADIOLOGY & ADDITIONAL STUDIES:  CT chest pending Patient is a 64y old  Male who presents with a chief complaint of inability to swallow food, liquid, saliva    64 year old man with DM 2, HTN, esophageal cancer s/p [place] ana esophagectomy, admitted with a few weeks of inability to tolerate PO.     Patient states over the last few months has had worsening issues eating food. At first had issues with solid foods but this has progressed slowly from solid foods to liquids and now can't tolerate anything. When he would chew food and swallow would regurgitate it immediately and this progressed to liquids and now per patient, has been over the past week having excessive regurgitation of large amount of mucus as well as fluids/soft foods now inclusive of his own saliva with cup to spit in at bedside during evaluation. Denies any pain with swallowing or waking up with food/liquid on pillow. Denies epigastric, abdominal, or chest discomfort. Endorses constipation with one bowel movement yesterday in past 5 days. Also with significant weight loss since 2023 of about 100lbs with worsening weight loss over past couple months, patient unable to quantify. Patient with Scopalamine patch to dry secretions although per patient, "not working" with excessive secretions, and unable to swallow. Followed by heme/onc Dr. Chambers with recent liver biopsy +adenocarcinoma, lesions on lungs, and per patient, appt for PET scan next week 9/24. Denies fever, chills, or SOB. Admits to overall generalized fatigue worsened by recent shingles infections x 2.     PAST MEDICAL & SURGICAL HISTORY:  Esophageal mass      HTN (hypertension)      Esophageal cancer      Type 2 diabetes mellitus      Malignant neoplasm of cardia      History of hypotension      History of tachycardia      Chemotherapy-induced neuropathy      S/P cholecystectomy      H/O lithotripsy      H/O endoscopy      History of chemotherapy      S/P radiation therapy      Esophageal stenosis      History of esophagogastroduodenoscopy (EGD)    [facility] ana esophagectomy    MEDICATIONS  (STANDING):  sodium chloride 0.9%. 1000 milliLiter(s) (200 mL/Hr) IV Continuous <Continuous>    MEDICATIONS  (PRN):    Allergies    No Known Allergies    Intolerances    SOCIAL HISTORY:  no smoking, drinking or drugs    FAMILY HISTORY:  FH: prostate cancer (Father)    REVIEW OF SYSTEMS:    CONSTITUTIONAL: + weakness, no fevers or chills  EYES/ENT: No visual changes;  No vertigo or throat pain   NECK: No pain or stiffness  RESPIRATORY: No cough, wheezing, hemoptysis; No shortness of breath  CARDIOVASCULAR: No chest pain or palpitations  GASTROINTESTINAL: See HPI  GENITOURINARY: No dysuria, frequency or hematuria  NEUROLOGICAL: No numbness or weakness  SKIN: No itching, burning, rashes, or lesions   PSYCH: Normal mood and affect  All other review of systems is negative unless indicated above.    Vital Signs Last 24 Hrs  T(C): 36.3 (16 Sep 2024 12:20), Max: 36.3 (16 Sep 2024 12:20)  T(F): 97.3 (16 Sep 2024 12:20), Max: 97.3 (16 Sep 2024 12:20)  HR: 106 (16 Sep 2024 12:20) (106 - 106)  BP: 131/100 (16 Sep 2024 12:20) (131/100 - 131/100)  BP(mean): --  RR: 17 (16 Sep 2024 12:20) (17 - 17)  SpO2: 95% (16 Sep 2024 12:20) (95% - 95%)    Parameters below as of 16 Sep 2024 12:20  Patient On (Oxygen Delivery Method): room air    PHYSICAL EXAM:    Constitutional: No acute distress, cachectic, non-toxic appearing  HEENT: unmasked, good phonation, not icteric  Neck: supple, no lymphadenopathy  Respiratory: clear to ascultation bilaterally, no wheezing  Cardiovascular: S1 and S2, regular rate and rhythm, no murmurs rubs or gallops  Gastrointestinal: soft, non-tender, non-distended, +bowel sounds, no rebound or guarding, + surgical scars, no drains  Extremities: No peripheral edema, no cyanosis or clubbing  Vascular: 2+ peripheral pulses, no venous stasis  Neurological: A/O x 3, no focal deficits, no asterixis  Psychiatric: Normal mood, normal affect  Skin: No rashes, not jaundiced    LABS:                        13.7   10.09 )-----------( 142      ( 16 Sep 2024 12:58 )             40.7     09-16    140  |  98  |  17  ----------------------------<  109<H>  3.2<L>   |  36<H>  |  0.72    Ca    9.8      16 Sep 2024 12:58    TPro  7.8  /  Alb  3.1<L>  /  TBili  1.2  /  DBili  x   /  AST  15  /  ALT  12  /  AlkPhos  126<H>  09-16      LIVER FUNCTIONS - ( 16 Sep 2024 12:58 )  Alb: 3.1 g/dL / Pro: 7.8 gm/dL / ALK PHOS: 126 U/L / ALT: 12 U/L / AST: 15 U/L / GGT: x             RADIOLOGY & ADDITIONAL STUDIES:  CT chest pending

## 2024-09-16 NOTE — ED ADULT NURSE REASSESSMENT NOTE - NS ED NURSE REASSESS COMMENT FT1
Pt stated he had a bedsore. Assessment of skin done and pt has a redden area to the right of his sacrum. Avellyn dressing applied.

## 2024-09-16 NOTE — PHARMACOTHERAPY INTERVENTION NOTE - COMMENTS
Medication reconciliation completed.  Reviewed Medication list and confirmed med allergies with patient; confirmed with Dr. Ramsay [facility].  pt confirms that he returned home from a Rehab facility at the end of July and stopped all meds except for Scopolamine Patch and Gabapentin.  He then stopped taking Gabapentin ~10 days ago and is ONLY using the Transderm-Scop.

## 2024-09-16 NOTE — PATIENT PROFILE ADULT - HEALTH LITERACY
Incoming Refill Request      Medication requested (name and dose):   HYDROcodone-acetaminophen (NORCO)  MG per tablet         Pharmacy where request should be sent:  [facility]     Additional details provided by patient:     Best call back number:     Does the patient have less than a 3 day supply:  [] Yes  [] No    Mila Santamaria  03/14/22, 08:40 CDT           no

## 2024-09-16 NOTE — H&P ADULT - ASSESSMENT
This is a 64 year old male with significant past medical history of HTN, DM2, esophageal/gastric adenocarcinoma s/p Andrew Bahman Esophagogastrectomy 8/15/23 presenting with several week history of inability to tolerate PO intake.    Assessment:  Metastatic Esophageal Cancer  Mets to liver and lung  Esophageal stricture  Failure to thrive, Severe protein and calore malnutrition  Inability to take po nutrition  Hypokalemia    Plan:  GI Consult in progress  IV Fluids  Potassium Repletion    Extensive Goals of care conversation  Pt not afraid to die but not ready at this time. Would agree to tube feeds if needed. Refuses CPR but would agree to a temporary trial of intubation and ventilation

## 2024-09-16 NOTE — ED ADULT NURSE NOTE - NSFALLRISKINTERV_ED_ALL_ED

## 2024-09-16 NOTE — ED ADULT NURSE NOTE - OBJECTIVE STATEMENT
Pt is a 63 y/o male, alert and oriented x3, presenting to ED c/o weakness. Pt reports, "For the past 1 week, I have felt more weak and fatigued. I have history of esophogeal cancer and had surgery for a new esophagus on 8/15/2023. Ever since I've had my esophagus surgery, I have been losing weight, unable to eat and stay hydrated, and have been increasingly weak. I started feeling worse within the last week." Pt denies SOB, blood thinner use, nausea, vomiting, diarrhea, fevers.   20G IV placed in LEFT ac and labs sent.

## 2024-09-16 NOTE — H&P ADULT - HISTORY OF PRESENT ILLNESS
This is a 64 year old male with significant past medical history of HTN, DM2, esophageal/gastric adenocarcinoma s/p Andrew Bahman Esophagogastrectomy 8/15/23 presenting with several week history of inability to tolerate PO intake. Patient presented to the ED accompanied by spouse, Fariha. Per patient, has been over the past week having excessive regurgitation of large amount of mucus as well as fluids/soft foods now inclusive of his own saliva with cup to spit in at bedside during evaluation. Denies any pain with swallowing or waking up with food/liquid on pillow. Denies epigastric, abdominal, or chest discomfort.  C/o significant weight loss since 2023 of about 100lbs with worsening weight loss over past couple months, States the Scopalamine patch helps a little for secretions although per patient, "not working" with excessive secretions, and unable to swallow. Spitting out saliva. Unable to swallow more that an oz or two of thin liquids. Ensure is too thick to get down  Followed by heme/onc Dr. Chambers with recent liver biopsy +adenocarcinoma, lesions on lungs, and per patient, appt for PET scan.        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)  HTN (hypertension), benign (401.1) (I10)  Hypotension, unspecified hypotension type (458.9) (I95.9)  Idiopathic hypotension (458.9) (I95.0)  Influenza vaccine administered (V04.81) (Z23)  Paroxysmal atrial fibrillation (427.31) (I48.0)  Postoperative atrial fibrillation (997.1,427.31) (I97.89,I48.91)  Tachycardia (785.0) (R00.0)  Type 2 diabetes mellitus without complication, without long-term current use of insulin  (250.00) (E11.9)          Past Medical History  Acute laryngitis (464.00) (J04.0)  History of Acute non-infective otitis externa of right ear, unspecified type (380.22)  (H60.501)  History of Dysphagia, oropharyngeal (787.22) (R13.12)  History of cough  History of herpes zoster (V12.09) (Z86.19)  History of nausea and vomiting (V12.79) (Z87.898)  History of psoriasis (V13.3) (Z87.2)  History of renal calculi (V13.01) (Z87.442)  History of Nasal septum ulceration (478.19) (J34.0)  History of Rhinitis sicca (472.0) (J31.0)  History of Screening for viral disease (V73.99) (Z11.59)  History of Surgical follow-up care (V67.00) (Z09)  History of Tetanus-diphtheria (Td) vaccination (V06.5) (Z23)    Current Meds  Scopolamine 1 MG/3DAYS Transdermal Patch 72 Hour; APPLY 1 PATCH EVERY 3 DAYS  Stopped taking all other medications    Allergies   sulfa

## 2024-09-16 NOTE — CONSULT NOTE ADULT - NS ATTEND AMEND GEN_ALL_CORE FT
64 year old man with esophageal cancer s/p surgery, now with mets, with worsening dysphagia and inability to tolerate PO.     Plan for EGD, will consent for stent, dilation, g tube, all possibilities for interventions to help.

## 2024-09-16 NOTE — CONSULT NOTE ADULT - ASSESSMENT
64 year old male with significant past medical history of HTN, DM2, esophageal/gastric adenocarcinoma s/p Andrew Bahman Esophagogastrectomy 8/15/23 presenting with several week history of inability to tolerate PO intake. History of esoph stent prior to surgical intervention with stent placed by Dr. Velazco via EGD on 2/15/23 with subsequent removal 6/5/23. Recent liver biopsy + adenoca with lung nodules, pending PET.    Imp: Inability to tolerate PO inclusive of saliva 2/2 possible recurrent tumor versus anastomotic stricture    Rec:  ::Obtain CT chest toe valuate further  ::Replete lytes, IVF as needed  ::Discussed current significant malnutrition/malnourishment and likelihood for PEJ/jtube via EGD or surgery for nutrition which patient amenable to  ::NPO p MN  ::EGD tomorrow poss stent versus PEJ placement 64 year old male with significant past medical history of HTN, DM2, esophageal/gastric adenocarcinoma s/p Andrew Bahman Esophagogastrectomy 8/15/23 presenting with several week history of inability to tolerate PO intake. History of esoph stent prior to surgical intervention with stent placed by Dr. Velazco via EGD on 2/15/23 with subsequent removal 6/5/23. Recent liver biopsy + adenoca with lung nodules, pending PET.    Imp: Inability to tolerate PO inclusive of saliva 2/2 possible recurrent tumor versus anastomotic stricture    Rec:  ::Obtain CT chest te valuate further  ::Replete lytes, IVF as needed  ::Discussed current significant malnutrition/malnourishment and likelihood for PEJ/jtube via EGD or surgery for nutrition which patient amenable to  ::NPO p MN  ::EGD tomorrow poss stent versus G tube vs PEGJ placement

## 2024-09-16 NOTE — ED ADULT TRIAGE NOTE - RESPIRATORY RATE (BREATHS/MIN)
05/31/24 1057   Prechemo Checklist   Has the patient been in the hospital, ED, or urgent care since last date of service No   Chemo/Immuno Consent Completed and Signed Yes   Protocol/Indications Verified Yes   Confirmed to previous date/time of medication Yes   Compared to previous dose Yes   All medications are dated accurately Yes   Pregnancy Test Negative Yes   Parameters Met Yes   BSA/Weight-Height Verified Yes   Dose Calculations Verified (current, total, cumulative) Yes        17

## 2024-09-16 NOTE — PATIENT PROFILE ADULT - FALL HARM RISK - HARM RISK INTERVENTIONS

## 2024-09-16 NOTE — ED ADULT NURSE REASSESSMENT NOTE - NS ED NURSE REASSESS COMMENT FT1
Received bedside report from previous RN Lilo. Pt lying in stretcher with unlabored respirations and denies any pain. Pt has no complaints, safety and comfort maintained.

## 2024-09-16 NOTE — H&P ADULT - NSHPPHYSICALEXAM_GEN_ALL_CORE
Vital Signs Last 24 Hrs  T(C): 36.3 (16 Sep 2024 14:39), Max: 36.3 (16 Sep 2024 12:20)  T(F): 97.4 (16 Sep 2024 14:39), Max: 97.4 (16 Sep 2024 14:39)  HR: 99 (16 Sep 2024 14:39) (99 - 106)  BP: 124/90 (16 Sep 2024 14:39) (124/90 - 131/100)  BP(mean): 102 (16 Sep 2024 14:39) (102 - 102)  RR: 18 (16 Sep 2024 14:39) (17 - 18)  SpO2: 99% (16 Sep 2024 14:39) (95% - 99%)    Parameters below as of 16 Sep 2024 14:39  Patient On (Oxygen Delivery Method): room air      Constitutional: NAD, awake and alert, cachetic   HEENT: PERRLA, EOMI, Pharynx and mouth dry  Neck: Supple, No JVD, No Lymphadenopathy  Respiratory: Breath sounds are clear bilaterally, No wheezing, rales or rhonchi  Cardiovascular: S1 and S2, regular rate and rhythm, no Murmurs, rubs or gallops  Gastrointestinal: Bowel Sounds present, soft, nontender. No Hepatosplenomegaly. No masses  Extremities: Without clubbing, cyanosis or edema  Vascular: 2+ peripheral pulses  Neurological: A/O x 3, no focal deficits  Musculoskeletal: FROM upper and lower extremities  Skin: No rashes

## 2024-09-16 NOTE — ED PROVIDER NOTE - OBJECTIVE STATEMENT
Subjective:  - Summary : Patient reports difficulty in speaking, severe phlegm build-up interfering with food intake. Prior esophagectomy  - Chief Complaint (CC) : Difficulty in eating due to excessive phlegm, "nothing is going down"  - Past Medical History : Esophagectomy  - Past Surgical History : Esophagectomy Subjective:  - Summary : Patient reports difficulty in swallowing severe phlegm build-up interfering with food intake. Prior esophagectomy  - Chief Complaint (CC) : Difficulty in eating due to excessive phlegm, "nothing is going down"  - Past Medical History : Esophagectomy  - Past Surgical History : Esophagectomy

## 2024-09-16 NOTE — H&P ADULT - NSHPLABSRESULTS_GEN_ALL_CORE
.                    13.7   10.09 )-----------( 142      ( 16 Sep 2024 12:58 )             40.7       140  |  98  |  17  ----------------------------<  109<H>  3.2<L>   |  36<H>  |  0.72    Ca    9.8      16 Sep 2024 12:58    TPro  7.8  /  Alb  3.1<L>  /  TBili  1.2  /  DBili  x   /  AST  15  /  ALT  12  /  AlkPhos  126<H>  09-16    Urinalysis Basic - ( 16 Sep 2024 12:58 )  Color: x / Appearance: x / SG: x / pH: x  Gluc: 109 mg/dL / Ketone: x  / Bili: x / Urobili: x   Blood: x / Protein: x / Nitrite: x   Leuk Esterase: x / RBC: x / WBC x   Sq Epi: x / Non Sq Epi: x / Bacteria: x

## 2024-09-17 LAB
ANION GAP SERPL CALC-SCNC: 3 MMOL/L — LOW (ref 5–17)
BUN SERPL-MCNC: 15 MG/DL — SIGNIFICANT CHANGE UP (ref 7–23)
CALCIUM SERPL-MCNC: 9.1 MG/DL — SIGNIFICANT CHANGE UP (ref 8.5–10.1)
CHLORIDE SERPL-SCNC: 105 MMOL/L — SIGNIFICANT CHANGE UP (ref 96–108)
CO2 SERPL-SCNC: 33 MMOL/L — HIGH (ref 22–31)
CREAT SERPL-MCNC: 0.56 MG/DL — SIGNIFICANT CHANGE UP (ref 0.5–1.3)
EGFR: 109 ML/MIN/1.73M2 — SIGNIFICANT CHANGE UP
GLUCOSE BLDC GLUCOMTR-MCNC: 57 MG/DL — LOW (ref 70–99)
GLUCOSE BLDC GLUCOMTR-MCNC: 65 MG/DL — LOW (ref 70–99)
GLUCOSE BLDC GLUCOMTR-MCNC: 77 MG/DL — SIGNIFICANT CHANGE UP (ref 70–99)
GLUCOSE SERPL-MCNC: 63 MG/DL — LOW (ref 70–99)
POTASSIUM SERPL-MCNC: 4 MMOL/L — SIGNIFICANT CHANGE UP (ref 3.5–5.3)
POTASSIUM SERPL-SCNC: 4 MMOL/L — SIGNIFICANT CHANGE UP (ref 3.5–5.3)
SODIUM SERPL-SCNC: 141 MMOL/L — SIGNIFICANT CHANGE UP (ref 135–145)

## 2024-09-17 PROCEDURE — 99222 1ST HOSP IP/OBS MODERATE 55: CPT

## 2024-09-17 PROCEDURE — 99233 SBSQ HOSP IP/OBS HIGH 50: CPT

## 2024-09-17 RX ADMIN — Medication 100 MILLILITER(S): at 20:25

## 2024-09-17 RX ADMIN — Medication 100 MILLILITER(S): at 10:01

## 2024-09-17 RX ADMIN — ENOXAPARIN SODIUM 40 MILLIGRAM(S): 100 INJECTION SUBCUTANEOUS at 18:21

## 2024-09-17 NOTE — PHYSICAL THERAPY INITIAL EVALUATION ADULT - MUSCLE TONE ASSESSMENT, REHAB EVAL
severe muscle wasting ,loss of bulk due to malnutrition /inability to eat ,swallow etc severe muscle wasting ,loss of bulk due to malnutrition /inability to eat ,swallow etc/mildly decreased tone

## 2024-09-17 NOTE — PHYSICAL THERAPY INITIAL EVALUATION ADULT - NSPTDISCHREC_GEN_A_CORE
TBD based on progress,improved independence/strength/endurance; [facility]/Sub-acute Rehab/Home PT

## 2024-09-17 NOTE — PHYSICAL THERAPY INITIAL EVALUATION ADULT - LIVES WITH, PROFILE
spouse resides with wife Fariha in [place]/spouse resides with wife Fariha in [place] ( pt reports she is a diagnosed manic-depressive and is currently manic)/spouse

## 2024-09-17 NOTE — PHYSICAL THERAPY INITIAL EVALUATION ADULT - PRECAUTIONS/LIMITATIONS, REHAB EVAL
fall precautions/swallowing precautions pt c/o intermittent diplopia x 4 days  ,wears eyeglasses/fall precautions/swallowing precautions/vision precautions

## 2024-09-17 NOTE — DIETITIAN INITIAL EVALUATION ADULT - ADD RECOMMEND
1. If s/p EGD w/ stent placement, ADAT to regular as soon as medically feasible as per MD orders to maximize caloric and nutrient intake; **If stent not placed/ unable to advance diet, ? plan for possible PEJ - please reconsult RD for nutrition support recs.  1. If s/p EGD w/ stent placement, ADAT to regular as soon as medically feasible as per MD orders to maximize caloric and nutrient intake; ** If stent not placed/ unable to advance diet, ? plan for possible PEJ - please reconsult RD for nutrition support recs ***  2. If diet advanced, encourage high-kcal, protein-rich foods, maximize food preferences   3. If diet advanced, add [employer] TID to optimize nutritional needs (provides 325 kcal, 16 g protein/ shake), ensure clear BID (240kcal, 8g protein), and Ciro BID (provides 90 kcal, 2.5 g collagen, 7 g L-Arginine, 7 g L-Glutamine/ 1 packet) to promote wound healing.   4. Consider obtaining vitamin D 25OH level to assess nutriture   5. Hypoglycemia noted, consider adding dextrose w/ IVF   6. C/w anti-emetics PRN  7. Obtain daily wt to track/ trend changes   8. Monitor bowel movements, if no BM for >3 days, consider implementing bowel regimen.   9. Consider adding thiamine 100 mg daily 2/2 poor PO intake/ malnutrition and MVI w/ minerals daily to ensure 100% RDA met   10. As per [facility] - agreeable w/ long-term feeding tube if needed, if TF placed RD will provide TF recs   11. *** Likely at HIGH RISK for refeeding - please obtain BMP, Mg, and Phos levels. Monitor and replete K, Phos, Mg prn if begins to downtrend, especially if IV dextrose started. If nutrition support is initiated, recommend to check refeeding labs BID x 2-3 days upon initiation and then will reevaluate. Consider KPhos suppl BID in effort to supplement low lytes prior to initiating nutrition support. Consider adding 200mg of thiamine and MVI w/ minerals daily.   RD will continue to monitor PO intake, labs, hydration, and wt prn.

## 2024-09-17 NOTE — DIETITIAN INITIAL EVALUATION ADULT - NSFNSPHYEXAMSKINFT_GEN_A_CORE
Pressure Injury 1: coccyx, Stage II  Pressure Injury 2: none, none  Pressure Injury 3: none, none  Pressure Injury 4: none, none  Pressure Injury 5: none, none  Pressure Injury 6: none, none  Pressure Injury 7: none, none  Pressure Injury 8: none, none  Pressure Injury 9: none, none  Pressure Injury 10: none, none  Pressure Injury 11: none, none Alexander = 17   Pressure Injury 1: coccyx, Stage II

## 2024-09-17 NOTE — DIETITIAN INITIAL EVALUATION ADULT - CALCULATED TO (CAL/KG)
----- Message from Champ Choudhary MD sent at 10/5/2021  8:08 AM EDT -----  Please notify patient that labs are stable/looked ok.     [id number]

## 2024-09-17 NOTE — DIETITIAN INITIAL EVALUATION ADULT - PERTINENT MEDS FT
MEDICATIONS  (STANDING):  dextrose 5% + sodium chloride 0.9%. 1000 milliLiter(s) (100 mL/Hr) IV Continuous <Continuous>  enoxaparin Injectable 40 milliGRAM(s) SubCutaneous every 24 hours    MEDICATIONS  (PRN):  acetaminophen     Tablet .. 650 milliGRAM(s) Oral every 6 hours PRN Temp greater or equal to 38C (100.4F), Mild Pain (1 - 3)  ondansetron Injectable 4 milliGRAM(s) IV Push every 8 hours PRN Nausea and/or Vomiting

## 2024-09-17 NOTE — PHYSICAL THERAPY INITIAL EVALUATION ADULT - PERTINENT HX OF CURRENT PROBLEM, REHAB EVAL
64 year old male with significant past medical history of HTN, DM2, esophageal/gastric adenocarcinoma s/p [place] [facility] Esophagogastrectomy 8/15/23 presenting with several week history of inability to tolerate PO intake. Patient presented to the ED accompanied by spouse, Fariha. Per patient, has been over the past week having excessive regurgitation of large amount of mucus as well as fluids/soft foods now inclusive of his own saliva with cup to spit in at bedside during evaluation. Denies any pain with swallowing or waking up with food/liquid on pillow. Denies epigastric, abdominal, or chest discomfort.  C/o significant weight loss since 2023 of about 100lbs with worsening weight loss over past couple months, States the Scopalamine patch helps a little for secretions although per patient, "not working" with excessive secretions, and unable to swallow. Spitting out saliva. Unable to swallow more that an oz or two of thin liquids. Ensure is too thick to get down  Followed by heme/onc Dr. Chambers with recent liver biopsy +adenocarcinoma, lesions on lungs, and per patient, appt for PET scan.

## 2024-09-17 NOTE — PHYSICAL THERAPY INITIAL EVALUATION ADULT - ADDITIONAL COMMENTS
pt attended [facility] at [facility] x10 weeks 2023 and was very pleased with experience there as he regained BLE strength and ability to walk which was lost after his esophagogastrectomy surgery August 2023

## 2024-09-17 NOTE — DIETITIAN INITIAL EVALUATION ADULT - PERTINENT LABORATORY DATA
09-17    141  |  105  |  15  ----------------------------<  63[L]  4.0   |  33[H]  |  0.56    Ca    9.1      17 Sep 2024 06:29    TPro  7.8  /  Alb  3.1[L]  /  TBili  1.2  /  DBili  x   /  AST  15  /  ALT  12  /  AlkPhos  126[H]  09-16  POCT Blood Glucose.: 57 mg/dL (09-17-24 @ 09:54)  A1C with Estimated Average Glucose Result: 5.7 % (06-07-24 @ 06:57)

## 2024-09-17 NOTE — PHYSICAL THERAPY INITIAL EVALUATION ADULT - NSACTIVITYREC_GEN_A_PT
out of bed to chair daily with waffle cushion due to decreased protective fat/muscle wasting with prominent bony landmarks at risk for pressure breakdown; Amb with RW and 1PA for safety ,c/o intermittent diplopia and generalized weakness

## 2024-09-17 NOTE — PHYSICAL THERAPY INITIAL EVALUATION ADULT - ACTIVE RANGE OF MOTION EXAMINATION, REHAB EVAL
marilu. upper extremity Active ROM was WNL (within normal limits)/bilateral lower extremity Active ROM was WNL (within normal limits)

## 2024-09-17 NOTE — DIETITIAN NUTRITION RISK NOTIFICATION - INADEQUATE ENERGY INTAKE
What Type Of Note Output Would You Prefer (Optional)?: Bullet Format What Is The Reason For Today's Visit?: Full Body Skin Examination Additional History: Pt here for fbse. Father had melanoma. </= 50% for >/= 5 days

## 2024-09-17 NOTE — DIETITIAN INITIAL EVALUATION ADULT - OTHER INFO
64 year old male with significant past medical history of HTN, DM2, esophageal/gastric adenocarcinoma s/p [place] [facility] Esophagogastrectomy 8/15/23 presenting with several week history of inability to tolerate PO intake. Patient presented to the ED accompanied by spouse, Fariha. Per patient, has been over the past week having excessive regurgitation of large amount of mucus as well as fluids/soft foods now inclusive of his own saliva with cup to spit in at bedside during evaluation. Denies any pain with swallowing or waking up with food/liquid on pillow. C/o significant weight loss since 2023 of about 100lbs with worsening weight loss over past couple months, States the Scopalamine patch helps a little for secretions although per patient, "not working" with excessive secretions, and unable to swallow. Spitting out saliva. Unable to swallow more that an oz or two of thin liquids. Ensure is too thick to get down  Followed by heme/onc Dr. Chambers with recent liver biopsy +adenocarcinoma, lesions on lungs, and per patient, appt for PET scan. Admit for dysphagia.  64 year old male with significant past medical history of HTN, DM2, esophageal/gastric adenocarcinoma s/p [place] [facility] Esophagogastrectomy 8/15/23 presenting with several week history of inability to tolerate PO intake. Patient presented to the ED accompanied by spouse, Fariha. Per patient, has been over the past week having excessive regurgitation of large amount of mucus as well as fluids/soft foods now inclusive of his own saliva with cup to spit in at bedside during evaluation. Denies any pain with swallowing or waking up with food/liquid on pillow. C/o significant weight loss since 2023 of about 100lbs with worsening weight loss over past couple months, States the Scopalamine patch helps a little for secretions although per patient, "not working" with excessive secretions, and unable to swallow. Spitting out saliva. Unable to swallow more that an oz or two of thin liquids. Ensure is too thick to get down  Followed by heme/onc Dr. Chambers with recent liver biopsy +adenocarcinoma, lesions on lungs, and per patient, appt for PET scan. Admit for dysphagia.     Known to nutr services w/ dx PCM on 6/10/24 and 4/29/24. Upon RD visit this AM, was on CLD, now NPO 2/2 ? planned for EGD w/ possible stent placement vs. PEJ placement, continuing to meet <50% ENN while in HH. Reports # x ~1.5 mo ago after intentional wt gain while in rehab but c/o unintentional wt loss since returning home, however, endorses ~100# unintentional wt loss x past year; states current wt now ~90#. Wt hx as per previous RD notes: 99# (RD bed scale wt) on 6/10/24; 89# (EMR wt) on 4/29/24; 128# on 8/16/23. RD obtained new bed scale wt 87# on 9/17. Unintentional wt loss 19# / 17.9% x1.5 mo - SEVERE and clin sig; continuous wt loss of 41# / 32% x13 mo noted - clin sig. Appears emaciated w/ NFPE revealing severe muscle /fat wasting. Labs reviewed: hypoglycemia noted, consider adding dextrose if to remain on IVF. If s/p stent placement recommend to ADAT to regular once medically feasible as per MD orders to maximize caloric and nutrient intake. If diet advanced, will add [employer] TID to optimize nutritional needs (provides 325 kcal, 16 g protein/ shake), ensure clear BID (240kcal, 8g protein), and Ciro BID (provides 90 kcal, 2.5 g collagen, 7 g L-Arginine, 7 g L-Glutamine/ 1 packet) to promote wound healing, pt receptive to all. **As per MOLST - DNR but agreeable to long-term feeding tube if needed. **If unable to advance diet and PEJ placed, RD will provide TF recs. If diet advanced or EN initiated, pt likely at HIGH RISK for refeeding syndrome - please obtain BMP, Mg, and Phos levels. Monitor and replete K, Phos, Mg prn if begins to downtrend, especially if IV dextrose started. If nutrition support is initiated, recommend to check refeeding labs BID x 2-3 days upon initiation and then will reevaluate. Consider KPhos suppl BID in effort to supplement low lytes prior to initiating nutrition support. Consider adding 200mg of thiamine and MVI w/ minerals daily. RD will continue to follow and monitor intake. See below for other recs.

## 2024-09-17 NOTE — DIETITIAN INITIAL EVALUATION ADULT - ETIOLOGY
r/t decreased ability to meet increased nutrient needs 2/2 worsened dysphagia associated w/ PO intolerance on hx cancer

## 2024-09-17 NOTE — DIETITIAN INITIAL EVALUATION ADULT - ORAL NUTRITION SUPPLEMENTS
If diet advanced, add [employer] TID to optimize nutritional needs (provides 325 kcal, 16 g protein/ shake), ensure clear BID (240kcal, 8g protein), and Ciro BID

## 2024-09-17 NOTE — PHYSICAL THERAPY INITIAL EVALUATION ADULT - PATIENT/FAMILY/SIGNIFICANT OTHER GOALS STATEMENT, PT EVAL
pt anticipates UPPER ENDO likely later today ,wants to be able to eat and keep food down as worried he will get weaker

## 2024-09-17 NOTE — DIETITIAN NUTRITION RISK NOTIFICATION - ADDITIONAL COMMENTS/DIETITIAN RECOMMENDATIONS
1. If s/p EGD w/ stent placement, ADAT to regular as soon as medically feasible as per MD orders to maximize caloric and nutrient intake; ** If stent not placed/ unable to advance diet, ? plan for possible PEJ - please reconsult RD for nutrition support recs ***  2. If diet advanced, encourage high-kcal, protein-rich foods, maximize food preferences   3. If diet advanced, add [employer] TID to optimize nutritional needs (provides 325 kcal, 16 g protein/ shake), ensure clear BID (240kcal, 8g protein), and Ciro BID (provides 90 kcal, 2.5 g collagen, 7 g L-Arginine, 7 g L-Glutamine/ 1 packet) to promote wound healing.   4. Consider obtaining vitamin D 25OH level to assess nutriture   5. Hypoglycemia noted, consider adding dextrose w/ IVF   6. C/w anti-emetics PRN  7. Obtain daily wt to track/ trend changes   8. Monitor bowel movements, if no BM for >3 days, consider implementing bowel regimen.   9. Consider adding thiamine 100 mg daily 2/2 poor PO intake/ malnutrition and MVI w/ minerals daily to ensure 100% RDA met   10. As per MOLST - agreeable w/ long-term feeding tube if needed, if TF placed RD will provide TF recs   11. *** Likely at HIGH RISK for refeeding - please obtain BMP, Mg, and Phos levels. Monitor and replete K, Phos, Mg prn if begins to downtrend, especially if IV dextrose started. If nutrition support is initiated, recommend to check refeeding labs BID x 2-3 days upon initiation and then will reevaluate. Consider KPhos suppl BID in effort to supplement low lytes prior to initiating nutrition support. Consider adding 200mg of thiamine and MVI w/ minerals daily.   RD will continue to monitor PO intake, labs, hydration, and wt prn.

## 2024-09-17 NOTE — PHYSICAL THERAPY INITIAL EVALUATION ADULT - FOLLOWS COMMANDS/ANSWERS QUESTIONS, REHAB EVAL
pt becoming irritated/frustrated as wife attempting to answer questions,offer commentary which he prefers she does not unless he asks her to/100% of the time

## 2024-09-17 NOTE — DIETITIAN INITIAL EVALUATION ADULT - FACTORS AFF FOOD INTAKE
persistent "regurgitation" 2/2 increased mucous/ saliva production/difficulty swallowing/other (specify)/NPO

## 2024-09-17 NOTE — DIETITIAN INITIAL EVALUATION ADULT - ORAL INTAKE PTA/DIET HISTORY
Was previously residing at [facility] x10 weeks, returned home x ~1.5 mo ago and has been living at home w/ wife. Reports appetite increased while at [facility] and was able to eat all foods except for tomato sauce and rose, was consuming Mighty shakes and intentionally gained ~10#. However, states that since he returned home, he has had increased difficulty swallowing 2/2 increased mucous/ saliva secretions that cause him to "regurgitate his food/ thick liquids," worsened x past week, only able to consume minimal amounts of thin liquids including Gatorade and Pedialyte, meeting <50% ENN x >/= 1 week and <75% ENN x >3 mo. Endorses wearing scopolamine patch behind ear to help decrease mucous production as per pt, however, states it has only helped w/ nausea. Hx esophagectomy w/ gastric pull-through on 8/15/23 2/2 hx esophageal cancer. Was previously residing at [facility] x10 weeks, returned home x ~1.5 mo ago and has been living at home w/ wife. Reports appetite increased while at [facility] and was able to eat all foods (except for tomato sauce and rose 2/2 reflux), was consuming Mighty shakes (provides ~ 300 kcal, 11 g protein / shake) and intentionally gained ~10#. However, states that since he returned home, he has had increased difficulty swallowing 2/2 increased mucous/ saliva secretions that cause him to "regurgitate his food/ thick liquids," worsened x past week, only able to consume minimal amounts of thin liquids including Gatorade and Pedialyte, likely meeting <50% ENN x >/= 1 week and overall <75% ENN x past year. Endorses wearing scopolamine patch behind ear to help decrease mucous production since surgery as per pt, however, states it has only helped w/ nausea.

## 2024-09-17 NOTE — PHYSICAL THERAPY INITIAL EVALUATION ADULT - PATIENT PROFILE REVIEW, REHAB EVAL
pt with >100lb weight loss since surgery and more progressive recently/yes
30 yo m pw c/o I am not feeling well when asked the exact nature of his complaint pt repeats I am not feeling well and does not offer any concrete medical complaints. Pt refused a physical exam or any more information during ROS, pt appears grossly atraumatic, with no apparent neurologic deficits with steady gait. and normal vital signs.    I have reviewed available current nursing and previous documentation of past medical, surgical, family, and/or social history.

## 2024-09-18 ENCOUNTER — RESULT REVIEW (OUTPATIENT)
Age: 64
End: 2024-09-18

## 2024-09-18 LAB
ALBUMIN SERPL ELPH-MCNC: 2.2 G/DL — LOW (ref 3.3–5)
ALP SERPL-CCNC: 88 U/L — SIGNIFICANT CHANGE UP (ref 40–120)
ALT FLD-CCNC: 13 U/L — SIGNIFICANT CHANGE UP (ref 12–78)
ANION GAP SERPL CALC-SCNC: 5 MMOL/L — SIGNIFICANT CHANGE UP (ref 5–17)
AST SERPL-CCNC: 13 U/L — LOW (ref 15–37)
BILIRUB SERPL-MCNC: 0.6 MG/DL — SIGNIFICANT CHANGE UP (ref 0.2–1.2)
BUN SERPL-MCNC: 8 MG/DL — SIGNIFICANT CHANGE UP (ref 7–23)
CALCIUM SERPL-MCNC: 8.3 MG/DL — LOW (ref 8.5–10.1)
CHLORIDE SERPL-SCNC: 108 MMOL/L — SIGNIFICANT CHANGE UP (ref 96–108)
CO2 SERPL-SCNC: 31 MMOL/L — SIGNIFICANT CHANGE UP (ref 22–31)
CREAT SERPL-MCNC: 0.6 MG/DL — SIGNIFICANT CHANGE UP (ref 0.5–1.3)
CULTURE RESULTS: ABNORMAL
EGFR: 107 ML/MIN/1.73M2 — SIGNIFICANT CHANGE UP
GLUCOSE BLDC GLUCOMTR-MCNC: 154 MG/DL — HIGH (ref 70–99)
GLUCOSE BLDC GLUCOMTR-MCNC: 197 MG/DL — HIGH (ref 70–99)
GLUCOSE SERPL-MCNC: 159 MG/DL — HIGH (ref 70–99)
POTASSIUM SERPL-MCNC: 2.5 MMOL/L — CRITICAL LOW (ref 3.5–5.3)
POTASSIUM SERPL-SCNC: 2.5 MMOL/L — CRITICAL LOW (ref 3.5–5.3)
PROT SERPL-MCNC: 5.6 GM/DL — LOW (ref 6–8.3)
SODIUM SERPL-SCNC: 144 MMOL/L — SIGNIFICANT CHANGE UP (ref 135–145)
SPECIMEN SOURCE: SIGNIFICANT CHANGE UP

## 2024-09-18 PROCEDURE — 88305 TISSUE EXAM BY PATHOLOGIST: CPT | Mod: 26

## 2024-09-18 PROCEDURE — 43239 EGD BIOPSY SINGLE/MULTIPLE: CPT | Mod: 59

## 2024-09-18 PROCEDURE — 99233 SBSQ HOSP IP/OBS HIGH 50: CPT

## 2024-09-18 PROCEDURE — 43266 EGD ENDOSCOPIC STENT PLACE: CPT

## 2024-09-18 RX ORDER — ONDANSETRON 2 MG/ML
4 INJECTION, SOLUTION INTRAMUSCULAR; INTRAVENOUS ONCE
Refills: 0 | Status: DISCONTINUED | OUTPATIENT
Start: 2024-09-18 | End: 2024-09-18

## 2024-09-18 RX ORDER — OXYCODONE HYDROCHLORIDE 5 MG/1
5 TABLET ORAL ONCE
Refills: 0 | Status: DISCONTINUED | OUTPATIENT
Start: 2024-09-18 | End: 2024-09-18

## 2024-09-18 RX ORDER — FENTANYL CITRATE 50 UG/ML
50 INJECTION INTRAMUSCULAR; INTRAVENOUS
Refills: 0 | Status: DISCONTINUED | OUTPATIENT
Start: 2024-09-18 | End: 2024-09-18

## 2024-09-18 RX ORDER — POTASSIUM CHLORIDE 10 MEQ
10 TABLET, EXT RELEASE, PARTICLES/CRYSTALS ORAL
Refills: 0 | Status: COMPLETED | OUTPATIENT
Start: 2024-09-18 | End: 2024-09-18

## 2024-09-18 RX ADMIN — Medication 100 MILLILITER(S): at 21:40

## 2024-09-18 RX ADMIN — Medication 100 MILLIEQUIVALENT(S): at 18:26

## 2024-09-18 RX ADMIN — Medication 100 MILLIEQUIVALENT(S): at 13:02

## 2024-09-18 RX ADMIN — Medication 100 MILLIEQUIVALENT(S): at 16:22

## 2024-09-18 RX ADMIN — ENOXAPARIN SODIUM 40 MILLIGRAM(S): 100 INJECTION SUBCUTANEOUS at 18:26

## 2024-09-18 NOTE — BRIEF OPERATIVE NOTE - COMMENTS
f/u pathology  clears today  full liquids tomorrow   advance diet slowly after that as tolerated  no difficult to eat meats

## 2024-09-19 ENCOUNTER — OUTPATIENT (OUTPATIENT)
Dept: OUTPATIENT SERVICES | Facility: HOSPITAL | Age: 64
LOS: 1 days | Discharge: HOME | End: 2024-09-19

## 2024-09-19 ENCOUNTER — TRANSCRIPTION ENCOUNTER (OUTPATIENT)
Age: 64
End: 2024-09-19

## 2024-09-19 DIAGNOSIS — Z98.890 OTHER SPECIFIED POSTPROCEDURAL STATES: Chronic | ICD-10-CM

## 2024-09-19 DIAGNOSIS — K22.2 ESOPHAGEAL OBSTRUCTION: Chronic | ICD-10-CM

## 2024-09-19 DIAGNOSIS — C15.9 MALIGNANT NEOPLASM OF ESOPHAGUS, UNSPECIFIED: ICD-10-CM

## 2024-09-19 DIAGNOSIS — Z90.49 ACQUIRED ABSENCE OF OTHER SPECIFIED PARTS OF DIGESTIVE TRACT: Chronic | ICD-10-CM

## 2024-09-19 DIAGNOSIS — Z92.21 PERSONAL HISTORY OF ANTINEOPLASTIC CHEMOTHERAPY: Chronic | ICD-10-CM

## 2024-09-19 DIAGNOSIS — Z92.3 PERSONAL HISTORY OF IRRADIATION: Chronic | ICD-10-CM

## 2024-09-19 LAB
ANION GAP SERPL CALC-SCNC: 6 MMOL/L — SIGNIFICANT CHANGE UP (ref 5–17)
BUN SERPL-MCNC: 5 MG/DL — LOW (ref 7–23)
CALCIUM SERPL-MCNC: 7.9 MG/DL — LOW (ref 8.5–10.1)
CHLORIDE SERPL-SCNC: 106 MMOL/L — SIGNIFICANT CHANGE UP (ref 96–108)
CO2 SERPL-SCNC: 32 MMOL/L — HIGH (ref 22–31)
CREAT SERPL-MCNC: 0.61 MG/DL — SIGNIFICANT CHANGE UP (ref 0.5–1.3)
EGFR: 107 ML/MIN/1.73M2 — SIGNIFICANT CHANGE UP
GLUCOSE SERPL-MCNC: 166 MG/DL — HIGH (ref 70–99)
POTASSIUM SERPL-MCNC: 2.3 MMOL/L — CRITICAL LOW (ref 3.5–5.3)
POTASSIUM SERPL-SCNC: 2.3 MMOL/L — CRITICAL LOW (ref 3.5–5.3)
SODIUM SERPL-SCNC: 144 MMOL/L — SIGNIFICANT CHANGE UP (ref 135–145)

## 2024-09-19 PROCEDURE — 99232 SBSQ HOSP IP/OBS MODERATE 35: CPT

## 2024-09-19 RX ORDER — POTASSIUM CHLORIDE 10 MEQ
20 TABLET, EXT RELEASE, PARTICLES/CRYSTALS ORAL
Refills: 0 | Status: COMPLETED | OUTPATIENT
Start: 2024-09-19 | End: 2024-09-20

## 2024-09-19 RX ADMIN — Medication 100 MILLILITER(S): at 12:21

## 2024-09-19 RX ADMIN — ENOXAPARIN SODIUM 40 MILLIGRAM(S): 100 INJECTION SUBCUTANEOUS at 18:28

## 2024-09-19 RX ADMIN — Medication 20 MILLIEQUIVALENT(S): at 22:44

## 2024-09-19 NOTE — PROGRESS NOTE ADULT - SUBJECTIVE AND OBJECTIVE BOX
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 Patient presented to the ED accompanied by spouse, Fariha. Per patient, has been over the past week having excessive regurgitation of large amount of mucus as well as fluids/soft foods now inclusive of his own saliva with cup to spit in at bedside during evaluation. Denies any pain with swallowing or waking up with food/liquid on pillow. Denies epigastric, abdominal, or chest discomfort.  C/o significant weight loss since 2023 of about 100lbs with worsening weight loss over past couple months, States the Scopalamine patch helps a little for secretions although per patient, "not working" with excessive secretions, and unable to swallow. Spitting out saliva. Unable to swallow more that an oz or two of thin liquids. Ensure is too thick to get down  Followed by heme/onc Dr. Chambers with recent liver biopsy +adenocarcinoma, lesions on lungs, and per patient, appt for PET scan.        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)  HTN (hypertension), benign (401.1) (I10)  Hypotension, unspecified hypotension type (458.9) (I95.9)  Idiopathic hypotension (458.9) (I95.0)  Influenza vaccine administered (V04.81) (Z23)  Paroxysmal atrial fibrillation (427.31) (I48.0)  Postoperative atrial fibrillation (997.1,427.31) (I97.89,I48.91)  Tachycardia (785.0) (R00.0)  Type 2 diabetes mellitus without complication, without long-term current use of insulin  (250.00) (E11.9)          Past Medical History  Acute laryngitis (464.00) (J04.0)  History of Acute non-infective otitis externa of right ear, unspecified type (380.22)  (H60.501)  History of Dysphagia, oropharyngeal (787.22) (R13.12)  History of cough  History of herpes zoster (V12.09) (Z86.19)  History of nausea and vomiting (V12.79) (Z87.898)  History of psoriasis (V13.3) (Z87.2)  History of renal calculi (V13.01) (Z87.442)  History of Nasal septum ulceration (478.19) (J34.0)  History of Rhinitis sicca (472.0) (J31.0)  History of Screening for viral disease (V73.99) (Z11.59)  History of Surgical follow-up care (V67.00) (Z09)  History of Tetanus-diphtheria (Td) vaccination (V06.5) (Z23)    Current Meds  Scopolamine 1 MG/3DAYS Transdermal Patch 72 Hour; APPLY 1 PATCH EVERY 3 DAYS  Stopped taking all other medications    Allergies   sulfa (16 Sep 2024 17:51)      Interval HPI and Overnight Events: Pt tolerated esophageal stent yesterday. Last night started on clears. This am started on full fluids. Tolerating so far. No complaints    REVIEW OF SYSTEMS:  CONSTITUTIONAL: No weakness, fevers or chills  EYES/ENT: No visual changes;  No vertigo or throat pain   NECK: No pain or stiffness  RESPIRATORY: No cough, wheezing, hemoptysis; No shortness of breath  CARDIOVASCULAR: No chest pain or palpitations  GASTROINTESTINAL: No abdominal or epigastric pain. No nausea, vomiting, or hematemesis; No diarrhea or constipation. No melena or hematochezia.  GENITOURINARY: No dysuria, frequency or hematuria  NEUROLOGICAL: No numbness or weakness  SKIN: No itching, burning, rashes, or lesions   All other review of systems is negative unless indicated above    OBJECTIVE    Vital Signs Last 24 Hrs  T(C): 36.7 (18 Sep 2024 21:25), Max: 36.7 (18 Sep 2024 21:25)  T(F): 98.1 (18 Sep 2024 21:25), Max: 98.1 (18 Sep 2024 21:25)  HR: 71 (18 Sep 2024 21:25) (71 - 71)  BP: 110/87 (18 Sep 2024 21:25) (110/87 - 110/87)  BP(mean): --  RR: 17 (18 Sep 2024 21:25) (17 - 17)  SpO2: 100% (18 Sep 2024 21:25) (100% - 100%)    Parameters below as of 18 Sep 2024 21:25  Patient On (Oxygen Delivery Method): room air        MEDICATIONS  (STANDING):  dextrose 5% + sodium chloride 0.9%. 1000 milliLiter(s) (100 mL/Hr) IV Continuous <Continuous>  enoxaparin Injectable 40 milliGRAM(s) SubCutaneous every 24 hours      LABS:     09-18    144  |  108  |  8   ----------------------------<  159[H]  2.5[LL]   |  31  |  0.60    Ca    8.3[L]      18 Sep 2024 11:06    TPro  5.6[L]  /  Alb  2.2[L]  /  TBili  0.6  /  DBili  x   /  AST  13[L]  /  ALT  13  /  AlkPhos  88  09-18    Urinalysis Basic - ( 18 Sep 2024 11:06 )  Color: x / Appearance: x / SG: x / pH: x  Gluc: 159 mg/dL / Ketone: x  / Bili: x / Urobili: x   Blood: x / Protein: x / Nitrite: x   Leuk Esterase: x / RBC: x / WBC x   Sq Epi: x / Non Sq Epi: x / Bacteria: x    Specimen Source .Urine None   09-16 @ 19:38  Culture Results  >100,000 CFU/ml Staphylococcus haemolyticus  "Susceptibilities not performed"[!]      
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 Patient presented to the ED accompanied by spouse, Fariha. Per patient, has been over the past week having excessive regurgitation of large amount of mucus as well as fluids/soft foods now inclusive of his own saliva with cup to spit in at bedside during evaluation. Denies any pain with swallowing or waking up with food/liquid on pillow. Denies epigastric, abdominal, or chest discomfort.  C/o significant weight loss since 2023 of about 100lbs with worsening weight loss over past couple months, States the Scopalamine patch helps a little for secretions although per patient, "not working" with excessive secretions, and unable to swallow. Spitting out saliva. Unable to swallow more that an oz or two of thin liquids. Ensure is too thick to get down  Followed by heme/onc Dr. Chambers with recent liver biopsy +adenocarcinoma, lesions on lungs, and per patient, appt for PET scan.    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)  HTN (hypertension), benign (401.1) (I10)  Hypotension, unspecified hypotension type (458.9) (I95.9)  Idiopathic hypotension (458.9) (I95.0)  Influenza vaccine administered (V04.81) (Z23)  Paroxysmal atrial fibrillation (427.31) (I48.0)  Postoperative atrial fibrillation (997.1,427.31) (I97.89,I48.91)  Tachycardia (785.0) (R00.0)  Type 2 diabetes mellitus without complication, without long-term current use of insulin  (250.00) (E11.9)    Past Medical History  Acute laryngitis (464.00) (J04.0)  History of Acute non-infective otitis externa of right ear, unspecified type (380.22)  (H60.501)  History of Dysphagia, oropharyngeal (787.22) (R13.12)  History of cough  History of herpes zoster (V12.09) (Z86.19)  History of nausea and vomiting (V12.79) (Z87.898)  History of psoriasis (V13.3) (Z87.2)  History of renal calculi (V13.01) (Z87.442)  History of Nasal septum ulceration (478.19) (J34.0)  History of Rhinitis sicca (472.0) (J31.0)  History of Screening for viral disease (V73.99) (Z11.59)  History of Surgical follow-up care (V67.00) (Z09)  History of Tetanus-diphtheria (Td) vaccination (V06.5) (Z23)    Current Meds  Scopolamine 1 MG/3DAYS Transdermal Patch 72 Hour; APPLY 1 PATCH EVERY 3 DAYS  Stopped taking all other medications    Allergies   sulfa (16 Sep 2024 17:51)    Interval HPI and Overnight Events: Pt has been NPO most of the morning waiting for procedure. Unable to be performed today. Now clear for clear liquids. Will be instrumented after 11 am tomorrow. Probably Stent    REVIEW OF SYSTEMS:  CONSTITUTIONAL: No weakness, fevers or chills  EYES/ENT: No visual changes;  No vertigo or throat pain   NECK: No pain or stiffness  RESPIRATORY: No cough, wheezing, hemoptysis; No shortness of breath  CARDIOVASCULAR: No chest pain or palpitations  GASTROINTESTINAL: No abdominal or epigastric pain. No nausea, vomiting, or hematemesis; No diarrhea or constipation. No melena or hematochezia.  GENITOURINARY: No dysuria, frequency or hematuria  NEUROLOGICAL: No numbness or weakness  SKIN: No itching, burning, rashes, or lesions   All other review of systems is negative unless indicated above    OBJECTIVE    Vital Signs Last 24 Hrs  T(C): 36.3 (17 Sep 2024 08:41), Max: 36.6 (16 Sep 2024 20:55)  T(F): 97.3 (17 Sep 2024 08:41), Max: 97.9 (16 Sep 2024 20:55)  HR: 80 (17 Sep 2024 08:41) (80 - 87)  BP: 134/87 (17 Sep 2024 08:41) (110/82 - 136/82)  BP(mean): 90 (16 Sep 2024 20:55) (90 - 92)  RR: 18 (17 Sep 2024 08:41) (18 - 18)  SpO2: 100% (17 Sep 2024 08:41) (99% - 100%)    Parameters below as of 17 Sep 2024 08:41  Patient On (Oxygen Delivery Method): room air        MEDICATIONS  (STANDING):  dextrose 5% + sodium chloride 0.9%. 1000 milliLiter(s) (100 mL/Hr) IV Continuous <Continuous>  enoxaparin Injectable 40 milliGRAM(s) SubCutaneous every 24 hours      LABS:                         13.7   10.09 )-----------( 142      ( 16 Sep 2024 12:58 )             40.7     09-17    141  |  105  |  15  ----------------------------<  63[L]  4.0   |  33[H]  |  0.56    Ca    9.1      17 Sep 2024 06:29    TPro  7.8  /  Alb  3.1[L]  /  TBili  1.2  /  DBili  x   /  AST  15  /  ALT  12  /  AlkPhos  126[H]  09-16    Urinalysis Basic - ( 17 Sep 2024 06:29 )    Color: x / Appearance: x / SG: x / pH: x  Gluc: 63 mg/dL / Ketone: x  / Bili: x / Urobili: x   Blood: x / Protein: x / Nitrite: x   Leuk Esterase: x / RBC: x / WBC x   Sq Epi: x / Non Sq Epi: x / Bacteria: x    CAPILLARY BLOOD GLUCOSE  POCT Blood Glucose.: 77 mg/dL (17 Sep 2024 11:36)  POCT Blood Glucose.: 65 mg/dL (17 Sep 2024 10:32)  POCT Blood Glucose.: 57 mg/dL (17 Sep 2024 09:54)    
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 Patient presented to the ED accompanied by spouse, Fariha. Per patient, has been over the past week having excessive regurgitation of large amount of mucus as well as fluids/soft foods now inclusive of his own saliva with cup to spit in at bedside during evaluation. Denies any pain with swallowing or waking up with food/liquid on pillow. Denies epigastric, abdominal, or chest discomfort.  C/o significant weight loss since 2023 of about 100lbs with worsening weight loss over past couple months, States the Scopalamine patch helps a little for secretions although per patient, "not working" with excessive secretions, and unable to swallow. Spitting out saliva. Unable to swallow more that an oz or two of thin liquids. Ensure is too thick to get down  Followed by heme/onc Dr. Chambers with recent liver biopsy +adenocarcinoma, lesions on lungs, and per patient, appt for PET scan.        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)  HTN (hypertension), benign (401.1) (I10)  Hypotension, unspecified hypotension type (458.9) (I95.9)  Idiopathic hypotension (458.9) (I95.0)  Influenza vaccine administered (V04.81) (Z23)  Paroxysmal atrial fibrillation (427.31) (I48.0)  Postoperative atrial fibrillation (997.1,427.31) (I97.89,I48.91)  Tachycardia (785.0) (R00.0)  Type 2 diabetes mellitus without complication, without long-term current use of insulin  (250.00) (E11.9)          Past Medical History  Acute laryngitis (464.00) (J04.0)  History of Acute non-infective otitis externa of right ear, unspecified type (380.22)  (H60.501)  History of Dysphagia, oropharyngeal (787.22) (R13.12)  History of cough  History of herpes zoster (V12.09) (Z86.19)  History of nausea and vomiting (V12.79) (Z87.898)  History of psoriasis (V13.3) (Z87.2)  History of renal calculi (V13.01) (Z87.442)  History of Nasal septum ulceration (478.19) (J34.0)  History of Rhinitis sicca (472.0) (J31.0)  History of Screening for viral disease (V73.99) (Z11.59)  History of Surgical follow-up care (V67.00) (Z09)  History of Tetanus-diphtheria (Td) vaccination (V06.5) (Z23)    Current Meds  Scopolamine 1 MG/3DAYS Transdermal Patch 72 Hour; APPLY 1 PATCH EVERY 3 DAYS  Stopped taking all other medications    Allergies   sulfa (16 Sep 2024 17:51)      Interval HPI and Overnight Events: Pt states he was able to drink some clears last night. NPO since MN for procedure today    REVIEW OF SYSTEMS:  CONSTITUTIONAL: No weakness, fevers or chills  EYES/ENT: No visual changes;  No vertigo or throat pain   NECK: No pain or stiffness  RESPIRATORY: No cough, wheezing, hemoptysis; No shortness of breath  CARDIOVASCULAR: No chest pain or palpitations  GASTROINTESTINAL: No abdominal or epigastric pain No diarrhea or constipation. No melena or hematochezia.  GENITOURINARY: No dysuria, frequency or hematuria  NEUROLOGICAL: No numbness or weakness  SKIN: No itching, burning, rashes, or lesions   All other review of systems is negative unless indicated above    OBJECTIVE    Vital Signs Last 24 Hrs  T(C): 36.7 (18 Sep 2024 08:58), Max: 36.8 (17 Sep 2024 21:32)  T(F): 98.1 (18 Sep 2024 08:58), Max: 98.3 (17 Sep 2024 21:32)  HR: 55 (18 Sep 2024 08:58) (55 - 82)  BP: 105/65 (18 Sep 2024 08:58) (103/59 - 137/82)  BP(mean): 74 (18 Sep 2024 08:58) (74 - 74)  RR: 18 (18 Sep 2024 08:58) (18 - 18)  SpO2: 100% (18 Sep 2024 08:58) (100% - 100%)    Parameters below as of 18 Sep 2024 08:58  Patient On (Oxygen Delivery Method): room air        MEDICATIONS  (STANDING):  dextrose 5% + sodium chloride 0.9%. 1000 milliLiter(s) (100 mL/Hr) IV Continuous <Continuous>  enoxaparin Injectable 40 milliGRAM(s) SubCutaneous every 24 hours      LABS:                         13.7   10.09 )-----------( 142      ( 16 Sep 2024 12:58 )             40.7     09-17    141  |  105  |  15  ----------------------------<  63[L]  4.0   |  33[H]  |  0.56    Ca    9.1      17 Sep 2024 06:29    TPro  7.8  /  Alb  3.1[L]  /  TBili  1.2  /  DBili  x   /  AST  15  /  ALT  12  /  AlkPhos  126[H]  09-16    Urinalysis Basic - ( 17 Sep 2024 06:29 )    Color: x / Appearance: x / SG: x / pH: x  Gluc: 63 mg/dL / Ketone: x  / Bili: x / Urobili: x   Blood: x / Protein: x / Nitrite: x   Leuk Esterase: x / RBC: x / WBC x   Sq Epi: x / Non Sq Epi: x / Bacteria: x    Specimen Source .Urine None   09-16 @ 19:38  Culture Results  50,000 - 99,000 CFU/mL Gram positive organisms    URINE CULTURE    09-16 @ 19:38    CULTURE RESULTS   50,000 - 99,000 CFU/mL Gram positive organisms    CAPILLARY BLOOD GLUCOSE  POCT Blood Glucose.: 197 mg/dL (18 Sep 2024 05:18)  POCT Blood Glucose.: 77 mg/dL (17 Sep 2024 11:36)  POCT Blood Glucose.: 65 mg/dL (17 Sep 2024 10:32)  POCT Blood Glucose.: 57 mg/dL (17 Sep 2024 09:54)

## 2024-09-19 NOTE — PROGRESS NOTE ADULT - ASSESSMENT
This is a 64 year old male with significant past medical history of HTN, DM2, esophageal/gastric adenocarcinoma s/p Andrew Bahman Esophagogastrectomy 8/15/23 presenting with several week history of inability to tolerate PO intake.    Assessment:  Metastatic Esophageal Cancer  Mets to liver and lung  Esophageal stricture  Failure to thrive, Severe protein and calore malnutrition  Inability to take po nutrition  Hypokalemia - improved  Hypoglycemia    Plan:  GI Consult appreciated. For Esophageal stent tomorrow late morning  IV Fluids - changed to D5NS  NPO after midnight    Extensive Goals of care conversation  Pt not afraid to die but not ready at this time. Would agree to tube feeds if needed. Refuses CPR but would agree to a temporary trial of intubation and ventilation
This is a 64 year old male with significant past medical history of HTN, DM2, esophageal/gastric adenocarcinoma s/p Andrew Bahman Esophagogastrectomy 8/15/23 presenting with several week history of inability to tolerate PO intake.    Assessment:  Metastatic Esophageal Cancer  Mets to liver and lung  Esophageal stricture  Failure to thrive, Severe protein and calore malnutrition  Inability to take po nutrition  Hypokalemia - improved  Hypoglycemia    Plan:  GI Consult appreciated  For Esophageal stenting today  Continue IV Fluids - D5NS  NPO since midnight    Extensive Goals of care conversation  Pt not afraid to die but not ready at this time. Would agree to tube feeds if needed. Refuses CPR but would agree to a temporary trial of intubation and ventilation
This is a 64 year old male with significant past medical history of HTN, DM2, esophageal/gastric adenocarcinoma s/p Andrew Bahman Esophagogastrectomy 8/15/23 presenting with several week history of inability to tolerate PO intake.    Assessment:  Metastatic Esophageal Cancer  Mets to liver and lung  Esophageal stricture  Failure to thrive, Severe protein and calore malnutrition  Inability to take po nutrition  Hypokalemia - improved  Hypoglycemia    Plan:  GI following  POD #1 s/p Esophageal stenting  Continue IV Fluids - D5NS  Advance ditet  Will need rehab  Recheck BMP    Extensive Goals of care conversation  Pt not afraid to die but not ready at this time. Would agree to tube feeds if needed. Refuses CPR but would agree to a temporary trial of intubation and ventilation
yes

## 2024-09-19 NOTE — PROGRESS NOTE ADULT - NUTRITIONAL ASSESSMENT
This patient has been assessed with a concern for Malnutrition and has been determined to have a diagnosis/diagnoses of Severe protein-calorie malnutrition and Underweight (BMI < 19).    This patient is being managed with:   Diet NPO after Midnight-     NPO Start Date: 17-Sep-2024   NPO Start Time: 23:59  Entered: Sep 17 2024  3:04PM    Diet Clear Liquid-  Entered: Sep 17 2024  2:44PM  
This patient has been assessed with a concern for Malnutrition and has been determined to have a diagnosis/diagnoses of Severe protein-calorie malnutrition and Underweight (BMI < 19).    This patient is being managed with:   Diet Soft and Bite Sized-  Entered: Sep 19 2024 12:13PM  
This patient has been assessed with a concern for Malnutrition and has been determined to have a diagnosis/diagnoses of Severe protein-calorie malnutrition and Underweight (BMI < 19).    This patient is being managed with:   Diet Clear Liquid-  Entered: Sep 17 2024  2:44PM

## 2024-09-20 ENCOUNTER — TRANSCRIPTION ENCOUNTER (OUTPATIENT)
Age: 64
End: 2024-09-20

## 2024-09-20 VITALS
RESPIRATION RATE: 18 BRPM | OXYGEN SATURATION: 99 % | DIASTOLIC BLOOD PRESSURE: 64 MMHG | TEMPERATURE: 99 F | SYSTOLIC BLOOD PRESSURE: 104 MMHG | HEART RATE: 87 BPM

## 2024-09-20 LAB
ANION GAP SERPL CALC-SCNC: 4 MMOL/L — LOW (ref 5–17)
ANION GAP SERPL CALC-SCNC: 6 MMOL/L — SIGNIFICANT CHANGE UP (ref 5–17)
BUN SERPL-MCNC: 4 MG/DL — LOW (ref 7–23)
BUN SERPL-MCNC: 5 MG/DL — LOW (ref 7–23)
CALCIUM SERPL-MCNC: 7.8 MG/DL — LOW (ref 8.5–10.1)
CALCIUM SERPL-MCNC: 7.9 MG/DL — LOW (ref 8.5–10.1)
CHLORIDE SERPL-SCNC: 105 MMOL/L — SIGNIFICANT CHANGE UP (ref 96–108)
CHLORIDE SERPL-SCNC: 108 MMOL/L — SIGNIFICANT CHANGE UP (ref 96–108)
CO2 SERPL-SCNC: 30 MMOL/L — SIGNIFICANT CHANGE UP (ref 22–31)
CO2 SERPL-SCNC: 31 MMOL/L — SIGNIFICANT CHANGE UP (ref 22–31)
CREAT SERPL-MCNC: 0.56 MG/DL — SIGNIFICANT CHANGE UP (ref 0.5–1.3)
CREAT SERPL-MCNC: 0.63 MG/DL — SIGNIFICANT CHANGE UP (ref 0.5–1.3)
EGFR: 106 ML/MIN/1.73M2 — SIGNIFICANT CHANGE UP
EGFR: 109 ML/MIN/1.73M2 — SIGNIFICANT CHANGE UP
GLUCOSE SERPL-MCNC: 115 MG/DL — HIGH (ref 70–99)
GLUCOSE SERPL-MCNC: 156 MG/DL — HIGH (ref 70–99)
POTASSIUM SERPL-MCNC: 2.7 MMOL/L — CRITICAL LOW (ref 3.5–5.3)
POTASSIUM SERPL-MCNC: 3.6 MMOL/L — SIGNIFICANT CHANGE UP (ref 3.5–5.3)
POTASSIUM SERPL-SCNC: 2.7 MMOL/L — CRITICAL LOW (ref 3.5–5.3)
POTASSIUM SERPL-SCNC: 3.6 MMOL/L — SIGNIFICANT CHANGE UP (ref 3.5–5.3)
SODIUM SERPL-SCNC: 142 MMOL/L — SIGNIFICANT CHANGE UP (ref 135–145)
SODIUM SERPL-SCNC: 142 MMOL/L — SIGNIFICANT CHANGE UP (ref 135–145)
SURGICAL PATHOLOGY STUDY: SIGNIFICANT CHANGE UP

## 2024-09-20 PROCEDURE — 99239 HOSP IP/OBS DSCHRG MGMT >30: CPT

## 2024-09-20 RX ORDER — POTASSIUM CHLORIDE 10 MEQ
20 TABLET, EXT RELEASE, PARTICLES/CRYSTALS ORAL
Refills: 0 | Status: COMPLETED | OUTPATIENT
Start: 2024-09-20 | End: 2024-09-20

## 2024-09-20 RX ORDER — DEXTROSE, SODIUM ACETATE, POTASSIUM CHLORIDE, POTASSIUM PHOSPHATE, AND SODIUM CHLORIDE 5; .15; .13; .28; .091 G/100ML; G/100ML; G/100ML; G/100ML; G/100ML
1000 INJECTION, SOLUTION INTRAVENOUS
Refills: 0 | Status: DISCONTINUED | OUTPATIENT
Start: 2024-09-20 | End: 2024-09-20

## 2024-09-20 RX ORDER — GABAPENTIN 100 MG
0 CAPSULE ORAL
Refills: 0 | DISCHARGE

## 2024-09-20 RX ORDER — POTASSIUM CHLORIDE 10 MEQ
1 TABLET, EXT RELEASE, PARTICLES/CRYSTALS ORAL
Qty: 0 | Refills: 0 | DISCHARGE
Start: 2024-09-20

## 2024-09-20 RX ADMIN — Medication 20 MILLIEQUIVALENT(S): at 03:00

## 2024-09-20 RX ADMIN — Medication 20 MILLIEQUIVALENT(S): at 00:55

## 2024-09-20 RX ADMIN — DEXTROSE, SODIUM ACETATE, POTASSIUM CHLORIDE, POTASSIUM PHOSPHATE, AND SODIUM CHLORIDE 100 MILLILITER(S): 5; .15; .13; .28; .091 INJECTION, SOLUTION INTRAVENOUS at 11:20

## 2024-09-20 RX ADMIN — Medication 100 MILLILITER(S): at 06:24

## 2024-09-20 RX ADMIN — Medication 20 MILLIEQUIVALENT(S): at 14:58

## 2024-09-20 RX ADMIN — Medication 20 MILLIEQUIVALENT(S): at 11:29

## 2024-09-20 RX ADMIN — Medication 20 MILLIEQUIVALENT(S): at 13:06

## 2024-09-20 RX ADMIN — ENOXAPARIN SODIUM 40 MILLIGRAM(S): 100 INJECTION SUBCUTANEOUS at 17:43

## 2024-09-20 NOTE — DISCHARGE NOTE PROVIDER - NSDCCPCAREPLAN_GEN_ALL_CORE_FT
PRINCIPAL DISCHARGE DIAGNOSIS  Diagnosis: Esophageal stricture  Assessment and Plan of Treatment:       SECONDARY DISCHARGE DIAGNOSES  Diagnosis: History of esophagectomy  Assessment and Plan of Treatment:     Diagnosis: Esophageal cancer  Assessment and Plan of Treatment:     Diagnosis: Metastases to the liver  Assessment and Plan of Treatment:     Diagnosis: Severe malnutrition  Assessment and Plan of Treatment:     Diagnosis: Hypokalemia  Assessment and Plan of Treatment:     Diagnosis: Dehydration  Assessment and Plan of Treatment:

## 2024-09-20 NOTE — DISCHARGE NOTE PROVIDER - CARE PROVIDERS DIRECT ADDRESSES
[email or web address]

## 2024-09-20 NOTE — DISCHARGE NOTE PROVIDER - HOSPITAL COURSE
The patient is a 64y Male with PMH of HTN, DM-2, esophageal cancer with mets to liver s/p RT, constipation, hypotension, generalized cachexia, Herpetic neuralgia,  DNR & DNI and others has been sent to ED with low Hb of 5.7 for blood transfusion and further evaluation.  He feels tired and week. He denies hematemesis, hemoptysis, rectal bleed, hematochezia or hematuria. He is not on any anti-coagulant or NSAIDs.  He denies sob, chest pain, palpitation, abdominal pain, N/V or diarrhea or dysuria.  Denies smoking, alcohol or substance abuse. He is not hypoxic.   The patient was admitted to a medical bed. Recieved IV fluids. Found to be significantly hypokalemic and received multiple doses of potassium iV and eventually PO. He was seen by GI and underwnt placement of esophageal stent. His diet was slowly advanced. Once fount to be stable and able to take regular food he was transfered to SNF rehab.  Condition on discharge improved

## 2024-09-20 NOTE — DISCHARGE NOTE PROVIDER - CARE PROVIDER_API CALL
Miles Hernández  [address], [address] [address]  Phone: [phone]  Fax: [phone]  Follow Up Time:

## 2024-09-20 NOTE — DISCHARGE NOTE PROVIDER - DETAILS OF MALNUTRITION DIAGNOSIS/DIAGNOSES
This patient has been assessed with a concern for Malnutrition and was treated during this hospitalization for the following Nutrition diagnosis/diagnoses:     -  09/17/2024: Severe protein-calorie malnutrition   -  09/17/2024: Underweight (BMI < 19)

## 2024-09-20 NOTE — DISCHARGE NOTE PROVIDER - NSDCPNSUBOBJ_GEN_ALL_CORE
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 Patient presented to the ED accompanied by spouse, Fariha. Per patient, has been over the past week having excessive regurgitation of large amount of mucus as well as fluids/soft foods now inclusive of his own saliva with cup to spit in at bedside during evaluation. Denies any pain with swallowing or waking up with food/liquid on pillow. Denies epigastric, abdominal, or chest discomfort.  C/o significant weight loss since 2023 of about 100lbs with worsening weight loss over past couple months, States the Scopalamine patch helps a little for secretions although per patient, "not working" with excessive secretions, and unable to swallow. Spitting out saliva. Unable to swallow more that an oz or two of thin liquids. Ensure is too thick to get down  Followed by heme/onc Dr. Chambers with recent liver biopsy +adenocarcinoma, lesions on lungs, and per patient, appt for PET scan.        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)  HTN (hypertension), benign (401.1) (I10)  Hypotension, unspecified hypotension type (458.9) (I95.9)  Idiopathic hypotension (458.9) (I95.0)  Influenza vaccine administered (V04.81) (Z23)  Paroxysmal atrial fibrillation (427.31) (I48.0)  Postoperative atrial fibrillation (997.1,427.31) (I97.89,I48.91)  Tachycardia (785.0) (R00.0)  Type 2 diabetes mellitus without complication, without long-term current use of insulin  (250.00) (E11.9)          Past Medical History  Acute laryngitis (464.00) (J04.0)  History of Acute non-infective otitis externa of right ear, unspecified type (380.22)  (H60.501)  History of Dysphagia, oropharyngeal (787.22) (R13.12)  History of cough  History of herpes zoster (V12.09) (Z86.19)  History of nausea and vomiting (V12.79) (Z87.898)  History of psoriasis (V13.3) (Z87.2)  History of renal calculi (V13.01) (Z87.442)  History of Nasal septum ulceration (478.19) (J34.0)  History of Rhinitis sicca (472.0) (J31.0)  History of Screening for viral disease (V73.99) (Z11.59)  History of Surgical follow-up care (V67.00) (Z09)  History of Tetanus-diphtheria (Td) vaccination (V06.5) (Z23)    Current Meds  Scopolamine 1 MG/3DAYS Transdermal Patch 72 Hour; APPLY 1 PATCH EVERY 3 DAYS  Stopped taking all other medications    Allergies   sulfa (16 Sep 2024 17:51)      Interval HPI and Overnight Events:    REVIEW OF SYSTEMS:  CONSTITUTIONAL: No weakness, fevers or chills  EYES/ENT: No visual changes;  No vertigo or throat pain   NECK: No pain or stiffness  RESPIRATORY: No cough, wheezing, hemoptysis; No shortness of breath  CARDIOVASCULAR: No chest pain or palpitations  GASTROINTESTINAL: No abdominal or epigastric pain. No nausea, vomiting, or hematemesis; No diarrhea or constipation. No melena or hematochezia.  GENITOURINARY: No dysuria, frequency or hematuria  NEUROLOGICAL: No numbness or weakness  SKIN: No itching, burning, rashes, or lesions   All other review of systems is negative unless indicated above    OBJECTIVE    PHYSICAL EXAM:  Vital Signs Last 24 Hrs  T(C): 36.7 (20 Sep 2024 08:46), Max: 36.8 (19 Sep 2024 15:30)  T(F): 98 (20 Sep 2024 08:46), Max: 98.3 (19 Sep 2024 15:30)  HR: 91 (20 Sep 2024 08:46) (71 - 91)  BP: 99/63 (20 Sep 2024 08:46) (99/63 - 123/73)  BP(mean): --  RR: 17 (19 Sep 2024 21:35) (17 - 17)  SpO2: 100% (20 Sep 2024 08:46) (99% - 100%)    Parameters below as of 20 Sep 2024 08:46  Patient On (Oxygen Delivery Method): room air      Constitutional: NAD, awake and alert, cachectic   HEENT: PERR, EOMI, Normal Hearing, MMM  Neck: Soft and supple, No LAD, No JVD  Respiratory: Breath sounds are clear bilaterally, No wheezing, rales or rhonchi  Cardiovascular: S1 and S2, regular rate and rhythm, no Murmurs, gallops or rubs  Gastrointestinal: Bowel Sounds present, soft, nontender, nondistended, no guarding, no rebound  Extremities: No peripheral edema  Vascular: 2+ peripheral pulses  Neurological: A/O x 3, no focal deficits  Musculoskeletal: 5/5 strength b/l upper and lower extremities  Skin: No rashes    MEDICATIONS  (STANDING):  dextrose 5% + sodium chloride 0.9% with potassium chloride 20 mEq/L 1000 milliLiter(s) (100 mL/Hr) IV Continuous <Continuous>  enoxaparin Injectable 40 milliGRAM(s) SubCutaneous every 24 hours  potassium chloride    Tablet ER 20 milliEquivalent(s) Oral every 2 hours      LABS:     09-20    142  |  105  |  4[L]  ----------------------------<  156[H]  2.7[LL]   |  31  |  0.63    Ca    7.9[L]      20 Sep 2024 10:12    Urinalysis Basic - ( 20 Sep 2024 10:12 )    Color: x / Appearance: x / SG: x / pH: x  Gluc: 156 mg/dL / Ketone: x  / Bili: x / Urobili: x   Blood: x / Protein: x / Nitrite: x   Leuk Esterase: x / RBC: x / WBC x   Sq Epi: x / Non Sq Epi: x / Bacteria: x    Specimen Source .Urine None   09-16 @ 19:38  Culture Results  >100,000 CFU/ml Staphylococcus haemolyticus  "Susceptibilities not performed"[!]        Assessment and Plan:   · Assessment	  This is a 64 year old male with significant past medical history of HTN, DM2, esophageal/gastric adenocarcinoma s/p Andrew Bahman Esophagogastrectomy 8/15/23 presenting with several week history of inability to tolerate PO intake.    Assessment:  Metastatic Esophageal Cancer  Mets to liver and lung  Esophageal stricture  Failure to thrive, Severe protein and calore malnutrition  Inability to take po nutrition  Hypokalemia   Hypoglycemia    Plan:  GI following  POD #2 s/p Esophageal stenting  Continue IV Fluids - D5NS with 20 KCl  Advance ditet  Transfer to rehab this evening  Give 3 more doses of 20 MEQ po every 2 hours.   Recheck BMP STAT at 4 pm. Should be good to go to SNF rehab. They can recheck potassium there and continue po supplementation.     Extensive Goals of care conversation  Pt not afraid to die but not ready at this time. Would agree to tube feeds if needed. Refuses CPR but would agree to a temporary trial of intubation and ventilation

## 2024-09-20 NOTE — PROVIDER CONTACT NOTE (CRITICAL VALUE NOTIFICATION) - ACTION/TREATMENT ORDERED:
Md made aware. New orders for D5NS0.9with 20 K  at 100 ml/hr and Potassium PO ordered. Will continue to monitor pt.

## 2024-09-20 NOTE — DISCHARGE NOTE PROVIDER - NSDCFUSCHEDAPPT_GEN_ALL_CORE_FT
[facility] Physician [facility]  NUCMED  Bruno D  Scheduled Appointment: 09/24/2024    [facility]  NUCMED  Helena D  Scheduled Appointment: 09/24/2024    [facility]  CATSC[address]  Scheduled Appointment: 09/24/2024    Yue Valero  [facility]  Zeina Tatum  Scheduled Appointment: 10/07/2024

## 2024-09-20 NOTE — DISCHARGE NOTE PROVIDER - NSDCMRMEDTOKEN_GEN_ALL_CORE_FT
Klor-Con M20 oral tablet, extended release: 1 orally once a day one or twice daily pending results of potassium levels while in rehab  scopolamine 1 mg/72 hr transdermal film, extended release: 1 patch transdermally every 3 days

## 2024-09-20 NOTE — DISCHARGE NOTE NURSING/CASE MANAGEMENT/SOCIAL WORK - PATIENT PORTAL LINK FT
You can access the FollowMyHealth Patient Portal offered by [facility] by registering at the following website: [email or web address]. By joining [employer]’s FollowMyHealth portal, you will also be able to view your health information using other applications (apps) compatible with our system.

## 2024-09-21 ENCOUNTER — NON-APPOINTMENT (OUTPATIENT)
Age: 64
End: 2024-09-21

## 2024-09-24 ENCOUNTER — APPOINTMENT (OUTPATIENT)
Dept: NUCLEAR MEDICINE | Facility: CLINIC | Age: 64
End: 2024-09-24
Payer: COMMERCIAL

## 2024-09-24 ENCOUNTER — APPOINTMENT (OUTPATIENT)
Dept: CT IMAGING | Facility: CLINIC | Age: 64
End: 2024-09-24
Payer: COMMERCIAL

## 2024-09-24 ENCOUNTER — OUTPATIENT (OUTPATIENT)
Dept: OUTPATIENT SERVICES | Facility: HOSPITAL | Age: 64
LOS: 1 days | End: 2024-09-24
Payer: COMMERCIAL

## 2024-09-24 DIAGNOSIS — C15.9 MALIGNANT NEOPLASM OF ESOPHAGUS, UNSPECIFIED: ICD-10-CM

## 2024-09-24 DIAGNOSIS — Z92.21 PERSONAL HISTORY OF ANTINEOPLASTIC CHEMOTHERAPY: Chronic | ICD-10-CM

## 2024-09-24 DIAGNOSIS — Z90.49 ACQUIRED ABSENCE OF OTHER SPECIFIED PARTS OF DIGESTIVE TRACT: Chronic | ICD-10-CM

## 2024-09-24 DIAGNOSIS — Z92.3 PERSONAL HISTORY OF IRRADIATION: Chronic | ICD-10-CM

## 2024-09-24 DIAGNOSIS — Z98.890 OTHER SPECIFIED POSTPROCEDURAL STATES: Chronic | ICD-10-CM

## 2024-09-24 DIAGNOSIS — K22.2 ESOPHAGEAL OBSTRUCTION: Chronic | ICD-10-CM

## 2024-09-24 PROCEDURE — 78815 PET IMAGE W/CT SKULL-THIGH: CPT

## 2024-09-24 PROCEDURE — 78815 PET IMAGE W/CT SKULL-THIGH: CPT | Mod: 26,PS

## 2024-09-24 PROCEDURE — A9552: CPT

## 2024-09-26 ENCOUNTER — APPOINTMENT (OUTPATIENT)
Dept: HEMATOLOGY ONCOLOGY | Facility: CLINIC | Age: 64
End: 2024-09-26
Payer: COMMERCIAL

## 2024-09-26 VITALS
SYSTOLIC BLOOD PRESSURE: 125 MMHG | HEART RATE: 94 BPM | OXYGEN SATURATION: 100 % | HEIGHT: 67 IN | BODY MASS INDEX: 14.44 KG/M2 | TEMPERATURE: 98 F | DIASTOLIC BLOOD PRESSURE: 83 MMHG | WEIGHT: 92 LBS

## 2024-09-26 DIAGNOSIS — C15.9 MALIGNANT NEOPLASM OF ESOPHAGUS, UNSPECIFIED: ICD-10-CM

## 2024-09-26 PROCEDURE — 99215 OFFICE O/P EST HI 40 MIN: CPT

## 2024-09-26 PROCEDURE — 99417 PROLNG OP E/M EACH 15 MIN: CPT

## 2024-09-26 PROCEDURE — G2211 COMPLEX E/M VISIT ADD ON: CPT

## 2024-09-26 NOTE — REVIEW OF SYSTEMS
[Fatigue] : fatigue [Recent Change In Weight] : ~T recent weight change [Diarrhea: Grade 0] : Diarrhea: Grade 0 [Negative] : Allergic/Immunologic [Chest Pain] : no chest pain [Shortness Of Breath] : no shortness of breath [Abdominal Pain] : no abdominal pain [Vomiting] : no vomiting [Constipation] : no constipation [FreeTextEntry2] : wt loss

## 2024-09-26 NOTE — ASSESSMENT
[FreeTextEntry1] : Oxaliplatin 130 mg/m2  Capecitabine 850 mg/m2 , by mouth Evening of day 1 to morning of day 15 repeat q. 21 days -risks, benefits and side effects of treatment were reviewed in detail  on 09/26/2024 with the patient.  All questions were asked and answered to the patients satisfaction and consent was given to proceed with chemotherapy. [AdvancecareDate] : 12/8/23

## 2024-09-26 NOTE — HISTORY OF PRESENT ILLNESS
[Home] : at home, [unfilled] , at the time of the visit. [Medical Office: ([facility])___] : at the medical office located in  [Verbal consent obtained from patient] : the patient, [unfilled] [T: ___] : T[unfilled] [N: ___] : N[unfilled] [M: ___] : M[unfilled] [AJCC Stage: ____] : AJCC Stage: [unfilled] [de-identified] : The patient was diagnosed with esophageal adenocarcinoma in February 2023 at the age of 62. He presented to his PCP with complaints of dysphagia, intermittently since 2021, however, since January 2023 it has become severe to the point where he can no longer eat solid foods. On 2/6/23, he had an xray esophagram which showed a 5 cm distal esophageal stricture suspect for neoplasm. On 2/15/23, he underwent an endoscopy which showed at least a T3N1Mx lesion.  Almost completely obstructing esophageal/ GE junction tumor, Siewert Type II.  Stented with an 18 x 103 mm Walflex stent. 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  No loss of nuclear expression of MMR proteins (MLH1, MSH2, MSH6, and PMS2). HER-2: Negative (0/1+ membrane staining). A CT C/A/P on 2/27/23 showed nonspecific bilateral noncalcified pulmonary nodules measuring up to 4 mm Lower third esophageal masslike wall thickening extends to the GE junction and gastric cardia compatible with tumor. Masslike wall thickening extends upstream of the proximal end of the indwelling esophageal Wallstent. Lower esophageal, gastrohepatic and periceliac lymphadenopathy, measures 4.5 x 3.2 cm . Minimal nonspecific nodularity involving the omentum suspicious however inconclusive for carcinomatosis. Probable left hepatic lobe cysts however consider liver MRI if additional imaging is warranted.  [de-identified] : Medical Hx: HTN; GERD; kidney stones \par  Surgical Hx: cholecystectomy; lithotripsy Left kidney  \par  Family Hx: father prostrate ca; paternal uncle lymphoma \par  Social Hx: never smoker; ETOH never; , live together; retired [place] ; family close by [de-identified] : He completed 3 cycles of induction for carbo / taxol on 3/30/23. He completed 6 cycles of carbo / Taxol with RT 5/15/23.  He began Nivolumab q 2 weeks for 8 cycles (started 10/23/23) then monthly for 1 year. Today he is cycle 8. Multiple cycles held due to shingles.  He will return in 2 weeks for higher dose and will be monthly moving forward. He also has a liver biopsy 3/1/24.  His shingle pain is 50% better, remains on neurontin. Reports he is sleeping better, no longer feels discomfort when he rolls over. Denies diarrhea or rash.  His wt remains between 100 and 107 lbs, states he is eating well, following a new diet with less carbs and healthier foods. Will reach out to  again.

## 2024-09-26 NOTE — PHYSICAL EXAM
[Restricted in physically strenuous activity but ambulatory and able to carry out work of a light or sedentary nature] : Status 1- Restricted in physically strenuous activity but ambulatory and able to carry out work of a light or sedentary nature, e.g., light house work, office work [Normal] : affect appropriate [de-identified] : wt loss noted today

## 2024-10-02 ENCOUNTER — NON-APPOINTMENT (OUTPATIENT)
Age: 64
End: 2024-10-02

## 2025-03-27 NOTE — ED STATDOCS - NSICDXPASTSURGICALHX_GEN_ALL_CORE_FT
Detail Level: Detailed Add 42463 Cpt? (Important Note: In 2017 The Use Of 37958 Is Being Tracked By Cms To Determine Future Global Period Reimbursement For Global Periods): no PAST SURGICAL HISTORY:  H/O endoscopy     H/O lithotripsy     S/P cholecystectomy

## 2025-09-16 ENCOUNTER — RESULT REVIEW (OUTPATIENT)
Age: 65
End: 2025-09-16

## 2025-09-19 ENCOUNTER — TRANSCRIPTION ENCOUNTER (OUTPATIENT)
Age: 65
End: 2025-09-19

## 2025-09-20 ENCOUNTER — TRANSCRIPTION ENCOUNTER (OUTPATIENT)
Age: 65
End: 2025-09-20

## (undated) DEVICE — SOL IRR POUR H2O 250ML

## (undated) DEVICE — SHEARS COVIDIEN ENDO SHEAR 5MM X 31CM W UNIPOLAR CAUTERY

## (undated) DEVICE — TROCAR COVIDIEN VERSAPORT BLADELESS OPTICAL 5MM STANDARD

## (undated) DEVICE — XI ARM FORCEP TENACULUM

## (undated) DEVICE — XI ARM PERMANENT CAUTERY HOOK

## (undated) DEVICE — POSITIONER PURPLE ARM ONE STEP (LARGE)

## (undated) DEVICE — XI ARM FORCEP MARYLAND BIPOLAR

## (undated) DEVICE — D HELP - CLEARVIEW CLEARIFY SYSTEM

## (undated) DEVICE — DRAPE MAYO STAND 30"

## (undated) DEVICE — SPECIMEN CONTAINER 100ML

## (undated) DEVICE — CHEST DRAIN PLEUR-EVAC WET/WET ADULT-PEDS SINGLE (QUICK)

## (undated) DEVICE — BLADE SCALPEL SAFETYLOCK #10

## (undated) DEVICE — XI SEAL UNIV 5- 8 MM

## (undated) DEVICE — SUT SOFSILK 0 30" V-20

## (undated) DEVICE — GLV 7 PROTEXIS (WHITE)

## (undated) DEVICE — XI ARM NEEDLE DRIVER LARGE

## (undated) DEVICE — XI ARM GRASPER TIP UP FENESTRATED

## (undated) DEVICE — TUBING INSUFFLATION LAP FILTER 10FT

## (undated) DEVICE — XI ARM FORCEP PROGRASP 8MM

## (undated) DEVICE — ENDOCATCH II 15MM

## (undated) DEVICE — MEDICATION LABELS W MARKER

## (undated) DEVICE — GLV 7.5 PROTEXIS (WHITE)

## (undated) DEVICE — SOL IRR POUR NS 0.9% 500ML

## (undated) DEVICE — PACK BASIN SPECIAL PROCEDURE

## (undated) DEVICE — MARKING PEN W RULER

## (undated) DEVICE — ELCTR BOVIE TIP BLADE INSULATED 2.75" EDGE

## (undated) DEVICE — TROCAR COVIDIEN VERSASTEP PLUS 15MM STANDARD

## (undated) DEVICE — TUBING STRYKEFLOW II SUCTION / IRRIGATOR

## (undated) DEVICE — VISITEC 4X4

## (undated) DEVICE — DRAPE GENERAL ENDOSCOPY

## (undated) DEVICE — GLV 8 PROTEXIS (WHITE)

## (undated) DEVICE — TROCAR COVIDIEN BLUNT TIP HASSAN 10MM

## (undated) DEVICE — XI ARM PERMANENT CAUTERY SPATULA

## (undated) DEVICE — SUT POLYSORB 0 36" GU-46

## (undated) DEVICE — POSITIONER FOAM HEAD CRADLE (PINK)

## (undated) DEVICE — XI ARM CLIP APPLIER LARGE

## (undated) DEVICE — XI OBTURATOR OPTICAL BLADELESS 8MM

## (undated) DEVICE — PACK ROBOTIC LIJ

## (undated) DEVICE — DRAPE MAYO STAND 23"

## (undated) DEVICE — DRSG STERISTRIPS 0.5 X 4"

## (undated) DEVICE — XI ARM FORCEP CADIERE 8MM

## (undated) DEVICE — VALVE YELLOW PORT SEAL PLUS 5MM

## (undated) DEVICE — DRAPE INSTRUMENT POUCH 6.75" X 11"

## (undated) DEVICE — VENODYNE/SCD SLEEVE CALF MEDIUM

## (undated) DEVICE — DRAPE IOBAN 23" X 23"

## (undated) DEVICE — STAPLER COVIDIEN ENDO GIA STANDARD HANDLE

## (undated) DEVICE — XI ARM SCISSOR MONO CURVED

## (undated) DEVICE — DRSG GAUZE PACKTNER ROLL

## (undated) DEVICE — SCOPE WARMER SEAL DISP

## (undated) DEVICE — SUT SOFSILK 2-0 30" V-20

## (undated) DEVICE — BLADE SCALPEL SAFETYLOCK #15

## (undated) DEVICE — DRSG OPSITE 13.75 X 4"

## (undated) DEVICE — XI STAPLER SUREFORM 45

## (undated) DEVICE — URETERAL CATH RED RUBBER 14FR (GREEN)

## (undated) DEVICE — CANISTER SUCTION 2000CC

## (undated) DEVICE — DRAPE BACK TABLE COVER HEAVY DUTY 60"

## (undated) DEVICE — ELCTR BOVIE PENCIL SMOKE EVACUATION

## (undated) DEVICE — WARMING BLANKET UPPER ADULT

## (undated) DEVICE — GLV 6.5 PROTEXIS (WHITE)

## (undated) DEVICE — LAP PAD 18 X 18"

## (undated) DEVICE — Device

## (undated) DEVICE — TUBING SUCTION 20FT

## (undated) DEVICE — SUT POLYSORB 0 60" TIES UNDYED

## (undated) DEVICE — CHEST DRAIN OASIS DRY SUCTION WATER SEAL

## (undated) DEVICE — DRAIN PENROSE .25" X 18" LATEX

## (undated) DEVICE — TUBING AIRSEAL TRI-LUMEN FILTERED

## (undated) DEVICE — NDL COUNTER FOAM AND MAGNET 40-70

## (undated) DEVICE — SOL IRR BAG H2O 1000ML

## (undated) DEVICE — ENDOCATCH GENERAL 15MM (PURPLE)

## (undated) DEVICE — SUT POLYSORB 2-0 30" V-20 UNDYED

## (undated) DEVICE — SUT SOFSILK 2-0 18" C-23

## (undated) DEVICE — XI ARM CLIP APPLIER SMALL

## (undated) DEVICE — ELCTR BOVIE PENCIL HANDPIECE

## (undated) DEVICE — SUT SOFSILK 3-0 30" V-20

## (undated) DEVICE — TROCAR SURGIQUEST AIRSEAL 12MMX100MM

## (undated) DEVICE — XI DRAPE ARM

## (undated) DEVICE — FOLEY TRAY 16FR 5CC LF LUBRISIL ADVANCE TEMP CLOSED

## (undated) DEVICE — XI ARM GRASPER BIPOLAR LONG 8MM

## (undated) DEVICE — POSITIONER FOAM EGG CRATE ULNAR 2PCS (PINK)

## (undated) DEVICE — POSITIONER PINK PAD PIGAZZI SYSTEM

## (undated) DEVICE — XI ARM CLIP APPLIER MEDIUM-LARGE

## (undated) DEVICE — SUT POLYSORB 4-0 P-12 UNDYED

## (undated) DEVICE — TUBING IV SET MICROCLAVE ADAPTER

## (undated) DEVICE — XI ARM NEEDLE DRIVER SUTURECUT MEGA 8MM

## (undated) DEVICE — XI DRAPE COLUMN

## (undated) DEVICE — SUT SILK 3-0 30" SH

## (undated) DEVICE — INSUFFLATION NDL COVIDIEN SURGINEEDLE VERESS 120MM

## (undated) DEVICE — ELCTR BOVIE TIP BLADE INSULATED 6.5" EDGE

## (undated) DEVICE — PACK ADVANCED LAPAROSCOPIC NS

## (undated) DEVICE — XI VESSEL SEALER

## (undated) DEVICE — XI ARM FORCEP FENESTRATED BIPOLAR 8MM

## (undated) DEVICE — PREP CHLORAPREP HI-LITE ORANGE 26ML

## (undated) DEVICE — XI ARM DISSECTOR CURVED BIPOLAR 8MM

## (undated) DEVICE — WARMING BLANKET LOWER ADULT